# Patient Record
Sex: FEMALE | Race: BLACK OR AFRICAN AMERICAN | NOT HISPANIC OR LATINO | URBAN - METROPOLITAN AREA
[De-identification: names, ages, dates, MRNs, and addresses within clinical notes are randomized per-mention and may not be internally consistent; named-entity substitution may affect disease eponyms.]

---

## 2017-03-07 ENCOUNTER — APPOINTMENT (RX ONLY)
Dept: URBAN - METROPOLITAN AREA CLINIC 349 | Facility: CLINIC | Age: 72
Setting detail: DERMATOLOGY
End: 2017-03-07

## 2017-03-07 DIAGNOSIS — L64.8 OTHER ANDROGENIC ALOPECIA: ICD-10-CM

## 2017-03-07 DIAGNOSIS — L73.2 HIDRADENITIS SUPPURATIVA: ICD-10-CM

## 2017-03-07 PROBLEM — I10 ESSENTIAL (PRIMARY) HYPERTENSION: Status: ACTIVE | Noted: 2017-03-07

## 2017-03-07 PROCEDURE — ? PRESCRIPTION

## 2017-03-07 PROCEDURE — ? TREATMENT REGIMEN

## 2017-03-07 PROCEDURE — ? COUNSELING

## 2017-03-07 PROCEDURE — 99213 OFFICE O/P EST LOW 20 MIN: CPT

## 2017-03-07 RX ORDER — MICONAZOLE NITRATE 20.6 MG/G
POWDER TOPICAL
Qty: 1 | Refills: 3 | Status: ERX | COMMUNITY
Start: 2017-03-07

## 2017-03-07 RX ORDER — CLOBETASOL PROPIONATE 0.5 MG/ML
SOLUTION TOPICAL
Qty: 1 | Refills: 6 | Status: ERX | COMMUNITY
Start: 2017-03-07

## 2017-03-07 RX ORDER — CLINDAMYCIN PHOSPHATE 10 MG/ML
SOLUTION TOPICAL
Qty: 1 | Refills: 4 | Status: ERX

## 2017-03-07 RX ORDER — CIPROFLOXACIN 750 MG/1
TABLET, FILM COATED ORAL
Qty: 60 | Refills: 1 | Status: ERX

## 2017-03-07 RX ADMIN — MICONAZOLE NITRATE: 20.6 POWDER TOPICAL at 18:06

## 2017-03-07 RX ADMIN — CLOBETASOL PROPIONATE: 0.5 SOLUTION TOPICAL at 18:10

## 2017-03-07 ASSESSMENT — LOCATION SIMPLE DESCRIPTION DERM
LOCATION SIMPLE: LEFT SCALP
LOCATION SIMPLE: RIGHT UPPER BACK
LOCATION SIMPLE: UPPER BACK
LOCATION SIMPLE: LEFT SCALP
LOCATION SIMPLE: LEFT UPPER BACK

## 2017-03-07 ASSESSMENT — LOCATION ZONE DERM
LOCATION ZONE: SCALP
LOCATION ZONE: TRUNK
LOCATION ZONE: SCALP
LOCATION ZONE: TRUNK

## 2017-03-07 ASSESSMENT — LOCATION DETAILED DESCRIPTION DERM
LOCATION DETAILED: LEFT INFERIOR LATERAL UPPER BACK
LOCATION DETAILED: INFERIOR THORACIC SPINE
LOCATION DETAILED: LEFT CENTRAL FRONTAL SCALP
LOCATION DETAILED: LEFT MEDIAL FRONTAL SCALP
LOCATION DETAILED: RIGHT INFERIOR LATERAL UPPER BACK

## 2017-03-07 NOTE — HPI: RASH (HIDRADENITIS SUPPURATIVA)
How Severe Is It?: severe
Is This A New Presentation, Or A Follow-Up?: Follow Up Hidradenitis Suppurativa

## 2017-03-27 ENCOUNTER — RX ONLY (OUTPATIENT)
Age: 72
Setting detail: RX ONLY
End: 2017-03-27

## 2017-03-27 RX ORDER — FLUCONAZOLE 150 MG/1
TABLET ORAL
Qty: 5 | Refills: 1 | Status: ERX

## 2018-11-01 PROBLEM — E66.01 SEVERE OBESITY (HCC): Status: ACTIVE | Noted: 2018-11-01

## 2020-07-07 ENCOUNTER — APPOINTMENT (RX ONLY)
Dept: URBAN - METROPOLITAN AREA CLINIC 349 | Facility: CLINIC | Age: 75
Setting detail: DERMATOLOGY
End: 2020-07-07

## 2020-07-07 DIAGNOSIS — L30.9 DERMATITIS, UNSPECIFIED: ICD-10-CM

## 2020-07-07 DIAGNOSIS — L73.2 HIDRADENITIS SUPPURATIVA: ICD-10-CM

## 2020-07-07 DIAGNOSIS — L81.0 POSTINFLAMMATORY HYPERPIGMENTATION: ICD-10-CM

## 2020-07-07 PROBLEM — I10 ESSENTIAL (PRIMARY) HYPERTENSION: Status: ACTIVE | Noted: 2020-07-07

## 2020-07-07 PROCEDURE — 99213 OFFICE O/P EST LOW 20 MIN: CPT

## 2020-07-07 PROCEDURE — ? PRESCRIPTION

## 2020-07-07 PROCEDURE — ? COUNSELING

## 2020-07-07 RX ORDER — CIPROFLOXACIN 750 MG/1
TABLET, FILM COATED ORAL
Qty: 60 | Refills: 2 | Status: ERX | COMMUNITY
Start: 2020-07-07

## 2020-07-07 RX ORDER — CLINDAMYCIN PHOSPHATE 10 MG/ML
SOLUTION TOPICAL
Qty: 1 | Refills: 6 | Status: ERX | COMMUNITY
Start: 2020-07-07

## 2020-07-07 RX ORDER — TRIAMCINOLONE ACETONIDE 1 MG/G
CREAM TOPICAL
Qty: 1 | Refills: 2 | Status: ERX | COMMUNITY
Start: 2020-07-07

## 2020-07-07 RX ADMIN — CIPROFLOXACIN: 750 TABLET, FILM COATED ORAL at 00:00

## 2020-07-07 RX ADMIN — TRIAMCINOLONE ACETONIDE: 1 CREAM TOPICAL at 00:00

## 2020-07-07 RX ADMIN — CLINDAMYCIN PHOSPHATE: 10 SOLUTION TOPICAL at 00:00

## 2020-07-07 ASSESSMENT — LOCATION SIMPLE DESCRIPTION DERM
LOCATION SIMPLE: LEFT UPPER BACK
LOCATION SIMPLE: LEFT LOWER BACK
LOCATION SIMPLE: LEFT AXILLARY VAULT
LOCATION SIMPLE: UPPER BACK
LOCATION SIMPLE: RIGHT UPPER BACK
LOCATION SIMPLE: RIGHT POSTERIOR AXILLA
LOCATION SIMPLE: RIGHT AXILLARY VAULT
LOCATION SIMPLE: RIGHT LOWER BACK

## 2020-07-07 ASSESSMENT — LOCATION DETAILED DESCRIPTION DERM
LOCATION DETAILED: LEFT SUPERIOR LATERAL MIDBACK
LOCATION DETAILED: RIGHT POSTERIOR AXILLA
LOCATION DETAILED: LEFT AXILLARY VAULT
LOCATION DETAILED: RIGHT AXILLARY VAULT
LOCATION DETAILED: INFERIOR THORACIC SPINE
LOCATION DETAILED: RIGHT SUPERIOR LATERAL MIDBACK
LOCATION DETAILED: RIGHT INFERIOR UPPER BACK
LOCATION DETAILED: LEFT INFERIOR UPPER BACK

## 2020-07-07 ASSESSMENT — LOCATION ZONE DERM
LOCATION ZONE: TRUNK
LOCATION ZONE: AXILLAE

## 2020-08-28 ENCOUNTER — APPOINTMENT (RX ONLY)
Dept: URBAN - METROPOLITAN AREA CLINIC 349 | Facility: CLINIC | Age: 75
Setting detail: DERMATOLOGY
End: 2020-08-28

## 2020-08-28 DIAGNOSIS — L81.0 POSTINFLAMMATORY HYPERPIGMENTATION: ICD-10-CM

## 2020-08-28 DIAGNOSIS — L73.2 HIDRADENITIS SUPPURATIVA: ICD-10-CM

## 2020-08-28 PROBLEM — E13.9 OTHER SPECIFIED DIABETES MELLITUS WITHOUT COMPLICATIONS: Status: ACTIVE | Noted: 2020-08-28

## 2020-08-28 PROCEDURE — 99213 OFFICE O/P EST LOW 20 MIN: CPT

## 2020-08-28 PROCEDURE — ? PRESCRIPTION

## 2020-08-28 PROCEDURE — ? COUNSELING

## 2020-08-28 RX ORDER — CIPROFLOXACIN 750 MG/1
TABLET, FILM COATED ORAL
Qty: 60 | Refills: 2 | Status: ERX

## 2020-08-28 RX ORDER — CLINDAMYCIN PHOSPHATE 10 MG/ML
SOLUTION TOPICAL
Qty: 1 | Refills: 6 | Status: ERX

## 2020-08-28 ASSESSMENT — LOCATION SIMPLE DESCRIPTION DERM
LOCATION SIMPLE: LEFT AXILLARY VAULT
LOCATION SIMPLE: RIGHT POSTERIOR AXILLA
LOCATION SIMPLE: UPPER BACK
LOCATION SIMPLE: RIGHT UPPER BACK
LOCATION SIMPLE: LEFT UPPER BACK
LOCATION SIMPLE: RIGHT AXILLARY VAULT

## 2020-08-28 ASSESSMENT — LOCATION DETAILED DESCRIPTION DERM
LOCATION DETAILED: INFERIOR THORACIC SPINE
LOCATION DETAILED: RIGHT AXILLARY VAULT
LOCATION DETAILED: RIGHT POSTERIOR AXILLA
LOCATION DETAILED: LEFT INFERIOR UPPER BACK
LOCATION DETAILED: LEFT AXILLARY VAULT
LOCATION DETAILED: RIGHT INFERIOR UPPER BACK

## 2020-08-28 ASSESSMENT — LOCATION ZONE DERM
LOCATION ZONE: AXILLAE
LOCATION ZONE: TRUNK

## 2021-04-09 PROBLEM — E11.3213 BOTH EYES AFFECTED BY MILD NONPROLIFERATIVE DIABETIC RETINOPATHY WITH MACULAR EDEMA, ASSOCIATED WITH TYPE 2 DIABETES MELLITUS (HCC): Status: ACTIVE | Noted: 2021-04-09

## 2021-05-30 ENCOUNTER — HOSPITAL ENCOUNTER (INPATIENT)
Age: 76
LOS: 1 days | Discharge: HOME | DRG: 065 | End: 2021-06-01
Attending: EMERGENCY MEDICINE | Admitting: INTERNAL MEDICINE
Payer: MEDICARE

## 2021-05-30 ENCOUNTER — APPOINTMENT (OUTPATIENT)
Dept: CT IMAGING | Age: 76
DRG: 065 | End: 2021-05-30
Attending: EMERGENCY MEDICINE
Payer: MEDICARE

## 2021-05-30 DIAGNOSIS — R29.810 FACIAL DROOP: Primary | ICD-10-CM

## 2021-05-30 DIAGNOSIS — I63.9 ACUTE ISCHEMIC STROKE (HCC): ICD-10-CM

## 2021-05-30 DIAGNOSIS — R47.1 DYSARTHRIA: ICD-10-CM

## 2021-05-30 PROBLEM — R09.89 SUSPECTED STROKE PATIENT LAST KNOWN TO BE WELL MORE THAN 2 HOURS AGO: Status: ACTIVE | Noted: 2021-05-30

## 2021-05-30 LAB
ANION GAP SERPL CALC-SCNC: 8 MMOL/L (ref 7–16)
ATRIAL RATE: 62 BPM
BASOPHILS # BLD: 0.1 K/UL (ref 0–0.2)
BASOPHILS NFR BLD: 1 % (ref 0–2)
BUN SERPL-MCNC: 16 MG/DL (ref 8–23)
CALCIUM SERPL-MCNC: 10 MG/DL (ref 8.3–10.4)
CALCULATED P AXIS, ECG09: 37 DEGREES
CALCULATED R AXIS, ECG10: -3 DEGREES
CALCULATED T AXIS, ECG11: 81 DEGREES
CHLORIDE SERPL-SCNC: 110 MMOL/L (ref 98–107)
CO2 SERPL-SCNC: 24 MMOL/L (ref 21–32)
CREAT SERPL-MCNC: 0.71 MG/DL (ref 0.6–1)
DIAGNOSIS, 93000: NORMAL
DIFFERENTIAL METHOD BLD: ABNORMAL
EOSINOPHIL # BLD: 0.3 K/UL (ref 0–0.8)
EOSINOPHIL NFR BLD: 5 % (ref 0.5–7.8)
ERYTHROCYTE [DISTWIDTH] IN BLOOD BY AUTOMATED COUNT: 13.4 % (ref 11.9–14.6)
GLUCOSE BLD STRIP.AUTO-MCNC: 117 MG/DL (ref 65–100)
GLUCOSE BLD STRIP.AUTO-MCNC: 229 MG/DL (ref 65–100)
GLUCOSE BLD STRIP.AUTO-MCNC: 233 MG/DL (ref 65–100)
GLUCOSE SERPL-MCNC: 218 MG/DL (ref 65–100)
HCT VFR BLD AUTO: 37.9 % (ref 35.8–46.3)
HGB BLD-MCNC: 11.9 G/DL (ref 11.7–15.4)
IMM GRANULOCYTES # BLD AUTO: 0 K/UL (ref 0–0.5)
IMM GRANULOCYTES NFR BLD AUTO: 1 % (ref 0–5)
INR PPP: 1
LYMPHOCYTES # BLD: 1.7 K/UL (ref 0.5–4.6)
LYMPHOCYTES NFR BLD: 26 % (ref 13–44)
MCH RBC QN AUTO: 29.5 PG (ref 26.1–32.9)
MCHC RBC AUTO-ENTMCNC: 31.4 G/DL (ref 31.4–35)
MCV RBC AUTO: 94 FL (ref 79.6–97.8)
MONOCYTES # BLD: 0.6 K/UL (ref 0.1–1.3)
MONOCYTES NFR BLD: 9 % (ref 4–12)
NEUTS SEG # BLD: 3.8 K/UL (ref 1.7–8.2)
NEUTS SEG NFR BLD: 59 % (ref 43–78)
NRBC # BLD: 0 K/UL (ref 0–0.2)
P-R INTERVAL, ECG05: 202 MS
PLATELET # BLD AUTO: 197 K/UL (ref 150–450)
PMV BLD AUTO: 9.2 FL (ref 9.4–12.3)
POTASSIUM SERPL-SCNC: 4.1 MMOL/L (ref 3.5–5.1)
PROTHROMBIN TIME: 13.8 SEC (ref 12.5–14.7)
Q-T INTERVAL, ECG07: 424 MS
QRS DURATION, ECG06: 86 MS
QTC CALCULATION (BEZET), ECG08: 430 MS
RBC # BLD AUTO: 4.03 M/UL (ref 4.05–5.2)
SERVICE CMNT-IMP: ABNORMAL
SODIUM SERPL-SCNC: 142 MMOL/L (ref 136–145)
TROPONIN-HIGH SENSITIVITY: 12.6 PG/ML (ref 0–14)
VENTRICULAR RATE, ECG03: 62 BPM
WBC # BLD AUTO: 6.4 K/UL (ref 4.3–11.1)

## 2021-05-30 PROCEDURE — 85025 COMPLETE CBC W/AUTO DIFF WBC: CPT

## 2021-05-30 PROCEDURE — 85610 PROTHROMBIN TIME: CPT

## 2021-05-30 PROCEDURE — 99218 HC RM OBSERVATION: CPT

## 2021-05-30 PROCEDURE — 70450 CT HEAD/BRAIN W/O DYE: CPT

## 2021-05-30 PROCEDURE — 99285 EMERGENCY DEPT VISIT HI MDM: CPT

## 2021-05-30 PROCEDURE — 74011250637 HC RX REV CODE- 250/637: Performed by: INTERNAL MEDICINE

## 2021-05-30 PROCEDURE — 82962 GLUCOSE BLOOD TEST: CPT

## 2021-05-30 PROCEDURE — 93005 ELECTROCARDIOGRAM TRACING: CPT | Performed by: EMERGENCY MEDICINE

## 2021-05-30 PROCEDURE — 74011250636 HC RX REV CODE- 250/636: Performed by: INTERNAL MEDICINE

## 2021-05-30 PROCEDURE — 84484 ASSAY OF TROPONIN QUANT: CPT

## 2021-05-30 PROCEDURE — 2709999900 HC NON-CHARGEABLE SUPPLY

## 2021-05-30 PROCEDURE — 80048 BASIC METABOLIC PNL TOTAL CA: CPT

## 2021-05-30 PROCEDURE — 74011636637 HC RX REV CODE- 636/637: Performed by: INTERNAL MEDICINE

## 2021-05-30 RX ORDER — CLOPIDOGREL BISULFATE 75 MG/1
75 TABLET ORAL DAILY
Status: DISCONTINUED | OUTPATIENT
Start: 2021-05-31 | End: 2021-06-01 | Stop reason: HOSPADM

## 2021-05-30 RX ORDER — SODIUM CHLORIDE 0.9 % (FLUSH) 0.9 %
5-10 SYRINGE (ML) INJECTION AS NEEDED
Status: DISCONTINUED | OUTPATIENT
Start: 2021-05-30 | End: 2021-06-01 | Stop reason: HOSPADM

## 2021-05-30 RX ORDER — SODIUM CHLORIDE 0.9 % (FLUSH) 0.9 %
5-40 SYRINGE (ML) INJECTION AS NEEDED
Status: DISCONTINUED | OUTPATIENT
Start: 2021-05-30 | End: 2021-06-01 | Stop reason: HOSPADM

## 2021-05-30 RX ORDER — INSULIN LISPRO 100 [IU]/ML
INJECTION, SOLUTION INTRAVENOUS; SUBCUTANEOUS
Status: DISCONTINUED | OUTPATIENT
Start: 2021-05-30 | End: 2021-06-01 | Stop reason: HOSPADM

## 2021-05-30 RX ORDER — ONDANSETRON 2 MG/ML
4 INJECTION INTRAMUSCULAR; INTRAVENOUS
Status: DISCONTINUED | OUTPATIENT
Start: 2021-05-30 | End: 2021-06-01 | Stop reason: HOSPADM

## 2021-05-30 RX ORDER — MYCOPHENOLATE MOFETIL 250 MG/1
250 CAPSULE ORAL
Status: DISCONTINUED | OUTPATIENT
Start: 2021-05-30 | End: 2021-06-01 | Stop reason: HOSPADM

## 2021-05-30 RX ORDER — ATORVASTATIN CALCIUM 40 MG/1
80 TABLET, FILM COATED ORAL
Status: DISCONTINUED | OUTPATIENT
Start: 2021-05-30 | End: 2021-06-01 | Stop reason: HOSPADM

## 2021-05-30 RX ORDER — SODIUM CHLORIDE 0.9 % (FLUSH) 0.9 %
5-40 SYRINGE (ML) INJECTION EVERY 8 HOURS
Status: DISCONTINUED | OUTPATIENT
Start: 2021-05-30 | End: 2021-05-30

## 2021-05-30 RX ORDER — GUAIFENESIN 100 MG/5ML
81 LIQUID (ML) ORAL DAILY
Status: DISCONTINUED | OUTPATIENT
Start: 2021-05-31 | End: 2021-06-01 | Stop reason: HOSPADM

## 2021-05-30 RX ORDER — SODIUM CHLORIDE 0.9 % (FLUSH) 0.9 %
5-10 SYRINGE (ML) INJECTION EVERY 8 HOURS
Status: DISCONTINUED | OUTPATIENT
Start: 2021-05-30 | End: 2021-06-01 | Stop reason: HOSPADM

## 2021-05-30 RX ORDER — INSULIN GLARGINE 100 [IU]/ML
10 INJECTION, SOLUTION SUBCUTANEOUS
Status: DISCONTINUED | OUTPATIENT
Start: 2021-05-30 | End: 2021-06-01 | Stop reason: HOSPADM

## 2021-05-30 RX ORDER — ACETAMINOPHEN 325 MG/1
650 TABLET ORAL
Status: DISCONTINUED | OUTPATIENT
Start: 2021-05-30 | End: 2021-06-01 | Stop reason: HOSPADM

## 2021-05-30 RX ADMIN — ATORVASTATIN CALCIUM 80 MG: 40 TABLET, FILM COATED ORAL at 21:36

## 2021-05-30 RX ADMIN — INSULIN LISPRO 4 UNITS: 100 INJECTION, SOLUTION INTRAVENOUS; SUBCUTANEOUS at 16:28

## 2021-05-30 RX ADMIN — MYCOPHENOLATE MOFETIL 250 MG: 250 CAPSULE ORAL at 16:28

## 2021-05-30 RX ADMIN — Medication 10 ML: at 21:29

## 2021-05-30 NOTE — PROGRESS NOTES
SW met with patient who confirmed demographic information, states that she lives with her  and has extensive family support. Patient does not use a walker to ambulate, does not require ADL assistance, and denies any falls. She states that her  does supervise her with bathing just in case she needs extra support. Patient is seen by primary care and is current. Patient states that she currently feels weaker than baseline. PT/OT evaluations ordered. Will continue to follow PT/OT evals for further discharge planning recommendations.      Khadar Fried, COOPER    [facility]    * [email or web address]

## 2021-05-30 NOTE — PROGRESS NOTES
SW reviewed patient's chart and conducted a baseline assessment. Discharge plan at this time is as follows:     Care Management Interventions  PCP Verified by CM: Yes  Mode of Transport at Discharge: Self  Transition of Care Consult (CM Consult): Discharge Planning  Physical Therapy Consult: Yes  Occupational Therapy Consult: Yes  Speech Therapy Consult: Yes  Current Support Network: Own Home, Lives with Spouse  Discharge Location  Discharge Placement: Home with family assistance      *Please note that discharge plans can change throughout an inpatient admission.  Ensure that you are referring to the most recent social work/nurse case management note for current discharge plan*     Nathanael Rowan, [address] [place] [address] Work   [facility]    * [email or web address]

## 2021-05-30 NOTE — ED NOTES
TRANSFER - OUT REPORT:    Verbal report given to GARIMA Nathan(name) on Micha Majano  being transferred to [facility](unit) for routine progression of care       Report consisted of patients Situation, Background, Assessment and   Recommendations(SBAR). Information from the following report(s) ED Summary was reviewed with the receiving nurse. Lines:   Peripheral IV 05/30/21 Right Antecubital (Active)   Site Assessment Clean, dry, & intact 05/30/21 1244   Phlebitis Assessment 0 05/30/21 1244   Infiltration Assessment 0 05/30/21 1244   Dressing Status Clean, dry, & intact 05/30/21 1244   Dressing Type Gauze;Tape;Transparent 05/30/21 1244   Hub Color/Line Status Pink;Flushed 05/30/21 1244   Action Taken Blood drawn 05/30/21 1244        Opportunity for questions and clarification was provided.       Patient transported with:   Registered Nurse

## 2021-05-30 NOTE — H&P
[facility] Hospitalist History and Physical       Name:  Jayne Pcikett  Age:75 y.o. Sex:female   :  1945    MRN:  [id number]   PCP:  Ayleen Early MD      Admit Date:  2021 12:37 PM   Chief Complaint: Difficulty speech    Reason for Admission:   Suspected stroke patient last known to be well more than 2 hours ago [R09.89]    Assessment & Plan:     Suspected stroke:  CT head negative, order MRI and MRA head and neck. No CTA due to renal transplant history. Continue aspirin, add Plavix and statin, check lipids/A1c/get echo/PT/OT/SLP eval.  Permissive hypertension for 24 hours. Prn hydralazine for BP > 220/110. DM2:  Start sliding scale and Lantus 10 units. Adjust according to blood sugars. S/p renal transplant:  Kidney function normal at this time, Resume home meds. Hypertension:  As above. Resume home meds tomorrow. CAD s/p endarterectomy:  Resume aspirin. Disposition/Expected LOS: Less than 48 hours  Diet: DIET DIABETIC WITH OPTIONS  VTE ppx: SCD  Code status: Full Code  Surrogate decision-maker:  Kiki Marshall: [phone]      History of Presenting Illness:     Jayne Pickett is a 70 y.o. female with medical history of renal transplant, DM 2 with neuropathy, hypertension, CAD, gout, hypothyroidism and obesity who presented to ED with CC of difficulty speaking. Patient is accompanied by her  and was providing history. Poor historians but reports that patient woke up with left-sided facial weakness and difficulty in speech.  also reported to ER staff that he probably had similar symptoms yesterday. Now both report that symptoms come and go. In the ER she had elevated blood pressure and Code S was called. Telemetry neuro did not recommend TPA due to NIH stroke scale of 2 and rapidly evolving symptoms. CT of head was unremarkable and neurology recommended further work-up for stroke.   Patient denies any headache, changes in vision, numbness or weakness in hands or feet. Hospitalist consulted for admission. Patient reports history of stroke in her mother many years ago. She was on dialysis before renal transplant. Blood pressure was 209/84 in ER, BG was 229. Review of Systems:  A 14 point review of systems was taken and pertinent positive as per HPI. Past Medical History:   Diagnosis Date    Anemia NEC     Carotid artery disease ([facility] [address]     Chronic kidney disease     dialysis patient mon wed and fri    Diabetes mellitus, type II ([facility] [address]     Dyspepsia and other specified disorders of function of stomach     Gout     Hypercholesterolemia     Hypertension     Hypothyroidism     Menopausal syndrome     Neuropathy     Renal failure     s/p kidney transplant x2    Right shoulder pain     Thyroid nodule        Past Surgical History:   Procedure Laterality Date    HX CATARACT REMOVAL Left     HX CATARACT REMOVAL Right     HX COLONOSCOPY      HX ORTHOPAEDIC      left rotator cuff, bilateral shoulder surg., rt. knee, left index finger    HX ORTHOPAEDIC      right knee surgery after an accident, has pins.  HX PARTIAL HYSTERECTOMY      HX TRANSPLANT      kidney rejected in ,     HX UROLOGICAL      Kidney tx in [facility].      right forearm.     VASCULAR SURGERY PROCEDURE UNLIST      fistula for dialysis, rt. carotid artery       Family History : reviewed  Family History   Problem Relation Age of Onset    Heart Disease Mother          age 46    Hypertension Mother     Stroke Mother     Liver Disease Father     Alcohol abuse Father     Diabetes Sister     Cancer Brother         colon    Lupus Sister     Diabetes Sister         Social History     Tobacco Use    Smoking status: Never Smoker    Smokeless tobacco: Never Used   Substance Use Topics    Alcohol use: No       Allergies   Allergen Reactions    Lisinopril Cough     cough       Immunization History   Administered Date(s) Administered   [facility] Influenza Vaccine 10/16/2014, 10/01/2015, 11/03/2016    Influenza Vaccine (Quad) Mdck Pf (>4 Yrs Flucelvax QUAD 74993) 10/06/2017    Influenza Vaccine (Quad) PF (>6 Mo Flulaval, Fluarix, and >3 Yrs Afluria, Fluzone 11871) 09/27/2018, 12/16/2019    Influenza Vaccine PF 10/16/2015    Pneumococcal Conjugate (PCV-7) 01/01/2012         PTA Medications:  Current Outpatient Medications   Medication Instructions    ascorbic acid (VITAMIN C) 500 mg tablet Oral    aspirin delayed-release 81 mg tablet DAILY    belatacept (NULOJIX) 500 mg, IntraVENous    carvediloL (COREG) 12.5 mg tablet TAKE 1 TABLET BY MOUTH TWICE DAILY    cholecalciferol (VITAMIN D3) 1,000 Units, Oral, DAILY    cyanocobalamin (Vitamin B-12) 250 mcg tablet 1 Tablet, Oral, DAILY    FreeStyle Rhonda 14 Day Sensor kit Use to monitor blood glucose. E11.65    glucagon (GLUCAGEN) 1 mg injection UAD , IM injection for severe hypoglycemia, include syringe and vial    Gvoke HypoPen 2-Pack 1 mg, SubCUTAneous, AS NEEDED    HumaLOG KwikPen Insulin 100 unit/mL kwikpen By correction 1/50>200, max daily dose 20 units    Insulin Needles, Disposable, 32 gauge x 5/32\" ndle Use as directed 3 times a day    insulin NPH (HUMULIN N) 20 Units, SubCUTAneous, DAILY BEFORE BREAKFAST    multivitamin (ONE A DAY) tablet 1 Tablet, Oral, DAILY    mycophenolate mofetil (CELLCEPT) 250 mg capsule TK 3 CS PO BID    RUTH PEN NEEDLE 32 x 5/32 \" ndle No dose, route, or frequency recorded.     predniSONE (DELTASONE) 5 mg, Oral, DAILY    Trulicity 5.14 mg, SubCUTAneous, EVERY 7 DAYS    VITAMIN B COMPLEX PO 1 Tablet, Oral, DAILY       Objective:     Patient Vitals for the past 24 hrs:   Temp Pulse Resp BP SpO2   05/30/21 1412  (!) 58  (!) 209/84 99 %   05/30/21 1334  (!) 58  (!) 189/76 98 %   05/30/21 1249  61   98 %   05/30/21 1248  61  (!) 166/74    05/30/21 1245 97.8 °F (36.6 °C) 60 20 (!) 166/74 99 %       Oxygen Therapy  O2 Sat (%): 99 % (05/30/21 1412)  Pulse via Oximetry: 58 beats per minute (05/30/21 1412)  O2 Device: None (Room air) (05/30/21 1251)    Body mass index is 30.11 kg/m². Physical Exam:    General:  No acute distress, speaking in full sentences  HEENT:  Pupils equal and reactive to light and accommodation, oropharynx is clear   Neck:   Supple, no lymphadenopathy, no JVD, right carotid endarterectomy scar  Lungs:  Clear to auscultation bilaterally   CV:   Regular rate and rhythm with normal S1 and S2   Abdomen:  Soft, nontender, nondistended, normoactive bowel sounds   Extremities:  No cyanosis clubbing or edema, LUE AVF  Neuro:  Nonfocal, A&O x3, Cranial nerves intact, sensations intact, Mild Left facial droop,    dysarthria  Psych:  Normal mood and affect       Data Reviewed: I have reviewed all labs, meds, and studies. Recent Results (from the past 24 hour(s))   CBC WITH AUTOMATED DIFF    Collection Time: 05/30/21 12:47 PM   Result Value Ref Range    WBC 6.4 4.3 - 11.1 K/uL    RBC 4.03 (L) 4.05 - 5.2 M/uL    HGB 11.9 11.7 - 15.4 g/dL    HCT 37.9 35.8 - 46.3 %    MCV 94.0 79.6 - 97.8 FL    MCH 29.5 26.1 - 32.9 PG    MCHC 31.4 31.4 - 35.0 g/dL    RDW 13.4 11.9 - 14.6 %    PLATELET 509 042 - 068 K/uL    MPV 9.2 (L) 9.4 - 12.3 FL    ABSOLUTE NRBC 0.00 0.0 - 0.2 K/uL    DF AUTOMATED      NEUTROPHILS 59 43 - 78 %    LYMPHOCYTES 26 13 - 44 %    MONOCYTES 9 4.0 - 12.0 %    EOSINOPHILS 5 0.5 - 7.8 %    BASOPHILS 1 0.0 - 2.0 %    IMMATURE GRANULOCYTES 1 0.0 - 5.0 %    ABS. NEUTROPHILS 3.8 1.7 - 8.2 K/UL    ABS. LYMPHOCYTES 1.7 0.5 - 4.6 K/UL    ABS. MONOCYTES 0.6 0.1 - 1.3 K/UL    ABS. EOSINOPHILS 0.3 0.0 - 0.8 K/UL    ABS. BASOPHILS 0.1 0.0 - 0.2 K/UL    ABS. IMM.  GRANS. 0.0 0.0 - 0.5 K/UL   METABOLIC PANEL, BASIC    Collection Time: 05/30/21 12:47 PM   Result Value Ref Range    Sodium 142 136 - 145 mmol/L    Potassium 4.1 3.5 - 5.1 mmol/L    Chloride 110 (H) 98 - 107 mmol/L    CO2 24 21 - 32 mmol/L    Anion gap 8 7 - 16 mmol/L    Glucose 218 (H) 65 - 100 mg/dL    BUN 16 8 - 23 MG/DL    Creatinine 0.71 0.6 - 1.0 MG/DL    GFR est AA >60 >60 ml/min/1.73m2    GFR est non-AA >60 >60 ml/min/1.73m2    Calcium 10.0 8.3 - 10.4 MG/DL   PROTHROMBIN TIME + INR    Collection Time: 05/30/21 12:47 PM   Result Value Ref Range    Prothrombin time 13.8 12.5 - 14.7 sec    INR 1.0     TROPONIN-HIGH SENSITIVITY    Collection Time: 05/30/21 12:47 PM   Result Value Ref Range    Troponin-High Sensitivity 12.6 0 - 14 pg/mL   EKG, 12 LEAD, INITIAL    Collection Time: 05/30/21 12:47 PM   Result Value Ref Range    Ventricular Rate 62 BPM    Atrial Rate 62 BPM    P-R Interval 202 ms    QRS Duration 86 ms    Q-T Interval 424 ms    QTC Calculation (Bezet) 430 ms    Calculated P Axis 37 degrees    Calculated R Axis -3 degrees    Calculated T Axis 81 degrees    Diagnosis       !! AGE AND GENDER SPECIFIC ECG ANALYSIS !! Normal sinus rhythm  Normal ECG     GLUCOSE, POC    Collection Time: 05/30/21 12:54 PM   Result Value Ref Range    Glucose (POC) 229 (H) 65 - 100 mg/dL    Performed by Dunia        EKG Results     Procedure 720 Value Units Date/Time    Initial ECG [[id number]] Collected: 05/30/21 1247    Order Status: Completed Updated: 05/30/21 1400     Ventricular Rate 62 BPM      Atrial Rate 62 BPM      P-R Interval 202 ms      QRS Duration 86 ms      Q-T Interval 424 ms      QTC Calculation (Bezet) 430 ms      Calculated P Axis 37 degrees      Calculated R Axis -3 degrees      Calculated T Axis 81 degrees      Diagnosis --     !! AGE AND GENDER SPECIFIC ECG ANALYSIS !! Normal sinus rhythm  Normal ECG            All Micro Results     None          Other Studies:  CT CODE NEURO HEAD WO CONTRAST    Result Date: 5/30/2021  CT HEAD WITHOUT CONTRAST, 5/30/2021 History: Slurred speech beginning at 9:30 Comparison: None Technique:   5 mm axial scans from the skull base to the vertex.  All CT scans performed at this facility use one or all of the following: Automated exposure control, adjustment of the mA and/or kVp according to patient's size, iterative reconstruction. Findings:  No evidence of intracranial hemorrhage is seen. No abnormal extra-axial fluid collections are seen. Moderate age-appropriate chronic cortical volume loss is seen. No evidence for acute hydrocephalus is seen. No evidence of midline shift or herniation is seen. No abnormal edema pattern is seen in a vascular distribution to suggest large artery infarction. Somewhat defined hypodensity is seen in the left basal ganglia on image 16 suggesting a more subacute to chronic lacunar infarction. Evaluation with bone windows shows no acute osseous changes. The visualized sinuses, middle ears, and mastoid air cells are well aerated. 1.  No acute intracranial process evident by noncontrast CT study of the head. This report was made using voice transcription. Despite my best efforts to avoid any, transcription errors may persist. If there is any question about the accuracy of the report or need for clarification, then please call [phone], or text me through [email or web address] for clarification or correction.          Medications:  Medications Administered              Problem List:     Hospital Problems as of 5/30/2021 Date Reviewed: 5/27/2021        Codes Class Noted - Resolved POA    Suspected stroke patient last known to be well more than 2 hours ago ICD-10-CM: R09.89  ICD-9-CM: 785.9  5/30/2021 - Present Yes        Severe obesity ([facility] [address] ICD-10-CM: E66.01  ICD-9-CM: 278.01  11/1/2018 - Present Yes        Renal transplant, status post (Chronic) ICD-10-CM: Z94.0  ICD-9-CM: V42.0  4/19/2016 - Present Yes        Hypertension ICD-10-CM: I10  ICD-9-CM: 401.9  Unknown - Present Yes        Hypercholesterolemia ICD-10-CM: E78.00  ICD-9-CM: 272.0  Unknown - Present Yes        Hypothyroidism ICD-10-CM: E03.9  ICD-9-CM: 244.9  Unknown - Present Yes        Uncontrolled type 2 diabetes mellitus with hyperglycemia ([facility] [address] ICD-10-CM: E11.65  ICD-9-CM: 250.02  Unknown - Present Yes        Carotid artery disease ([facility] [address] ICD-10-CM: I77.9  ICD-9-CM: 447.9  Unknown - Present Yes        Gout ICD-10-CM: M10.9  ICD-9-CM: 274.9  Unknown - Present Yes               Signed By: Alivia Saavedra MD   [facility]ist [facility]    May 30, 2021  4:22 PM

## 2021-05-30 NOTE — PROGRESS NOTES
05/30/21 1606   Dual Skin Pressure Injury Assessment   Dual Skin Pressure Injury Assessment WDL   Second Care Provider (Based on [address] [place] [address]) Anahi Hancock, GARIMA   No wounds or breakdown noted. Fistulas in both arms from previous dialysis.

## 2021-05-30 NOTE — ED TRIAGE NOTES
Pt to ED with new onset slurred speech. Pt states she woke up at 9:30 this morning and noticed the slurred speech shortly after. pts  states she had episodes of slurred speech throughout the day yesterday and he felt that she had left facial droop intermittently yesterday as well. Pt denies any heart history. Pt denies any numbness or tingling. Masked.

## 2021-05-30 NOTE — PROGRESS NOTES
TRANSFER - IN REPORT:    Verbal report received from Jean slater RN(name) on [facility]  being received from ED(unit) for routine progression of care      Report consisted of patients Situation, Background, Assessment and   Recommendations(SBAR). Information from the following report(s) SBAR and ED Summary was reviewed with the receiving nurse. Opportunity for questions and clarification was provided. Assessment completed upon patients arrival to unit and care assumed.

## 2021-05-30 NOTE — ED PROVIDER NOTES
29-year-old black female with history of hypertension, kidney transplant in 2012, CAD, diabetes, and gout presents to the emergency department complaining of a right facial droop with slurred speech starting this morning. According the spouse, she may or may not have had a slight intermittent slurred and facial droop yesterday (very poor historian). Patient denies any headache or visual changes. The history is provided by the patient and the spouse. Dysarthria  This is a new problem. Episode onset: Patient woke this morning with the symptoms. The problem has not changed since onset. There was right facial focality noted. Primary symptoms include slurred speech. Pertinent negatives include no focal weakness, no loss of sensation, no loss of balance, no speech difficulty, no memory loss, no movement disorder, no agitation, no visual change, no auditory change, no mental status change, no unresponsiveness and no disorientation. There has been no fever. Pertinent negatives include no shortness of breath, no chest pain, no vomiting, no altered mental status, no confusion, no headaches, no choking, no nausea, no bowel incontinence and no bladder incontinence. There were no medications administered prior to arrival. Associated medical issues do not include trauma, mood changes, bleeding disorder, seizures, dementia, CVA or clotting disorder.         Past Medical History:   Diagnosis Date    Anemia NEC     Carotid artery disease ([facility] [address]     Chronic kidney disease     dialysis patient mon wed and fri    Diabetes mellitus, type II ([address]     Dyspepsia and other specified disorders of function of stomach     Gout     Hypercholesterolemia     Hypertension     Hypothyroidism     Menopausal syndrome     Neuropathy     Renal failure     s/p kidney transplant x2    Right shoulder pain     Thyroid nodule        Past Surgical History:   Procedure Laterality Date    HX CATARACT REMOVAL Left     HX CATARACT REMOVAL Right     HX COLONOSCOPY      HX ORTHOPAEDIC      left rotator cuff, bilateral shoulder surg., rt. knee, left index finger    HX ORTHOPAEDIC      right knee surgery after an accident, has pins.  HX PARTIAL HYSTERECTOMY      HX TRANSPLANT      kidney rejected in ,     HX UROLOGICAL      [facility]idney tx in [facility] .      right forearm.  VASCULAR SURGERY PROCEDURE UNLIST      fistula for dialysis, rt. carotid artery         Family History:   Problem Relation Age of Onset    Heart Disease Mother          age 46    Hypertension Mother     Stroke Mother     Liver Disease Father     Alcohol abuse Father     Diabetes Sister     Cancer Brother         colon    Lupus Sister     Diabetes Sister        Social History     Socioeconomic History    Marital status:      Spouse name: Not on file    Number of children: Not on file    Years of education: Not on file    Highest education level: Not on file   Occupational History    Not on file   Tobacco Use    Smoking status: Never Smoker    Smokeless tobacco: Never Used   Substance and Sexual Activity    Alcohol use: No    Drug use: No    Sexual activity: Never   Other Topics Concern    Not on file   Social History Narrative    Not on file     Social Determinants of Health     Financial Resource Strain:     Difficulty of Paying Living Expenses:    Food Insecurity:     Worried About Running Out of Food in the Last Year:     Ran Out of Food in the Last Year:    Transportation Needs:     Lack of Transportation (Medical):      Lack of Transportation (Non-Medical):    Physical Activity:     Days of Exercise per Week:     Minutes of Exercise per Session:    Stress:     Feeling of Stress :    Social Connections:     Frequency of Communication with Friends and Family:     Frequency of Social Gatherings with Friends and Family:     Attends Zoroastrian Services:     Active Member of Clubs or Organizations:     Attends [employer] or Organization Meetings:     Marital Status:    Intimate Partner Violence:     Fear of Current or Ex-Partner:     Emotionally Abused:     Physically Abused:     Sexually Abused: ALLERGIES: Lisinopril    Review of Systems   Constitutional: Negative for chills and fever. Respiratory: Negative for choking and shortness of breath. Cardiovascular: Negative for chest pain. Gastrointestinal: Negative for bowel incontinence, nausea and vomiting. Genitourinary: Negative for bladder incontinence. Neurological: Positive for facial asymmetry. Negative for focal weakness, speech difficulty, headaches and loss of balance. Psychiatric/Behavioral: Negative for agitation, confusion and memory loss. All other systems reviewed and are negative. Vitals:    05/30/21 1245   BP: (!) 166/74   Pulse: 60   Resp: 20   Temp: 97.8 °F (36.6 °C)   SpO2: 99%   Weight: 77.1 kg (170 lb)   Height: 5' 3\" (1.6 m)            Physical Exam  Vitals and nursing note reviewed. Constitutional:       General: She is not in acute distress. Appearance: She is well-developed. HENT:      Head: Normocephalic and atraumatic. Right Ear: External ear normal.      Left Ear: External ear normal.   Eyes:      General: No visual field deficit. Extraocular Movements: Extraocular movements intact. Conjunctiva/sclera: Conjunctivae normal.      Pupils: Pupils are equal, round, and reactive to light. Cardiovascular:      Rate and Rhythm: Normal rate and regular rhythm. Heart sounds: Normal heart sounds. No murmur heard. Pulmonary:      Effort: Pulmonary effort is normal.      Breath sounds: Normal breath sounds. Abdominal:      General: Bowel sounds are normal.      Palpations: Abdomen is soft. Tenderness: There is no abdominal tenderness. Musculoskeletal:         General: Normal range of motion. Cervical back: Normal range of motion and neck supple. Skin:     General: Skin is warm and dry. Capillary Refill: Capillary refill takes less than 2 seconds. Neurological:      Mental Status: She is alert and oriented to person, place, and time. Cranial Nerves: Cranial nerve deficit, dysarthria and facial asymmetry present. Sensory: Sensation is intact. Motor: Motor function is intact. Psychiatric:         Mood and Affect: Mood and affect normal.         Behavior: Behavior normal.         Cognition and Memory: Cognition and memory normal.          MDM  Number of Diagnoses or Management Options  Dysarthria: new and requires workup  Facial droop: new and requires workup     Amount and/or Complexity of Data Reviewed  Clinical lab tests: ordered and reviewed  Tests in the radiology section of CPT®: ordered and reviewed  Tests in the medicine section of CPT®: ordered and reviewed  Review and summarize past medical records: yes  Discuss the patient with other providers: yes    Risk of Complications, Morbidity, and/or Mortality  Presenting problems: high  Diagnostic procedures: moderate  Management options: moderate    Patient Progress  Patient progress: improved         Procedures    The patient was observed in the ED. a code stroke was called on presentation due to the recent nature of the onset of the deficits although it is unclear whether this occurred today or yesterday. After the patient was evaluated by neurologist via video conferencing, symptoms were improving greatly. CT of the head revealed no evidence of acute infarct, and the patient did not meet TPA criteria due to the relative minor symptoms that were resolving. It was recommended the patient be placed on Plavix, undergo MRI and MRA as opposed to CTA due to the minimal deficit as well as patient is a kidney transplant and did not want to risk further kidney injury with contrast from the CTA. Case was discussed with the hospitalist who will admit.     Patient is not a TPA candidate for the following reason(s):  Rapid improvement of stroke symptoms  Minor or isolated neurological signs  Symptom onset fall outside the therapeutic window or onset undetermined        Results Reviewed:      Recent Results (from the past 24 hour(s))   CBC WITH AUTOMATED DIFF    Collection Time: 05/30/21 12:47 PM   Result Value Ref Range    WBC 6.4 4.3 - 11.1 K/uL    RBC 4.03 (L) 4.05 - 5.2 M/uL    HGB 11.9 11.7 - 15.4 g/dL    HCT 37.9 35.8 - 46.3 %    MCV 94.0 79.6 - 97.8 FL    MCH 29.5 26.1 - 32.9 PG    MCHC 31.4 31.4 - 35.0 g/dL    RDW 13.4 11.9 - 14.6 %    PLATELET 258 111 - 626 K/uL    MPV 9.2 (L) 9.4 - 12.3 FL    ABSOLUTE NRBC 0.00 0.0 - 0.2 K/uL    DF AUTOMATED      NEUTROPHILS 59 43 - 78 %    LYMPHOCYTES 26 13 - 44 %    MONOCYTES 9 4.0 - 12.0 %    EOSINOPHILS 5 0.5 - 7.8 %    BASOPHILS 1 0.0 - 2.0 %    IMMATURE GRANULOCYTES 1 0.0 - 5.0 %    ABS. NEUTROPHILS 3.8 1.7 - 8.2 K/UL    ABS. LYMPHOCYTES 1.7 0.5 - 4.6 K/UL    ABS. MONOCYTES 0.6 0.1 - 1.3 K/UL    ABS. EOSINOPHILS 0.3 0.0 - 0.8 K/UL    ABS. BASOPHILS 0.1 0.0 - 0.2 K/UL    ABS. IMM.  GRANS. 0.0 0.0 - 0.5 K/UL   METABOLIC PANEL, BASIC    Collection Time: 05/30/21 12:47 PM   Result Value Ref Range    Sodium 142 136 - 145 mmol/L    Potassium 4.1 3.5 - 5.1 mmol/L    Chloride 110 (H) 98 - 107 mmol/L    CO2 24 21 - 32 mmol/L    Anion gap 8 7 - 16 mmol/L    Glucose 218 (H) 65 - 100 mg/dL    BUN 16 8 - 23 MG/DL    Creatinine 0.71 0.6 - 1.0 MG/DL    GFR est AA >60 >60 ml/min/1.73m2    GFR est non-AA >60 >60 ml/min/1.73m2    Calcium 10.0 8.3 - 10.4 MG/DL   PROTHROMBIN TIME + INR    Collection Time: 05/30/21 12:47 PM   Result Value Ref Range    Prothrombin time 13.8 12.5 - 14.7 sec    INR 1.0     TROPONIN-HIGH SENSITIVITY    Collection Time: 05/30/21 12:47 PM   Result Value Ref Range    Troponin-High Sensitivity 12.6 0 - 14 pg/mL   GLUCOSE, POC    Collection Time: 05/30/21 12:54 PM   Result Value Ref Range    Glucose (POC) 229 (H) 65 - 100 mg/dL    Performed by [facility]      CT CODE NEURO HEAD WO CONTRAST Final Result   1. No acute intracranial process evident by noncontrast CT study of the head. This report was made using voice transcription. Despite my best efforts to avoid   any, transcription errors may persist. If there is any question about the   accuracy of the report or need for clarification, then please call [phone], or text me through perfectserv for clarification or correction. CRITICAL CARE Documentation: This patient is critically ill and there is a high probability of of imminent or life threatening deterioration in the patient's condition without immediate management. The nature of the patient's clinical problem is: Right facial droop with dysarthria  I have spent 45 minutes in direct patient care, documentation, review of labs/xrays/old records, discussion with Family, Staff, Colleague, Nursing . The time involved in the performance of separately reportable procedures was not counted toward critical care time.      Larry Davis MD; 5/30/2021 @1:53 PM

## 2021-05-30 NOTE — PROGRESS NOTES
Problem: Falls - Risk of  Goal: *Absence of Falls  Description: Document Ubaldo Walls Fall Risk and appropriate interventions in the flowsheet.   Outcome: Progressing Towards Goal  Note: Fall Risk Interventions:            Medication Interventions: Teach patient to arise slowly

## 2021-05-31 ENCOUNTER — APPOINTMENT (OUTPATIENT)
Dept: MRI IMAGING | Age: 76
DRG: 065 | End: 2021-05-31
Attending: INTERNAL MEDICINE
Payer: MEDICARE

## 2021-05-31 PROBLEM — I63.9 ACUTE ISCHEMIC STROKE (HCC): Status: ACTIVE | Noted: 2021-05-31

## 2021-05-31 LAB
CHOLEST SERPL-MCNC: 262 MG/DL
ERYTHROCYTE [DISTWIDTH] IN BLOOD BY AUTOMATED COUNT: 13.5 % (ref 11.9–14.6)
EST. AVERAGE GLUCOSE BLD GHB EST-MCNC: 169 MG/DL
GLUCOSE BLD STRIP.AUTO-MCNC: 135 MG/DL (ref 65–100)
GLUCOSE BLD STRIP.AUTO-MCNC: 176 MG/DL (ref 65–100)
GLUCOSE BLD STRIP.AUTO-MCNC: 198 MG/DL (ref 65–100)
GLUCOSE BLD STRIP.AUTO-MCNC: 212 MG/DL (ref 65–100)
HBA1C MFR BLD: 7.5 % (ref 4.2–6.3)
HCT VFR BLD AUTO: 35.6 % (ref 35.8–46.3)
HDLC SERPL-MCNC: 89 MG/DL (ref 40–60)
HDLC SERPL: 2.9 {RATIO}
HGB BLD-MCNC: 11.2 G/DL (ref 11.7–15.4)
LDLC SERPL CALC-MCNC: 152.8 MG/DL
MCH RBC QN AUTO: 29.3 PG (ref 26.1–32.9)
MCHC RBC AUTO-ENTMCNC: 31.5 G/DL (ref 31.4–35)
MCV RBC AUTO: 93.2 FL (ref 79.6–97.8)
NRBC # BLD: 0 K/UL (ref 0–0.2)
PLATELET # BLD AUTO: 182 K/UL (ref 150–450)
PMV BLD AUTO: 9.2 FL (ref 9.4–12.3)
RBC # BLD AUTO: 3.82 M/UL (ref 4.05–5.2)
SERVICE CMNT-IMP: ABNORMAL
TRIGL SERPL-MCNC: 101 MG/DL (ref 35–150)
VLDLC SERPL CALC-MCNC: 20.2 MG/DL (ref 6–23)
WBC # BLD AUTO: 5.1 K/UL (ref 4.3–11.1)

## 2021-05-31 PROCEDURE — 82962 GLUCOSE BLOOD TEST: CPT

## 2021-05-31 PROCEDURE — 97165 OT EVAL LOW COMPLEX 30 MIN: CPT

## 2021-05-31 PROCEDURE — 97116 GAIT TRAINING THERAPY: CPT

## 2021-05-31 PROCEDURE — 2709999900 HC NON-CHARGEABLE SUPPLY

## 2021-05-31 PROCEDURE — 70551 MRI BRAIN STEM W/O DYE: CPT

## 2021-05-31 PROCEDURE — 83036 HEMOGLOBIN GLYCOSYLATED A1C: CPT

## 2021-05-31 PROCEDURE — 74011250636 HC RX REV CODE- 250/636: Performed by: INTERNAL MEDICINE

## 2021-05-31 PROCEDURE — 70547 MR ANGIOGRAPHY NECK W/O DYE: CPT

## 2021-05-31 PROCEDURE — 65270000029 HC RM PRIVATE

## 2021-05-31 PROCEDURE — 92610 EVALUATE SWALLOWING FUNCTION: CPT

## 2021-05-31 PROCEDURE — 74011250637 HC RX REV CODE- 250/637: Performed by: INTERNAL MEDICINE

## 2021-05-31 PROCEDURE — 74011636637 HC RX REV CODE- 636/637: Performed by: INTERNAL MEDICINE

## 2021-05-31 PROCEDURE — 36415 COLL VENOUS BLD VENIPUNCTURE: CPT

## 2021-05-31 PROCEDURE — 99218 HC RM OBSERVATION: CPT

## 2021-05-31 PROCEDURE — 70544 MR ANGIOGRAPHY HEAD W/O DYE: CPT

## 2021-05-31 PROCEDURE — 85027 COMPLETE CBC AUTOMATED: CPT

## 2021-05-31 PROCEDURE — 80061 LIPID PANEL: CPT

## 2021-05-31 PROCEDURE — 65660000000 HC RM CCU STEPDOWN

## 2021-05-31 PROCEDURE — 97161 PT EVAL LOW COMPLEX 20 MIN: CPT

## 2021-05-31 RX ORDER — CARVEDILOL 6.25 MG/1
12.5 TABLET ORAL 2 TIMES DAILY WITH MEALS
Status: DISCONTINUED | OUTPATIENT
Start: 2021-05-31 | End: 2021-06-01 | Stop reason: HOSPADM

## 2021-05-31 RX ADMIN — INSULIN LISPRO 2 UNITS: 100 INJECTION, SOLUTION INTRAVENOUS; SUBCUTANEOUS at 11:57

## 2021-05-31 RX ADMIN — MYCOPHENOLATE MOFETIL 250 MG: 250 CAPSULE ORAL at 08:21

## 2021-05-31 RX ADMIN — CARVEDILOL 12.5 MG: 6.25 TABLET, FILM COATED ORAL at 15:18

## 2021-05-31 RX ADMIN — Medication 10 ML: at 06:30

## 2021-05-31 RX ADMIN — MYCOPHENOLATE MOFETIL 250 MG: 250 CAPSULE ORAL at 16:44

## 2021-05-31 RX ADMIN — INSULIN LISPRO 2 UNITS: 100 INJECTION, SOLUTION INTRAVENOUS; SUBCUTANEOUS at 16:44

## 2021-05-31 RX ADMIN — ATORVASTATIN CALCIUM 80 MG: 40 TABLET, FILM COATED ORAL at 22:11

## 2021-05-31 RX ADMIN — ASPIRIN 81 MG 81 MG: 81 TABLET ORAL at 08:21

## 2021-05-31 RX ADMIN — CLOPIDOGREL BISULFATE 75 MG: 75 TABLET ORAL at 08:21

## 2021-05-31 RX ADMIN — Medication 10 ML: at 22:12

## 2021-05-31 NOTE — PROGRESS NOTES
Problem: Dysphagia (Adult)  Goal: *Acute Goals and Plan of Care (Insert Text)  Outcome: Progressing Towards Goal    STG:  Pt will consume a regular diet without signs/sx aspiration 100%. STG:  Pt will participate in a cognitive evaluation x1 to further assess cognitive skills. OBSERVATION SPEECH LANGUAGE PATHOLOGY: DYSPHAGIA- Initial Assessment    NAME/AGE/GENDER: Faraz Tilley is a 70 y.o. female  DATE: 5/31/2021  PRIMARY DIAGNOSIS: Suspected stroke patient last known to be well more than 2 hours ago [R09.89]       ICD-10: Treatment Diagnosis: R13.13 Dysphagia, Pharyngeal Phase    RECOMMENDATIONS   DIET:    Continue with regular diet    MEDICATIONS: With liquid     ASPIRATION PRECAUTIONS  · Upright at 90 degrees during meal     COMPENSATORY STRATEGIES/MODIFICATIONS  · None     RECOMMENDATIONS for CONTINUED SPEECH THERAPY:   YES: Anticipate need for ongoing speech therapy during this hospitalization. ASSESSMENT   Patient seen for a bedside swallowing evaluation. Pt reported no dysphagia at baseline. However, she reported apple juice \"went down the wrong pipe\" once earlier this date. She reported it may have been due to her family making her laugh. Her family was present and reported they thought maybe she drank it too fast.  Her  reported pt's speech was slurred yesterday but it has now returned to baseline. He reported a left facial droop yesterday which has also resolved. Pt given trials thin liquids, pureed, mixed consistencies and solids. Mastication was adequate. No signs/sx aspiration observed. Will follow for diet tolerance given pt reports of coughing x1 with liquids earlier this date. Motor speech skills were intact. Recommend continuing with regular diet. Will follow for diet tolerance and cognitive evaluation given MRI results.         EDUCATION:  · Recommendations discussed with Patient, Family, and RN  CONTINUATION OF SKILLED SERVICES/MEDICAL NECESSITY:   Patient continues to require skilled intervention due to ensure safety of a regular diet; needs cognitive evaluation. REHABILITATION POTENTIAL FOR STATED GOALS: Good    PLAN    FREQUENCY/DURATION: Continue to follow patient 3 times a week for duration of hospital stay to address above goals. - Recommendations for next treatment session: Next treatment session will address po trials and assessment of cognitive-linguistic abilities     SUBJECTIVE   Pt cooperative. Pt's spouse, children and grandchildren present. Oxygen Device: room air  Pain: Pain Scale 1: Numeric (0 - 10)  Pain Intensity 1: 0    History of Present Injury/Illness: Ms. Sheryl Jay  has a past medical history of Anemia NEC, Carotid artery disease ([facility] [address] Chronic kidney disease, Diabetes mellitus, type II ([facility] [address] Dyspepsia and other specified disorders of function of stomach, Gout, Hypercholesterolemia, Hypertension, Hypothyroidism, Menopausal syndrome, Neuropathy, Renal failure, Right shoulder pain, and Thyroid nodule. . She also  has a past surgical history that includes hx vascular access; hx transplant; hx urological; hx partial hysterectomy; vascular surgery procedure unlist; hx colonoscopy; hx orthopaedic; hx cataract removal (Left); hx cataract removal (Right); and hx orthopaedic. PRECAUTIONS/ALLERGIES: Lisinopril     Problem List:  (Impairments causing functional limitations):  1. ? Dysphagia     Previous Dysphagia: NONE REPORTED  Diet Prior to Evaluation: regular     Orientation:  Person  Place  Time  Situation    Cognitive-Linguistic Screening:   Alertness  o Alert   Speech Production:   o Fully intelligible   Expressive Language:  o Fluent speech   Receptive Language:  o Follows commands   Cognition:   o Pt was alert and oriented x4  Prior Level of Function: with spouse  Recommendations: recommend a cognitive evaluation given MRI results.       OBJECTIVE      05/31/21 1500   Mental Status   Neurologic State Alert   Orientation Level Oriented X4 Oral Assessment   Labial No impairment   Dentition Upper & lower dentures   Oral Hygiene adequate   Lingual No impairment   PO Trials   Assessment Method(s) Observation;Palpation   Patient Position upright in chair   Vocal Quality No impairment   Consistency Presented Mixed consistency;Puree; Solid; Thin liquid   How Presented Self-fed/presented;Cup/sip;Spoon;Straw;Successive swallows   Bolus Acceptance No impairment   Bolus Formation/Control No impairment   Propulsion No impairment   Oral Residue None   Initiation of Swallow No impairment   Laryngeal Elevation Functional   Aspiration Signs/Symptoms None       Tool Used: Dysphagia Outcome and Severity Scale (GENE)    Score Comments   Normal Diet  [] 7 With no strategies or extra time needed   Functional Swallow  [] 6 May have mild oral or pharyngeal delay   Mild Dysphagia  [] 5 Which may require one diet consistency restricted    Mild-Moderate Dysphagia  [] 4 With 1-2 diet consistencies restricted   Moderate Dysphagia  [] 3 With 2 or more diet consistencies restricted   Moderate-Severe Dysphagia  [] 2 With partial PO strategies (trials with ST only)   Severe Dysphagia  [] 1 With inability to tolerate any PO safely      Score:  Initial: 6 Most Recent:  (Date  )   Interpretation of Tool: The Dysphagia Outcome and Severity Scale (GENE) is a simple, easy-to-use, 7-point scale developed to systematically rate the functional severity of dysphagia based on objective assessment and make recommendations for diet level, independence level, and type of nutrition. Current Medications:   No current facility-administered medications on file prior to encounter. Current Outpatient Medications on File Prior to Encounter   Medication Sig Dispense Refill    cyanocobalamin (Vitamin B-12) 250 mcg tablet Take 1 Tablet by mouth daily.  VITAMIN B COMPLEX PO Take 1 Tablet by mouth daily.       insulin NPH (HUMULIN N) 100 unit/mL (3 mL) inpn 20 Units by SubCUTAneous route Daily (before breakfast). 6 Pen 3    HumaLOG KwikPen Insulin 100 unit/mL kwikpen By correction 1/50>200, max daily dose 20 units 6 Pen 3    FreeStyle Rhonda 14 Day Sensor kit Use to monitor blood glucose. E11.65 2 Kit 11    carvediloL (COREG) 12.5 mg tablet TAKE 1 TABLET BY MOUTH TWICE DAILY 180 Tab 0    belatacept (NULOJIX) 250 mg injection 500 mg by IntraVENous route.  Insulin Needles, Disposable, 32 gauge x 5/32\" ndle Use as directed 3 times a day      mycophenolate mofetil (CELLCEPT) 250 mg capsule TK 3 CS PO BID  5    multivitamin (ONE A DAY) tablet Take 1 Tab by mouth daily.  ascorbic acid (VITAMIN C) 500 mg tablet Take  by mouth.  Cholecalciferol, Vitamin D3, (VITAMIN D3) 1,000 unit cap Take 1,000 Units by mouth daily.  aspirin delayed-release 81 mg tablet Take  by mouth daily.  RUTH PEN NEEDLE 32 x 5/32 \" ndle       predniSONE (DELTASONE) 5 mg tablet Take 5 mg by mouth daily.  Gvoke HypoPen 2-Pack 1 mg/0.2 mL atIn 1 mg by SubCUTAneous route as needed (severe hypoglycemia). 2 Syringe 1    Trulicity 4.00 AG/9.6 mL sub-q pen 0.5 mL by SubCUTAneous route every seven (7) days.  12 Syringe 3    glucagon (GLUCAGEN) 1 mg injection UAD , IM injection for severe hypoglycemia, include syringe and vial          INTERDISCIPLINARY COLLABORATION: RN    After treatment position/precautions:  · Upright in chair  · Call light within reach  · spouse at bedside  · RN notified    Total Treatment Duration:   Time In: 2155  Time Out: 2 [facility] [address] [facility]

## 2021-05-31 NOTE — PROGRESS NOTES
OUTREACH NURSE PROGRESS REPORT    SUBJECTIVE: In to assess patient secondary to code S earlier in the ER. Melani Valdez was seen and focused assessment complete. MEWS Score: 1 (05/30/21 2321)  Vitals:    05/30/21 1616 05/30/21 1929 05/30/21 2124 05/30/21 2321   BP:  (!) 171/69  (!) 175/62   Pulse:  97  75   Resp:  20  20   Temp:  97.7 °F (36.5 °C)  97.8 °F (36.6 °C)   SpO2:  100%  99%   Weight: 75.6 kg (166 lb 11.2 oz)  75.5 kg (166 lb 7.5 oz)    Height: 5' 3\" (1.6 m)             OBJECTIVE: On arrival to room, I found patient to be semi-dailey's in bed, in company of family member. ASSESSMENT:   Visit Vitals  BP (!) 175/62 (BP 1 Location: Left arm, BP Patient Position: At rest)   Pulse 75   Temp 97.8 °F (36.6 °C)   Resp 20   Ht 5' 3\" (1.6 m)   Wt 75.5 kg (166 lb 7.5 oz)   SpO2 99%   BMI 29.49 kg/m²     General:  Alert, cooperative, no distress. Head:  Normocephalic, without obvious abnormality, atraumatic. Eyes:  Conjunctivae/corneas clear. Pupils equal, round, reactive to light. Lungs:   Clear to auscultation bilaterally. Chest wall:  No tenderness or deformity. Heart:  Regular rate and rhythm, S1, S2 normal,    Abdomen:   Soft, non-tender. Bowel sounds normal.   Extremities: Extremities normal, atraumatic, no cyanosis or edema. = , - arm drop, symmetrical lower extremity strength   Pulses: 2+ and symmetric all extremities. Skin:        Neurologic:  Normal strength, sensation, and reflexes throughout. No facial droop at current. Possible slightly slurred speech. Pain Assessment  Pain Intensity 1: 0 (05/30/21 2321)        Patient Stated Pain Goal: 0      PLAN:  Discussed with primary RN Iona Alexandra, no needs expressed or apparent at current. Will continue to follow per outreach protocol.

## 2021-05-31 NOTE — PROGRESS NOTES
Problem: Self Care Deficits Care Plan (Adult)  Goal: *Acute Goals and Plan of Care (Insert Text)  Outcome: Progressing Towards Goal  Note:   Patient will complete lower body dressing with stand by assist to increase self care independence. Patient will improve static standing at sink for 10 minutes to improve independence with transfers and self cares. Patient will complete toilet transfer with supervision using adaptive equipment as needed. Patient will complete chair transfer with supervision using adaptive equipment as needed. Patient will complete UE exercises with independence to increase overall activity tolerance and strength. Timeframe: 7 visits       OCCUPATIONAL THERAPY: Initial Assessment and AM 5/31/2021  OBSERVATION: OT Visit Days: 1  [place] / Product Type: Medicare /      NAME/AGE/GENDER: Andressa Arzate is a 70 y.o. female   PRIMARY DIAGNOSIS:  Suspected stroke patient last known to be well more than 2 hours ago [R09.89] Suspected stroke patient last known to be well more than 2 hours ago Suspected stroke patient last known to be well more than 2 hours ago        ICD-10: Treatment Diagnosis:    Generalized Muscle Weakness (M62.81)  Other lack of cordination (R27.8)  Difficulty in walking, Not elsewhere classified (R26.2)   Precautions/Allergies:     Lisinopril      ASSESSMENT:     Ms. Inez Sosa presents with suspected CVA. No differences noted in R & L UE assessment. Pt generally weak and has limited ROM in shoulders due to prior shoulder surgeries. Pt required assistance with don/doffing socks. Lives with supportive spouse. Pt reports using walls and furniture to get around in her home. Per patient and spouse, pt functioning below baseline and would benefit from 1-2 additional sessions to address the concerns below.      This section established at most recent assessment   PROBLEM LIST (Impairments causing functional limitations):  Decreased Strength  Decreased ADL/Functional Activities  Decreased Transfer Abilities  Decreased Ambulation Ability/Technique  Decreased Balance   INTERVENTIONS PLANNED: (Benefits and precautions of occupational therapy have been discussed with the patient.)  Activities of daily living training  Adaptive equipment training  Balance training  Clothing management  Cognitive training  Neuromuscular re-eduation  Therapeutic activity  Therapeutic exercise     TREATMENT PLAN: Frequency/Duration: Follow patient 1-2 sessions to address above goals. Rehabilitation Potential For Stated Goals: Good     REHAB RECOMMENDATIONS (at time of discharge pending progress):    Placement: It is my opinion, based on this patient's performance to date, that Ms. Mabel Sher may benefit from [address] after discharge due to the functional deficits listed above that are likely to improve with skilled rehabilitation because patient would benefit from education on safe mobility and functional t/fs in the home . [place]   [facility] [place]              OCCUPATIONAL PROFILE AND HISTORY:   History of Present Injury/Illness (Reason for Referral):  See H&P  Past Medical History/Comorbidities:   Ms. Mabel Sher  has a past medical history of Anemia NEC, Carotid artery disease ([address] Chronic kidney disease, Diabetes mellitus, type II ([address] Dyspepsia and other specified disorders of function of stomach, Gout, Hypercholesterolemia, Hypertension, Hypothyroidism, Menopausal syndrome, Neuropathy, Renal failure, Right shoulder pain, and Thyroid nodule. Ms. Mabel Sher  has a past surgical history that includes hx vascular access; hx transplant; hx urological; hx partial hysterectomy; vascular surgery procedure unlist; hx colonoscopy; hx orthopaedic; hx cataract removal (Left); hx cataract removal (Right); and hx orthopaedic.   Social History/Living Environment:   Home Environment: Private residence  One/Two Story Residence: One story  Living Alone: No  Support Systems: Spouse/Significant Other/Partner, Family member(s)  Patient Expects to be Discharged to[de-identified] Private residence  Current DME Used/Available at Home: None  Tub or Shower Type: Tub/Shower combination  Prior Level of Function/Work/Activity:  Independent ADLs, IADLs with assistance from      Number of Personal Factors/Comorbidities that affect the Plan of Care: Brief history (0):  LOW COMPLEXITY   ASSESSMENT OF OCCUPATIONAL PERFORMANCE[de-identified]   Activities of Daily Living:   Basic ADLs (From Assessment) Complex ADLs (From Assessment)   Feeding: Independent  Oral Facial Hygiene/Grooming: Independent  Bathing: Setup  Upper Body Dressing: Independent  Lower Body Dressing: Minimum assistance  Toileting: Contact guard assistance     Grooming/Bathing/Dressing Activities of Daily Living     Cognitive Retraining  Safety/Judgement: Awareness of environment                 Functional Transfers  Bathroom Mobility: Contact guard assistance  Toilet Transfer : Contact guard assistance  Shower Transfer: Contact guard assistance;Minimum assistance     Bed/Mat Mobility  Supine to Sit: Contact guard assistance  Sit to Supine: Contact guard assistance  Sit to Stand: Minimum assistance;Assist x2  Stand to Sit: Assist x2;Minimum assistance  Bed to Chair: Contact guard assistance  Scooting: Minimum assistance     Most Recent Physical Functioning:   Gross Assessment:                  Posture:  Posture (WDL): Within defined limits  Balance:  Sitting: Intact  Standing: Pull to stand; With support Bed Mobility:  Supine to Sit: Contact guard assistance  Sit to Supine: Contact guard assistance  Scooting: Minimum assistance  Wheelchair Mobility:     Transfers:  Sit to Stand: Minimum assistance;Assist x2  Stand to Sit: Assist x2;Minimum assistance  Bed to Chair: Contact guard assistance            Patient Vitals for the past 6 hrs:   BP BP Patient Position SpO2 Pulse   05/31/21 0735 (!) 150/67 Supine 96 % 64   05/31/21 0800 -- -- -- 64       Mental Status  Neurologic State: Alert  Orientation Level: Oriented X4  Cognition: Appropriate for age attention/concentration, Poor safety awareness  Perception: Appears intact  Perseveration: No perseveration noted  Safety/Judgement: Awareness of environment                          Physical Skills Involved:  Balance  Strength  Activity Tolerance Cognitive Skills Affected (resulting in the inability to perform in a timely and safe manner):  [facility] Psychosocial Skills Affected:  Habits/Routines  Environmental Adaptation   Number of elements that affect the Plan of Care: 1-3:  LOW COMPLEXITY   CLINICAL DECISION MAKIN[address] [place] [address] AM-PAC 6 Clicks   Daily Activity Inpatient Short Form  How much help from another person does the patient currently need. .. Total A Lot A Little None   1. Putting on and taking off regular lower body clothing? [] 1   [] 2   [x] 3   [] 4   2. Bathing (including washing, rinsing, drying)? [] 1   [] 2   [x] 3   [] 4   3. Toileting, which includes using toilet, bedpan or urinal?   [] 1   [] 2   [x] 3   [] 4   4. Putting on and taking off regular upper body clothing? [] 1   [] 2   [] 3   [x] 4   5. Taking care of personal grooming such as brushing teeth? [] 1   [] 2   [] 3   [x] 4   6. Eating meals? [] 1   [] 2   [] 3   [x] 4   © , Trustees of [address] [place] [address], under license to [employer]. All rights reserved      Score:  Initial: 21 Most Recent: X (Date: -- )    Interpretation of Tool:  Represents activities that are increasingly more difficult (i.e. Bed mobility, Transfers, Gait). Medical Necessity:     Skilled intervention continues to be required due to the above deficits. Reason for Services/Other Comments:  See above deficits.    Use of outcome tool(s) and clinical judgement create a POC that gives a: LOW COMPLEXITY         TREATMENT:   (In addition to Assessment/Re-Assessment sessions the following treatments were rendered)     Pre-treatment Symptoms/Complaints:    Pain: Initial:   Pain Intensity 1: 0  Post Session:  0     Assessment/Reassessment only, no treatment provided today    Braces/Orthotics/Lines/Etc:   O2 Device: None (Room air)  Treatment/Session Assessment:    Response to Treatment:  Good, up in chair  Interdisciplinary Collaboration:   Physical Therapist  Occupational Therapist  Registered Nurse  After treatment position/precautions:   Up in chair  Bed/Chair-wheels locked  Caregiver at bedside  Call light within reach  RN notified   Compliance with Program/Exercises: Will assess as treatment progresses. Recommendations/Intent for next treatment session: \"Next visit will focus on reduction in assistance provided\".   Total Treatment Duration:  OT Patient Time In/Time Out  Time In: 0915  Time Out: Yin Maloney

## 2021-05-31 NOTE — PROGRESS NOTES
Outreach Follow Up Note          MEWS Score: 1 (05/30/21 2321)  Vitals:    05/30/21 1616 05/30/21 1929 05/30/21 2124 05/30/21 2321   BP:  (!) 171/69  (!) 175/62   Pulse:  97  75   Resp:  20  20   Temp:  97.7 °F (36.5 °C)  97.8 °F (36.6 °C)   SpO2:  100%  99%   Weight: 75.6 kg (166 lb 11.2 oz)  75.5 kg (166 lb 7.5 oz)    Height: 5' 3\" (1.6 m)            Pain Assessment  Pain Intensity 1: 0 (05/30/21 2321)        Patient Stated Pain Goal: 0      Patient reviewed and discussed with primary nurse. There have been no significant clinical changes since the completion of the last dated Outreach assessment. Patient restful at current with eyes closed and even non-labored respirations- allowed to remain at rest. Primary RN to advise if any changes or if patient with any further needs. Will continue to follow up per outreach protocol.     Signed By:   Gelacio Oliver RN    May 31, 2021 3:02 AM

## 2021-05-31 NOTE — PROGRESS NOTES
END OF SHIFT NOTE: 7p ~ 7a    Intake/Output  Taking PO without difficulty  Voiding: YES      Stool:  0 occurrences. Emesis:  0 occurrences. VITAL SIGNS  Patient Vitals for the past 12 hrs:   Temp Pulse Resp BP SpO2   05/31/21 0735 98.2 °F (36.8 °C) 64 20 (!) 150/67 96 %   05/31/21 0310 97.7 °F (36.5 °C) 62 20 (!) 181/72 99 %   05/30/21 2321 97.8 °F (36.6 °C) 75 20 (!) 175/62 99 %       Pain Assessment  Pain 1  Pain Scale 1: Numeric (0 - 10) (05/31/21 0310)  Pain Intensity 1: 0 (05/31/21 0310)  Patient Stated Pain Goal: 0 (05/31/21 0310)    Ambulating  Yes    Additional Information: Neuro checks are stable. Voices no c/o. Continuing with current POC. Shift report given to oncoming nurse at the bedside.     Phyllis Boswell RN

## 2021-05-31 NOTE — PROGRESS NOTES
Outreach Follow Up Note    Melani Valdez was seen and assessed. MEWS Score: 1 (05/31/21 0310)  Vitals:    05/30/21 1929 05/30/21 2124 05/30/21 2321 05/31/21 0310   BP: (!) 171/69  (!) 175/62 (!) 181/72   Pulse: 97  75 62   Resp: 20  20 20   Temp: 97.7 °F (36.5 °C)  97.8 °F (36.6 °C) 97.7 °F (36.5 °C)   SpO2: 100%  99% 99%   Weight:  75.5 kg (166 lb 7.5 oz)     Height:             Pain Assessment  Pain Intensity 1: 0 (05/31/21 0310)        Patient Stated Pain Goal: 0      Patient reviewed and discussed with primary nurse. There have been no significant clinical changes since the completion of the last dated Outreach assessment. Will continue to follow up per outreach protocol.     Signed By:   Lolly Mcgill RN    May 31, 2021 8:17 AM

## 2021-05-31 NOTE — CONSULTS
IRC/Physiatrist Consult Acknowledged    EHR reviewed. Pt admitted under Code S Protocol, thus Physiatry consulted. OBSERVATION status. Will f/u MRI results and PT/OT pending. Thank you for this consultation. Recommendations to follow. Denise Malave MD, Medical Director  [address] [place] [address]

## 2021-05-31 NOTE — PROGRESS NOTES
Problem: Mobility Impaired (Adult and Pediatric)  Goal: *Acute Goals and Plan of Care (Insert Text)  Outcome: Progressing Towards Goal  Note: STG:  (1.)Ms. Thaddeus Garcia will move from supine to sit and sit to supine  with CONTACT GUARD ASSIST within 1-2 treatment day(s). (2.)Ms. Thaddeus Garcia will transfer from bed to chair and chair to bed with CONTACT GUARD ASSIST using the least restrictive device within 1-2 treatment day(s). (3.)Ms. Thaddeus Garcia will ambulate with CONTACT GUARD ASSIST for 45-65 feet with the least restrictive device within 1-2 treatment day(s). LTG:  (1.)Ms. Thaddeus Garcia will move from supine to sit and sit to supine  in bed with STAND BY ASSIST-INDEPENDENT within three treatment day(s). (2.)Ms. Thaddeus Garcia will transfer from bed to chair and chair to bed with STAND BY ASSIST-INDEPENDENT using the least restrictive device within 3-5 treatment day(s). (3.)Ms. Castaneda will ambulate with STAND BY ASSIST-INDEPENDENT for 65-85 feet with the least restrictive device within 3-7 treatment day(s). ________________________________________________________________________________________________       PHYSICAL THERAPY: Initial Assessment 5/31/2021  OBSERVATION:    [place] / Product Type: Medicare /       NAME/AGE/GENDER: Myron Moreno is a 70 y.o. female   PRIMARY DIAGNOSIS: Suspected stroke patient last known to be well more than 2 hours ago [R09.89] Suspected stroke patient last known to be well more than 2 hours ago Suspected stroke patient last known to be well more than 2 hours ago        ICD-10: Treatment Diagnosis:    · Difficulty in walking, Not elsewhere classified (R26.2)  · Other abnormalities of gait and mobility (R26.89)   Precaution/Allergies:  Lisinopril      ASSESSMENT:     Ms. Thaddeus Garcia presents with decreased independence with functional mobility. Pt performed supine to sit to stand. Pt ambulated in room. Pt in bedside chair with needs in reach.  Pt will benefit from PT services to maximize independence with functional mobility. This section established at most recent assessment   PROBLEM LIST (Impairments causing functional limitations):  1. Decreased Strength  2. Decreased Transfer Abilities  3. Decreased Ambulation Ability/Technique  4. Decreased Balance  5. Increased Pain  6. Decreased Activity Tolerance  7. Decreased Flexibility/Joint Mobility   INTERVENTIONS PLANNED: (Benefits and precautions of physical therapy have been discussed with the patient.)  1. Bed Mobility  2. Gait Training  3. Therapeutic Activites  4. Therapeutic Exercise/Strengthening  5. Transfer Training     TREATMENT PLAN: Frequency/Duration: daily for duration of hospital stay  Rehabilitation Potential For Stated Goals: Good     REHAB RECOMMENDATIONS (at time of discharge pending progress):    Placement: It is my opinion, based on this patient's performance to date, that Ms. Madalynn Boxer may benefit from participating in 1-2 additional therapy sessions in order to continue to assess for rehab potential and then make recommendation for disposition at discharge. Equipment:    None at this time              HISTORY:   History of Present Injury/Illness (Reason for Referral):  Pt is admitted with above diagnosis. Past Medical History/Comorbidities:   Ms. Madalynn Boxer  has a past medical history of Anemia NEC, Carotid artery disease ([address] Chronic kidney disease, Diabetes mellitus, type II ([address] Dyspepsia and other specified disorders of function of stomach, Gout, Hypercholesterolemia, Hypertension, Hypothyroidism, Menopausal syndrome, Neuropathy, Renal failure, Right shoulder pain, and Thyroid nodule. Ms. Madalynn Boxer  has a past surgical history that includes hx vascular access; hx transplant; hx urological; hx partial hysterectomy; vascular surgery procedure unlist; hx colonoscopy; hx orthopaedic; hx cataract removal (Left); hx cataract removal (Right); and hx orthopaedic.   Social History/Living Environment:   Home Environment: Private residence  One/Two Story Residence: One story  Living Alone: No  Support Systems: Spouse/Significant Other/Partner, Family member(s)  Patient Expects to be Discharged to[de-identified] Private residence  Current DME Used/Available at Home: None  Tub or Shower Type: Tub/Shower combination  Prior Level of Function/Work/Activity:  Pt is independent with ambulation. Number of Personal Factors/Comorbidities that affect the Plan of Care: 0: LOW COMPLEXITY   EXAMINATION:   Most Recent Physical Functioning:   Gross Assessment:  AROM: Generally decreased, functional  Strength: Generally decreased, functional  Sensation: Intact               Posture:  Posture (WDL): Within defined limits  Balance:  Sitting: Intact  Standing: Pull to stand; With support Bed Mobility:  Supine to Sit: Contact guard assistance  Sit to Supine: Contact guard assistance  Wheelchair Mobility:     Transfers:  Sit to Stand: Minimum assistance;Assist x2  Stand to Sit: Assist x2;Minimum assistance  Gait:     Gait Abnormalities: Other (slightly unsteady gait)  Distance (ft): 45 Feet (ft)  Ambulation - Level of Assistance: Minimal assistance;Assist x2      Body Structures Involved:  1. Bones  2. Joints  3. Muscles  4. Ligaments Body Functions Affected:  1. Neuromusculoskeletal  2. Movement Related Activities and Participation Affected:  1. Mobility   Number of elements that affect the Plan of Care: 4+: HIGH COMPLEXITY   CLINICAL PRESENTATION:   Presentation: Stable and uncomplicated: LOW COMPLEXITY   CLINICAL DECISION MAKIN [facility] [address] [id number] AM-PAC 6 Clicks   Basic Mobility Inpatient Short Form  How much difficulty does the patient currently have. .. Unable A Lot A Little None   1. Turning over in bed (including adjusting bedclothes, sheets and blankets)? [] 1   [] 2   [x] 3   [] 4   2. Sitting down on and standing up from a chair with arms ( e.g., wheelchair, bedside commode, etc.)   [] 1   [] 2   [x] 3   [] 4   3.   Moving from lying on back to sitting on the side of the bed? [] 1   [] 2   [x] 3   [] 4   How much help from another person does the patient currently need. .. Total A Lot A Little None   4. Moving to and from a bed to a chair (including a wheelchair)? [] 1   [] 2   [x] 3   [] 4   5. Need to walk in hospital room? [] 1   [] 2   [x] 3   [] 4   6. Climbing 3-5 steps with a railing? [] 1   [] 2   [x] 3   [] 4   © 2007, Trustees of [facility], under license to [employer]. All rights reserved      Score:  Initial: 18 Most Recent: X (Date: -- )    Interpretation of Tool:  Represents activities that are increasingly more difficult (i.e. Bed mobility, Transfers, Gait). Medical Necessity:     · Skilled intervention continues to be required due to decreased independence with functional mobility. Reason for Services/Other Comments:  · Patient continues to require skilled intervention due to   · Decreased independence with functional mobility. · .   Use of outcome tool(s) and clinical judgement create a POC that gives a: Clear prediction of patient's progress: LOW COMPLEXITY            TREATMENT:   (In addition to Assessment/Re-Assessment sessions the following treatments were rendered)   Pre-treatment Symptoms/Complaints:    Pain: Initial:      Post Session:  no complaints     Gait Training ( 15 minutes):  Gait training to improve and/or restore physical functioning as related to mobility. Ambulated 45 Feet (ft) with Minimal assistance;Assist x2     Assessment    Braces/Orthotics/Lines/Etc:   · O2 Device: None (Room air)  Treatment/Session Assessment:    · Response to Treatment:  Pt agreeable to exercise and ambulate with therapy.   · Interdisciplinary Collaboration:   o Physical Therapist  o Occupational Therapist  o Registered Nurse  · After treatment position/precautions:   o Up in chair  o Bed/Chair-wheels locked  o Bed in low position  o Caregiver at bedside  o Call light within reach  o RN notified   · Compliance with Program/Exercises: Compliant most of the time, Will assess as treatment progresses  · Recommendations/Intent for next treatment session: \"Next visit will focus on advancements to more challenging activities and reduction in assistance provided\".   Total Treatment Duration:  PT Patient Time In/Time Out  Time In: 1000  Time Out: 16 Kidspe[address]a Shah, PT

## 2021-05-31 NOTE — DISCHARGE INSTRUCTIONS
Stroke: After Your Visit     Your Care Instructions     Risk factors for stroke include being overweight, smoking, and sedentary lifestyle. This means that the blood flow to a part of your brain was blocked for some time, which damages the nerve cells in that part of the brain. The part of your body controlled by that part of your brain may not function properly now. The brain is an amazing organ that can heal itself to some degree. The stroke you had damaged part of your brain, but other parts of your brain may take over in some way for the damaged areas. You have already started this process. Going home may be hard for you and your family. The more you can try to do for yourself, the better. Remember to take each day one at a time. Follow-up care is a key part of your treatment and safety. Be sure to make and go to all appointments, and call your doctor if you are having problems. Its also a good idea to know your test results and keep a list of the medicines you take. How can you care for yourself at home? Enter a stroke rehabilitation (rehab) program, if your doctor recommends it. Physical, speech, and occupational therapies can help you manage bathing, dressing, eating, and other basics of daily living. Eat a heart-healthy diet that is low in cholesterol, saturated fat, and salt. Eat lots of fresh fruits and vegetables and foods high in fiber. Increase your activities slowly. Take short rest breaks when you get tired. Gradually increase the amount you walk. Start out by walking a little more than you did the day before. Do not drive until your doctor says it is okay. It is normal to feel sad or depressed after a stroke. If the blues last, talk to your doctor. If you are having problems with urine leakage, go to the bathroom at regular times, including when you first wake up and at bedtime. Also, limit fluids after dinner.   If you are constipated, drink plenty of fluids, enough so that your urine is light yellow or clear like water. If you have kidney, heart, or liver disease and have to limit fluids, talk with your doctor before you increase the amount of fluids you drink. Set up a regular time for using the toilet. If you continue to have constipation, your doctor may suggest using a bulking agent, such as Metamucil, or a stool softener, laxative, or enema. Medicines  Take your medicines exactly as prescribed. Call your doctor if you think you are having a problem with your medicine. You may be taking several medicines. ACE (angiotensin-converting enzyme) inhibitors, angiotensin II receptor blockers (ARBs), beta-blockers, diuretics (water pills), and calcium channel blockers control your blood pressure. Statins help lower cholesterol. Your doctor may also prescribe medicines for depression, pain, sleep problems, anxiety, or agitation. If your doctor has given you medicine that prevents blood clots, such as warfarin (Coumadin), aspirin combined with extended-release dipyridamole (Aggrenox), clopidogrel (Plavix), or aspirin to prevent another stroke, you should:  Tell your dentist, pharmacist, and other health professionals that you take these medicines. Watch for unusual bruising or bleeding, such as blood in your urine, red or black stools, or bleeding from your nose or gums. Get regular blood tests to check your clotting time if you are taking Coumadin. Wear medical alert jewelry that says you take blood thinners. You can buy this at most drugstores. Do not take any over-the-counter medicines or herbal products without talking to your doctor first.  If you take birth control pills or hormone replacement therapy, talk to your doctor about whether they are right for you. For family members and caregivers  Make the home safe. Set up a room so that your loved one does not have to climb stairs. Be sure the bathroom is on the same floor.  Move throw rugs and furniture that could cause falls, and make sure that the lighting is good. Put grab bars and seats in tubs and showers. Find out what your loved one can do and what he or she needs help with. Try not to do things for your loved one that your loved one can do on his or her own. Help him or her learn and practice new skills. Visit and talk with your loved one often. Try doing activities together that you both enjoy, such as playing cards or board games. Keep in touch with your loved one's friends as much as you can, and encourage them to visit. Take care of yourself. Do not try to do everything yourself. Ask other family members to help. Eat well, get enough rest, and take time to do things that you enjoy. Keep up with your own doctor visits, and make sure to take your medicines regularly. Get out of the house as much as you can. Join a local support group. Find out if you qualify for home health care visits to help with rehab or for adult day care. When should you call for help? Call 911 anytime you think you may need emergency care. For example, call if:  You have signs of another stroke. These may include:  Sudden numbness, paralysis, or weakness in your face, arm, or leg, especially on only one side of your body. New problems with walking or balance. Sudden vision changes. Drooling or slurred speech. New problems speaking or understanding simple statements, or you feel confused. A sudden, severe headache that is different from past headaches. Call 911 even if these symptoms go away in a few minutes. You cough up blood. You vomit blood or what looks like coffee grounds. You pass maroon or very bloody stools. Call your doctor now or seek immediate medical care if:  You have new bruises or blood spots under your skin. You have a nosebleed. Your gums bleed when you brush your teeth. You have blood in your urine. Your stools are black and tarlike or have streaks of blood.   You have vaginal bleeding when you are not having your period, or heavy period bleeding. You have new symptoms that may be related to your stroke, such as falls or trouble swallowing. Watch closely for changes in your health, and be sure to contact your doctor if you have any problems. Where can you learn more? Go to [employer]    Enter C294  in the search box to learn more about \"Stroke: After Your Visit\". © 7787-2110 [employer], [employer]. Care instructions adapted under license by [employer] (which disclaims liability or warranty for this information). This care instruction is for use with your licensed healthcare professional. If you have questions about a medical condition or this instruction, always ask your healthcare professional. Lacey Speck any warranty or liability for your use of this information.

## 2021-05-31 NOTE — PROGRESS NOTES
[phone] [id number]- notified provider of pts increased bp, ordered to resume home bp meds. Coreg 12.5mg BID ordered.

## 2021-05-31 NOTE — PROGRESS NOTES
Physiatry Follow up      EHR reviewed, MRI confirms ; an Acute to early subacute small infarct in the left corona radiata    PT/OT have seen the pt and she is functioning below her baseline independent status. She is CGA with bed mobility, STS min assist x 2, gait 45ft min assist x 2. Generalized weakness documented, no focal deficits. She is independent with feeding, grooming and Upper body dressing. Min assist with LE dressing. (did require assist with socks PTA).  does assist at times with  IADLs. Rec; pt would benefit from further post acute rehab. [facility] to maximize functional mobility and self care activities. Could also consider a short inpt rehab stay to continue maximizing BP and Blood Sugar levels. -will d/w team and see what pt would prefer    Denise Rodriguez MD, Medical Director  [address] [place] [address]

## 2021-05-31 NOTE — PROGRESS NOTES
1800-END OF SHIFT NOTE:    -pt rested well throughout the shift  -possible d/c tomorrow   -vss, no needs voiced at this time     Intake/Output  05/31 0701 - 05/31 1900  In: 600 [P.O.:600]  Out: 50 [Urine:50] (pt not voiding in hat)   Voiding: YES  Catheter: NO  Drain:        Stool:  0 occurrences. Emesis:  0 occurrences. VITAL SIGNS  Patient Vitals for the past 12 hrs:   Temp Pulse Resp BP SpO2   05/31/21 1506 98.2 °F (36.8 °C) 63 20 (!) 174/71 99 %   05/31/21 1200  63      05/31/21 1110 97.8 °F (36.6 °C) 65 20 (!) 166/65 95 %   05/31/21 0800  64      05/31/21 0735 98.2 °F (36.8 °C) 64 20 (!) 150/67 96 %       Pain Assessment  Pain 1  Pain Scale 1: Numeric (0 - 10) (05/31/21 1500)  Pain Intensity 1: 0 (05/31/21 1500)  Patient Stated Pain Goal: 0 (05/31/21 0830)    Ambulating  Yes    Additional Information:     Shift report given to oncoming nurse at the bedside.     Salo Willis RN

## 2021-06-01 ENCOUNTER — HOME HEALTH ADMISSION (OUTPATIENT)
Dept: HOME HEALTH SERVICES | Facility: HOME HEALTH | Age: 76
End: 2021-06-01

## 2021-06-01 VITALS
DIASTOLIC BLOOD PRESSURE: 71 MMHG | TEMPERATURE: 97.9 F | SYSTOLIC BLOOD PRESSURE: 140 MMHG | BODY MASS INDEX: 29.5 KG/M2 | HEART RATE: 65 BPM | WEIGHT: 166.47 LBS | RESPIRATION RATE: 12 BRPM | OXYGEN SATURATION: 99 % | HEIGHT: 63 IN

## 2021-06-01 LAB
GLUCOSE BLD STRIP.AUTO-MCNC: 166 MG/DL (ref 65–100)
GLUCOSE BLD STRIP.AUTO-MCNC: 180 MG/DL (ref 65–100)
GLUCOSE BLD STRIP.AUTO-MCNC: 216 MG/DL (ref 65–100)
SERVICE CMNT-IMP: ABNORMAL

## 2021-06-01 PROCEDURE — 99222 1ST HOSP IP/OBS MODERATE 55: CPT | Performed by: PSYCHIATRY & NEUROLOGY

## 2021-06-01 PROCEDURE — 97530 THERAPEUTIC ACTIVITIES: CPT

## 2021-06-01 PROCEDURE — 74011000250 HC RX REV CODE- 250: Performed by: INTERNAL MEDICINE

## 2021-06-01 PROCEDURE — 2709999900 HC NON-CHARGEABLE SUPPLY

## 2021-06-01 PROCEDURE — C8929 TTE W OR WO FOL WCON,DOPPLER: HCPCS

## 2021-06-01 PROCEDURE — 97116 GAIT TRAINING THERAPY: CPT

## 2021-06-01 PROCEDURE — 97535 SELF CARE MNGMENT TRAINING: CPT

## 2021-06-01 PROCEDURE — 92526 ORAL FUNCTION THERAPY: CPT

## 2021-06-01 PROCEDURE — 74011250637 HC RX REV CODE- 250/637: Performed by: INTERNAL MEDICINE

## 2021-06-01 PROCEDURE — 74011636637 HC RX REV CODE- 636/637: Performed by: INTERNAL MEDICINE

## 2021-06-01 PROCEDURE — 74011250636 HC RX REV CODE- 250/636: Performed by: INTERNAL MEDICINE

## 2021-06-01 PROCEDURE — 82962 GLUCOSE BLOOD TEST: CPT

## 2021-06-01 RX ORDER — LOSARTAN POTASSIUM 50 MG/1
25 TABLET ORAL DAILY
Status: DISCONTINUED | OUTPATIENT
Start: 2021-06-01 | End: 2021-06-01 | Stop reason: HOSPADM

## 2021-06-01 RX ORDER — CLOPIDOGREL BISULFATE 75 MG/1
75 TABLET ORAL DAILY
Qty: 19 TABLET | Refills: 0 | Status: SHIPPED | OUTPATIENT
Start: 2021-06-02 | End: 2021-07-20 | Stop reason: ALTCHOICE

## 2021-06-01 RX ORDER — LOSARTAN POTASSIUM 25 MG/1
25 TABLET ORAL DAILY
Qty: 30 TABLET | Refills: 3 | Status: SHIPPED | OUTPATIENT
Start: 2021-06-02 | End: 2022-02-02

## 2021-06-01 RX ORDER — AMLODIPINE BESYLATE 5 MG/1
5 TABLET ORAL DAILY
Status: DISCONTINUED | OUTPATIENT
Start: 2021-06-01 | End: 2021-06-01

## 2021-06-01 RX ORDER — ATORVASTATIN CALCIUM 80 MG/1
80 TABLET, FILM COATED ORAL
Qty: 30 TABLET | Refills: 3 | Status: SHIPPED | OUTPATIENT
Start: 2021-06-01 | End: 2021-06-18

## 2021-06-01 RX ORDER — LOSARTAN POTASSIUM 25 MG/1
25 TABLET ORAL DAILY
Qty: 30 TABLET | Refills: 0 | Status: SHIPPED | OUTPATIENT
Start: 2021-06-02 | End: 2021-06-01

## 2021-06-01 RX ADMIN — PERFLUTREN 1 ML: 6.52 INJECTION, SUSPENSION INTRAVENOUS at 10:20

## 2021-06-01 RX ADMIN — CLOPIDOGREL BISULFATE 75 MG: 75 TABLET ORAL at 08:03

## 2021-06-01 RX ADMIN — Medication 10 ML: at 13:48

## 2021-06-01 RX ADMIN — INSULIN LISPRO 2 UNITS: 100 INJECTION, SOLUTION INTRAVENOUS; SUBCUTANEOUS at 16:17

## 2021-06-01 RX ADMIN — MYCOPHENOLATE MOFETIL 250 MG: 250 CAPSULE ORAL at 16:14

## 2021-06-01 RX ADMIN — Medication 10 ML: at 05:34

## 2021-06-01 RX ADMIN — INSULIN LISPRO 2 UNITS: 100 INJECTION, SOLUTION INTRAVENOUS; SUBCUTANEOUS at 12:05

## 2021-06-01 RX ADMIN — LOSARTAN POTASSIUM 25 MG: 50 TABLET, FILM COATED ORAL at 12:23

## 2021-06-01 RX ADMIN — MYCOPHENOLATE MOFETIL 250 MG: 250 CAPSULE ORAL at 08:03

## 2021-06-01 RX ADMIN — INSULIN LISPRO 4 UNITS: 100 INJECTION, SOLUTION INTRAVENOUS; SUBCUTANEOUS at 08:04

## 2021-06-01 RX ADMIN — ASPIRIN 81 MG 81 MG: 81 TABLET ORAL at 08:03

## 2021-06-01 RX ADMIN — CARVEDILOL 12.5 MG: 6.25 TABLET, FILM COATED ORAL at 16:14

## 2021-06-01 RX ADMIN — CARVEDILOL 12.5 MG: 6.25 TABLET, FILM COATED ORAL at 08:03

## 2021-06-01 NOTE — PROGRESS NOTES
STG:  Pt will consume a regular diet without signs/sx aspiration 100%. MET 6/1  STG:  Pt will participate in a cognitive evaluation x1 to further assess cognitive skills. SPEECH LANGUAGE PATHOLOGY: DYSPHAGIA- Daily Note 1    NAME/AGE/GENDER: Andressa Arzate is a 70 y.o. female  DATE: 6/1/2021  PRIMARY DIAGNOSIS: Suspected stroke patient last known to be well more than 2 hours ago [R09.89]  Acute ischemic stroke ([facility] [address] [I63.9]      ICD-10: Treatment Diagnosis: R13.13 Dysphagia, Pharyngeal Phase    RECOMMENDATIONS   DIET:    Regular Consistency   Thin Liquids    MEDICATIONS: With liquid     ASPIRATION PRECAUTIONS  · Upright at 90 degrees during meal     COMPENSATORY STRATEGIES/MODIFICATIONS  · None     RECOMMENDATIONS for CONTINUED SPEECH THERAPY:   YES: Anticipate need for ongoing speech therapy during this hospitalization and at next level of care. ASSESSMENT   Patient consuming regular consistency lunch meal and thin liquids without overt s/sx airway compromise. Functional oral prep and clearance with all textures. Recommend continue regular diet/thin liquids. Plan for cognitive linguistic assessment next session or at next level care s/p acute CVA. EDUCATION:  · Recommendations discussed with Patient and Family  CONTINUATION OF SKILLED SERVICES/MEDICAL NECESSITY:   Patient continues to require skilled intervention due to need for cognitive linguistic assessment. REHABILITATION POTENTIAL FOR STATED GOALS: Good    PLAN    FREQUENCY/DURATION: Assessment of cognitive-linguistic abilities to be completed at next session.  Frequency and duration to be determined by findings/recommendations.     - Recommendations for next treatment session: Next treatment session will address assessment of cognitive-linguistic abilities     SUBJECTIVE   Patient alert upright in bedside chair upon arrival. Just received lunch tray and family at bedside assisting patient with set up for meal.     Oxygen Device: room air  Pain: Pain Scale 1: Numeric (0 - 10)  Pain Intensity 1: 0    History of Present Injury/Illness: Ms. Ovi Holcomb  has a past medical history of Anemia NEC, Carotid artery disease ([facility] [address] Chronic kidney disease, Diabetes mellitus, type II ([address] Dyspepsia and other specified disorders of function of stomach, Gout, Hypercholesterolemia, Hypertension, Hypothyroidism, Menopausal syndrome, Neuropathy, Renal failure, Right shoulder pain, and Thyroid nodule. . She also  has a past surgical history that includes hx vascular access; hx transplant; hx urological; hx partial hysterectomy; vascular surgery procedure unlist; hx colonoscopy; hx orthopaedic; hx cataract removal (Left); hx cataract removal (Right); and hx orthopaedic. PRECAUTIONS/ALLERGIES: Lisinopril     Problem List:  (Impairments causing functional limitations):  1. Oropharyngeal dysphagia- No symptoms identified    Orientation:  Person  Place  Time       OBJECTIVE   Swallowing Treatment:  Patient seen for diet tolerance. Consumed ~ 3 oz thin by cup/straw and regular consistency lunch meal items without overt s/sx airway compromise. Functional oral prep and clearance with all consistencies.        Tool Used: Dysphagia Outcome and Severity Scale (GENE)    Score Comments   Normal Diet  [] 7 With no strategies or extra time needed   Functional Swallow  [] 6 May have mild oral or pharyngeal delay   Mild Dysphagia  [] 5 Which may require one diet consistency restricted    Mild-Moderate Dysphagia  [] 4 With 1-2 diet consistencies restricted   Moderate Dysphagia  [] 3 With 2 or more diet consistencies restricted   Moderate-Severe Dysphagia  [] 2 With partial PO strategies (trials with ST only)   Severe Dysphagia  [] 1 With inability to tolerate any PO safely      Score:  Initial: 6 Most Recent: 7 (Date 6/1/21  )   Interpretation of Tool: The Dysphagia Outcome and Severity Scale (GENE) is a simple, easy-to-use, 7-point scale developed to systematically rate the functional severity of dysphagia based on objective assessment and make recommendations for diet level, independence level, and type of nutrition. Current Medications:   No current facility-administered medications on file prior to encounter. Current Outpatient Medications on File Prior to Encounter   Medication Sig Dispense Refill    cyanocobalamin (Vitamin B-12) 250 mcg tablet Take 1 Tablet by mouth daily.  VITAMIN B COMPLEX PO Take 1 Tablet by mouth daily.  insulin NPH (HUMULIN N) 100 unit/mL (3 mL) inpn 20 Units by SubCUTAneous route Daily (before breakfast). 6 Pen 3    HumaLOG KwikPen Insulin 100 unit/mL kwikpen By correction 1/50>200, max daily dose 20 units 6 Pen 3    FreeStyle Rhonda 14 Day Sensor kit Use to monitor blood glucose. E11.65 2 Kit 11    carvediloL (COREG) 12.5 mg tablet TAKE 1 TABLET BY MOUTH TWICE DAILY 180 Tab 0    belatacept (NULOJIX) 250 mg injection 500 mg by IntraVENous route.  Insulin Needles, Disposable, 32 gauge x 5/32\" ndle Use as directed 3 times a day      mycophenolate mofetil (CELLCEPT) 250 mg capsule TK 3 CS PO BID  5    multivitamin (ONE A DAY) tablet Take 1 Tab by mouth daily.  ascorbic acid (VITAMIN C) 500 mg tablet Take  by mouth.  Cholecalciferol, Vitamin D3, (VITAMIN D3) 1,000 unit cap Take 1,000 Units by mouth daily.  aspirin delayed-release 81 mg tablet Take  by mouth daily.  RUTH PEN NEEDLE 32 x 5/32 \" ndle       predniSONE (DELTASONE) 5 mg tablet Take 5 mg by mouth daily.  Gvoke HypoPen 2-Pack 1 mg/0.2 mL atIn 1 mg by SubCUTAneous route as needed (severe hypoglycemia). 2 Syringe 1    Trulicity 7.55 RC/5.6 mL sub-q pen 0.5 mL by SubCUTAneous route every seven (7) days.  12 Syringe 3    glucagon (GLUCAGEN) 1 mg injection UAD , IM injection for severe hypoglycemia, include syringe and vial          INTERDISCIPLINARY COLLABORATION: RN    After treatment position/precautions:  · Upright in chair  · Family at bedside    Total Treatment Duration:   Time In: 1244  Time Out: 1016 [facility], [id number] [facility]

## 2021-06-01 NOTE — DISCHARGE SUMMARY
Hospitalist Discharge Summary     Patient ID:  Camron Rivera  [id number]  70 y.o.  1945  Admit date: 5/30/2021 12:37 PM  Discharge date and time: 6/1/2021  Attending: Aristeo Milton MD  PCP:  Wayne Anders MD  Treatment Team: Attending Provider: Aristeo Milton MD; : Don Rivera; Utilization Review: Suresh Ely; Physical Therapist: Jayne Delaney PT; Staff Nurse: Varsha Dalton RN; Consulting Provider: Aravind Mcgovern DO; Care Manager: AMEYA Hunt    Principal Diagnosis Suspected stroke patient last known to be well more than 2 hours ago   Principal Problem:    Suspected stroke patient last known to be well more than 2 hours ago (5/30/2021)    Active Problems:    Hypertension ()      Hypercholesterolemia ()      Hypothyroidism ()      Uncontrolled type 2 diabetes mellitus with hyperglycemia ([facility] ()      Carotid artery disease ([facility] ()      Gout ()      Renal transplant, status post (4/19/2016)      Severe obesity ([facility] [facility] (11/1/2018)      Acute ischemic stroke ([facility] [address] (5/31/2021)         Hospital Course:    Laci Cheney a 70 y. o. female with medical history of renal transplant, DM 2 with neuropathy, hypertension, CAD, gout, hypothyroidism and obesity who presented to ED [facility] of difficulty speaking. Lorene Snyder is accompanied by her  and was providing history.  Poor historians but reports that patient woke up with left-sided facial weakness and difficulty in speech.   also reported to ER staff that he probably had similar symptoms yesterday.  Now both report that symptoms come and go.  In the ER she had elevated blood pressure and Code S was called.  Telemetry neuro did not recommend TPA due to NIH stroke scale of 2 and rapidly evolving symptoms.  CT of head was unremarkable and neurology recommended further work-up for stroke. MRI showed  Acute to early subacute small infarct in the left corona radiata.   Patient was seen by Dr. Carlos Thomas over telemetry consult continue aspirin and adding Plavix for 21 days with high intensity statin and outpatient follow-up. She was seen by PT OT and ST and was cleared for discharge with home health therapy. Blood pressure was elevated and losartan was added which improved her blood pressure to goal of < 140/90. Patient feels better and is medically ready for discharge to home with home health therapy and outpatient follow-up with her PCP and with neurology. Plan discussed with pt who is in agreement with the plan. All questions answered. Pt was stable at time of discharge. Follow up as per below. Significant Diagnostic Imaging:     Labs: Results:       Chemistry Recent Labs     05/30/21  1247   *      K 4.1   *   CO2 24   BUN 16   CREA 0.71   CA 10.0   AGAP 8      CBC w/Diff Recent Labs     05/31/21  0513 05/30/21  1247   WBC 5.1 6.4   RBC 3.82* 4.03*   HGB 11.2* 11.9   HCT 35.6* 37.9    197   GRANS  --  59   LYMPH  --  26   EOS  --  5      Cardiac Enzymes No results for input(s): CPK, CKND1, SABRINA in the last 72 hours. No lab exists for component: CKRMB, TROIP   Coagulation Recent Labs     05/30/21  1247   PTP 13.8   INR 1.0       Last A1c Lab Results   Component Value Date/Time    Hemoglobin A1c 7.5 (H) 05/31/2021 05:13 AM    Hemoglobin A1c (POC) 7.7 05/25/2021 03:10 PM    Hemoglobin A1c, External 11.6 06/25/2015 12:00 AM      Lipid Panel Lab Results   Component Value Date/Time    Cholesterol, total 262 05/31/2021 05:13 AM    HDL Cholesterol 89 (H) 05/31/2021 05:13 AM    LDL, calculated 152.8 (H) 05/31/2021 05:13 AM    VLDL, calculated 20.2 05/31/2021 05:13 AM    Triglyceride 101 05/31/2021 05:13 AM    CHOL/HDL Ratio 2.9 05/31/2021 05:13 AM      BNP No results for input(s): BNPP in the last 72 hours. Liver Enzymes No results for input(s): TP, ALB, TBIL, AP in the last 72 hours.     No lab exists for component: SGOT, GPT, DBIL   Thyroid Studies Lab Results   Component Value Date/Time TSH 0.760 02/17/2021 03:46 PM          MRA BRAIN WO CONT    Result Date: 5/31/2021  INDICATION:   stroke. History of kidney transplant COMPARISON:  None TECHNIQUE:  3-D time-of-flight MRA of the brain was performed. Multiplanar reconstructions were obtained. FINDINGS: The right vertebral artery is dominant. The left vertebral artery demonstrates diminished flow. It is uncertain if this is a chronic finding. . The basilar artery is small but patent. Visualized branches are patent. . The internal carotid, anterior cerebral, and middle cerebral arteries are patent. There is no flow-limiting intracranial stenosis. There is no aneurysm. Bilateral fetal origin of the PTAs. .     1. Diminished flow of the distal left vertebral artery. This may be a chronic finding. 2. Otherwise, no obvious acute intracranial arterial occlusion or stenosis. Minh Forrest MRA NECK WO CONT    Result Date: 5/31/2021  EXAM:  MRA NECK WO CONT INDICATION:    Slurred speech. Left-sided facial droop COMPARISON:  MRI of the brain of the same date TECHNIQUE: Noncontrasted images and 2D time-of-flight MRA of the neck were performed. Multiplanar MIP and 3D shaded surface display reconstructions were obtained. FINDINGS: There is conventional three vessel arch anatomy. The right subclavian, common carotid, and internal carotid arteries are patent with no flow-limiting stenosis. Mild smooth narrowing of the proximal left ICA in the bulb. Remainder of the ICA is patent. The left common carotid and left subclavian arteries appear patent. % of right carotid artery stenosis: 0 % of left carotid artery stenosis: Less than 50 NASCET method was utilized for calculating stenosis. Diminished flow is present in the left vertebral artery. This may be a chronic finding. The right vertebral artery is dominant. .     No significant stenosis is identified. MRI BRAIN WO CONT    Result Date: 5/31/2021  MRI BRAIN WITHOUT CONTRAST 5/31/2021 HISTORY: Stroke evaluation.  Recent slurred speech and intermittent left facial droop. TECHNIQUE: Sagittal and axial T1-weighted, axial T2-weighted, axial and coronal FLAIR, axial T2-weighted gradient-echo, axial diffusion weighted images with ADC maps of the brain. COMPARISON: Head CT from the previous day FINDINGS: There is a small area of restricted diffusion that is hyperintense on the FLAIR sequence located in the left corona radiata. There is no associated hemorrhage or mass effect. Moderate cerebral volume loss is present without disproportionate hippocampal atrophy. On the T2-weighted and FLAIR sequences, there are multiple white matter hyperintensities scattered throughout the brain. This pattern may be present with chronic small vessel ischemic disease. There is no intra-axial mass, hydrocephalus, or extra-axial hematoma. 1. Acute to early subacute small infarct in the left corona radiata. 2. White matter findings compatible with chronic small vessel ischemic disease. 3. Moderate cerebral volume loss. CT CODE NEURO HEAD WO CONTRAST    Result Date: 5/30/2021  CT HEAD WITHOUT CONTRAST, 5/30/2021 History: Slurred speech beginning at 9:30 Comparison: None Technique:   5 mm axial scans from the skull base to the vertex. All CT scans performed at this facility use one or all of the following: Automated exposure control, adjustment of the mA and/or kVp according to patient's size, iterative reconstruction. Findings:  No evidence of intracranial hemorrhage is seen. No abnormal extra-axial fluid collections are seen. Moderate age-appropriate chronic cortical volume loss is seen. No evidence for acute hydrocephalus is seen. No evidence of midline shift or herniation is seen. No abnormal edema pattern is seen in a vascular distribution to suggest large artery infarction. Somewhat defined hypodensity is seen in the left basal ganglia on image 16 suggesting a more subacute to chronic lacunar infarction.  Evaluation with bone windows shows no acute osseous changes. The visualized sinuses, middle ears, and mastoid air cells are well aerated. 1.  No acute intracranial process evident by noncontrast CT study of the head. This report was made using voice transcription. Despite my best efforts to avoid any, transcription errors may persist. If there is any question about the accuracy of the report or need for clarification, then please call [phone], or text me through perfectserv for clarification or correction. Discharge Exam:  Patient Vitals for the past 24 hrs:   Temp Pulse Resp BP SpO2   06/01/21 1447 97.9 °F (36.6 °C) 65 12 (!) 140/71 99 %   06/01/21 1210  66      06/01/21 1200 97.6 °F (36.4 °C) 62 12 (!) 161/97 98 %   06/01/21 0752  82      06/01/21 0738 98 °F (36.7 °C) 79 12 (!) 165/69 100 %   06/01/21 0210 98 °F (36.7 °C) 69 20 (!) 175/73 99 %   05/31/21 2239 98.3 °F (36.8 °C) 64 20 (!) 178/73 99 %   05/31/21 1931 98.3 °F (36.8 °C) 72 20 (!) 153/75 99 %     Visit Vitals  BP (!) 140/71 (BP 1 Location: Right upper arm, BP Patient Position: Sitting)   Pulse 65   Temp 97.9 °F (36.6 °C)   Resp 12   Ht 5' 3\" (1.6 m)   Wt 75.5 kg (166 lb 7.5 oz)   SpO2 99%   BMI 29.49 kg/m²     General appearance: alert, cooperative, no distress  Lungs: CTABL  Heart: RRR, no m/r/g  Abdomen: soft, non-tender. Bowel sounds normal.   Extremities: no cyanosis. Neurologic: Grossly normal    Disposition: Home with home health  Discharge Condition: stable  Patient Instructions: Activity as tolerated, diet as recommended. Current Discharge Medication List      START taking these medications    Details   atorvastatin (LIPITOR) 80 mg tablet Take 1 Tablet by mouth nightly. Qty: 30 Tablet, Refills: 3  Start date: 6/1/2021      clopidogreL (PLAVIX) 75 mg tab Take 1 Tablet by mouth daily. Qty: 19 Tablet, Refills: 0  Start date: 6/2/2021      losartan (COZAAR) 25 mg tablet Take 1 Tablet by mouth daily.   Qty: 30 Tablet, Refills: 3  Start date: 6/2/2021 CONTINUE these medications which have NOT CHANGED    Details   cyanocobalamin (Vitamin B-12) 250 mcg tablet Take 1 Tablet by mouth daily. VITAMIN B COMPLEX PO Take 1 Tablet by mouth daily. insulin NPH (HUMULIN N) 100 unit/mL (3 mL) inpn 20 Units by SubCUTAneous route Daily (before breakfast). Qty: 6 Pen, Refills: 3    Comments: STOP 75/25 MIXED INSULIN  Associated Diagnoses: Uncontrolled type 2 diabetes mellitus with hyperglycemia (HCC)      HumaLOG KwikPen Insulin 100 unit/mL kwikpen By correction 1/50>200, max daily dose 20 units  Qty: 6 Pen, Refills: 3    Associated Diagnoses: Uncontrolled type 2 diabetes mellitus with hyperglycemia (HCC)      FreeStyle Rhonda 14 Day Sensor kit Use to monitor blood glucose. E11.65  Qty: 2 Kit, Refills: 11      carvediloL (COREG) 12.5 mg tablet TAKE 1 TABLET BY MOUTH TWICE DAILY  Qty: 180 Tab, Refills: 0      belatacept (NULOJIX) 250 mg injection 500 mg by IntraVENous route.      !! Insulin Needles, Disposable, 32 gauge x 5/32\" ndle Use as directed 3 times a day      mycophenolate mofetil (CELLCEPT) 250 mg capsule TK 3 CS PO BID  Refills: 5      multivitamin (ONE A DAY) tablet Take 1 Tab by mouth daily. ascorbic acid (VITAMIN C) 500 mg tablet Take  by mouth. Cholecalciferol, Vitamin D3, (VITAMIN D3) 1,000 unit cap Take 1,000 Units by mouth daily. aspirin delayed-release 81 mg tablet Take  by mouth daily. !! RUTH PEN NEEDLE 32 x 5/32 \" ndle       predniSONE (DELTASONE) 5 mg tablet Take 5 mg by mouth daily. Gvoke HypoPen 2-Pack 1 mg/0.2 mL atIn 1 mg by SubCUTAneous route as needed (severe hypoglycemia). Qty: 2 Syringe, Refills: 1    Associated Diagnoses: Uncontrolled type 2 diabetes mellitus with hyperglycemia (HCC)      Trulicity 5.89 LR/6.3 mL sub-q pen 0.5 mL by SubCUTAneous route every seven (7) days.   Qty: 12 Syringe, Refills: 3    Associated Diagnoses: Uncontrolled type 2 diabetes mellitus with hyperglycemia ([address]      glucagon (GLUCAGEN) 1 mg injection UAD , IM injection for severe hypoglycemia, include syringe and vial       !! - Potential duplicate medications found. Please discuss with provider. Vaccinations:   Pt screened by nursing for pneumococcal and influenza vaccination needs at discharge, will be ordered if needed and pt consented. Immunization History   Administered Date(s) Administered    Influenza Vaccine 10/16/2014, 10/01/2015, 11/03/2016    Influenza Vaccine (Quad) Mdck Pf (>4 Yrs Flucelvax QUAD [id number]) 10/06/2017    Influenza Vaccine [employer]) PF (>6 Mo Flulaval, Fluarix, and >3 Yrs Afluria, Fluzone [id number]) 09/27/2018, 12/16/2019    Influenza Vaccine PF 10/16/2015    Pneumococcal Conjugate (PCV-7) 01/01/2012       Follow-up Information     Follow up With Specialties Details Why Contact Info    Ayleen Early MD Family Medicine   [address] [facility]  [phone]      Dahlia DO [facility]   [address]  [phone]            Time spent in the discharge process was 35 minutes.       Signed By: Elida Rangel MD     June 1, 2021

## 2021-06-01 NOTE — PROGRESS NOTES
OUTREACH NURSE PROGRESS REPORT    SUBJECTIVE: In to assess patient secondary to Code S 05/30. Daija Ferreira was seen and focused assessment complete. MEWS Score: 1 (05/31/21 1931)  Vitals:    05/31/21 1110 05/31/21 1200 05/31/21 1506 05/31/21 1931   BP: (!) 166/65  (!) 174/71 (!) 153/75   Pulse: 65 63 63 72   Resp: 20  20 20   Temp: 97.8 °F (36.6 °C)  98.2 °F (36.8 °C) 98.3 °F (36.8 °C)   SpO2: 95%  99% 99%   Weight:       Height:              OBJECTIVE: On arrival to room, I found patient to be seated in recliner, alert/oriented. ASSESSMENT:   Visit Vitals  BP (!) 153/75 (BP 1 Location: Right arm, BP Patient Position: Sitting)   Pulse 72   Temp 98.3 °F (36.8 °C)   Resp 20   Ht 5' 3\" (1.6 m)   Wt 75.5 kg (166 lb 7.5 oz)   SpO2 99%   BMI 29.49 kg/m²     General:  Alert, cooperative, no distress. Head:  Normocephalic, without obvious abnormality, atraumatic. Eyes:  Conjunctivae/corneas clear. Pupils equal, round, reactive to light. Extraocular movements intact. Lungs:   Clear to auscultation bilaterally. Heart:  Regular rate and rhythm, S1, S2 normal   Abdomen:   Soft, non-tender. Bowel sounds normal. No masses. No organomegaly. Extremities: Extremities normal, atraumatic, no cyanosis or edema. Pulses: 2+ and symmetric all extremities. Skin: Skin color, texture, turgor normal. No rashes or lesions. Neurologic: Normal strength, sensation, and reflexes throughout. Improvement in speech from yesterday- now speaking in clear, complete sentences. Slight facial droop from yesterday now resolved. Patient with some global weakness, no unilateral deficits. Pain Assessment  Pain Intensity 1: 0 (05/31/21 1500)        Patient Stated Pain Goal: 0      PLAN:  Discussed with primary RN Rashaun Jonas, no needs expressed or apparent. Patient states she's 10-4, denies any needs. Echo planned for tomorrow and to continue OT. Will continue to follow per outreach protocol.

## 2021-06-01 NOTE — PROGRESS NOTES
Problem: Self Care Deficits Care Plan (Adult)  Goal: *Acute Goals and Plan of Care (Insert Text)  Outcome: Progressing Towards Goal  Note:   1. Patient will complete lower body dressing with stand by assist to increase self care independence. 2. Patient will improve static standing at sink for 10 minutes to improve independence with transfers and self cares. 3. Patient will complete toilet transfer with supervision using adaptive equipment as needed. 4. Patient will complete chair transfer with supervision using adaptive equipment as needed. 5. Patient will complete UE exercises with independence to increase overall activity tolerance and strength. Timeframe: 7 visits       OCCUPATIONAL THERAPY: Initial Assessment and AM 6/1/2021  INPATIENT: OT Visit Days: 2  [place] / [place] / Product Type: Medicare /      NAME/AGE/GENDER: Rahel De Los Santos is a 70 y.o. female   PRIMARY DIAGNOSIS:  Suspected stroke patient last known to be well more than 2 hours ago [R09.89]  Acute ischemic stroke ([facility] [address] [I63.9] Suspected stroke patient last known to be well more than 2 hours ago Suspected stroke patient last known to be well more than 2 hours ago       ICD-10: Treatment Diagnosis:    · Generalized Muscle Weakness (M62.81)  · Other lack of cordination (R27.8)  · Difficulty in walking, Not elsewhere classified (R26.2)   Precautions/Allergies:     Lisinopril      ASSESSMENT:     Ms. Bety Freeman presents with suspected CVA. No differences noted in R & L UE assessment. Pt generally weak and has limited ROM in shoulders due to prior shoulder surgeries. Pt required assistance with don/doffing socks. Lives with supportive spouse. Pt reports using walls and furniture to get around in her home. Per patient and spouse, pt functioning below baseline and would benefit from 1-2 additional sessions to address the concerns below. 6/1/2021 Spouse notes improvement from yesterday.  Patient improving mobility and exercises up in chair. Used RW well and could use one at d/c. This section established at most recent assessment   PROBLEM LIST (Impairments causing functional limitations):  1. Decreased Strength  2. Decreased ADL/Functional Activities  3. Decreased Transfer Abilities  4. Decreased Ambulation Ability/Technique  5. Decreased Balance   INTERVENTIONS PLANNED: (Benefits and precautions of occupational therapy have been discussed with the patient.)  1. Activities of daily living training  2. Adaptive equipment training  3. Balance training  4. Clothing management  5. Cognitive training  6. Neuromuscular re-eduation  7. Therapeutic activity  8. Therapeutic exercise     TREATMENT PLAN: Frequency/Duration: Follow patient 1-2 sessions to address above goals. Rehabilitation Potential For Stated Goals: Good     REHAB RECOMMENDATIONS (at time of discharge pending progress):    Placement: It is my opinion, based on this patient's performance to date, that Ms. Herberth Francois may benefit from [address] after discharge due to the functional deficits listed above that are likely to improve with skilled rehabilitation because patient would benefit from education on safe mobility and functional t/fs in the home . Equipment:    [facility]              OCCUPATIONAL PROFILE AND HISTORY:   History of Present Injury/Illness (Reason for Referral):  See H&P  Past Medical History/Comorbidities:   Ms. Herberth Francois  has a past medical history of Anemia NEC, Carotid artery disease ([address] Chronic kidney disease, Diabetes mellitus, type II ([address] Dyspepsia and other specified disorders of function of stomach, Gout, Hypercholesterolemia, Hypertension, Hypothyroidism, Menopausal syndrome, Neuropathy, Renal failure, Right shoulder pain, and Thyroid nodule.   Ms. Herberth Francois  has a past surgical history that includes hx vascular access; hx transplant; hx urological; hx partial hysterectomy; vascular surgery procedure unlist; hx colonoscopy; hx orthopaedic; hx cataract removal (Left); hx cataract removal (Right); and hx orthopaedic.   Social History/Living Environment:   Home Environment: Private residence  One/Two Story Residence: One story  Living Alone: No  Support Systems: Spouse/Significant Other/Partner  Patient Expects to be Discharged to[de-identified] Private residence  Current DME Used/Available at Home: None  Tub or Shower Type: Tub/Shower combination  Prior Level of Function/Work/Activity:  Independent ADLs, IADLs with assistance from      Number of Personal Factors/Comorbidities that affect the Plan of Care: Brief history (0):  LOW COMPLEXITY   ASSESSMENT OF OCCUPATIONAL PERFORMANCE[de-identified]   Activities of Daily Living:   Basic ADLs (From Assessment) Complex ADLs (From Assessment)   Feeding: Independent  Oral Facial Hygiene/Grooming: Independent  Bathing: Setup  Upper Body Dressing: Independent  Lower Body Dressing: Minimum assistance  Toileting: Contact guard assistance     Grooming/Bathing/Dressing Activities of Daily Living                       Functional Transfers  Bathroom Mobility: Contact guard assistance  Toilet Transfer : Contact guard assistance     Bed/Mat Mobility  Sit to Stand: Contact guard assistance  Stand to Sit: Contact guard assistance  Bed to Chair: Contact guard assistance     Most Recent Physical Functioning:   Gross Assessment:                  Posture:  Posture (WDL): Within defined limits  Balance:  Sitting: Intact  Standing: With support Bed Mobility:     Wheelchair Mobility:     Transfers:  Sit to Stand: Contact guard assistance  Stand to Sit: Contact guard assistance  Bed to Chair: Contact guard assistance            Patient Vitals for the past 6 hrs:   BP BP Patient Position SpO2 Pulse   06/01/21 0738 (!) 165/69 At rest 100 % 79   06/01/21 0752    82       Mental Status  Neurologic State: Alert  Orientation Level: Oriented X4  Cognition: Appropriate decision making  Perception: Appears intact  Perseveration: No perseveration noted  Safety/Judgement: Awareness of environment                          Physical Skills Involved:  1. Balance  2. Strength  3. Activity Tolerance Cognitive Skills Affected (resulting in the inability to perform in a timely and safe manner):  1. [facility] Psychosocial Skills Affected:  1. Habits/Routines  2. Environmental Adaptation   Number of elements that affect the Plan of Care: 1-3:  LOW COMPLEXITY   CLINICAL DECISION MAKING:   [facility] AM-PAC 6 Clicks   Daily Activity Inpatient Short Form  How much help from another person does the patient currently need. .. Total A Lot A Little None   1. Putting on and taking off regular lower body clothing? [] 1   [] 2   [x] 3   [] 4   2. Bathing (including washing, rinsing, drying)? [] 1   [] 2   [x] 3   [] 4   3. Toileting, which includes using toilet, bedpan or urinal?   [] 1   [] 2   [x] 3   [] 4   4. Putting on and taking off regular upper body clothing? [] 1   [] 2   [] 3   [x] 4   5. Taking care of personal grooming such as brushing teeth? [] 1   [] 2   [] 3   [x] 4   6. Eating meals? [] 1   [] 2   [] 3   [x] 4   © 2007, Trustees of [facility], under license to [employer]. All rights reserved      Score:  Initial: 21 Most Recent: X (Date: -- )    Interpretation of Tool:  Represents activities that are increasingly more difficult (i.e. Bed mobility, Transfers, Gait). Medical Necessity:     · Skilled intervention continues to be required due to the above deficits. Reason for Services/Other Comments:  · See above deficits.    Use of outcome tool(s) and clinical judgement create a POC that gives a: LOW COMPLEXITY         TREATMENT:   (In addition to Assessment/Re-Assessment sessions the following treatments were rendered)     Pre-treatment Symptoms/Complaints:    Pain: Initial:   Pain Intensity 1: 0  Post Session:  0     Self Care: (10): Procedure(s) (per grid) utilized to improve and/or restore self-care/home management as related to grooming and transfers. Required minimal verbal and tactile cueing to facilitate activities of daily living skills. Therapeutic Activity: (    15): Therapeutic activities including Bed transfers and Chair transfers to improve mobility and strength. Required minimal   to promote motor control of bilateral, upper extremity(s), lower extremity(s). Braces/Orthotics/Lines/Etc:   · O2 Device: None (Room air)  Treatment/Session Assessment:    · Response to Treatment:  Good, up in chair  · Interdisciplinary Collaboration:   o Physical Therapist  o Occupational Therapist  o Registered Nurse  · After treatment position/precautions:   o Up in chair  o Bed/Chair-wheels locked  o Caregiver at bedside  o Call light within reach  o RN notified   · Compliance with Program/Exercises: Will assess as treatment progresses. · Recommendations/Intent for next treatment session: \"Next visit will focus on reduction in assistance provided\".   Total Treatment Duration:  OT Patient Time In/Time Out  Time In: 2902  Time Out: [place], [place]

## 2021-06-01 NOTE — PROGRESS NOTES
Outreach Follow Up Note    Patient resting in bed with eyes closed. No distress noted. Per Stan Martinez (primary) patient may be discharged to home later today. No needs or concerns voiced at this time. Will continue to monitor. MEWS Score: 0 (06/01/21 0738)  Vitals:    05/31/21 2239 06/01/21 0210 06/01/21 0738 06/01/21 0752   BP: (!) 178/73 (!) 175/73 (!) 165/69    Pulse: 64 69 79 82   Resp: 20 20 12    Temp: 98.3 °F (36.8 °C) 98 °F (36.7 °C) 98 °F (36.7 °C)    SpO2: 99% 99% 100%    Weight:       Height:             Pain Assessment  Pain Intensity 1: 0 (06/01/21 0800)        Patient Stated Pain Goal: 0      Patient reviewed and discussed with primary nurse. There have been no significant clinical changes since the completion of the last dated Outreach assessment. Will continue to follow up per outreach protocol.     Signed By:   Colton Sandy RN    June 1, 2021 9:12 AM

## 2021-06-01 NOTE — PROGRESS NOTES
SW received a call from Mike from [facility] stating they received a referral and could accept pt for a short stay since pt was requiring min assist x 2.   SW met with pt and her spouse, pt was walking out of bathroom with no DME when SW entered the room. Pt and spouse state she worked with PT today and walked down the griffith. They state pt is pretty much at baseline. 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 Pt will return home, SW following to determine if pt has [facility] needs. AMEYA Huston    Met with pt, spouse and 2 dtrs and they would like HHPT. Pt chose [facility]. Order and referral completed for [facility]. AMEYA Huston   OT recommended HHOT-orders added for [facility].

## 2021-06-01 NOTE — PROGRESS NOTES
Problem: Mobility Impaired (Adult and Pediatric)  Goal: *Acute Goals and Plan of Care (Insert Text)  Outcome: Progressing Towards Goal  Note: STG:  (1.)Ms. Madalynn Boxer will move from supine to sit and sit to supine  with CONTACT GUARD ASSIST within 1-2 treatment day(s). (2.)Ms. Madalynn Boxer will transfer from bed to chair and chair to bed with CONTACT GUARD ASSIST using the least restrictive device within 1-2 treatment day(s). (3.)Ms. Madalynn Boxer will ambulate with CONTACT GUARD ASSIST for 45-65 feet with the least restrictive device within 1-2 treatment day(s). LTG:  (1.)Ms. Madalynn Boxer will move from supine to sit and sit to supine  in bed with STAND BY ASSIST-INDEPENDENT within three treatment day(s). (2.)Ms. Madalynn Boxer will transfer from bed to chair and chair to bed with STAND BY ASSIST-INDEPENDENT using the least restrictive device within 3-5 treatment day(s). (3.)Ms. Castaneda will ambulate with STAND BY ASSIST-INDEPENDENT for 65-85 feet with the least restrictive device within 3-7 treatment day(s). ________________________________________________________________________________________________       PHYSICAL THERAPY: Daily Note and AM 6/1/2021  INPATIENT: PT Visit Days : 2  [place] / [place] / Product Type: Medicare /       NAME/AGE/GENDER: Demond Murillo is a 70 y.o. female   PRIMARY DIAGNOSIS: Suspected stroke patient last known to be well more than 2 hours ago [R09.89]  Acute ischemic stroke ([facility] [address] [I63.9] Suspected stroke patient last known to be well more than 2 hours ago Suspected stroke patient last known to be well more than 2 hours ago       ICD-10: Treatment Diagnosis:    · Difficulty in walking, Not elsewhere classified (R26.2)  · Other abnormalities of gait and mobility (R26.89)   Precaution/Allergies:  Lisinopril      ASSESSMENT:     Ms. Madalynn Boxer presents with decreased independence with functional mobility. Pt performed supine to sit to stand. Pt ambulated in room.  Pt in bedside chair with needs in reach. Pt will benefit from PT services to maximize independence with functional mobility. 6/1--Pt in bedside chair. Discussed walker usage with pt. Pt and  hoped PT would see pt walk then decide whether pt needed to use walker. Pt performed sit to stand. Pt walked a distance in the hallway, more steady than before, yet still slightly unsteady. Pt requires a little extra time for transfers and gait due to unsteadiness. This section established at most recent assessment   PROBLEM LIST (Impairments causing functional limitations):  1. Decreased Strength  2. Decreased Transfer Abilities  3. Decreased Ambulation Ability/Technique  4. Decreased Balance  5. Increased Pain  6. Decreased Activity Tolerance  7. Decreased Flexibility/Joint Mobility   INTERVENTIONS PLANNED: (Benefits and precautions of physical therapy have been discussed with the patient.)  1. Bed Mobility  2. Gait Training  3. Therapeutic Activites  4. Therapeutic Exercise/Strengthening  5. Transfer Training     TREATMENT PLAN: Frequency/Duration: daily for duration of hospital stay  Rehabilitation Potential For Stated Goals: Good     REHAB RECOMMENDATIONS (at time of discharge pending progress):    Placement: It is my opinion, based on this patient's performance to date, that Ms. Manjit Gonzalez may benefit from participating in 1-2 additional therapy sessions in order to continue to assess for rehab potential and then make recommendation for disposition at discharge. Equipment:    Walkers, Type: fixed wheel              HISTORY:   History of Present Injury/Illness (Reason for Referral):  Pt is admitted with above diagnosis.   Past Medical History/Comorbidities:   Ms. Manjit Gonzalez  has a past medical history of Anemia NEC, Carotid artery disease ([address] Chronic kidney disease, Diabetes mellitus, type II ([address] Dyspepsia and other specified disorders of function of stomach, Gout, Hypercholesterolemia, Hypertension, Hypothyroidism, Menopausal syndrome, Neuropathy, Renal failure, Right shoulder pain, and Thyroid nodule. Ms. Meri Mack  has a past surgical history that includes hx vascular access; hx transplant; hx urological; hx partial hysterectomy; vascular surgery procedure unlist; hx colonoscopy; hx orthopaedic; hx cataract removal (Left); hx cataract removal (Right); and hx orthopaedic. Social History/Living Environment:   Home Environment: Private residence  One/Two Story Residence: One story  Living Alone: No  Support Systems: Spouse/Significant Other/Partner, Family member(s)  Patient Expects to be Discharged to[de-identified] Private residence  Current DME Used/Available at Home: None  Tub or Shower Type: Tub/Shower combination  Prior Level of Function/Work/Activity:  Pt is independent with ambulation. Number of Personal Factors/Comorbidities that affect the Plan of Care: 0: LOW COMPLEXITY   EXAMINATION:   Most Recent Physical Functioning:   Gross Assessment:  AROM: Generally decreased, functional  Strength: Generally decreased, functional  Sensation: Intact               Posture:  Posture (WDL): Within defined limits  Balance:  Sitting: Intact  Standing: Pull to stand; With support Bed Mobility:     Wheelchair Mobility:     Transfers:  Sit to Stand: Contact guard assistance  Stand to Sit: Contact guard assistance  Gait:     Distance (ft): 125 Feet (ft)  Ambulation - Level of Assistance: Contact guard assistance      Body Structures Involved:  1. Bones  2. Joints  3. Muscles  4. Ligaments Body Functions Affected:  1. Neuromusculoskeletal  2. Movement Related Activities and Participation Affected:  1. Mobility   Number of elements that affect the Plan of Care: 4+: HIGH COMPLEXITY   CLINICAL PRESENTATION:   Presentation: Stable and uncomplicated: LOW COMPLEXITY   CLINICAL DECISION MAKIN [facility] [address] [id number] AM-PAC 6 Clicks   Basic Mobility Inpatient Short Form  How much difficulty does the patient currently have. .. Unable A Lot A Little None   1.   Turning over in bed (including adjusting bedclothes, sheets and blankets)? [] 1   [] 2   [x] 3   [] 4   2. Sitting down on and standing up from a chair with arms ( e.g., wheelchair, bedside commode, etc.)   [] 1   [] 2   [x] 3   [] 4   3. Moving from lying on back to sitting on the side of the bed? [] 1   [] 2   [x] 3   [] 4   How much help from another person does the patient currently need. .. Total A Lot A Little None   4. Moving to and from a bed to a chair (including a wheelchair)? [] 1   [] 2   [x] 3   [] 4   5. Need to walk in hospital room? [] 1   [] 2   [x] 3   [] 4   6. Climbing 3-5 steps with a railing? [] 1   [] 2   [x] 3   [] 4   © 2007, Trustees of [address] [place] [address], under license to [employer]. All rights reserved      Score:  Initial: 18 Most Recent: X (Date: -- )    Interpretation of Tool:  Represents activities that are increasingly more difficult (i.e. Bed mobility, Transfers, Gait). Medical Necessity:     · Skilled intervention continues to be required due to decreased independence with functional mobility. Reason for Services/Other Comments:  · Patient continues to require skilled intervention due to   · Decreased independence with functional mobility. · .   Use of outcome tool(s) and clinical judgement create a POC that gives a: Clear prediction of patient's progress: LOW COMPLEXITY            TREATMENT:   (In addition to Assessment/Re-Assessment sessions the following treatments were rendered)   Pre-treatment Symptoms/Complaints:    Pain: Initial:      Post Session:  no complaints     Gait Training ( 12 minutes):  Gait training to improve and/or restore physical functioning as related to mobility. Ambulated 125 Feet (ft) with Contact guard assistance   Therapeutic Activity (   11): Therapeutic activities per grid below to improve mobility.      Braces/Orthotics/Lines/Etc:   · O2 Device: None (Room air)  Treatment/Session Assessment:    · Response to Treatment:  Pt agreeable to ambulate with therapy. · Interdisciplinary Collaboration:   o Physical Therapist  o Occupational Therapist  o Registered Nurse  · After treatment position/precautions:   o Up in chair  o Bed/Chair-wheels locked  o Bed in low position  o Caregiver at bedside  o Call light within reach  o RN notified   · Compliance with Program/Exercises: Compliant most of the time, Will assess as treatment progresses  · Recommendations/Intent for next treatment session: \"Next visit will focus on advancements to more challenging activities and reduction in assistance provided\".   Total Treatment Duration:  PT Patient Time In/Time Out  Time In: 0910  Time Out: Joe [address], PT

## 2021-06-01 NOTE — PROGRESS NOTES
Outreach Follow Up Note          MEWS Score: 1 (06/01/21 0210)  Vitals:    05/31/21 1506 05/31/21 1931 05/31/21 2239 06/01/21 0210   BP: (!) 174/71 (!) 153/75 (!) 178/73 (!) 175/73   Pulse: 63 72 64 69   Resp: 20 20 20 20   Temp: 98.2 °F (36.8 °C) 98.3 °F (36.8 °C) 98.3 °F (36.8 °C) 98 °F (36.7 °C)   SpO2: 99% 99% 99% 99%   Weight:       Height:             Pain Assessment  Pain Intensity 1: 0 (05/31/21 1500)        Patient Stated Pain Goal: 0      Patient reviewed and discussed with primary nurse. There have been no significant clinical changes since the completion of the last dated Outreach assessment. Patient restful at current with eyes closed, even non-labored respirations. Patient allowed to remain at rest. No further needs expressed or apparent from primary [employer]. Will continue to follow up per outreach protocol.     Signed By:   Charlette Kaplan RN    June 1, 2021 2:20 AM

## 2021-06-01 NOTE — CONSULTS
Consult    Patient: Daija Ferreira MRN: [id number]     YOB: 1945  Age: 70 y.o. Sex: female      Subjective:      Dajia Ferreira is a 70 y.o. female who is being seen for stroke    The patient had sudden onset facial droop and slurred speech. She cannot recall if her facial droop is on the right or left. Duration was the least several hours. Her symptoms have now resolved and she feels better. MRI shows a small infarct in the left subcortex. Past Medical History:   Diagnosis Date    Anemia NEC     Carotid artery disease ([facility] [address]     Chronic kidney disease     dialysis patient  and fri    Diabetes mellitus, type II ([facility] [address]     Dyspepsia and other specified disorders of function of stomach     Gout     Hypercholesterolemia     Hypertension     Hypothyroidism     Menopausal syndrome     Neuropathy     Renal failure     s/p kidney transplant x2    Right shoulder pain     Thyroid nodule      Past Surgical History:   Procedure Laterality Date    HX CATARACT REMOVAL Left     HX CATARACT REMOVAL Right     HX COLONOSCOPY      HX ORTHOPAEDIC      left rotator cuff, bilateral shoulder surg., rt. knee, left index finger    HX ORTHOPAEDIC      right knee surgery after an accident, has pins.  HX PARTIAL HYSTERECTOMY      HX TRANSPLANT      kidney rejected in 1994    HX UROLOGICAL      Kidney tx in [facility].      right forearm.     VASCULAR SURGERY PROCEDURE UNLIST      fistula for dialysis, rt. carotid artery      Family History   Problem Relation Age of Onset    Heart Disease Mother          age 46    Hypertension Mother     Stroke Mother     Liver Disease Father     Alcohol abuse Father     Diabetes Sister     Cancer Brother         colon    Lupus Sister     Diabetes Sister      Social History     Tobacco Use    Smoking status: Never Smoker    Smokeless tobacco: Never Used   Substance Use Topics    Alcohol use: No      Current Facility-Administered Medications   Medication Dose Route Frequency Provider Last Rate Last Admin    losartan (COZAAR) tablet 25 mg  25 mg Oral DAILY Ena Durant MD   25 mg at 06/01/21 1223    carvediloL (COREG) tablet 12.5 mg  12.5 mg Oral BID WITH MEALS Ena Durant MD   12.5 mg at 06/01/21 0803    sodium chloride (NS) flush 5-10 mL  5-10 mL IntraVENous Q8H Leo Goldman MD   10 mL at 06/01/21 1348    sodium chloride (NS) flush 5-10 mL  5-10 mL IntraVENous PRN Sridevi Goldman MD        sodium chloride (NS) flush 5-40 mL  5-40 mL IntraVENous PRN Mayur Peterson MD        ondansetron (ZOFRAN) injection 4 mg  4 mg IntraVENous Q6H PRN Mayur Peterson MD        aspirin chewable tablet 81 mg  81 mg Oral DAILY Ena Durant MD   81 mg at 06/01/21 0803    clopidogreL (PLAVIX) tablet 75 mg  75 mg Oral DAILY Ena Durant MD   75 mg at 06/01/21 0803    atorvastatin (LIPITOR) tablet 80 mg  80 mg Oral QHS Yung Durant MD   80 mg at 05/31/21 2211    acetaminophen (TYLENOL) tablet 650 mg  650 mg Oral Q4H PRN Yung Durant MD        mycophenolate mofetil (CELLCEPT) capsule 250 mg  250 mg Oral ACB&D Mayur Peterson MD   250 mg at 06/01/21 0803    insulin lispro (HUMALOG) injection   SubCUTAneous AC&HS Mayur Peterson MD   2 Units at 06/01/21 1205    insulin glargine (LANTUS) injection 10 Units  10 Units SubCUTAneous QHS Ena Durant MD            Allergies   Allergen Reactions    Lisinopril Cough     cough       Review of Systems:  CONSTITUTIONAL: No weight loss, fever, chills, weakness or fatigue. HEENT: Eyes: No visual loss, blurred vision, double vision or yellow sclerae. Ears, Nose, Throat: No hearing loss, sneezing, congestion, runny nose or sore throat. SKIN: No rash or itching. CARDIOVASCULAR: No chest pain, chest pressure or chest discomfort. No palpitations or edema. RESPIRATORY: No shortness of breath, cough or sputum. GASTROINTESTINAL: No anorexia, nausea, vomiting or diarrhea.  No abdominal pain or blood. GENITOURINARY: no burning with urination. NEUROLOGICAL: No headache, dizziness, syncope, paralysis, ataxia, numbness or tingling in the extremities. No change in bowel or bladder control. MUSCULOSKELETAL: No muscle, back pain, joint pain or stiffness. HEMATOLOGIC: No anemia, bleeding or bruising. LYMPHATICS: No enlarged nodes. No history of splenectomy. PSYCHIATRIC: No history of depression or anxiety. ENDOCRINOLOGIC: No reports of sweating, cold or heat intolerance. No polyuria or polydipsia. ALLERGIES: No history of asthma, hives, eczema or rhinitis. Objective:     Vitals:    06/01/21 0738 06/01/21 0752 06/01/21 1200 06/01/21 1210   BP: (!) 165/69  (!) 161/97    Pulse: 79 82 62 66   Resp: 12  12    Temp: 98 °F (36.7 °C)  97.6 °F (36.4 °C)    SpO2: 100%  98%    Weight:       Height:            Physical Exam:  General - Well developed, well nourished, in no apparent distress. Pleasant and conversant. HEENT - Normocephalic, atraumatic. Neck -No masses   Lungs - Normal work of breathing   Abdomen - No masses   Extremities - No edema and no rashes. Psychiatric - Mood and affect are normal    Neurological examination - Comprehension, attention , memory and reasoning are intact. Language and speech are normal. On cranial nerve examination pupils are equal round and reactive to light (cranial nerve II and III). Visual acuity is adequate (cranial nerve II). Visual fields are full to finger confrontation (CN II). Extraocular motility is normal (CN III, IV, VI). Face is symmetric (CN VII). Hearing is grossly intact to verbal communication (CN VIII). Motor examination - There is normal bulk. Power is full throughout (with spontaneous movement against environmental objects). Cerebellar examination is normal with finger-no-nose testing. Neeta Andres      NIHSS   NIHSS Score: 0  1a-Level of Consciousness 0  1b-What is Month/Age 0  1c-Open/Close Eyes&Hand 0  2 -Best Gaze 0  3 -Visual Fields 0  4 -Facial Palsy 0  5a-Motor-Left Arm 0  5b-Motor-Right Arm 0  6a-Motor-Left Leg 0  6b-Motor-Right Leg 0  7 -Limb Ataxia 0  8 -Sensory 0  9 -Best Language 0  10-Dysarthria 0  11-Extinction/Inattention 0    Lab Results   Component Value Date/Time    Cholesterol, total 262 05/31/2021 05:13 AM    HDL Cholesterol 89 (H) 05/31/2021 05:13 AM    LDL, calculated 152.8 (H) 05/31/2021 05:13 AM    VLDL, calculated 20.2 05/31/2021 05:13 AM    Triglyceride 101 05/31/2021 05:13 AM    CHOL/HDL Ratio 2.9 05/31/2021 05:13 AM        Lab Results   Component Value Date/Time    Hemoglobin A1c 7.5 (H) 05/31/2021 05:13 AM    Hemoglobin A1c (POC) 7.7 05/25/2021 03:10 PM    Hemoglobin A1c, External 11.6 06/25/2015 12:00 AM        CT Results (most recent): Personally Reviewed   Results from [place] encounter on 05/30/21    CT CODE NEURO HEAD WO CONTRAST    Narrative  CT HEAD WITHOUT CONTRAST, 5/30/2021    History: Slurred speech beginning at 9:30    Comparison: None    Technique:   5 mm axial scans from the skull base to the vertex. All CT scans  performed at this facility use one or all of the following: Automated exposure  control, adjustment of the mA and/or kVp according to patient's size, iterative  reconstruction. Findings:  No evidence of intracranial hemorrhage is seen. No abnormal  extra-axial fluid collections are seen. Moderate age-appropriate chronic  cortical volume loss is seen. No evidence for acute hydrocephalus is seen. No  evidence of midline shift or herniation is seen. No abnormal edema pattern is  seen in a vascular distribution to suggest large artery infarction. Somewhat  defined hypodensity is seen in the left basal ganglia on image 16 suggesting a  more subacute to chronic lacunar infarction. Evaluation with bone windows shows no acute osseous changes. The visualized  sinuses, middle ears, and mastoid air cells are well aerated. Impression  1.   No acute intracranial process evident by noncontrast CT study of the head. This report was made using voice transcription. Despite my best efforts to avoid  any, transcription errors may persist. If there is any question about the  accuracy of the report or need for clarification, then please call [phone], or text me through [employer]serv for clarification or correction. Results for orders placed or performed during the hospital encounter of 05/30/21   EKG, 12 LEAD, INITIAL   Result Value Ref Range    Ventricular Rate 62 BPM    Atrial Rate 62 BPM    P-R Interval 202 ms    QRS Duration 86 ms    Q-T Interval 424 ms    QTC Calculation (Bezet) 430 ms    Calculated P Axis 37 degrees    Calculated R Axis -3 degrees    Calculated T Axis 81 degrees    Diagnosis       !! AGE AND GENDER SPECIFIC ECG ANALYSIS !! Normal sinus rhythm  Normal ECG    Confirmed by Bernardino Chaudhari MD (), DASIA ARIAS ([id number]) on 5/30/2021 6:50:30 PM          MRI Results (most recent): Personally Reviewed  Results from [place] encounter on 05/30/21    MRA NECK WO CONT    Narrative  EXAM:  MRA NECK WO CONT    INDICATION:    Slurred speech. Left-sided facial droop    COMPARISON:  MRI of the brain of the same date    TECHNIQUE: Noncontrasted images and 2D time-of-flight MRA of the neck were  performed. Multiplanar MIP and 3D shaded surface display reconstructions were  obtained. FINDINGS:  There is conventional three vessel arch anatomy. The right subclavian, common  carotid, and internal carotid arteries are patent with no flow-limiting  stenosis. Mild smooth narrowing of the proximal left ICA in the bulb. Remainder  of the ICA is patent. The left common carotid and left subclavian arteries  appear patent. % of right carotid artery stenosis: 0  % of left carotid artery stenosis: Less than 50    NASCET method was utilized for calculating stenosis. Diminished flow is present in the left vertebral artery. This may be a chronic  finding. The right vertebral artery is dominant. .    Impression  No significant stenosis is identified.                Assessment:     Acute ischemic stroke secondary to small vessel ischemic disease    Plan:     Aspirin 81 mg daily and clopidogrel 75 mg daily for total of 21 days and then continue aspirin monotherapy    High intensity statin    Long-term blood pressure goal less than 140/90 mmHg    Discussed diet and exercise    Follow-up in neurology clinic    Signed By: Alivia Stevenson DO     June 1, 2021

## 2021-06-01 NOTE — PROGRESS NOTES
Pt and pts  given discharge instructions, future medication changes/prescriptions, and follow up appts and aware of appt that needs to be made. Pt and pts  verbalized understanding. All lines removed. Pt taken downstairs via wheelchair by pct. Pt picked up by pts .

## 2021-06-03 ENCOUNTER — HOME CARE VISIT (OUTPATIENT)
Dept: SCHEDULING | Facility: HOME HEALTH | Age: 76
End: 2021-06-03

## 2021-09-20 ENCOUNTER — TRANSCRIBE ORDER (OUTPATIENT)
Dept: SCHEDULING | Age: 76
End: 2021-09-20

## 2021-09-20 DIAGNOSIS — R10.9 RIGHT FLANK PAIN: Primary | ICD-10-CM

## 2021-09-24 ENCOUNTER — HOSPITAL ENCOUNTER (OUTPATIENT)
Dept: ULTRASOUND IMAGING | Age: 76
Discharge: HOME | End: 2021-09-24
Attending: NURSE PRACTITIONER
Payer: MEDICARE

## 2021-09-24 DIAGNOSIS — R10.9 RIGHT FLANK PAIN: ICD-10-CM

## 2021-09-24 PROCEDURE — 76776 US EXAM K TRANSPL W/DOPPLER: CPT

## 2022-02-02 ENCOUNTER — HOSPITAL ENCOUNTER (INPATIENT)
Age: 77
LOS: 2 days | Discharge: HOME | DRG: 378 | End: 2022-02-06
Attending: EMERGENCY MEDICINE | Admitting: FAMILY MEDICINE
Payer: MEDICARE

## 2022-02-02 DIAGNOSIS — K62.5 BRIGHT RED BLOOD PER RECTUM: ICD-10-CM

## 2022-02-02 DIAGNOSIS — K92.2 LOWER GI BLEED: Primary | ICD-10-CM

## 2022-02-02 LAB
ALBUMIN SERPL-MCNC: 3.2 G/DL (ref 3.2–4.6)
ALBUMIN/GLOB SERPL: 0.8 {RATIO} (ref 1.2–3.5)
ALP SERPL-CCNC: 64 U/L (ref 50–136)
ALT SERPL-CCNC: 17 U/L (ref 12–65)
ANION GAP SERPL CALC-SCNC: 6 MMOL/L (ref 7–16)
AST SERPL-CCNC: 14 U/L (ref 15–37)
BASOPHILS # BLD: 0 K/UL (ref 0–0.2)
BASOPHILS NFR BLD: 1 % (ref 0–2)
BILIRUB SERPL-MCNC: 0.2 MG/DL (ref 0.2–1.1)
BUN SERPL-MCNC: 17 MG/DL (ref 8–23)
CALCIUM SERPL-MCNC: 10 MG/DL (ref 8.3–10.4)
CHLORIDE SERPL-SCNC: 109 MMOL/L (ref 98–107)
CO2 SERPL-SCNC: 26 MMOL/L (ref 21–32)
CREAT SERPL-MCNC: 0.96 MG/DL (ref 0.6–1)
DIFFERENTIAL METHOD BLD: ABNORMAL
EOSINOPHIL # BLD: 0.2 K/UL (ref 0–0.8)
EOSINOPHIL NFR BLD: 3 % (ref 0.5–7.8)
ERYTHROCYTE [DISTWIDTH] IN BLOOD BY AUTOMATED COUNT: 12.7 % (ref 11.9–14.6)
GLOBULIN SER CALC-MCNC: 3.9 G/DL (ref 2.3–3.5)
GLUCOSE SERPL-MCNC: 225 MG/DL (ref 65–100)
HCT VFR BLD AUTO: 31.3 % (ref 35.8–46.3)
HCT VFR BLD AUTO: 33.7 % (ref 35.8–46.3)
HGB BLD-MCNC: 10.4 G/DL (ref 11.7–15.4)
HGB BLD-MCNC: 9.8 G/DL (ref 11.7–15.4)
IMM GRANULOCYTES # BLD AUTO: 0 K/UL (ref 0–0.5)
IMM GRANULOCYTES NFR BLD AUTO: 0 % (ref 0–5)
INR PPP: 1
LYMPHOCYTES # BLD: 1.7 K/UL (ref 0.5–4.6)
LYMPHOCYTES NFR BLD: 31 % (ref 13–44)
MCH RBC QN AUTO: 29.2 PG (ref 26.1–32.9)
MCHC RBC AUTO-ENTMCNC: 30.9 G/DL (ref 31.4–35)
MCV RBC AUTO: 94.7 FL (ref 79.6–97.8)
MONOCYTES # BLD: 0.4 K/UL (ref 0.1–1.3)
MONOCYTES NFR BLD: 6 % (ref 4–12)
NEUTS SEG # BLD: 3.2 K/UL (ref 1.7–8.2)
NEUTS SEG NFR BLD: 59 % (ref 43–78)
NRBC # BLD: 0 K/UL (ref 0–0.2)
PLATELET # BLD AUTO: 161 K/UL (ref 150–450)
PMV BLD AUTO: 9.4 FL (ref 9.4–12.3)
POTASSIUM SERPL-SCNC: 3.8 MMOL/L (ref 3.5–5.1)
PROT SERPL-MCNC: 7.1 G/DL (ref 6.3–8.2)
PROTHROMBIN TIME: 13.6 SEC (ref 12.6–14.5)
RBC # BLD AUTO: 3.56 M/UL (ref 4.05–5.2)
SODIUM SERPL-SCNC: 141 MMOL/L (ref 136–145)
WBC # BLD AUTO: 5.5 K/UL (ref 4.3–11.1)

## 2022-02-02 PROCEDURE — 74011250637 HC RX REV CODE- 250/637: Performed by: NURSE PRACTITIONER

## 2022-02-02 PROCEDURE — G0378 HOSPITAL OBSERVATION PER HR: HCPCS

## 2022-02-02 PROCEDURE — 80053 COMPREHEN METABOLIC PANEL: CPT

## 2022-02-02 PROCEDURE — 85018 HEMOGLOBIN: CPT

## 2022-02-02 PROCEDURE — 74011000250 HC RX REV CODE- 250: Performed by: NURSE PRACTITIONER

## 2022-02-02 PROCEDURE — 85610 PROTHROMBIN TIME: CPT

## 2022-02-02 PROCEDURE — 74011250636 HC RX REV CODE- 250/636: Performed by: NURSE PRACTITIONER

## 2022-02-02 PROCEDURE — 86923 COMPATIBILITY TEST ELECTRIC: CPT

## 2022-02-02 PROCEDURE — 99285 EMERGENCY DEPT VISIT HI MDM: CPT

## 2022-02-02 PROCEDURE — 36415 COLL VENOUS BLD VENIPUNCTURE: CPT

## 2022-02-02 PROCEDURE — 85025 COMPLETE CBC W/AUTO DIFF WBC: CPT

## 2022-02-02 PROCEDURE — 86900 BLOOD TYPING SEROLOGIC ABO: CPT

## 2022-02-02 RX ORDER — ATORVASTATIN CALCIUM 20 MG/1
20 TABLET, FILM COATED ORAL DAILY
Status: ON HOLD | COMMUNITY
End: 2022-02-02

## 2022-02-02 RX ORDER — SODIUM CHLORIDE 9 MG/ML
125 INJECTION, SOLUTION INTRAVENOUS CONTINUOUS
Status: DISCONTINUED | OUTPATIENT
Start: 2022-02-02 | End: 2022-02-06

## 2022-02-02 RX ORDER — SODIUM CHLORIDE 0.9 % (FLUSH) 0.9 %
5-40 SYRINGE (ML) INJECTION EVERY 8 HOURS
Status: DISCONTINUED | OUTPATIENT
Start: 2022-02-02 | End: 2022-02-06 | Stop reason: HOSPADM

## 2022-02-02 RX ORDER — SODIUM CHLORIDE 0.9 % (FLUSH) 0.9 %
5-40 SYRINGE (ML) INJECTION AS NEEDED
Status: DISCONTINUED | OUTPATIENT
Start: 2022-02-02 | End: 2022-02-06 | Stop reason: HOSPADM

## 2022-02-02 RX ORDER — ROSUVASTATIN CALCIUM 20 MG/1
20 TABLET, COATED ORAL
Status: DISCONTINUED | OUTPATIENT
Start: 2022-02-02 | End: 2022-02-06 | Stop reason: HOSPADM

## 2022-02-02 RX ORDER — ROSUVASTATIN CALCIUM 20 MG/1
20 TABLET, COATED ORAL DAILY
COMMUNITY

## 2022-02-02 RX ORDER — CARVEDILOL 6.25 MG/1
12.5 TABLET ORAL 2 TIMES DAILY WITH MEALS
Status: DISCONTINUED | OUTPATIENT
Start: 2022-02-02 | End: 2022-02-06 | Stop reason: HOSPADM

## 2022-02-02 RX ORDER — MYCOPHENOLATE MOFETIL 250 MG/1
750 CAPSULE ORAL
Status: DISCONTINUED | OUTPATIENT
Start: 2022-02-02 | End: 2022-02-06 | Stop reason: HOSPADM

## 2022-02-02 RX ORDER — LANOLIN ALCOHOL/MO/W.PET/CERES
500 CREAM (GRAM) TOPICAL DAILY
Status: DISCONTINUED | OUTPATIENT
Start: 2022-02-03 | End: 2022-02-06 | Stop reason: HOSPADM

## 2022-02-02 RX ORDER — PREDNISONE 5 MG/1
5 TABLET ORAL DAILY
Status: DISCONTINUED | OUTPATIENT
Start: 2022-02-03 | End: 2022-02-06 | Stop reason: HOSPADM

## 2022-02-02 RX ORDER — ONDANSETRON 2 MG/ML
4 INJECTION INTRAMUSCULAR; INTRAVENOUS
Status: DISCONTINUED | OUTPATIENT
Start: 2022-02-02 | End: 2022-02-06 | Stop reason: HOSPADM

## 2022-02-02 RX ORDER — INSULIN LISPRO 100 [IU]/ML
INJECTION, SOLUTION INTRAVENOUS; SUBCUTANEOUS
Status: DISCONTINUED | OUTPATIENT
Start: 2022-02-02 | End: 2022-02-06 | Stop reason: HOSPADM

## 2022-02-02 RX ADMIN — SODIUM CHLORIDE, PRESERVATIVE FREE 10 ML: 5 INJECTION INTRAVENOUS at 22:00

## 2022-02-02 RX ADMIN — SODIUM CHLORIDE, PRESERVATIVE FREE 10 ML: 5 INJECTION INTRAVENOUS at 14:00

## 2022-02-02 RX ADMIN — MYCOPHENOLATE MOFETIL 750 MG: 250 CAPSULE ORAL at 18:19

## 2022-02-02 RX ADMIN — CARVEDILOL 12.5 MG: 6.25 TABLET, FILM COATED ORAL at 18:19

## 2022-02-02 RX ADMIN — SODIUM CHLORIDE 75 ML/HR: 900 INJECTION, SOLUTION INTRAVENOUS at 12:11

## 2022-02-02 RX ADMIN — ROSUVASTATIN CALCIUM 20 MG: 20 TABLET, COATED ORAL at 23:11

## 2022-02-02 NOTE — ED PROVIDER NOTES
Mask was worn during the entire patient examination. Nasra Olivares is a 78 y.o. female who presents to the ED with a chief complaint of rectal bleeding. Patient has no history of rectal bleeding in the past.  Has had 2 episodes at home that were bright red blood that filled the toilet bowl. They show me a picture of this. She is wearing a pad now and states she feels a little bit fatigued and weak but has no other real complaints. She occasionally gets some right-sided abdominal pain but does not have any today. She denies being on any blood thinners. No syncope. The history is provided by the patient. Rectal Bleeding   Pertinent negatives include no abdominal pain, no abdominal distention, no chills, no fever, no nausea, no vomiting and no diarrhea. Past Medical History:   Diagnosis Date    Anemia NEC     Carotid artery disease [address] [address]     Chronic kidney disease     dialysis patient mon wed and fri    Diabetes mellitus, type II ([facility] [address]     Dyspepsia and other specified disorders of function of stomach     Gout     Hypercholesterolemia     Hypertension     Hypothyroidism     Menopausal syndrome     Neuropathy     Renal failure     s/p kidney transplant x2    Right shoulder pain     Thyroid nodule        Past Surgical History:   Procedure Laterality Date    HX CATARACT REMOVAL Left     HX CATARACT REMOVAL Right     HX COLONOSCOPY      HX ORTHOPAEDIC      left rotator cuff, bilateral shoulder surg., rt. knee, left index finger    HX ORTHOPAEDIC      right knee surgery after an accident, has pins.  HX PARTIAL HYSTERECTOMY      HX TRANSPLANT      kidney rejected in 1994    HX UROLOGICAL      Kidney tx in [facility] .      right forearm.     VASCULAR SURGERY PROCEDURE UNLIST      fistula for dialysis, rt. carotid artery         Family History:   Problem Relation Age of Onset    Heart Disease Mother          age 52    Hypertension Mother     Stroke Mother     Liver Disease Father     Alcohol abuse Father     Diabetes Sister     Cancer Brother         colon    Lupus Sister     Diabetes Sister        Social History     Socioeconomic History    Marital status:      Spouse name: Not on file    Number of children: Not on file    Years of education: Not on file    Highest education level: Not on file   Occupational History    Not on file   Tobacco Use    Smoking status: Never Smoker    Smokeless tobacco: Never Used   Substance and Sexual Activity    Alcohol use: No    Drug use: No    Sexual activity: Never   Other Topics Concern    Not on file   Social History Narrative    Not on file     Social Determinants of Health     Financial Resource Strain:     Difficulty of Paying Living Expenses: Not on file   Food Insecurity:     Worried About Running Out of Food in the Last Year: Not on file    Emily of Food in the Last Year: Not on file   Transportation Needs:     Lack of Transportation (Medical): Not on file    Lack of Transportation (Non-Medical):  Not on file   Physical Activity:     Days of Exercise per Week: Not on file    Minutes of Exercise per Session: Not on file   Stress:     Feeling of Stress : Not on file   Social Connections:     Frequency of Communication with Friends and Family: Not on file    Frequency of Social Gatherings with Friends and Family: Not on file    Attends [employer] Services: Not on file    Active Member of [address] [place] [address] or Organizations: Not on file    Attends Club or Organization Meetings: Not on file    Marital Status: Not on file   Intimate Partner Violence:     Fear of Current or Ex-Partner: Not on file    Emotionally Abused: Not on file    Physically Abused: Not on file    Sexually Abused: Not on file   Housing Stability:     Unable to Pay for Housing in the Last Year: Not on file    Number of [place] in the Last Year: Not on file    Unstable Housing in the Last Year: Not on file ALLERGIES: Lisinopril    Review of Systems   Constitutional: Positive for fatigue. Negative for activity change, appetite change, chills and fever. Respiratory: Negative for apnea, cough, chest tightness, shortness of breath, wheezing and stridor. Cardiovascular: Negative for chest pain and palpitations. Gastrointestinal: Positive for anal bleeding and blood in stool. Negative for abdominal distention, abdominal pain, diarrhea, nausea and vomiting. Skin: Negative for pallor and rash. All other systems reviewed and are negative. Vitals:    02/02/22 0701   BP: (!) 146/64   Pulse: 87   Resp: 18   Temp: 97.5 °F (36.4 °C)   SpO2: 100%   Weight: 73.5 kg (162 lb)   Height: 5' 3\" (1.6 m)            Physical Exam  Vitals and nursing note reviewed. Constitutional:       General: She is not in acute distress. Appearance: Normal appearance. She is well-developed. She is not ill-appearing, toxic-appearing or diaphoretic. HENT:      Head: Normocephalic and atraumatic. Eyes:      General: No scleral icterus. Conjunctiva/sclera: Conjunctivae normal.   Pulmonary:      Effort: Pulmonary effort is normal. No respiratory distress. Breath sounds: Normal breath sounds. No stridor. No wheezing, rhonchi or rales. Chest:      Chest wall: No tenderness. Abdominal:      General: Abdomen is flat. There is no distension. Palpations: Abdomen is soft. There is no mass. Tenderness: There is no abdominal tenderness. There is no guarding or rebound. Hernia: No hernia is present. Genitourinary:     Vagina: No vaginal discharge. Rectum: Guaiac result positive (some blood from the rectum no bleeding hemorrhoids or active problems. ). Musculoskeletal:      Cervical back: No rigidity or tenderness. Skin:     Capillary Refill: Capillary refill takes less than 2 seconds. Coloration: Skin is not jaundiced or pale. Neurological:      Mental Status: She is alert.  Mental status is at baseline. Psychiatric:         Mood and Affect: Mood normal.         Behavior: Behavior normal.          MDM  Number of Diagnoses or Management Options  Diagnosis management comments: Patient's hemoglobin is dropped one-point but given the bleeding has been several episodes today and fairly large amount of bright red blood I am going to have her observed in the hospital and have consulted with gastroenterology who felt the patient could stay [facility] at [facility] I have also consult with hospitalist for admission. Lucero Strong MD; 2/2/2022 @9:38 AM Voice dictation software was used during the making of this note. This software is not perfect and grammatical and other typographical errors may be present.   This note has not been proofread for errors.  ====================================        Amount and/or Complexity of Data Reviewed  Clinical lab tests: ordered and reviewed (Results for orders placed or performed during the hospital encounter of 02/02/22  -CBC WITH AUTOMATED DIFF:        Result                      Value             Ref Range           WBC                         5.5               4.3 - 11.1 K*       RBC                         3.56 (L)          4.05 - 5.2 M*       HGB                         10.4 (L)          11.7 - 15.4 *       HCT                         33.7 (L)          35.8 - 46.3 %       MCV                         94.7              79.6 - 97.8 *       MCH                         29.2              26.1 - 32.9 *       MCHC                        30.9 (L)          31.4 - 35.0 *       RDW                         12.7              11.9 - 14.6 %       PLATELET                    161               150 - 450 K/*       MPV                         9.4               9.4 - 12.3 FL       ABSOLUTE NRBC               0.00              0.0 - 0.2 K/*       DF                          AUTOMATED                             NEUTROPHILS                 59                43 - 78 % LYMPHOCYTES                 31                13 - 44 %           MONOCYTES                   6                 4.0 - 12.0 %        EOSINOPHILS                 3                 0.5 - 7.8 %         BASOPHILS                   1                 0.0 - 2.0 %         IMMATURE GRANULOCYTES       0                 0.0 - 5.0 %         ABS. NEUTROPHILS            3.2               1.7 - 8.2 K/*       ABS. LYMPHOCYTES            1.7               0.5 - 4.6 K/*       ABS. MONOCYTES              0.4               0.1 - 1.3 K/*       ABS. EOSINOPHILS            0.2               0.0 - 0.8 K/*       ABS. BASOPHILS              0.0               0.0 - 0.2 K/*       ABS. IMM. GRANS.            0.0               0.0 - 0.5 K/*  -METABOLIC PANEL, COMPREHENSIVE:        Result                      Value             Ref Range           Sodium                      141               136 - 145 mm*       Potassium                   3.8               3.5 - 5.1 mm*       Chloride                    109 (H)           98 - 107 mmo*       CO2                         26                21 - 32 mmol*       Anion gap                   6 (L)             7 - 16 mmol/L       Glucose                     225 (H)           65 - 100 mg/*       BUN                         17                8 - 23 MG/DL        Creatinine                  0.96              0.6 - 1.0 MG*       GFR est AA                  >60               >60 ml/min/1*       GFR est non-AA              >60               >60 ml/min/1*       Calcium                     10.0              8.3 - 10.4 M*       Bilirubin, total            0.2               0.2 - 1.1 MG*       ALT (SGPT)                  17                12 - 65 U/L         AST (SGOT)                  14 (L)            15 - 37 U/L         Alk.  phosphatase            64                50 - 136 U/L        Protein, total              7.1               6.3 - 8.2 g/*       Albumin                     3.2               3.2 - 4.6 g/* Globulin                    3.9 (H)           2.3 - 3.5 g/*       A-G Ratio                   0.8 (L)           1.2 - 3.5      -PROTHROMBIN TIME + INR:        Result                      Value             Ref Range           Prothrombin time            13.6              12.6 - 14.5 *       INR                         1.0                              -TYPE & SCREEN:        Result                      Value             Ref Range           Crossmatch Expiration                                         02/05/2022,2359       ABO/Rh(D)                   O POSITIVE                            Antibody screen             NEG                             )           Procedures

## 2022-02-02 NOTE — PROGRESS NOTES
Care Management Interventions  PCP Verified by CM: Yes Eugene Feng)  Mode of Transport at Discharge: Other (see comment) (Family)  Transition of Care Consult (CM Consult): Discharge Planning  MyChart Signup: No  Discharge Durable Medical Equipment: No  Physical Therapy Consult: No  Occupational Therapy Consult: No  Speech Therapy Consult: No  Support Systems: Spouse/Significant Other  Confirm Follow Up Transport: Family  The Plan for Transition of Care is Related to the Following Treatment Goals : Work with patient until back to baseline  The Patient and/or Patient Representative was Provided with a Choice of Provider and Agrees with the Discharge Plan?: Yes  Freedom of Choice List was Provided with Basic Dialogue that Supports the Patient's Individualized Plan of Care/Goals, Treatment Preferences and Shares the Quality Data Associated with the Providers?: Yes  Discharge Location  Patient Expects to be Discharged to[de-identified] Home    SW met with patient/ while social distancing w/appropriate PPE. Patient lives with her  Silvia Cavazos) who will be available for support upon discharge. Prior to hospitalization, patient was independent w/ADLs and required no DME. No current home health services. No d/c needs identified at this time. SW will continue to follow.     JUAQUIN Cardozo, [address] [facility]   [phone]

## 2022-02-02 NOTE — H&P (VIEW-ONLY)
[facility] Consult Note       Primary GI Physician: Dr. Rj Sanchez    Referring Provider:  Dr. Baron Silence Date:  2/2/2022    Admit Date:  2/2/2022    Chief Complaint:  GI bleeding    Subjective:     History of Present Illness:  Patient is a 78 y.o. female with PMH including but not limited to CAD, CKD, s/p two renal transplants at [facility] on immunosuppressive therapy, DM II, HlD, who is seen in consultation at the request of Dr. Baldomero Mckeon for GI bleeding. Patient presented to the ED today with complaints of bright  bloody stool x 2 starting at 3 am today. Has had intermittent right sided abdominal pain over months. HGB is 10.4 on presentation with MCV 94. Baseline HGB appears to be 11. BUN 17, creatinine 0.96. No upper sx. States that she had a routine colonoscopy several year ago that was normal.  She takes prednisone, but denies NSAIDs. No reported anti coagulants. We have never seen Mrs. Deidre Benavides in our office. PMH:  Past Medical History:   Diagnosis Date    Anemia NEC     [facility]otid artery disease ([facility] [address]     Chronic kidney disease     dialysis patient mon wed and fri    Diabetes mellitus, type II ([facility] [address]     Dyspepsia and other specified disorders of function of stomach     Gout     Hypercholesterolemia     Hypertension     Hypothyroidism     Menopausal syndrome     Neuropathy     Renal failure     s/p kidney transplant x2    Right shoulder pain     Thyroid nodule        PSH:  Past Surgical History:   Procedure Laterality Date    HX CATARACT REMOVAL Left     HX CATARACT REMOVAL Right     HX COLONOSCOPY      HX ORTHOPAEDIC      left rotator cuff, bilateral shoulder surg., rt. knee, left index finger    HX ORTHOPAEDIC      right knee surgery after an accident, has pins.  HX PARTIAL HYSTERECTOMY      HX TRANSPLANT      kidney rejected in 2005, 1994    HX UROLOGICAL      Kidney tx in [facility]      right forearm.     VASCULAR SURGERY PROCEDURE UNLIST fistula for dialysis, rt. carotid artery       Allergies: Allergies   Allergen Reactions    Lisinopril Cough     cough       Home Medications:  Prior to Admission medications    Medication Sig Start Date End Date Taking? Authorizing Provider   insulin NPH (HUMULIN N) 100 unit/mL (3 mL) inpn 25 Units by SubCUTAneous route Daily (before breakfast). 1/4/22   Byron Morrissey PA-C   HumaLOG KwikPen Insulin 100 unit/mL kwikpen By correction 1/50>200, max daily dose 20 units 1/4/22   Delonte Ledezma PA-C   Trulicity 1.5 CF/0.9 mL sub-q pen 0.5 mL by SubCUTAneous route every seven (7) days. 1/4/22   Yulissa RICKETTS PA-C   carvediloL (COREG) 12.5 mg tablet TAKE 1 TABLET BY MOUTH TWICE DAILY 12/20/21   Wynonia Kawasaki P, DO   rosuvastatin (CRESTOR) 20 mg tablet Take 1 Tablet by mouth nightly. 11/24/21   Wendy Manuel MD   losartan (COZAAR) 25 mg tablet Take 1 Tablet by mouth daily. 6/2/21   Maurice Cameron MD   cyanocobalamin (Vitamin B-12) 250 mcg tablet Take 1 Tablet by mouth daily. Provider, Historical   VITAMIN B COMPLEX PO Take 1 Tablet by mouth daily. Provider, Historical   Gvoke HypoPen 2-Pack 1 mg/0.2 mL atIn 1 mg by SubCUTAneous route as needed (severe hypoglycemia). 5/25/21   Byron Morrissey PA-C   FreeStyle Rhonda 14 Day Sensor kit Use to monitor blood glucose. E11.65 5/10/21   Byron Morrissey PA-C   belatacept (NULOJIX) 250 mg injection 500 mg by IntraVENous route. 11/27/14   Provider, Historical   glucagon (GLUCAGEN) 1 mg injection UAD , IM injection for severe hypoglycemia, include syringe and vial 6/19/17   Provider, Historical   Insulin Needles, Disposable, 32 gauge x 5/32\" ndle Use as directed 3 times a day 1/9/17   Provider, Historical   mycophenolate mofetil (CELLCEPT) 250 mg capsule TK 3 CS PO BID 4/1/17   Provider, Historical   multivitamin (ONE A DAY) tablet Take 1 Tab by mouth daily. Provider, Historical   ascorbic acid (VITAMIN C) 500 mg tablet Take  by mouth.     Provider, Historical   Cholecalciferol, Vitamin D3, (VITAMIN D3) 1,000 unit cap Take 1,000 Units by mouth daily. Provider, Historical   aspirin delayed-release 81 mg tablet Take  by mouth daily. Provider, Historical   RUTH PEN NEEDLE 32 x 5/32 \" ndle  2/15/14   Provider, Historical   predniSONE (DELTASONE) 5 mg tablet Take 5 mg by mouth daily. Provider, Historical       Hospital Medications:  No current facility-administered medications for this encounter. Current Outpatient Medications   Medication Sig    insulin NPH (HUMULIN N) 100 unit/mL (3 mL) inpn 25 Units by SubCUTAneous route Daily (before breakfast).  HumaLOG KwikPen Insulin 100 unit/mL kwikpen By correction 1/50>200, max daily dose 20 units    Trulicity 1.5 UG/9.7 mL sub-q pen 0.5 mL by SubCUTAneous route every seven (7) days.  carvediloL (COREG) 12.5 mg tablet TAKE 1 TABLET BY MOUTH TWICE DAILY    rosuvastatin (CRESTOR) 20 mg tablet Take 1 Tablet by mouth nightly.  losartan (COZAAR) 25 mg tablet Take 1 Tablet by mouth daily.  cyanocobalamin (Vitamin B-12) 250 mcg tablet Take 1 Tablet by mouth daily.  VITAMIN B COMPLEX PO Take 1 Tablet by mouth daily.  Gvoke HypoPen 2-Pack 1 mg/0.2 mL atIn 1 mg by SubCUTAneous route as needed (severe hypoglycemia).  FreeStyle Rhonda 14 Day Sensor kit Use to monitor blood glucose. E11.65    belatacept (NULOJIX) 250 mg injection 500 mg by IntraVENous route.  glucagon (GLUCAGEN) 1 mg injection UAD , IM injection for severe hypoglycemia, include syringe and vial    Insulin Needles, Disposable, 32 gauge x 5/32\" ndle Use as directed 3 times a day    mycophenolate mofetil (CELLCEPT) 250 mg capsule TK 3 CS PO BID    multivitamin (ONE A DAY) tablet Take 1 Tab by mouth daily.  ascorbic acid (VITAMIN C) 500 mg tablet Take  by mouth.  Cholecalciferol, Vitamin D3, (VITAMIN D3) 1,000 unit cap Take 1,000 Units by mouth daily.  aspirin delayed-release 81 mg tablet Take  by mouth daily.     RUTH PEN NEEDLE 32 x 5/32 \" ndle     predniSONE (DELTASONE) 5 mg tablet Take 5 mg by mouth daily. Social History:  Social History     Tobacco Use    Smoking status: Never Smoker    Smokeless tobacco: Never Used   Substance Use Topics    Alcohol use: No       Pt denies any history of drug use, blood transfusions, or tattoos. Family History:  Family History   Problem Relation Age of Onset    Heart Disease Mother          age 46    Hypertension Mother     Stroke Mother     Liver Disease Father     Alcohol abuse Father     Diabetes Sister     Cancer Brother         colon    Lupus Sister     Diabetes Sister        Review of Systems:  A detailed 10 system ROS is obtained, with pertinent positives as listed above. All others are negative. Diet:      Objective:     Physical Exam:  Vitals:  Visit Vitals  BP (!) 146/64   Pulse 87   Temp 97.5 °F (36.4 °C)   Resp 18   Ht 5' 3\" (1.6 m)   Wt 73.5 kg (162 lb)   SpO2 100%   BMI 28.70 kg/m²     Gen:  Pt is alert, cooperative, no acute distress  Skin:  Extremities and face reveal no rashes. HEENT: Sclerae anicteric. Extra-occular muscles are intact. No oral ulcers. No abnormal pigmentation of the lips. The neck is supple. Cardiovascular: Regular rate and rhythm. No murmurs, gallops, or rubs. Respiratory:  Comfortable breathing with no accessory muscle use. Clear breath sounds anteriorly with no wheezes, rales, or rhonchi. GI:  Abdomen distended, soft, and nontender. Normal active bowel sounds. No enlargement of the liver or spleen. No masses palpable. Rectal:  Deferred  Musculoskeletal:  No pitting edema of the lower legs. Neurological:  Gross memory appears intact. Patient is alert and oriented.       Laboratory:    Recent Labs     22  0812   WBC 5.5   HGB 10.4*   HCT 33.7*      MCV 94.7      K 3.8   *   CO2 26   BUN 17   CREA 0.96   CA 10.0   *   AP 64   AST 14*   ALT 17   TBILI 0.2   ALB 3.2   TP 7.1   PTP 13.6 INR 1.0          Assessment:     Patient is a 78 y.o. female with PMH including but not limited to CAD, CKD, s/p Renal Transplant on immunosuppressive therapy, DM II, HlD, who is seen in consultation at the request of Dr. Geo Robins for GI bleeding. We have never seen her in our office. She presented on 2/2/22 for complaints of 2 episodes of dark bloody BM. HGB 10.4 on presentation with baseline HGB around 11. She is not on any anticoagulants. Plan:   Follow HGB and transfuse as needed  Would start with a CT A/P first.  With contrast if no contraindications. We may want to discuss with her Nephrologist tomorrow. Clear liquid diet  May need flex or colonoscopy on Friday.     Rashida Shin MD

## 2022-02-02 NOTE — ED TRIAGE NOTES
Patient states that she woke up this morning and has had two bloody stool. Patient states dark stool. States no previous hx of same. Denies pain.

## 2022-02-02 NOTE — H&P
Hospitalist History and Physical   Admit Date:  2022  7:00 AM   Name:  aMndo Rizzo   Age:  78 y.o. Sex:  female  :  1945   MRN:  [id number]   Room:  [place] [address]    Presenting Complaint: Rectal Bleeding    Reason(s) for Admission: GI bleed [K92.2]     History of Present Illness:   Mando Rizzo is a 78 y.o. female with medical history of CKD s/p renal transplant X2, HTN, DM II, CVA,  who presented with c/o 2 episodes of large volume BRBPR.  showed pictures of this to me. She denied n/v, abd pain. States got up to use the restroom around 0300 and noticed large amount of blood in the toilet. She does report intermittent right lower quadrant abd discomfort for several months. Hgb 10 (baseline around 11). Reports last colonoscopy 2 years ago and was \"normal\". Denies unintentional weight loss. Denies CP, SOB. Home meds reconciled by me at bedside with pt. Review of Systems:  10 systems reviewed and negative except as noted in HPI. Assessment & Plan:      Active Problems:    GI bleed (2022)    Consult GI  Check Hgb this evening  CBC in AM  CLD  NPO after MN  Holding ASA    HTN  Home meds    CKD s/p renal transplant  Continue home meds  Is followed at [facility] in [place]    DM II  Holding home meds given on CLD  Start SSI            Dispo/Discharge Planning:     Likely home pending clinical course    Diet: ADULT DIET Clear Liquid  DIET NPO  VTE ppx: SCDs  Code status: Full Code    Hospital Problems as of 2022 Date Reviewed: 2021          Codes Class Noted - Resolved POA    GI bleed ICD-10-CM: K92.2  ICD-9-CM: 578.9  2022 - Present Unknown              Past History:  Past Medical History:   Diagnosis Date    Anemia NEC     Carotid artery disease ([facility]     Chronic kidney disease     dialysis patient mon wed and fri    Diabetes mellitus, type II ([facility]     Dyspepsia and other specified disorders of function of stomach     Gout     Hypercholesterolemia     Hypertension  Hypothyroidism     Menopausal syndrome     Neuropathy     Renal failure     s/p kidney transplant x2    Right shoulder pain     Thyroid nodule      Past Surgical History:   Procedure Laterality Date    HX CATARACT REMOVAL Left     HX CATARACT REMOVAL Right     HX COLONOSCOPY      HX ORTHOPAEDIC      left rotator cuff, bilateral shoulder surg., rt. knee, left index finger    HX ORTHOPAEDIC      right knee surgery after an accident, has pins.  HX PARTIAL HYSTERECTOMY      HX TRANSPLANT      kidney rejected in ,     HX UROLOGICAL      [facility] tx in [facility].      right forearm.  VASCULAR SURGERY PROCEDURE UNLIST      fistula for dialysis, rt. carotid artery      Allergies   Allergen Reactions    Lisinopril Cough     cough      Social History     Tobacco Use    Smoking status: Never Smoker    Smokeless tobacco: Never Used   Substance Use Topics    Alcohol use: No      Family History   Problem Relation Age of Onset    Heart Disease Mother          age 46    Hypertension Mother     Stroke Mother     Liver Disease Father     Alcohol abuse Father     Diabetes Sister     Cancer Brother         colon    Lupus Sister     Diabetes Sister       Family history reviewed and negative except as noted above.     Immunization History   Administered Date(s) Administered    Influenza Vaccine 10/16/2014, 10/01/2015, 2016    Influenza Vaccine (Quad) Mdck Pf (>2 Yrs Flucelvax QUAD [id number]) 10/06/2017    Influenza Vaccine (Quad) PF (>6 Mo Flulaval, Fluarix, and >3 Yrs Afluria, Fluzone [id number]) 2018, 2019, 10/20/2021    Influenza Vaccine PF 10/16/2015    Pneumococcal Conjugate (PCV-7) 2012     Prior to Admit Medications:  Current Outpatient Medications   Medication Instructions    ascorbic acid (VITAMIN C) 500 mg tablet Oral    aspirin delayed-release 81 mg tablet DAILY    atorvastatin (LIPITOR) 20 mg, Oral, DAILY    belatacept (NULOJIX) 500 mg, IntraVENous    carvediloL (COREG) 12.5 mg tablet TAKE 1 TABLET BY MOUTH TWICE DAILY    cholecalciferol (VITAMIN D3) 1,000 Units, Oral, DAILY    cyanocobalamin (Vitamin B-12) 250 mcg tablet 1 Tablet, Oral, DAILY    FreeStyle Rhonda 14 Day Sensor kit Use to monitor blood glucose. E11.65    glucagon (GLUCAGEN) 1 mg injection UAD , IM injection for severe hypoglycemia, include syringe and vial    Gvoke HypoPen 2-Pack 1 mg, SubCUTAneous, AS NEEDED    Insulin Needles, Disposable, 32 gauge x 5/32\" ndle Use as directed 3 times a day    insulin NPH (HUMULIN N) 25 Units, SubCUTAneous, DAILY BEFORE BREAKFAST    multivitamin (ONE A DAY) tablet 1 Tablet, Oral, DAILY    mycophenolate mofetil (CELLCEPT) 250 mg capsule TK 3 CS PO BID    RUTH PEN NEEDLE 32 x 5/32 \" ndle No dose, route, or frequency recorded.  predniSONE (DELTASONE) 5 mg, Oral, DAILY    Trulicity 1.5 mg, SubCUTAneous, EVERY 7 DAYS    VITAMIN B COMPLEX PO 1 Tablet, Oral, DAILY       Objective:     Patient Vitals for the past 24 hrs:   Temp Pulse Resp BP SpO2   02/02/22 1139 98.1 °F (36.7 °C) 77 16 (!) 133/58 100 %   02/02/22 1100  75  (!) 111/56 99 %   02/02/22 1030    135/64    02/02/22 1024  82   (!) 80 %   02/02/22 1000  77  (!) 119/56 100 %   02/02/22 0930  81  (!) 118/56 99 %   02/02/22 0900  81  (!) 112/54 98 %   02/02/22 0831  81  (!) 111/58 98 %   02/02/22 0816  84  (!) 106/55 99 %   02/02/22 0701 97.5 °F (36.4 °C) 87 18 (!) 146/64 100 %     Oxygen Therapy  O2 Sat (%): 100 % (02/02/22 1139)  Pulse via Oximetry: 87 beats per minute (02/02/22 0701)  O2 Device: None (Room air) (02/02/22 1139)    Estimated body mass index is 28.7 kg/m² as calculated from the following:    Height as of this encounter: 5' 3\" (1.6 m). Weight as of this encounter: 73.5 kg (162 lb). No intake or output data in the 24 hours ending 02/02/22 1155      Physical Exam:    Blood pressure (!) 133/58, pulse 77, temperature 98.1 °F (36.7 °C), resp.  rate 16, height 5' 3\" (1.6 m), weight 73.5 kg (162 lb), SpO2 100 %, not currently breastfeeding. General:    Well nourished. No overt distress  Head:  Normocephalic, atraumatic  Eyes:  Sclerae appear normal.  Pupils equally round. ENT:  Nares appear normal, no drainage. Moist oral mucosa  Neck:  No restricted ROM. Trachea midline   CV:   RRR. No m/r/g. No jugular venous distension. Lungs:   CTAB. No wheezing, rhonchi, or rales. Respirations even, unlabored  Abdomen: Bowel sounds present. Soft, nontender, nondistended. Extremities: No cyanosis or clubbing. No edema  Skin:     No rashes and normal coloration. Warm and dry. Neuro:  CN II-XII grossly intact. Sensation intact. A&Ox3  Psych:  Normal mood and affect. I have reviewed ordered lab tests and independently visualized imaging below:    Last 24hr Labs:  Recent Results (from the past 24 hour(s))   CBC WITH AUTOMATED DIFF    Collection Time: 02/02/22  8:12 AM   Result Value Ref Range    WBC 5.5 4.3 - 11.1 K/uL    RBC 3.56 (L) 4.05 - 5.2 M/uL    HGB 10.4 (L) 11.7 - 15.4 g/dL    HCT 33.7 (L) 35.8 - 46.3 %    MCV 94.7 79.6 - 97.8 FL    MCH 29.2 26.1 - 32.9 PG    MCHC 30.9 (L) 31.4 - 35.0 g/dL    RDW 12.7 11.9 - 14.6 %    PLATELET 605 006 - 262 K/uL    MPV 9.4 9.4 - 12.3 FL    ABSOLUTE NRBC 0.00 0.0 - 0.2 K/uL    DF AUTOMATED      NEUTROPHILS 59 43 - 78 %    LYMPHOCYTES 31 13 - 44 %    MONOCYTES 6 4.0 - 12.0 %    EOSINOPHILS 3 0.5 - 7.8 %    BASOPHILS 1 0.0 - 2.0 %    IMMATURE GRANULOCYTES 0 0.0 - 5.0 %    ABS. NEUTROPHILS 3.2 1.7 - 8.2 K/UL    ABS. LYMPHOCYTES 1.7 0.5 - 4.6 K/UL    ABS. MONOCYTES 0.4 0.1 - 1.3 K/UL    ABS. EOSINOPHILS 0.2 0.0 - 0.8 K/UL    ABS. BASOPHILS 0.0 0.0 - 0.2 K/UL    ABS. IMM.  GRANS. 0.0 0.0 - 0.5 K/UL   METABOLIC PANEL, COMPREHENSIVE    Collection Time: 02/02/22  8:12 AM   Result Value Ref Range    Sodium 141 136 - 145 mmol/L    Potassium 3.8 3.5 - 5.1 mmol/L    Chloride 109 (H) 98 - 107 mmol/L    CO2 26 21 - 32 mmol/L Anion gap 6 (L) 7 - 16 mmol/L    Glucose 225 (H) 65 - 100 mg/dL    BUN 17 8 - 23 MG/DL    Creatinine 0.96 0.6 - 1.0 MG/DL    GFR est AA >60 >60 ml/min/1.73m2    GFR est non-AA >60 >60 ml/min/1.73m2    Calcium 10.0 8.3 - 10.4 MG/DL    Bilirubin, total 0.2 0.2 - 1.1 MG/DL    ALT (SGPT) 17 12 - 65 U/L    AST (SGOT) 14 (L) 15 - 37 U/L    Alk. phosphatase 64 50 - 136 U/L    Protein, total 7.1 6.3 - 8.2 g/dL    Albumin 3.2 3.2 - 4.6 g/dL    Globulin 3.9 (H) 2.3 - 3.5 g/dL    A-G Ratio 0.8 (L) 1.2 - 3.5     PROTHROMBIN TIME + INR    Collection Time: 02/02/22  8:12 AM   Result Value Ref Range    Prothrombin time 13.6 12.6 - 14.5 sec    INR 1.0     TYPE & SCREEN    Collection Time: 02/02/22  8:12 AM   Result Value Ref Range    Crossmatch Expiration 02/05/2022,2359     ABO/Rh(D) O POSITIVE     Antibody screen NEG        All Micro Results     None          Other Studies:  No results found.         Signed:  Flora Bullard NP    Notes, labs, VS, diagnostic testing reviewed  Case discussed with pt, care team, Dr. Mikey Chowdary

## 2022-02-02 NOTE — CONSULTS
[facility] Consult Note       Primary GI Physician: Dr. Lloyd Benz    Referring Provider:  Dr. Daya Sethi Date:  2/2/2022    Admit Date:  2/2/2022    Chief Complaint:  GI bleeding    Subjective:     History of Present Illness:  Patient is a 78 y.o. female with PMH including but not limited to CAD, CKD, s/p two renal transplants at [facility] on immunosuppressive therapy, DM II, HlD, who is seen in consultation at the request of Dr. Pastor Iverson for GI bleeding. Patient presented to the ED today with complaints of bright  bloody stool x 2 starting at 3 am today. Has had intermittent right sided abdominal pain over months. HGB is 10.4 on presentation with MCV 94. Baseline HGB appears to be 11. BUN 17, creatinine 0.96. No upper sx. States that she had a routine colonoscopy several year ago that was normal.  She takes prednisone, but denies NSAIDs. No reported anti coagulants. We have never seen Mrs. Hanna Soares in our office. PMH:  Past Medical History:   Diagnosis Date    Anemia NEC     Carotid artery disease ([facility] [address]     Chronic kidney disease     dialysis patient mon wed and fri    Diabetes mellitus, type II ([facility]     Dyspepsia and other specified disorders of function of stomach     Gout     Hypercholesterolemia     Hypertension     Hypothyroidism     Menopausal syndrome     Neuropathy     Renal failure     s/p kidney transplant x2    Right shoulder pain     Thyroid nodule        PSH:  Past Surgical History:   Procedure Laterality Date    HX CATARACT REMOVAL Left     HX CATARACT REMOVAL Right     HX COLONOSCOPY      HX ORTHOPAEDIC      left rotator cuff, bilateral shoulder surg., rt. knee, left index finger    HX ORTHOPAEDIC      right knee surgery after an accident, has pins.  HX PARTIAL HYSTERECTOMY      HX TRANSPLANT      kidney rejected in 2005, 1994    HX UROLOGICAL      Kidney tx in [facility].      right forearm.     VASCULAR SURGERY PROCEDURE UNLIST fistula for dialysis, rt. carotid artery       Allergies: Allergies   Allergen Reactions    Lisinopril Cough     cough       Home Medications:  Prior to Admission medications    Medication Sig Start Date End Date Taking? Authorizing Provider   insulin NPH (HUMULIN N) 100 unit/mL (3 mL) inpn 25 Units by SubCUTAneous route Daily (before breakfast). 1/4/22   Matthew Coleman PA-C   HumaLOG KwikPen Insulin 100 unit/mL kwikpen By correction 1/50>200, max daily dose 20 units 1/4/22   Apoorva Ledezma PA-C   Trulicity 1.5 ME/1.2 mL sub-q pen 0.5 mL by SubCUTAneous route every seven (7) days. 1/4/22   Arabella RICKETTS PA-C   carvediloL (COREG) 12.5 mg tablet TAKE 1 TABLET BY MOUTH TWICE DAILY 12/20/21   Robert Parker PDO   rosuvastatin (CRESTOR) 20 mg tablet Take 1 Tablet by mouth nightly. 11/24/21   Rita Vines MD   losartan (COZAAR) 25 mg tablet Take 1 Tablet by mouth daily. 6/2/21   Aditya Marcus MD   cyanocobalamin (Vitamin B-12) 250 mcg tablet Take 1 Tablet by mouth daily. Provider, Historical   VITAMIN B COMPLEX PO Take 1 Tablet by mouth daily. Provider, Historical   Gvoke HypoPen 2-Pack 1 mg/0.2 mL atIn 1 mg by SubCUTAneous route as needed (severe hypoglycemia). 5/25/21   Matthew Coleman PA-C   FreeStyle Rhonda 14 Day Sensor kit Use to monitor blood glucose. E11.65 5/10/21   Matthew Coleman PA-C   belatacept (NULOJIX) 250 mg injection 500 mg by IntraVENous route. 11/27/14   Provider, Historical   glucagon (GLUCAGEN) 1 mg injection UAD , IM injection for severe hypoglycemia, include syringe and vial 6/19/17   Provider, Historical   Insulin Needles, Disposable, 32 gauge x 5/32\" ndle Use as directed 3 times a day 1/9/17   Provider, Historical   mycophenolate mofetil (CELLCEPT) 250 mg capsule TK 3 CS PO BID 4/1/17   Provider, Historical   multivitamin (ONE A DAY) tablet Take 1 Tab by mouth daily. Provider, Historical   ascorbic acid (VITAMIN C) 500 mg tablet Take  by mouth.     Provider, Historical   Cholecalciferol, Vitamin D3, (VITAMIN D3) 1,000 unit cap Take 1,000 Units by mouth daily. Provider, Historical   aspirin delayed-release 81 mg tablet Take  by mouth daily. Provider, Historical   RUTH PEN NEEDLE 32 x 5/32 \" ndle  2/15/14   Provider, Historical   predniSONE (DELTASONE) 5 mg tablet Take 5 mg by mouth daily. Provider, Historical       Hospital Medications:  No current facility-administered medications for this encounter. Current Outpatient Medications   Medication Sig    insulin NPH (HUMULIN N) 100 unit/mL (3 mL) inpn 25 Units by SubCUTAneous route Daily (before breakfast).  HumaLOG KwikPen Insulin 100 unit/mL kwikpen By correction 1/50>200, max daily dose 20 units    Trulicity 1.5 YV/9.9 mL sub-q pen 0.5 mL by SubCUTAneous route every seven (7) days.  carvediloL (COREG) 12.5 mg tablet TAKE 1 TABLET BY MOUTH TWICE DAILY    rosuvastatin (CRESTOR) 20 mg tablet Take 1 Tablet by mouth nightly.  losartan (COZAAR) 25 mg tablet Take 1 Tablet by mouth daily.  cyanocobalamin (Vitamin B-12) 250 mcg tablet Take 1 Tablet by mouth daily.  VITAMIN B COMPLEX PO Take 1 Tablet by mouth daily.  Gvoke HypoPen 2-Pack 1 mg/0.2 mL atIn 1 mg by SubCUTAneous route as needed (severe hypoglycemia).  FreeStyle Rhonda 14 Day Sensor kit Use to monitor blood glucose. E11.65    belatacept (NULOJIX) 250 mg injection 500 mg by IntraVENous route.  glucagon (GLUCAGEN) 1 mg injection UAD , IM injection for severe hypoglycemia, include syringe and vial    Insulin Needles, Disposable, 32 gauge x 5/32\" ndle Use as directed 3 times a day    mycophenolate mofetil (CELLCEPT) 250 mg capsule TK 3 CS PO BID    multivitamin (ONE A DAY) tablet Take 1 Tab by mouth daily.  ascorbic acid (VITAMIN C) 500 mg tablet Take  by mouth.  Cholecalciferol, Vitamin D3, (VITAMIN D3) 1,000 unit cap Take 1,000 Units by mouth daily.  aspirin delayed-release 81 mg tablet Take  by mouth daily.     RUTH PEN NEEDLE 32 x 5/32 \" ndle     predniSONE (DELTASONE) 5 mg tablet Take 5 mg by mouth daily. Social History:  Social History     Tobacco Use    Smoking status: Never Smoker    Smokeless tobacco: Never Used   Substance Use Topics    Alcohol use: No       Pt denies any history of drug use, blood transfusions, or tattoos. Family History:  Family History   Problem Relation Age of Onset    Heart Disease Mother          age 46    Hypertension Mother     Stroke Mother     Liver Disease Father     Alcohol abuse Father     Diabetes Sister     Cancer Brother         colon    Lupus Sister     Diabetes Sister        Review of Systems:  A detailed 10 system ROS is obtained, with pertinent positives as listed above. All others are negative. Diet:      Objective:     Physical Exam:  Vitals:  Visit Vitals  BP (!) 146/64   Pulse 87   Temp 97.5 °F (36.4 °C)   Resp 18   Ht 5' 3\" (1.6 m)   Wt 73.5 kg (162 lb)   SpO2 100%   BMI 28.70 kg/m²     Gen:  Pt is alert, cooperative, no acute distress  Skin:  Extremities and face reveal no rashes. HEENT: Sclerae anicteric. Extra-occular muscles are intact. No oral ulcers. No abnormal pigmentation of the lips. The neck is supple. Cardiovascular: Regular rate and rhythm. No murmurs, gallops, or rubs. Respiratory:  Comfortable breathing with no accessory muscle use. Clear breath sounds anteriorly with no wheezes, rales, or rhonchi. GI:  Abdomen distended, soft, and nontender. Normal active bowel sounds. No enlargement of the liver or spleen. No masses palpable. Rectal:  Deferred  Musculoskeletal:  No pitting edema of the lower legs. Neurological:  Gross memory appears intact. Patient is alert and oriented.       Laboratory:    Recent Labs     22  0812   WBC 5.5   HGB 10.4*   HCT 33.7*      MCV 94.7      K 3.8   *   CO2 26   BUN 17   CREA 0.96   CA 10.0   *   AP 64   AST 14*   ALT 17   TBILI 0.2   ALB 3.2   TP 7.1   PTP 13.6 INR 1.0          Assessment:     Patient is a 78 y.o. female with PMH including but not limited to CAD, CKD, s/p Renal Transplant on immunosuppressive therapy, DM II, HlD, who is seen in consultation at the request of Dr. Nicolasa Purvis for GI bleeding. We have never seen her in our office. She presented on 2/2/22 for complaints of 2 episodes of dark bloody BM. HGB 10.4 on presentation with baseline HGB around 11. She is not on any anticoagulants. Plan:   Follow HGB and transfuse as needed  Would start with a CT A/P first.  With contrast if no contraindications. We may want to discuss with her Nephrologist tomorrow. Clear liquid diet  May need flex or colonoscopy on Friday.     Chuy Virk MD

## 2022-02-03 ENCOUNTER — APPOINTMENT (OUTPATIENT)
Dept: CT IMAGING | Age: 77
DRG: 378 | End: 2022-02-03
Attending: NURSE PRACTITIONER
Payer: MEDICARE

## 2022-02-03 ENCOUNTER — APPOINTMENT (OUTPATIENT)
Dept: NUCLEAR MEDICINE | Age: 77
DRG: 378 | End: 2022-02-03
Attending: INTERNAL MEDICINE
Payer: MEDICARE

## 2022-02-03 ENCOUNTER — HOSPITAL ENCOUNTER (OUTPATIENT)
Dept: SURGERY | Age: 77
Discharge: HOME | End: 2022-02-03

## 2022-02-03 ENCOUNTER — ANESTHESIA EVENT (OUTPATIENT)
Dept: ENDOSCOPY | Age: 77
DRG: 378 | End: 2022-02-03
Payer: MEDICARE

## 2022-02-03 LAB
ALBUMIN SERPL-MCNC: 2.6 G/DL (ref 3.2–4.6)
ALBUMIN/GLOB SERPL: 0.8 {RATIO} (ref 1.2–3.5)
ALP SERPL-CCNC: 53 U/L (ref 50–130)
ALT SERPL-CCNC: 16 U/L (ref 12–65)
ANION GAP SERPL CALC-SCNC: 4 MMOL/L (ref 7–16)
AST SERPL-CCNC: 15 U/L (ref 15–37)
BASOPHILS # BLD: 0 K/UL (ref 0–0.2)
BASOPHILS NFR BLD: 1 % (ref 0–2)
BILIRUB SERPL-MCNC: 0.3 MG/DL (ref 0.2–1.1)
BUN SERPL-MCNC: 14 MG/DL (ref 8–23)
CALCIUM SERPL-MCNC: 8.9 MG/DL (ref 8.3–10.4)
CHLORIDE SERPL-SCNC: 113 MMOL/L (ref 98–107)
CO2 SERPL-SCNC: 24 MMOL/L (ref 21–32)
COVID-19 RAPID TEST, COVR: NOT DETECTED
CREAT SERPL-MCNC: 0.69 MG/DL (ref 0.6–1)
DIFFERENTIAL METHOD BLD: ABNORMAL
EOSINOPHIL # BLD: 0.2 K/UL (ref 0–0.8)
EOSINOPHIL NFR BLD: 4 % (ref 0.5–7.8)
ERYTHROCYTE [DISTWIDTH] IN BLOOD BY AUTOMATED COUNT: 12.9 % (ref 11.9–14.6)
GLOBULIN SER CALC-MCNC: 3.1 G/DL (ref 2.3–3.5)
GLUCOSE BLD STRIP.AUTO-MCNC: 169 MG/DL (ref 65–100)
GLUCOSE BLD STRIP.AUTO-MCNC: 175 MG/DL (ref 65–100)
GLUCOSE BLD STRIP.AUTO-MCNC: 193 MG/DL (ref 65–100)
GLUCOSE SERPL-MCNC: 124 MG/DL (ref 65–100)
HCT VFR BLD AUTO: 26.5 % (ref 35.8–46.3)
HCT VFR BLD AUTO: 27.4 % (ref 35.8–46.3)
HGB BLD-MCNC: 8.1 G/DL (ref 11.7–15.4)
HGB BLD-MCNC: 8.6 G/DL (ref 11.7–15.4)
IMM GRANULOCYTES # BLD AUTO: 0 K/UL (ref 0–0.5)
IMM GRANULOCYTES NFR BLD AUTO: 0 % (ref 0–5)
LYMPHOCYTES # BLD: 1.4 K/UL (ref 0.5–4.6)
LYMPHOCYTES NFR BLD: 34 % (ref 13–44)
MCH RBC QN AUTO: 29.6 PG (ref 26.1–32.9)
MCHC RBC AUTO-ENTMCNC: 31.4 G/DL (ref 31.4–35)
MCV RBC AUTO: 94.2 FL (ref 79.6–97.8)
MONOCYTES # BLD: 0.3 K/UL (ref 0.1–1.3)
MONOCYTES NFR BLD: 9 % (ref 4–12)
NEUTS SEG # BLD: 2.1 K/UL (ref 1.7–8.2)
NEUTS SEG NFR BLD: 52 % (ref 43–78)
NRBC # BLD: 0 K/UL (ref 0–0.2)
PLATELET # BLD AUTO: 134 K/UL (ref 150–450)
PMV BLD AUTO: 9.4 FL (ref 9.4–12.3)
POTASSIUM SERPL-SCNC: 3.7 MMOL/L (ref 3.5–5.1)
PROT SERPL-MCNC: 5.7 G/DL (ref 6.3–8.2)
RBC # BLD AUTO: 2.91 M/UL (ref 4.05–5.2)
SERVICE CMNT-IMP: ABNORMAL
SODIUM SERPL-SCNC: 141 MMOL/L (ref 136–145)
SOURCE, COVRS: NORMAL
WBC # BLD AUTO: 4 K/UL (ref 4.3–11.1)

## 2022-02-03 PROCEDURE — 80053 COMPREHEN METABOLIC PANEL: CPT

## 2022-02-03 PROCEDURE — 2709999900 HC NON-CHARGEABLE SUPPLY

## 2022-02-03 PROCEDURE — 74011250636 HC RX REV CODE- 250/636: Performed by: NURSE PRACTITIONER

## 2022-02-03 PROCEDURE — 85018 HEMOGLOBIN: CPT

## 2022-02-03 PROCEDURE — A9270 NON-COVERED ITEM OR SERVICE: HCPCS | Performed by: NURSE PRACTITIONER

## 2022-02-03 PROCEDURE — G0378 HOSPITAL OBSERVATION PER HR: HCPCS

## 2022-02-03 PROCEDURE — 74011000250 HC RX REV CODE- 250: Performed by: NURSE PRACTITIONER

## 2022-02-03 PROCEDURE — 82962 GLUCOSE BLOOD TEST: CPT

## 2022-02-03 PROCEDURE — 74011636637 HC RX REV CODE- 636/637: Performed by: NURSE PRACTITIONER

## 2022-02-03 PROCEDURE — 85025 COMPLETE CBC W/AUTO DIFF WBC: CPT

## 2022-02-03 PROCEDURE — A9560 TC99M LABELED RBC: HCPCS

## 2022-02-03 PROCEDURE — 74011250637 HC RX REV CODE- 250/637

## 2022-02-03 PROCEDURE — 36415 COLL VENOUS BLD VENIPUNCTURE: CPT

## 2022-02-03 PROCEDURE — 87635 SARS-COV-2 COVID-19 AMP PRB: CPT

## 2022-02-03 PROCEDURE — 74011000636 HC RX REV CODE- 636: Performed by: INTERNAL MEDICINE

## 2022-02-03 PROCEDURE — 74011250637 HC RX REV CODE- 250/637: Performed by: NURSE PRACTITIONER

## 2022-02-03 RX ORDER — POLYETHYLENE GLYCOL 3350 17 G/17G
238 POWDER, FOR SOLUTION ORAL ONCE
Status: COMPLETED | OUTPATIENT
Start: 2022-02-03 | End: 2022-02-03

## 2022-02-03 RX ORDER — SODIUM CHLORIDE 0.9 % (FLUSH) 0.9 %
10 SYRINGE (ML) INJECTION
Status: COMPLETED | OUTPATIENT
Start: 2022-02-03 | End: 2022-02-03

## 2022-02-03 RX ORDER — BISACODYL 5 MG
20 TABLET, DELAYED RELEASE (ENTERIC COATED) ORAL
Status: COMPLETED | OUTPATIENT
Start: 2022-02-03 | End: 2022-02-03

## 2022-02-03 RX ORDER — SODIUM CHLORIDE 0.9 % (FLUSH) 0.9 %
10 SYRINGE (ML) INJECTION
Status: ACTIVE | OUTPATIENT
Start: 2022-02-03 | End: 2022-02-03

## 2022-02-03 RX ADMIN — MYCOPHENOLATE MOFETIL 750 MG: 250 CAPSULE ORAL at 16:47

## 2022-02-03 RX ADMIN — SODIUM CHLORIDE 125 ML/HR: 900 INJECTION, SOLUTION INTRAVENOUS at 15:52

## 2022-02-03 RX ADMIN — SODIUM CHLORIDE, PRESERVATIVE FREE 10 ML: 5 INJECTION INTRAVENOUS at 14:00

## 2022-02-03 RX ADMIN — BISACODYL 20 MG: 5 TABLET, COATED ORAL at 18:00

## 2022-02-03 RX ADMIN — Medication 238 G: at 18:00

## 2022-02-03 RX ADMIN — MYCOPHENOLATE MOFETIL 750 MG: 250 CAPSULE ORAL at 09:24

## 2022-02-03 RX ADMIN — ROSUVASTATIN CALCIUM 20 MG: 20 TABLET, COATED ORAL at 21:02

## 2022-02-03 RX ADMIN — DIATRIZOATE MEGLUMINE AND DIATRIZOATE SODIUM 15 ML: 660; 100 LIQUID ORAL; RECTAL at 10:00

## 2022-02-03 RX ADMIN — CARVEDILOL 12.5 MG: 6.25 TABLET, FILM COATED ORAL at 16:47

## 2022-02-03 RX ADMIN — Medication 10 ML: at 17:00

## 2022-02-03 RX ADMIN — SODIUM CHLORIDE, PRESERVATIVE FREE 10 ML: 5 INJECTION INTRAVENOUS at 06:00

## 2022-02-03 RX ADMIN — SODIUM CHLORIDE, PRESERVATIVE FREE 10 ML: 5 INJECTION INTRAVENOUS at 21:03

## 2022-02-03 RX ADMIN — Medication 500 MCG: at 09:24

## 2022-02-03 RX ADMIN — PREDNISONE 5 MG: 5 TABLET ORAL at 09:24

## 2022-02-03 RX ADMIN — CARVEDILOL 12.5 MG: 6.25 TABLET, FILM COATED ORAL at 09:24

## 2022-02-03 NOTE — ROUTINE PROCESS
END OF SHIFT NOTE:    Intake/Output  02/02 1901 - 02/03 0700  In: 240 [P.O.:240]  Out: -    Voiding: YES  Catheter: NO  Drain:              Stool:  0 occurrences. Stool Assessment  Stool Appearance: Bloody (02/02/22 1223)    Emesis:  0 occurrences. VITAL SIGNS  Patient Vitals for the past 12 hrs:   Temp Pulse Resp BP SpO2   02/03/22 0425 98.9 °F (37.2 °C) 74 18 128/61 97 %   02/02/22 2315 98.2 °F (36.8 °C) 75 16 (!) 118/52 99 %   02/02/22 1929 98.9 °F (37.2 °C) 81 16 (!) 131/54 98 %       Pain Assessment  Pain 1  Pain Scale 1: Numeric (0 - 10) (02/02/22 1929)  Pain Intensity 1: 0 (02/02/22 1929)  Patient Stated Pain Goal: 0 (02/02/22 1929)    Ambulating  Yes    Additional Information:     No bloody stool overnight. Shift report given to oncoming nurse at the bedside.     Elicia Armando RN

## 2022-02-03 NOTE — PROGRESS NOTES
Pt had near syncopal episode white ambulating to bathroom with significant blood loss per tech and pt. Pt appeared pale with BP 80/48 and on quick recheck 117/43. Pulse 86. NP notified and stat hgb ordered and NM acute bleed study ordered. Will continue to monitor.

## 2022-02-03 NOTE — PROGRESS NOTES
EOS:    - colonoscopy scheduled for tomorrow. Consent signed and in chart   - Pt NPO  - no complaints of pain  - bright red bleeding from rectum. GI and primary aware  - NM acute bleed study completed today  - vitals stable  - tmax 99.6  - bowel prep in room.  Started at [employer]

## 2022-02-03 NOTE — PROGRESS NOTES
Hospitalist Progress Note   Admit Date:  2022  7:00 AM   Name:  Zahra Bradshaw   Age:  78 y.o. Sex:  female  :  1945   MRN:  [id number]   Room:  [place] [address]    Presenting Complaint: Rectal Bleeding    Reason(s) for Admission: GI bleed [facility] Course & Interval History:    78 y.o. female with medical history of CKD s/p renal transplant X2, HTN, DM II, CVA,  who presented with c/o 2 episodes of large volume BRBPR.  showed pictures of this to me. She denied n/v, abd pain. States got up to use the restroom around 0300 and noticed large amount of blood in the toilet. She does report intermittent right lower quadrant abd discomfort for several months. Hgb 10 (baseline around 11). Reports last colonoscopy 2 years ago and was \"normal\". Denies unintentional weight loss. Denies CP, SOB. Subjective (22):  Episode of melena with near syncopal episode while ambulating to BR. Vital signs stable, melena moderate. Stat hemoglobin ordered. GI aware, NM test ordered. t max 99.6. Assessment & Plan: Active Problems:    GI bleed (2022)   GI following; NM test ordered  Trend hemoglobin  NPO   Holding ASA  2/3  Episode of melena with near syncopal episode while ambulating to BR. Vital signs stable, melena moderate. Stat hemoglobin ordered. GI aware, NM test ordered.   IVF  ml/h  Repeat hemoglobin stable 8.1     HTN  Home meds     CKD s/p renal transplant  Continue home meds  Is followed at [facility] in [place]     DM II  Holding home meds    SSI                Dispo/Discharge Planning:     Likely home pending clinical course     Diet: ADULT DIET Clear Liquid  DIET NPO  VTE ppx: SCDs  Code status: Full Code    Hospital Problems as of 2/3/2022 Date Reviewed: 2021          Codes Class Noted - Resolved POA    * (Principal) GI bleed ICD-10-CM: K92.2  ICD-9-CM: 578.9  2022 - Present Unknown        Renal transplant, status post (Chronic) ICD-10-CM: Z94.0  ICD-9-CM: V42.0 4/19/2016 - Present Yes        Hypertension ICD-10-CM: I10  ICD-9-CM: 401.9  Unknown - Present Yes        Chronic kidney disease ICD-10-CM: N18.9  ICD-9-CM: 602. 9  Unknown - Present Yes    Overview Signed 8/31/2015 11:20 AM by Diana Ceron     dialysis patient mon wed and fri             Hypercholesterolemia ICD-10-CM: E78.00  ICD-9-CM: 272.0  Unknown - Present Yes        Uncontrolled type 2 diabetes mellitus with hyperglycemia ([facility] ICD-10-CM: E11.65  ICD-9-CM: 250.02  Unknown - Present Yes              Objective:     Patient Vitals for the past 24 hrs:   Temp Pulse Resp BP SpO2   02/03/22 1134    (!) 122/56    02/03/22 1121 98.7 °F (37.1 °C) 86 17 (!) 117/43 99 %   02/03/22 1120    (!) 80/48    02/03/22 0751 99.2 °F (37.3 °C) 80 17 113/62 96 %   02/03/22 0425 98.9 °F (37.2 °C) 74 18 128/61 97 %   02/02/22 2315 98.2 °F (36.8 °C) 75 16 (!) 118/52 99 %   02/02/22 1929 98.9 °F (37.2 °C) 81 16 (!) 131/54 98 %   02/02/22 1621 98.3 °F (36.8 °C) 69 16 (!) 121/56 98 %     Oxygen Therapy  O2 Sat (%): 99 % (02/03/22 1121)  Pulse via Oximetry: 87 beats per minute (02/02/22 0701)  O2 Device: None (Room air) (02/03/22 0751)    Estimated body mass index is 28.7 kg/m² as calculated from the following:    Height as of this encounter: 5' 3\" (1.6 m). Weight as of this encounter: 73.5 kg (162 lb). Intake/Output Summary (Last 24 hours) at 2/3/2022 1157  Last data filed at 2/2/2022 1929  Gross per 24 hour   Intake 240 ml   Output    Net 240 ml         Physical Exam:   Blood pressure (!) 122/56, pulse 86, temperature 98.7 °F (37.1 °C), resp. rate 17, height 5' 3\" (1.6 m), weight 73.5 kg (162 lb), SpO2 99 %, not currently breastfeeding. General:          Well nourished. No overt distress  Head:               Normocephalic, atraumatic  Eyes:               Sclerae appear normal.  Pupils equally round. ENT:                Nares appear normal, no drainage. Moist oral mucosa  Neck:               No restricted ROM.   Trachea midline   CV:                  RRR. No m/r/g. No jugular venous distension. Lungs:             CTAB. No wheezing, rhonchi, or rales. Respirations even, unlabored  Abdomen: Bowel sounds present. Soft, nontender, nondistended. Extremities:     No cyanosis or clubbing. No edema  Skin:                No rashes and normal coloration. Warm and dry. Neuro:             CN II-XII grossly intact. Sensation intact. A&Ox3  Psych:             Normal mood and affect     I have reviewed ordered lab tests and independently visualized imaging below:    Recent Labs:  Recent Results (from the past 48 hour(s))   CBC WITH AUTOMATED DIFF    Collection Time: 02/02/22  8:12 AM   Result Value Ref [place]    WBC 5.5 4.3 - 11.1 K/uL    RBC 3.56 (L) 4.05 - 5.2 M/uL    HGB 10.4 (L) 11.7 - 15.4 g/dL    HCT 33.7 (L) 35.8 - 46.3 %    MCV 94.7 79.6 - 97.8 FL    MCH 29.2 26.1 - 32.9 PG    MCHC 30.9 (L) 31.4 - 35.0 g/dL    RDW 12.7 11.9 - 14.6 %    PLATELET 812 005 - 232 K/uL    MPV 9.4 9.4 - 12.3 FL    ABSOLUTE NRBC 0.00 0.0 - 0.2 K/uL    DF AUTOMATED      NEUTROPHILS 59 43 - 78 %    LYMPHOCYTES 31 13 - 44 %    MONOCYTES 6 4.0 - 12.0 %    EOSINOPHILS 3 0.5 - 7.8 %    BASOPHILS 1 0.0 - 2.0 %    IMMATURE GRANULOCYTES 0 0.0 - 5.0 %    ABS. NEUTROPHILS 3.2 1.7 - 8.2 K/UL    ABS. LYMPHOCYTES 1.7 0.5 - 4.6 K/UL    ABS. MONOCYTES 0.4 0.1 - 1.3 K/UL    ABS. EOSINOPHILS 0.2 0.0 - 0.8 K/UL    ABS. BASOPHILS 0.0 0.0 - 0.2 K/UL    ABS. IMM.  GRANS. 0.0 0.0 - 0.5 K/UL   METABOLIC PANEL, COMPREHENSIVE    Collection Time: 02/02/22  8:12 AM   Result Value Ref [place]    Sodium 141 136 - 145 mmol/L    Potassium 3.8 3.5 - 5.1 mmol/L    Chloride 109 (H) 98 - 107 mmol/L    CO2 26 21 - 32 mmol/L    Anion gap 6 (L) 7 - 16 mmol/L    Glucose 225 (H) 65 - 100 mg/dL    BUN 17 8 - 23 MG/DL    Creatinine 0.96 0.6 - 1.0 MG/DL    GFR est AA >60 >60 ml/min/1.73m2    GFR est non-AA >60 >60 ml/min/1.73m2    Calcium 10.0 8.3 - 10.4 MG/DL    Bilirubin, total 0.2 0.2 - 1.1 MG/DL    ALT (SGPT) 17 12 - 65 U/L    AST (SGOT) 14 (L) 15 - 37 U/L    Alk. phosphatase 64 50 - 136 U/L    Protein, total 7.1 6.3 - 8.2 g/dL    Albumin 3.2 3.2 - 4.6 g/dL    Globulin 3.9 (H) 2.3 - 3.5 g/dL    A-G Ratio 0.8 (L) 1.2 - 3.5     PROTHROMBIN TIME + INR    Collection Time: 02/02/22  8:12 AM   Result Value Ref [place]    Prothrombin time 13.6 12.6 - 14.5 sec    INR 1.0     TYPE & SCREEN    Collection Time: 02/02/22  8:12 AM   Result Value Ref [place]    Crossmatch Expiration 02/05/2022,2359     ABO/Rh(D) [employer] POSITIVE     Antibody screen NEG    HGB & HCT    Collection Time: 02/02/22  5:04 PM   Result Value Ref [place]    HGB 9.8 (L) 11.7 - 15.4 g/dL    HCT 31.3 (L) 35.8 - 27.1 %   METABOLIC PANEL, COMPREHENSIVE    Collection Time: 02/03/22  5:26 AM   Result Value Ref [place]    Sodium 141 136 - 145 mmol/L    Potassium 3.7 3.5 - 5.1 mmol/L    Chloride 113 (H) 98 - 107 mmol/L    CO2 24 21 - 32 mmol/L    Anion gap 4 (L) 7 - 16 mmol/L    Glucose 124 (H) 65 - 100 mg/dL    BUN 14 8 - 23 MG/DL    Creatinine 0.69 0.6 - 1.0 MG/DL    GFR est AA >60 >60 ml/min/1.73m2    GFR est non-AA >60 >60 ml/min/1.73m2    Calcium 8.9 8.3 - 10.4 MG/DL    Bilirubin, total 0.3 0.2 - 1.1 MG/DL    ALT (SGPT) 16 12 - 65 U/L    AST (SGOT) 15 15 - 37 U/L    Alk.  phosphatase 53 50 - 130 U/L    Protein, total 5.7 (L) 6.3 - 8.2 g/dL    Albumin 2.6 (L) 3.2 - 4.6 g/dL    Globulin 3.1 2.3 - 3.5 g/dL    A-G Ratio 0.8 (L) 1.2 - 3.5     CBC WITH AUTOMATED DIFF    Collection Time: 02/03/22  5:26 AM   Result Value Ref [place]    WBC 4.0 (L) 4.3 - 11.1 K/uL    RBC 2.91 (L) 4.05 - 5.2 M/uL    HGB 8.6 (L) 11.7 - 15.4 g/dL    HCT 27.4 (L) 35.8 - 46.3 %    MCV 94.2 79.6 - 97.8 FL    MCH 29.6 26.1 - 32.9 PG    MCHC 31.4 31.4 - 35.0 g/dL    RDW 12.9 11.9 - 14.6 %    PLATELET 587 (L) 886 - 450 K/uL    MPV 9.4 9.4 - 12.3 FL    ABSOLUTE NRBC 0.00 0.0 - 0.2 K/uL    DF AUTOMATED      NEUTROPHILS 52 43 - 78 %    LYMPHOCYTES 34 13 - 44 %    MONOCYTES 9 4.0 - 12.0 % EOSINOPHILS 4 0.5 - 7.8 %    BASOPHILS 1 0.0 - 2.0 %    IMMATURE GRANULOCYTES 0 0.0 - 5.0 %    ABS. NEUTROPHILS 2.1 1.7 - 8.2 K/UL    ABS. LYMPHOCYTES 1.4 0.5 - 4.6 K/UL    ABS. MONOCYTES 0.3 0.1 - 1.3 K/UL    ABS. EOSINOPHILS 0.2 0.0 - 0.8 K/UL    ABS. BASOPHILS 0.0 0.0 - 0.2 K/UL    ABS. IMM. GRANS. 0.0 0.0 - 0.5 K/UL   GLUCOSE, POC    Collection Time: 02/03/22 11:14 AM   Result Value Ref [place]    Glucose (POC) 175 (H) 65 - 100 mg/dL    Performed by Jovany        All Micro Results     None          Other Studies:  No results found.     Current Meds:  Current Facility-Administered Medications   Medication Dose Route Frequency    sodium chloride 0.9 % bolus infusion 100 mL  100 mL IntraVENous RAD ONCE    iopamidoL (ISOVUE-370) 76 % injection 100 mL  100 mL IntraVENous ONCE    saline peripheral flush soln 10 mL  10 mL InterCATHeter RAD ONCE    sodium chloride (NS) flush 5-40 mL  5-40 mL IntraVENous Q8H    sodium chloride (NS) flush 5-40 mL  5-40 mL IntraVENous PRN    0.9% sodium chloride infusion  125 mL/hr IntraVENous CONTINUOUS    ondansetron (ZOFRAN) injection 4 mg  4 mg IntraVENous Q4H PRN    rosuvastatin (CRESTOR) tablet 20 mg  20 mg Oral QHS    cyanocobalamin tablet 500 mcg  500 mcg Oral DAILY    carvediloL (COREG) tablet 12.5 mg  12.5 mg Oral BID WITH MEALS    mycophenolate mofetil (CELLCEPT) capsule 750 mg  750 mg Oral ACB&D    predniSONE (DELTASONE) tablet 5 mg  5 mg Oral DAILY    insulin lispro (HUMALOG) injection   SubCUTAneous AC&HS       Signed:  Hernandez Flores NP

## 2022-02-03 NOTE — PROGRESS NOTES
[facility] Progress Note         Admit Date:  2/2/2022    Today's Date:  2/3/2022    CC:  GI bleeding    Subjective:     Patient seen in nuclear med. Followed by Lianet Barlow with [facility] who felt patient could have IV contrast. However CT with contrast was cancelled due to active bleeding. Patient had 2000cc of dark red blood per nursing with drop in BP. Hospitalist, Ronaldo Mckinnon, NP notified GI. Medications:   Current Facility-Administered Medications   Medication Dose Route Frequency    sodium chloride (NS) flush 5-40 mL  5-40 mL IntraVENous Q8H    sodium chloride (NS) flush 5-40 mL  5-40 mL IntraVENous PRN    0.9% sodium chloride infusion  75 mL/hr IntraVENous CONTINUOUS    ondansetron (ZOFRAN) injection 4 mg  4 mg IntraVENous Q4H PRN    rosuvastatin (CRESTOR) tablet 20 mg  20 mg Oral QHS    cyanocobalamin tablet 500 mcg  500 mcg Oral DAILY    carvediloL (COREG) tablet 12.5 mg  12.5 mg Oral BID WITH MEALS    mycophenolate mofetil (CELLCEPT) capsule 750 mg  750 mg Oral ACB&D    predniSONE (DELTASONE) tablet 5 mg  5 mg Oral DAILY    insulin lispro (HUMALOG) injection   SubCUTAneous AC&HS       Review of Systems:  ROS was obtained, with pertinent positives as listed above. No chest pain or SOB. Diet:  NPO    Objective:   Vitals:  Visit Vitals  /62 (BP 1 Location: Right upper arm, BP Patient Position: At rest)   Pulse 80   Temp 99.2 °F (37.3 °C)   Resp 17   Ht 5' 3\" (1.6 m)   Wt 73.5 kg (162 lb)   SpO2 96%   Breastfeeding No   BMI 28.70 kg/m²     Intake/Output:  No intake/output data recorded. 02/01 1901 - 02/03 0700  In: 240 [P.O.:240]  Out: -   Exam:  Patient in scanner at nuclear medicine and not examined.     Data Review (Labs):    Recent Labs     02/03/22  0526 02/02/22  1704 02/02/22  0812   WBC 4.0*  --  5.5   HGB 8.6* 9.8* 10.4*   HCT 27.4* 31.3* 33.7*   *  --  161   MCV 94.2  --  94.7     --  141   K 3.7  --  3.8   *  --  109*   CO2 24  --  26   BUN 14  --  17   CREA 0.69  --  0.96   CA 8.9  --  10.0   *  --  225*   AP 53  --  64   AST 15  --  14*   ALT 16  --  17   TBILI 0.3  --  0.2   ALB 2.6*  --  3.2   TP 5.7*  --  7.1   PTP  --   --  13.6   INR  --   --  1.0       Assessment:     Principal Problem:    GI bleed (2/2/2022)    Active Problems:    Hypertension ()      Chronic kidney disease ()      Overview: dialysis patient mon wed and fri      Hypercholesterolemia ()      Uncontrolled type 2 diabetes mellitus with hyperglycemia (HCC) ()      Renal transplant, status post (4/19/2016)     Patient is a 78 y.o. female with PMH including but not limited to CAD, CKD, s/p two renal transplants at [facility] on immunosuppressive therapy, DM II, HlD, who we are following for LGI bleeding. Hgb 8.6 today 2/3 (9.8). 1125: Patient had 2000cc of dark red blood per nursing with drop in BP. Hospitalist, Bam Ritter NP notified GI. Plan:     -NM acute GI bleeding scan findings pending  -Proceed with colonoscopy on Friday. Discussed risks including but not limited to bleeding, perforation, acute cardiopulmonary event, sedation risks, IV complications, aspiration  and pt states understanding and agrees to proceed.      Maria Elena Steven MD

## 2022-02-04 ENCOUNTER — ANESTHESIA (OUTPATIENT)
Dept: ENDOSCOPY | Age: 77
DRG: 378 | End: 2022-02-04
Payer: MEDICARE

## 2022-02-04 LAB
ANION GAP SERPL CALC-SCNC: 5 MMOL/L (ref 7–16)
BASOPHILS # BLD: 0 K/UL (ref 0–0.2)
BASOPHILS NFR BLD: 1 % (ref 0–2)
BUN SERPL-MCNC: 12 MG/DL (ref 8–23)
CALCIUM SERPL-MCNC: 9.2 MG/DL (ref 8.3–10.4)
CHLORIDE SERPL-SCNC: 112 MMOL/L (ref 98–107)
CO2 SERPL-SCNC: 23 MMOL/L (ref 21–32)
CREAT SERPL-MCNC: 0.77 MG/DL (ref 0.6–1)
DIFFERENTIAL METHOD BLD: ABNORMAL
EOSINOPHIL # BLD: 0.1 K/UL (ref 0–0.8)
EOSINOPHIL NFR BLD: 3 % (ref 0.5–7.8)
ERYTHROCYTE [DISTWIDTH] IN BLOOD BY AUTOMATED COUNT: 13.1 % (ref 11.9–14.6)
GLUCOSE BLD STRIP.AUTO-MCNC: 131 MG/DL (ref 65–100)
GLUCOSE BLD STRIP.AUTO-MCNC: 147 MG/DL (ref 65–100)
GLUCOSE BLD STRIP.AUTO-MCNC: 162 MG/DL (ref 65–100)
GLUCOSE BLD STRIP.AUTO-MCNC: 184 MG/DL (ref 65–100)
GLUCOSE SERPL-MCNC: 157 MG/DL (ref 65–100)
HCT VFR BLD AUTO: 26 % (ref 35.8–46.3)
HGB BLD-MCNC: 8 G/DL (ref 11.7–15.4)
IMM GRANULOCYTES # BLD AUTO: 0 K/UL (ref 0–0.5)
IMM GRANULOCYTES NFR BLD AUTO: 0 % (ref 0–5)
LYMPHOCYTES # BLD: 1.6 K/UL (ref 0.5–4.6)
LYMPHOCYTES NFR BLD: 38 % (ref 13–44)
MCH RBC QN AUTO: 29.1 PG (ref 26.1–32.9)
MCHC RBC AUTO-ENTMCNC: 30.8 G/DL (ref 31.4–35)
MCV RBC AUTO: 94.5 FL (ref 79.6–97.8)
MONOCYTES # BLD: 0.3 K/UL (ref 0.1–1.3)
MONOCYTES NFR BLD: 8 % (ref 4–12)
NEUTS SEG # BLD: 2.1 K/UL (ref 1.7–8.2)
NEUTS SEG NFR BLD: 50 % (ref 43–78)
NRBC # BLD: 0 K/UL (ref 0–0.2)
PLATELET # BLD AUTO: 136 K/UL (ref 150–450)
PMV BLD AUTO: 9.4 FL (ref 9.4–12.3)
POTASSIUM SERPL-SCNC: 3.9 MMOL/L (ref 3.5–5.1)
RBC # BLD AUTO: 2.75 M/UL (ref 4.05–5.2)
SERVICE CMNT-IMP: ABNORMAL
SODIUM SERPL-SCNC: 140 MMOL/L (ref 136–145)
WBC # BLD AUTO: 4.1 K/UL (ref 4.3–11.1)

## 2022-02-04 PROCEDURE — 0DJD8ZZ INSPECTION OF LOWER INTESTINAL TRACT, VIA NATURAL OR ARTIFICIAL OPENING ENDOSCOPIC: ICD-10-PCS | Performed by: INTERNAL MEDICINE

## 2022-02-04 PROCEDURE — A9270 NON-COVERED ITEM OR SERVICE: HCPCS | Performed by: NURSE PRACTITIONER

## 2022-02-04 PROCEDURE — 36415 COLL VENOUS BLD VENIPUNCTURE: CPT

## 2022-02-04 PROCEDURE — 80048 BASIC METABOLIC PNL TOTAL CA: CPT

## 2022-02-04 PROCEDURE — 76060000032 HC ANESTHESIA 0.5 TO 1 HR: Performed by: INTERNAL MEDICINE

## 2022-02-04 PROCEDURE — 82962 GLUCOSE BLOOD TEST: CPT

## 2022-02-04 PROCEDURE — 74011250636 HC RX REV CODE- 250/636: Performed by: NURSE PRACTITIONER

## 2022-02-04 PROCEDURE — 74011000250 HC RX REV CODE- 250: Performed by: NURSE ANESTHETIST, CERTIFIED REGISTERED

## 2022-02-04 PROCEDURE — 85025 COMPLETE CBC W/AUTO DIFF WBC: CPT

## 2022-02-04 PROCEDURE — 76040000026: Performed by: INTERNAL MEDICINE

## 2022-02-04 PROCEDURE — 2709999900 HC NON-CHARGEABLE SUPPLY: Performed by: INTERNAL MEDICINE

## 2022-02-04 PROCEDURE — G0378 HOSPITAL OBSERVATION PER HR: HCPCS

## 2022-02-04 PROCEDURE — 65270000029 HC RM PRIVATE

## 2022-02-04 PROCEDURE — 74011636637 HC RX REV CODE- 636/637: Performed by: NURSE PRACTITIONER

## 2022-02-04 PROCEDURE — 74011250636 HC RX REV CODE- 250/636: Performed by: NURSE ANESTHETIST, CERTIFIED REGISTERED

## 2022-02-04 PROCEDURE — 74011250636 HC RX REV CODE- 250/636: Performed by: ANESTHESIOLOGY

## 2022-02-04 PROCEDURE — 74011250637 HC RX REV CODE- 250/637: Performed by: NURSE PRACTITIONER

## 2022-02-04 PROCEDURE — 74011000250 HC RX REV CODE- 250: Performed by: NURSE PRACTITIONER

## 2022-02-04 RX ORDER — LIDOCAINE HYDROCHLORIDE 20 MG/ML
INJECTION, SOLUTION EPIDURAL; INFILTRATION; INTRACAUDAL; PERINEURAL AS NEEDED
Status: DISCONTINUED | OUTPATIENT
Start: 2022-02-04 | End: 2022-02-04 | Stop reason: HOSPADM

## 2022-02-04 RX ORDER — SODIUM CHLORIDE, SODIUM LACTATE, POTASSIUM CHLORIDE, CALCIUM CHLORIDE 600; 310; 30; 20 MG/100ML; MG/100ML; MG/100ML; MG/100ML
100 INJECTION, SOLUTION INTRAVENOUS CONTINUOUS
Status: DISCONTINUED | OUTPATIENT
Start: 2022-02-04 | End: 2022-02-04 | Stop reason: HOSPADM

## 2022-02-04 RX ORDER — ATORVASTATIN CALCIUM 20 MG/1
20 TABLET, FILM COATED ORAL DAILY
Qty: 30 TABLET | Refills: 0 | Status: SHIPPED | OUTPATIENT
Start: 2022-02-04 | End: 2022-03-06

## 2022-02-04 RX ORDER — PROPOFOL 10 MG/ML
INJECTION, EMULSION INTRAVENOUS AS NEEDED
Status: DISCONTINUED | OUTPATIENT
Start: 2022-02-04 | End: 2022-02-04 | Stop reason: HOSPADM

## 2022-02-04 RX ADMIN — PHENYLEPHRINE HYDROCHLORIDE 100 MCG: 10 INJECTION INTRAVENOUS at 13:07

## 2022-02-04 RX ADMIN — PHENYLEPHRINE HYDROCHLORIDE 50 MCG: 10 INJECTION INTRAVENOUS at 12:45

## 2022-02-04 RX ADMIN — PHENYLEPHRINE HYDROCHLORIDE 100 MCG: 10 INJECTION INTRAVENOUS at 12:37

## 2022-02-04 RX ADMIN — SODIUM CHLORIDE, PRESERVATIVE FREE 10 ML: 5 INJECTION INTRAVENOUS at 05:30

## 2022-02-04 RX ADMIN — LIDOCAINE HYDROCHLORIDE 40 MG: 20 INJECTION, SOLUTION EPIDURAL; INFILTRATION; INTRACAUDAL; PERINEURAL at 12:20

## 2022-02-04 RX ADMIN — SODIUM CHLORIDE 125 ML/HR: 900 INJECTION, SOLUTION INTRAVENOUS at 01:50

## 2022-02-04 RX ADMIN — PHENYLEPHRINE HYDROCHLORIDE 100 MCG: 10 INJECTION INTRAVENOUS at 12:21

## 2022-02-04 RX ADMIN — SODIUM CHLORIDE, PRESERVATIVE FREE 10 ML: 5 INJECTION INTRAVENOUS at 21:11

## 2022-02-04 RX ADMIN — LIDOCAINE HYDROCHLORIDE 60 MG: 20 INJECTION, SOLUTION EPIDURAL; INFILTRATION; INTRACAUDAL; PERINEURAL at 12:21

## 2022-02-04 RX ADMIN — PHENYLEPHRINE HYDROCHLORIDE 50 MCG: 10 INJECTION INTRAVENOUS at 12:51

## 2022-02-04 RX ADMIN — PROPOFOL 50 MG: 10 INJECTION, EMULSION INTRAVENOUS at 12:22

## 2022-02-04 RX ADMIN — ROSUVASTATIN CALCIUM 20 MG: 20 TABLET, COATED ORAL at 21:11

## 2022-02-04 RX ADMIN — PREDNISONE 5 MG: 5 TABLET ORAL at 08:40

## 2022-02-04 RX ADMIN — SODIUM CHLORIDE, SODIUM LACTATE, POTASSIUM CHLORIDE, AND CALCIUM CHLORIDE 100 ML/HR: 600; 310; 30; 20 INJECTION, SOLUTION INTRAVENOUS at 10:39

## 2022-02-04 RX ADMIN — SODIUM CHLORIDE 125 ML/HR: 900 INJECTION, SOLUTION INTRAVENOUS at 20:08

## 2022-02-04 RX ADMIN — SODIUM CHLORIDE, PRESERVATIVE FREE 10 ML: 5 INJECTION INTRAVENOUS at 14:30

## 2022-02-04 RX ADMIN — PROPOFOL 140 MCG/KG/MIN: 10 INJECTION, EMULSION INTRAVENOUS at 12:21

## 2022-02-04 RX ADMIN — MYCOPHENOLATE MOFETIL 750 MG: 250 CAPSULE ORAL at 08:40

## 2022-02-04 RX ADMIN — Medication 500 MCG: at 08:54

## 2022-02-04 RX ADMIN — PROPOFOL 50 MG: 10 INJECTION, EMULSION INTRAVENOUS at 12:20

## 2022-02-04 RX ADMIN — MYCOPHENOLATE MOFETIL 750 MG: 250 CAPSULE ORAL at 16:24

## 2022-02-04 RX ADMIN — INSULIN LISPRO 2 UNITS: 100 INJECTION, SOLUTION INTRAVENOUS; SUBCUTANEOUS at 16:23

## 2022-02-04 NOTE — PROGRESS NOTES
TRANSFER - OUT REPORT:    Verbal report given to Jose G Palafox RN [facility]  [facility] to [facility] for ordered procedure       Report consisted of patients Situation, Background, Assessment and   Recommendations(SBAR). Information from the following report(s) SBAR was reviewed with the receiving nurse. Lines:   Peripheral IV 02/03/22 Anterior;Right Hand (Active)   Site Assessment Clean, dry, & intact 02/04/22 0910   Phlebitis Assessment 0 02/04/22 0910   Infiltration Assessment 0 02/04/22 0910   Dressing Status Clean, dry, & intact 02/04/22 0910   Dressing Type Tape;Transparent 02/04/22 0910   Hub Color/Line Status Blue; Infusing 02/04/22 0593   Action Taken Other (comment) 02/03/22 1416   Alcohol Cap Used No 02/04/22 0910        Opportunity for questions and clarification was provided. Vitals were stable, Bp on the lower side, made periop nurse aware, medications given reviewed with periop nurse.   Patient transported with:   Registered Nurse

## 2022-02-04 NOTE — INTERVAL H&P NOTE
Update History & Physical    The Patient's History and Physical of February 4, 2022 was reviewed with the patient and I examined the patient. There was no change. The surgical site was confirmed by the patient and me. Plan:  The risk, benefits, expected outcome, and alternative to the recommended procedure have been discussed with the patient. Patient understands and wants to proceed with the procedure.     Electronically signed by Isai Banuelos MD on 2/4/2022 at 12:15 PM

## 2022-02-04 NOTE — PROGRESS NOTES
END OF SHIFT NOTE:    Intake/Output  02/04 0701 - 02/04 1900  In: 1335.6 [I.V.:1335.6]  Out: 60    Voiding: YES  Catheter: NO  Drain:      Stool:  2 occurrences. Stool Assessment  Stool Color: Red;Maroon (02/04/22 0201)  Stool Appearance: Loose;Bloody; Other (comment) (some soft stool mixed in blood and loose stool) (02/04/22 0800)  Stool Amount: Large (02/04/22 0201)  Stool Source/Status: Rectum (02/04/22 0201)    Emesis:  0 occurrences. VITAL SIGNS  Patient Vitals for the past 12 hrs:   Temp Pulse Resp BP SpO2   02/04/22 1554 98 °F (36.7 °C) 81 18 (!) 113/53 99 %   02/04/22 1448 97.9 °F (36.6 °C) 78 18 (!) 126/45 100 %   02/04/22 1327  80 16 (!) 129/59 97 %   02/04/22 1322  79 16 126/60 96 %   02/04/22 1317  81 16 125/60 97 %   02/04/22 1312  81 16 130/60 97 %   02/04/22 1310  78  (!) 155/65 96 %   02/04/22 1307  81 18 (!) 94/48 97 %   02/04/22 1218  80 16 (!) 152/56 98 %   02/04/22 0906    (!) 118/52    02/04/22 0902    (!) 112/52    02/04/22 0825 98.4 °F (36.9 °C) 77 18 (!) 108/48 96 %       Pain Assessment  Pain 1  Pain Scale 1: Numeric (0 - 10) (02/04/22 1554)  Pain Intensity 1: 0 (02/04/22 1554)  Patient Stated Pain Goal: 0 (02/04/22 1554)  Pain Reassessment 1: Yes (02/04/22 1218)    Ambulating  Yes    Additional Information:     Pt had colonoscopy, resulted as no new bleeding, see results, Pt had low Bp throughout shift, held BP medication per physician. Pt is resting in room with family at bedside and no questions or concerns at this time. Shift report given to oncoming nurse at the bedside.     Richardo Angelucci

## 2022-02-04 NOTE — DISCHARGE SUMMARY
Hospitalist Discharge Summary   Admit Date:  2022  7:00 AM   DC Note date: 2022  Name:  Kristina Mohr   Age:  78 y.o. Sex:  female  :  1945   MRN:  [id number]   Room:  [facility]  PCP:  Devan Vann MD    Presenting Complaint: Rectal Bleeding    Initial Admission Diagnosis: GI bleed [K92.2]     Problem List for this Hospitalization:  Hospital Problems as of 2022 Date Reviewed: 2021          Codes Class Noted - Resolved POA    * (Principal) GI bleed ICD-10-CM: K92.2  ICD-9-CM: 578.9  2022 - Present Unknown        Renal transplant, status post (Chronic) ICD-10-CM: Z94.0  ICD-9-CM: V42.0  2016 - Present Yes        Hypertension ICD-10-CM: I10  ICD-9-CM: 401.9  Unknown - Present Yes        Chronic kidney disease ICD-10-CM: N18.9  ICD-9-CM: 873. 9  Unknown - Present Yes    Overview Signed 2015 11:20 AM by Parisa Beebe     dialysis patient mon wed and fri             Hypercholesterolemia ICD-10-CM: E78.00  ICD-9-CM: 272.0  Unknown - Present Yes        Uncontrolled type 2 diabetes mellitus with hyperglycemia ([facility] ICD-10-CM: E11.65  ICD-9-CM: 250.02  Unknown - Present Yes            Did Patient have Sepsis (YES OR NO): No    Hospital Course:  78 y. o. female with medical history of CKD s/p renal transplant X2, HTN, DM II, CVA,  who presented with c/o 2 episodes of large volume BRBPR. She denied n/v, abd pain.  States got up to use the restroom around 0300 and noticed large amount of blood in the toilet. She does report intermittent right lower quadrant abd discomfort for several months.  Hgb 10 (baseline around 11).  Reports last colonoscopy 2 years ago and was \"normal\".  Denies unintentional weight loss. Patient admitted for lower GI bleed. NM 2/3 showed no acute bleeding, colonoscopy 2/4 showed small external hemorrhoid, moderate amount of diverticulosis and diverticular bleed of left colon. GI Recommend supportive care. Patient hemoglobin dropped to 6.1 next day post procedure. Patient received 1 unit PRBC. Hemoglobin remained stable. No more episodes of blood per rectum. Patient diet advanced and tolerated well. On day of discharge hemoglobin 7.1 and she was given IV iron x 1, also potassium 3.1 which was repleted. All other chronic medical conditions are stable and we continued with essential home meds. She is alert and oriented x3. Patient is stable for discharge home today. Will follow up with PCP early next week for CBC recheck. GI 4 weeks. Disposition: Home or Self Care  Diet: ADULT DIET Dysphagia - Minced & Moist; 3 carb choices (45 gm/meal)  Code Status: Full Code    Follow Up Orders: Follow-up Appointments   Procedures    FOLLOW UP VISIT Appointment in: 3 - 5 Days PCP GI follow up 4 weeks     PCP    GI follow up 4 weeks     Standing Status:   Standing     Number of Occurrences:   1     Order Specific Question:   Appointment in     Answer:   3 - 5 Days       Follow-up Information     Follow up With Specialties Details Why Contact Info    Dominga Etienne MD Gastroenterology  OFFICE CLOSED FOR WEEKEND R Gary Amos [address]      Filipe Yeh MD Family Medicine   [address] [place] [address]  [phone]            Follow up labs/diagnostics (ultimately defer to outpatient provider):  CBC    Time spent in patient discharge and coordination 40 minutes. Plan was discussed with patient and . All questions answered. Patient was stable at time of discharge. Instructions given to call a physician or return if any concerns. Discharge Info:   Current Discharge Medication List      START taking these medications    Details   ferrous sulfate 325 mg (65 mg iron) tablet Take 1 Tablet by mouth two (2) times daily (with meals) for 30 days.   Qty: 60 Tablet, Refills: 0  Start date: 2/6/2022, End date: 3/8/2022         CONTINUE these medications which have CHANGED    Details   atorvastatin (LIPITOR) 20 mg tablet Take 1 Tablet by mouth daily for 30 days. Qty: 30 Tablet, Refills: 0  Start date: 2/4/2022, End date: 3/6/2022         CONTINUE these medications which have NOT CHANGED    Details   rosuvastatin (CRESTOR) 20 mg tablet Take 20 mg by mouth daily. insulin NPH (HUMULIN N) 100 unit/mL (3 mL) inpn 25 Units by SubCUTAneous route Daily (before breakfast). Qty: 24 mL, Refills: 3    Comments: PLEASE NOTE increased fill for 90 day supply, dispense 8 pens or 24 mL  Associated Diagnoses: Uncontrolled type 2 diabetes mellitus with hyperglycemia (HCC)      Trulicity 1.5 OT/0.2 mL sub-q pen 0.5 mL by SubCUTAneous route every seven (7) days. Qty: 12 Each, Refills: 3    Comments: 12 pens per 90 days  Associated Diagnoses: Uncontrolled type 2 diabetes mellitus with hyperglycemia (HCC)      carvediloL (COREG) 12.5 mg tablet TAKE 1 TABLET BY MOUTH TWICE DAILY  Qty: 30 Tablet, Refills: 1      cyanocobalamin (Vitamin B-12) 250 mcg tablet Take 1 Tablet by mouth daily. VITAMIN B COMPLEX PO Take 1 Tablet by mouth daily. Gvoke HypoPen 2-Pack 1 mg/0.2 mL atIn 1 mg by SubCUTAneous route as needed (severe hypoglycemia). Qty: 2 Syringe, Refills: 1    Associated Diagnoses: Uncontrolled type 2 diabetes mellitus with hyperglycemia (HCC)      FreeStyle Rhonda 14 Day Sensor kit Use to monitor blood glucose. E11.65  Qty: 2 Kit, Refills: 11      belatacept (NULOJIX) 250 mg injection 500 mg by IntraVENous route.      glucagon (GLUCAGEN) 1 mg injection UAD , IM injection for severe hypoglycemia, include syringe and vial      !! Insulin Needles, Disposable, 32 gauge x 5/32\" ndle Use as directed 3 times a day      mycophenolate mofetil (CELLCEPT) 250 mg capsule TK 3 CS PO BID  Refills: 5      multivitamin (ONE A DAY) tablet Take 1 Tab by mouth daily. ascorbic acid (VITAMIN C) 500 mg tablet Take  by mouth. Cholecalciferol, Vitamin D3, (VITAMIN D3) 1,000 unit cap Take 1,000 Units by mouth daily.       aspirin delayed-release 81 mg tablet Take  by mouth daily. !! RUTH PEN NEEDLE 32 x 5/32 \" ndle       predniSONE (DELTASONE) 5 mg tablet Take 5 mg by mouth daily. !! - Potential duplicate medications found. Please discuss with provider. Procedures done this admission:  Procedure(s) with comments:  COLONOSCOPY/ BMI 29 ROOM 442 - Colonoscopy    Consults this admission:  IP CONSULT TO GASTROENTEROLOGY    Echocardiogram/EKG results:  No results found for this or any previous visit. EKG Results     None          Diagnostic Imaging/Tests:   NM ACUTE GI BLEED SCAN    Result Date: 2/3/2022  Nuclear Medicine GI Bleed Study Indication: Multiple bloody bowel movements Radiopharmaceutical: 25 mCi Tc-99m ultra tag labeled RBC's Findings: Dynamic and planar imaging performed of the abdomen. Imaging was carried out to 50 minutes. Transplant kidney noted in the left pelvis. Urine contamination and/or urine within the urinary bladder. No active bleeding identified. No active gastrointestinal bleeding identified. All Micro Results     Procedure Component Value Units Date/Time    COVID-19 RAPID TEST [[id number]] Collected: 02/03/22 1627    Order Status: Completed Specimen: Nasopharyngeal Updated: 02/03/22 1704     Specimen source Nasopharyngeal        COVID-19 rapid test Not detected        Comment:      The specimen is NEGATIVE for SARS-CoV-2, the novel coronavirus associated with COVID-19. A negative result does not rule out COVID-19. This test has been authorized by the FDA under an Emergency Use Authorization (EUA) for use by authorized laboratories.         Fact sheet for Healthcare Providers: ConventionUpdate.co.nz  Fact sheet for Patients: ConventionUpdate.co.nz       Methodology: Isothermal Nucleic Acid Amplification               Labs: Results:       BMP, Mg, Phos Recent Labs     02/06/22  0814 02/05/22  0520 02/04/22  0220   * 143 140   K 3.1* 3.5 3.9   * 115* 112*   CO2 20* 24 23   AGAP 4* 4* 5*   BUN 4* 7* 12   CREA 0.41* 0.55* 0.77   CA 7.3* 8.9 9.2   GLU 96 90 157*      CBC Recent Labs     02/06/22  0814 02/05/22  0520 02/04/22  0220   WBC 3.8* 3.0* 4.1*   RBC 2.41* 2.07* 2.75*   HGB 7.1* 6.1* 8.0*   HCT 22.2* 19.8* 26.0*   * 119* 136*   GRANS 56 46 50   LYMPH 33 42 38   EOS 3 4 3   MONOS 7 8 8   BASOS 1 1 1   IG 0 0 0   ANEU 2.1 1.4* 2.1   ABL 1.3 1.3 1.6   LZU ELENA 0.1 0.1 0.1   ABM 0.3 0.3 0.3   ABB 0.0 0.0 0.0   AIG 0.0 0.0 0.0      LFT No results for input(s): ALT, TBIL, AP, TP, ALB, GLOB, AGRAT in the last 72 hours.     No lab exists for component: SGOT, GPT   Cardiac Testing No results found for: BNPP, BNP, CPK, RCK1, RCK2, RCK3, RCK4, CKMB, CKNDX, CKND1, TROPT, TROIQ   Coagulation Tests Lab Results   Component Value Date/Time    Prothrombin time 13.6 02/02/2022 08:12 AM    Prothrombin time 13.8 05/30/2021 12:47 PM    INR 1.0 02/02/2022 08:12 AM    INR 1.0 05/30/2021 12:47 PM      A1c Lab Results   Component Value Date/Time    Hemoglobin A1c 7.5 (H) 05/31/2021 05:13 AM    Hemoglobin A1c 12.3 (H) 02/17/2021 03:46 PM    Hemoglobin A1c 12.6 (H) 12/16/2019 11:19 AM    Hemoglobin A1c, External 11.6 06/25/2015 12:00 AM      Lipid Panel Lab Results   Component Value Date/Time    Cholesterol, total 243 (H) 10/20/2021 11:52 AM    HDL Cholesterol 69 10/20/2021 11:52 AM    LDL, calculated 149 (H) 10/20/2021 11:52 AM    LDL, calculated 152.8 (H) 05/31/2021 05:13 AM    VLDL, calculated 25 10/20/2021 11:52 AM    VLDL, calculated 20.2 05/31/2021 05:13 AM    Triglyceride 143 10/20/2021 11:52 AM    CHOL/HDL Ratio 2.9 05/31/2021 05:13 AM      Thyroid Panel Lab Results   Component Value Date/Time    TSH 1.640 10/20/2021 11:52 AM    TSH 0.760 02/17/2021 03:46 PM        Most Recent UA No results found for: COLOR, APPRN, REFSG, PATTI, PROTU, GLUCU, KETU, BILU, BLDU, UROU, MILAD, LEUKU, WBCU, RBCU, UEPI, BACTU, CASTS, UCRY, MUCUS, UCOM       All Labs from Last 24 Hrs:  Recent Results (from the past 24 hour(s))   GLUCOSE, POC    Collection Time: 02/05/22 11:45 AM   Result Value Ref Range    Glucose (POC) 191 (H) 65 - 100 mg/dL    Performed by Haroldo    GLUCOSE, POC    Collection Time: 02/05/22  4:18 PM   Result Value Ref Range    Glucose (POC) 195 (H) 65 - 100 mg/dL    Performed by Codi    GLUCOSE, POC    Collection Time: 02/05/22  8:44 PM   Result Value Ref Range    Glucose (POC) 109 (H) 65 - 100 mg/dL    Performed by Mara    GLUCOSE, POC    Collection Time: 02/06/22  4:02 AM   Result Value Ref Range    Glucose (POC) 96 65 - 100 mg/dL    Performed by ElizabethKERRY    CBC WITH AUTOMATED DIFF    Collection Time: 02/06/22  8:14 AM   Result Value Ref Range    WBC 3.8 (L) 4.3 - 11.1 K/uL    RBC 2.41 (L) 4.05 - 5.2 M/uL    HGB 7.1 (L) 11.7 - 15.4 g/dL    HCT 22.2 (L) 35.8 - 46.3 %    MCV 92.1 79.6 - 97.8 FL    MCH 29.5 26.1 - 32.9 PG    MCHC 32.0 31.4 - 35.0 g/dL    RDW 13.8 11.9 - 14.6 %    PLATELET 711 (L) 545 - 450 K/uL    MPV 8.9 (L) 9.4 - 12.3 FL    ABSOLUTE NRBC 0.00 0.0 - 0.2 K/uL    DF AUTOMATED      NEUTROPHILS 56 43 - 78 %    LYMPHOCYTES 33 13 - 44 %    MONOCYTES 7 4.0 - 12.0 %    EOSINOPHILS 3 0.5 - 7.8 %    BASOPHILS 1 0.0 - 2.0 %    IMMATURE GRANULOCYTES 0 0.0 - 5.0 %    ABS. NEUTROPHILS 2.1 1.7 - 8.2 K/UL    ABS. LYMPHOCYTES 1.3 0.5 - 4.6 K/UL    ABS. MONOCYTES 0.3 0.1 - 1.3 K/UL    ABS. EOSINOPHILS 0.1 0.0 - 0.8 K/UL    ABS. BASOPHILS 0.0 0.0 - 0.2 K/UL    ABS. IMM.  GRANS. 0.0 0.0 - 0.5 K/UL   METABOLIC PANEL, BASIC    Collection Time: 02/06/22  8:14 AM   Result Value Ref Range    Sodium 146 (H) 136 - 145 mmol/L    Potassium 3.1 (L) 3.5 - 5.1 mmol/L    Chloride 122 (H) 98 - 107 mmol/L    CO2 20 (L) 21 - 32 mmol/L    Anion gap 4 (L) 7 - 16 mmol/L    Glucose 96 65 - 100 mg/dL    BUN 4 (L) 8 - 23 MG/DL    Creatinine 0.41 (L) 0.6 - 1.0 MG/DL    GFR est AA >60 >60 ml/min/1.73m2    GFR est non-AA >60 >60 ml/min/1.73m2    Calcium 7.3 (L) 8.3 - 10.4 MG/DL GLUCOSE, POC    Collection Time: 02/06/22  8:39 AM   Result Value Ref Range    Glucose (POC) 141 (H) 65 - 100 mg/dL    Performed by [address] List in Hospital:   Current Facility-Administered Medications   Medication Dose Route Frequency    potassium chloride 20 mEq in 100 ml IVPB  20 mEq IntraVENous ONCE    ferrous sulfate tablet 325 mg  1 Tablet Oral BID WITH MEALS    ferric gluconate (FERRLECIT) 250 mg in 0.9% sodium chloride 250 mL IVPB  250 mg IntraVENous ONCE    0.9% sodium chloride infusion 250 mL  250 mL IntraVENous PRN    sodium chloride (NS) flush 5-40 mL  5-40 mL IntraVENous Q8H    sodium chloride (NS) flush 5-40 mL  5-40 mL IntraVENous PRN    ondansetron (ZOFRAN) injection 4 mg  4 mg IntraVENous Q4H PRN    rosuvastatin (CRESTOR) tablet 20 mg  20 mg Oral QHS    cyanocobalamin tablet 500 mcg  500 mcg Oral DAILY    carvediloL (COREG) tablet 12.5 mg  12.5 mg Oral BID WITH MEALS    mycophenolate mofetil (CELLCEPT) capsule 750 mg  750 mg Oral ACB&D    predniSONE (DELTASONE) tablet 5 mg  5 mg Oral DAILY    insulin lispro (HUMALOG) injection   SubCUTAneous AC&HS       Allergies   Allergen Reactions    Lisinopril Cough     cough     Immunization History   Administered Date(s) Administered    Influenza Vaccine 10/16/2014, 10/01/2015, 11/03/2016    Influenza Vaccine (Quad) Mdck Pf (>2 Yrs Flucelvax QUAD 22196) 10/06/2017    Influenza Vaccine (Quad) PF (>6 Mo Flulaval, Fluarix, and >3 Yrs Afluria, Fluzone [id number]) 09/27/2018, 12/16/2019, 10/20/2021    Influenza Vaccine PF 10/16/2015    Pneumococcal Conjugate (PCV-7) 01/01/2012       Recent Vital Data:  Patient Vitals for the past 24 hrs:   Temp Pulse Resp BP SpO2   02/06/22 0742 98.1 °F (36.7 °C) 70 20 (!) 146/64 99 %   02/06/22 0401 98.9 °F (37.2 °C) 72 19 (!) 143/64 97 %   02/05/22 2303 98.4 °F (36.9 °C) 66 18 (!) 140/48 99 %   02/05/22 1944 98.7 °F (37.1 °C) 67 19 (!) 129/58 98 %   02/05/22 1456 98.4 °F (36.9 °C) 74 18 (!) 115/57 97 %   02/05/22 1440 98.4 °F (36.9 °C) 74 18 (!) 104/55 97 %   02/05/22 1146 98.3 °F (36.8 °C) 78 18 (!) 111/53 98 %     Oxygen Therapy  O2 Sat (%): 99 % (02/06/22 0742)  Pulse via Oximetry: 80 beats per minute (02/04/22 1218)  O2 Device: None (Room air) (02/06/22 0316)  Skin Assessment: Clean, dry, & intact (02/04/22 1218)  Skin Protection for O2 Device: No (02/04/22 1218)  O2 Flow Rate (L/min): 3 l/min (02/04/22 1218)  ETCO2 (mmHg): 36 mmHg (02/04/22 1218)    Estimated body mass index is 30.29 kg/m² as calculated from the following:    Height as of this encounter: 5' 3\" (1.6 m). Weight as of this encounter: 77.6 kg (171 lb). Intake/Output Summary (Last 24 hours) at 2/6/2022 1033  Last data filed at 2/5/2022 2247  Gross per 24 hour   Intake 2469.1 ml   Output    Net 2469.1 ml         Physical Exam:    General:    No overt distress  Head:  Normocephalic, atraumatic  Eyes:  Sclerae appear pale. Pupils equally round. HENT:  Nares appear normal, no drainage. Moist mucous membranes  Neck:  No restricted ROM. Trachea midline  CV:   RRR. No m/r/g. No JVD  Lungs:   CTAB. No wheezing, rhonchi, or rales. Even, unlabored  Abdomen:   Soft, nontender, nondistended. Extremities: Warm and dry. No cyanosis or clubbing. No edema. Skin:     No rashes. Normal coloration  Neuro:  CN II-XII grossly intact. Psych:  Normal mood and affect.       Signed:  Soraya Yen MD

## 2022-02-04 NOTE — PROGRESS NOTES
Hospitalist Progress Note   Admit Date:  2022  7:00 AM   Name:  Dayana Nurse   Age:  78 y.o. Sex:  female  :  1945   MRN:  [id number]   Room:  [facility]/01    Presenting Complaint: Rectal Bleeding    Reason(s) for Admission: GI bleed [facility] Hospital Course & Interval History:    78 y.o. female with medical history of CKD s/p renal transplant X2, HTN, DM II, CVA,  who presented with c/o 2 episodes of large volume BRBPR.  showed pictures of this to me. She denied n/v, abd pain. States got up to use the restroom around 0300 and noticed large amount of blood in the toilet. She does report intermittent right lower quadrant abd discomfort for several months. Hgb 10 (baseline around 11). Reports last colonoscopy 2 years ago and was \"normal\". Denies unintentional weight loss. Denies CP, SOB. Subjective (22): Minimal amount melena per patient. BP stable, hemoglobin 8. Colonoscopy planned for today. Assessment & Plan: Active Problems:    GI bleed (2022)   GI following; NM test ordered  Trend hemoglobin  NPO   Holding ASA  2/3  Episode of melena with near syncopal episode while ambulating to BR. Vital signs stable, melena moderate. Stat hemoglobin ordered. GI aware, NM test ordered. IVF  ml/h  Repeat hemoglobin stable 8.1  2/4  Colonoscopy planned for today.  Hemoglobin 8, patient reports minimal melena today.      Essential HTN  Home meds  controlled     CKD s/p renal transplant  Continue home meds  Is followed at [facility] in [facility]     DM II  Holding home meds    SSI  2/4  BGL controlled.                Dispo/Discharge Planning:     Likely home pending clinical course     Diet: ADULT DIET Clear Liquid  DIET NPO  VTE ppx: SCDs  Code status: Full Code    Hospital Problems as of 2022 Date Reviewed: 2021          Codes Class Noted - Resolved POA    * (Principal) GI bleed ICD-10-CM: K92.2  ICD-9-CM: 578.9  2022 - Present Unknown        Renal transplant, status post (Chronic) ICD-10-CM: Z94.0  ICD-9-CM: V42.0  4/19/2016 - Present Yes        Hypertension ICD-10-CM: I10  ICD-9-CM: 401.9  Unknown - Present Yes        Chronic kidney disease ICD-10-CM: N18.9  ICD-9-CM: 679. 9  Unknown - Present Yes    Overview Signed 8/31/2015 11:20 AM by Christin Recinos     dialysis patient mon wed and fri             Hypercholesterolemia ICD-10-CM: E78.00  ICD-9-CM: 272.0  Unknown - Present Yes        Uncontrolled type 2 diabetes mellitus with hyperglycemia ([facility] [address] ICD-10-CM: E11.65  ICD-9-CM: 250.02  Unknown - Present Yes              Objective:     Patient Vitals for the past 24 hrs:   Temp Pulse Resp BP SpO2   02/04/22 0906    (!) 118/52    02/04/22 0902    (!) 112/52    02/04/22 0825 98.4 °F (36.9 °C) 77 18 (!) 108/48 96 %   02/04/22 0302 98.1 °F (36.7 °C) 81 18 133/65 100 %   02/03/22 2245 98.2 °F (36.8 °C) 73 18 (!) 111/53 99 %   02/03/22 1849 99 °F (37.2 °C) 77 17 (!) 125/57 99 %   02/03/22 1612 98.9 °F (37.2 °C) 75 18 (!) 146/64 98 %   02/03/22 1447 99.2 °F (37.3 °C) 82 18 138/62 98 %   02/03/22 1346  81  (!) 127/58    02/03/22 1242 99.6 °F (37.6 °C) 86  (!) 123/49    02/03/22 1221  80  (!) 117/56    02/03/22 1134    (!) 122/56    02/03/22 1121 98.7 °F (37.1 °C) 86 17 (!) 117/43 99 %   02/03/22 1120    (!) 80/48      Oxygen Therapy  O2 Sat (%): 96 % (02/04/22 0825)  Pulse via Oximetry: 87 beats per minute (02/02/22 0701)  O2 Device: None (Room air) (02/03/22 0751)    Estimated body mass index is 30.29 kg/m² as calculated from the following:    Height as of this encounter: 5' 3\" (1.6 m). Weight as of this encounter: 77.6 kg (171 lb). Intake/Output Summary (Last 24 hours) at 2/4/2022 1001  Last data filed at 2/4/2022 0310  Gross per 24 hour   Intake 1604 ml   Output    Net 1604 ml         Physical Exam:   Blood pressure (!) 118/52, pulse 77, temperature 98.4 °F (36.9 °C), resp.  rate 18, height 5' 3\" (1.6 m), weight 77.6 kg (171 lb), SpO2 96 %, not currently breastfeeding. General:          Well nourished. No overt distress  Head:               Normocephalic, atraumatic  Eyes:               Sclerae appear normal.  Pupils equally round. ENT:                Nares appear normal, no drainage. Moist oral mucosa  Neck:               No restricted ROM. Trachea midline   CV:                  RRR. No m/r/g. No jugular venous distension. Lungs:             CTAB. No wheezing, rhonchi, or rales. Respirations even, unlabored  Abdomen: Bowel sounds present. Soft, nontender, nondistended. Extremities:     No cyanosis or clubbing. No edema  Skin:                No rashes and normal coloration. Warm and dry. Neuro:             CN II-XII grossly intact. Sensation intact. A&Ox3  Psych:             Normal mood and affect     I have reviewed ordered lab tests and independently visualized imaging below:    Recent Labs:  Recent Results (from the past 48 hour(s))   HGB & HCT    Collection Time: 02/02/22  5:04 PM   Result Value Ref Range    HGB 9.8 (L) 11.7 - 15.4 g/dL    HCT 31.3 (L) 35.8 - 93.8 %   METABOLIC PANEL, COMPREHENSIVE    Collection Time: 02/03/22  5:26 AM   Result Value Ref Range    Sodium 141 136 - 145 mmol/L    Potassium 3.7 3.5 - 5.1 mmol/L    Chloride 113 (H) 98 - 107 mmol/L    CO2 24 21 - 32 mmol/L    Anion gap 4 (L) 7 - 16 mmol/L    Glucose 124 (H) 65 - 100 mg/dL    BUN 14 8 - 23 MG/DL    Creatinine 0.69 0.6 - 1.0 MG/DL    GFR est AA >60 >60 ml/min/1.73m2    GFR est non-AA >60 >60 ml/min/1.73m2    Calcium 8.9 8.3 - 10.4 MG/DL    Bilirubin, total 0.3 0.2 - 1.1 MG/DL    ALT (SGPT) 16 12 - 65 U/L    AST (SGOT) 15 15 - 37 U/L    Alk.  phosphatase 53 50 - 130 U/L    Protein, total 5.7 (L) 6.3 - 8.2 g/dL    Albumin 2.6 (L) 3.2 - 4.6 g/dL    Globulin 3.1 2.3 - 3.5 g/dL    A-G Ratio 0.8 (L) 1.2 - 3.5     CBC WITH AUTOMATED DIFF    Collection Time: 02/03/22  5:26 AM   Result Value Ref Range    WBC 4.0 (L) 4.3 - 11.1 K/uL    RBC 2.91 (L) 4.05 - 5.2 M/uL    HGB 8.6 (L) 11.7 - 15.4 g/dL    HCT 27.4 (L) 35.8 - 46.3 %    MCV 94.2 79.6 - 97.8 FL    MCH 29.6 26.1 - 32.9 PG    MCHC 31.4 31.4 - 35.0 g/dL    RDW 12.9 11.9 - 14.6 %    PLATELET 532 (L) 962 - 450 K/uL    MPV 9.4 9.4 - 12.3 FL    ABSOLUTE NRBC 0.00 0.0 - 0.2 K/uL    DF AUTOMATED      NEUTROPHILS 52 43 - 78 %    LYMPHOCYTES 34 13 - 44 %    MONOCYTES 9 4.0 - 12.0 %    EOSINOPHILS 4 0.5 - 7.8 %    BASOPHILS 1 0.0 - 2.0 %    IMMATURE GRANULOCYTES 0 0.0 - 5.0 %    ABS. NEUTROPHILS 2.1 1.7 - 8.2 K/UL    ABS. LYMPHOCYTES 1.4 0.5 - 4.6 K/UL    ABS. MONOCYTES 0.3 0.1 - 1.3 K/UL    ABS. EOSINOPHILS 0.2 0.0 - 0.8 K/UL    ABS. BASOPHILS 0.0 0.0 - 0.2 K/UL    ABS. IMM.  GRANS. 0.0 0.0 - 0.5 K/UL   GLUCOSE, POC    Collection Time: 02/03/22 11:14 AM   Result Value Ref Range    Glucose (POC) 175 (H) 65 - 100 mg/dL    Performed by Jovany    HGB & HCT    Collection Time: 02/03/22 12:47 PM   Result Value Ref Range    HGB 8.1 (L) 11.7 - 15.4 g/dL    HCT 26.5 (L) 35.8 - 46.3 %   COVID-19 RAPID TEST    Collection Time: 02/03/22  4:27 PM   Result Value Ref Range    Specimen source Nasopharyngeal      COVID-19 rapid test Not detected NOTD     GLUCOSE, POC    Collection Time: 02/03/22  4:41 PM   Result Value Ref Range    Glucose (POC) 169 (H) 65 - 100 mg/dL    Performed by Jovany    GLUCOSE, POC    Collection Time: 02/03/22  9:00 PM   Result Value Ref Range    Glucose (POC) 193 (H) 65 - 100 mg/dL    Performed by Tiki    GLUCOSE, POC    Collection Time: 02/04/22  1:52 AM   Result Value Ref Range    Glucose (POC) 162 (H) 65 - 100 mg/dL    Performed by Riya    CBC WITH AUTOMATED DIFF    Collection Time: 02/04/22  2:20 AM   Result Value Ref Range    WBC 4.1 (L) 4.3 - 11.1 K/uL    RBC 2.75 (L) 4.05 - 5.2 M/uL    HGB 8.0 (L) 11.7 - 15.4 g/dL    HCT 26.0 (L) 35.8 - 46.3 %    MCV 94.5 79.6 - 97.8 FL    MCH 29.1 26.1 - 32.9 PG    MCHC 30.8 (L) 31.4 - 35.0 g/dL    RDW 13.1 11.9 - 14.6 % PLATELET 967 (L) 082 - 450 K/uL    MPV 9.4 9.4 - 12.3 FL    ABSOLUTE NRBC 0.00 0.0 - 0.2 K/uL    NEUTROPHILS 50 43 - 78 %    LYMPHOCYTES 38 13 - 44 %    MONOCYTES 8 4.0 - 12.0 %    EOSINOPHILS 3 0.5 - 7.8 %    BASOPHILS 1 0.0 - 2.0 %    IMMATURE GRANULOCYTES 0 0.0 - 5.0 %    ABS. NEUTROPHILS 2.1 1.7 - 8.2 K/UL    ABS. LYMPHOCYTES 1.6 0.5 - 4.6 K/UL    ABS. MONOCYTES 0.3 0.1 - 1.3 K/UL    ABS. EOSINOPHILS 0.1 0.0 - 0.8 K/UL    ABS. BASOPHILS 0.0 0.0 - 0.2 K/UL    ABS. IMM. GRANS. 0.0 0.0 - 0.5 K/UL    DF AUTOMATED     METABOLIC PANEL, BASIC    Collection Time: 02/04/22  2:20 AM   Result Value Ref Range    Sodium 140 136 - 145 mmol/L    Potassium 3.9 3.5 - 5.1 mmol/L    Chloride 112 (H) 98 - 107 mmol/L    CO2 23 21 - 32 mmol/L    Anion gap 5 (L) 7 - 16 mmol/L    Glucose 157 (H) 65 - 100 mg/dL    BUN 12 8 - 23 MG/DL    Creatinine 0.77 0.6 - 1.0 MG/DL    GFR est AA >60 >60 ml/min/1.73m2    GFR est non-AA >60 >60 ml/min/1.73m2    Calcium 9.2 8.3 - 10.4 MG/DL   GLUCOSE, POC    Collection Time: 02/04/22  8:23 AM   Result Value Ref Range    Glucose (POC) 147 (H) 65 - 100 mg/dL    Performed by Prenova        All Micro Results     Procedure Component Value Units Date/Time    COVID-19 RAPID TEST [[id number]] Collected: 02/03/22 1627    Order Status: Completed Specimen: Nasopharyngeal Updated: 02/03/22 1702     Specimen source Nasopharyngeal        COVID-19 rapid test Not detected        Comment:      The specimen is NEGATIVE for SARS-CoV-2, the novel coronavirus associated with COVID-19. A negative result does not rule out COVID-19. This test has been authorized by the FDA under an Emergency Use Authorization (EUA) for use by authorized laboratories.         Fact sheet for Healthcare Providers: ConventionUpdate.co.nz  Fact sheet for Patients: ConventionUpdate.co.nz       Methodology: Isothermal Nucleic Acid Amplification               Other Studies:  NM ACUTE GI BLEED SCAN    Result Date: 2/3/2022  Nuclear Medicine GI Bleed Study Indication: Multiple bloody bowel movements Radiopharmaceutical: 25 mCi Tc-99m ultra tag labeled RBC's Findings: Dynamic and planar imaging performed of the abdomen. Imaging was carried out to 50 minutes. Transplant kidney noted in the left pelvis. Urine contamination and/or urine within the urinary bladder. No active bleeding identified. No active gastrointestinal bleeding identified.        Current Meds:  Current Facility-Administered Medications   Medication Dose Route Frequency    sodium chloride (NS) flush 5-40 mL  5-40 mL IntraVENous Q8H    sodium chloride (NS) flush 5-40 mL  5-40 mL IntraVENous PRN    0.9% sodium chloride infusion  125 mL/hr IntraVENous CONTINUOUS    ondansetron (ZOFRAN) injection 4 mg  4 mg IntraVENous Q4H PRN    rosuvastatin (CRESTOR) tablet 20 mg  20 mg Oral QHS    cyanocobalamin tablet 500 mcg  500 mcg Oral DAILY    carvediloL (COREG) tablet 12.5 mg  12.5 mg Oral BID WITH MEALS    mycophenolate mofetil (CELLCEPT) capsule 750 mg  750 mg Oral ACB&D    predniSONE (DELTASONE) tablet 5 mg  5 mg Oral DAILY    insulin lispro (HUMALOG) injection   SubCUTAneous AC&HS       Signed:  Jovana Lange NP

## 2022-02-04 NOTE — PROCEDURES
COLONOSCOPY    DATE of PROCEDURE: 2/4/2022    INDICATION:  1. Lower GI bleeding    POSTPROCEDURE DIAGNOSIS:  1. Diverticulosis of colon  2. External hemorrhoids    MEDICATION: MAC. Further details per anesthesia. INSTRUMENT: [id number]    PROCEDURE: After obtaining informed consent, the patient was placed in the left lateral position and sedated. The endoscope was advanced to the cecum where the appendiceal orifice and ileocecal valve were identified. On withdrawal, the colon was carefully visualized in a 360 degree fashion. The patient was taken to the recovery area in stable condition. FINDINGS:    There was maroon blood and small clots limited to left colon at beginning of procedure. Right colon was negative for any evidence of active or recent bleeding. Extensive washing was performed on withdrawal of scope. Left colon was completely cleared of blood and clots. Exam was negative for active bleeding. Rectum: Small external hemorrhoids. Otherwise normal exam.  Sigmoid: Mild to moderate diverticulosis. Otherwise normal exam.  Descending Colon:  Mild to moderate diverticulosis. Otherwise normal exam.  Transverse Colon: normal  Ascending Colon: normal  Cecum: normal  Terminal ileum: not entered    Estimated blood loss: 0-minimal   Specimens obtained during procedure: none      ASSESSMENT/PLAN:  1. Diverticular bleed of left colon  2.  Supportive care    Thu Muir MD

## 2022-02-04 NOTE — ANESTHESIA PREPROCEDURE EVALUATION
Relevant Problems   NEUROLOGY   (+) Acute ischemic stroke (HCC)      CARDIOVASCULAR   (+) Hypertension      RENAL FAILURE   (+) Chronic kidney disease   (+) Renal failure      ENDOCRINE   (+) Hypothyroidism   (+) Severe obesity (HCC)       Anesthetic History   No history of anesthetic complications            Review of Systems / Medical History  Patient summary reviewed and pertinent labs reviewed    Pulmonary                   Neuro/Psych       CVA       Cardiovascular    Hypertension              Exercise tolerance: >4 METS     GI/Hepatic/Renal         Renal disease: CRI       Endo/Other    Diabetes  Hypothyroidism  Obesity and anemia     Other Findings   Comments: S/p renal Tx           Physical Exam    Airway  Mallampati: II  TM Distance: 4 - 6 cm  Neck ROM: normal range of motion   Mouth opening: Normal     Cardiovascular    Rhythm: regular  Rate: normal         Dental  No notable dental hx       Pulmonary  Breath sounds clear to auscultation               Abdominal  GI exam deferred       Other Findings            Anesthetic Plan    ASA: 3  Anesthesia type: total IV anesthesia          Induction: Intravenous  Anesthetic plan and risks discussed with: Patient

## 2022-02-04 NOTE — DISCHARGE INSTRUCTIONS
Gastrointestinal Colonoscopy/Flexible Sigmoidoscopy - Lower Exam Discharge Instructions  1. Call [address] for any problems or questions. 2. Contact the doctors office for follow up appointment as directed  3. Medication may cause drowsiness for several hours, therefore:  · Do not drive or operate machinery for reminder of the day. · No alcohol today. · Do not make any important or legal decisions for 24 hours. · Do not sign any legal documents for 24 hours. 4. Ordinarily, you may resume regular diet and activity after exam unless otherwise specified by your physician. 5. Because of air put into your colon during exam, you may experience some abdominal distension, relieved by the passage of gas, for several hours. 6. Contact your physician if you have any of the following:  · Excessive amount of bleeding - large amount when having a bowel movement. Occasional specks of blood with bowel movement would not be unusual.  · Severe abdominal pain  · Fever or Chills        Instructions given to Juancarlos Ayers and other family members.   Instructions given by:  Severino Bevaer RN

## 2022-02-04 NOTE — PERIOP NOTES
TRANSFER - IN REPORT:    Verbal report received from Woody Guadalupe on [employer]  being received from Med surg for routine progression of care      Report consisted of patients Situation, Background, Assessment and   Recommendations(SBAR). Information from the following report(s) SBAR, MAR and Recent Results was reviewed with the receiving nurse. Opportunity for questions and clarification was provided. Assessment completed upon patients arrival to unit and care assumed.

## 2022-02-04 NOTE — PERIOP NOTES
TRANSFER - OUT REPORT:    Verbal report given to receiving nurse Aurelia(name) on Odette Castaneda being transferred to [facility](unit) for routine progression of care       Report consisted of patient's Situation, Background, Assessment and   Recommendations(SBAR). Information from the following report(s) OR Summary, Procedure Summary, Intake/Output and MAR was reviewed with the receiving nurse. Opportunity for questions and clarification was provided.       Patient transported with:   [employer]

## 2022-02-04 NOTE — PROGRESS NOTES
TRANSFER - IN REPORT:    Verbal report received from Yanelis Rios rn on [facility]  being received from post op for routine post - op      Report consisted of patients Situation, Background, Assessment and   Recommendations(SBAR). Information from the following report(s) SBAR was reviewed with the receiving nurse. Opportunity for questions and clarification was provided. Assessment completed upon patients arrival to unit and care assumed.

## 2022-02-05 LAB
ANION GAP SERPL CALC-SCNC: 4 MMOL/L (ref 7–16)
BASOPHILS # BLD: 0 K/UL (ref 0–0.2)
BASOPHILS NFR BLD: 1 % (ref 0–2)
BUN SERPL-MCNC: 7 MG/DL (ref 8–23)
CALCIUM SERPL-MCNC: 8.9 MG/DL (ref 8.3–10.4)
CHLORIDE SERPL-SCNC: 115 MMOL/L (ref 98–107)
CO2 SERPL-SCNC: 24 MMOL/L (ref 21–32)
CREAT SERPL-MCNC: 0.55 MG/DL (ref 0.6–1)
DIFFERENTIAL METHOD BLD: ABNORMAL
EOSINOPHIL # BLD: 0.1 K/UL (ref 0–0.8)
EOSINOPHIL NFR BLD: 4 % (ref 0.5–7.8)
ERYTHROCYTE [DISTWIDTH] IN BLOOD BY AUTOMATED COUNT: 13.2 % (ref 11.9–14.6)
GLUCOSE BLD STRIP.AUTO-MCNC: 107 MG/DL (ref 65–100)
GLUCOSE BLD STRIP.AUTO-MCNC: 109 MG/DL (ref 65–100)
GLUCOSE BLD STRIP.AUTO-MCNC: 191 MG/DL (ref 65–100)
GLUCOSE BLD STRIP.AUTO-MCNC: 195 MG/DL (ref 65–100)
GLUCOSE SERPL-MCNC: 90 MG/DL (ref 65–100)
HCT VFR BLD AUTO: 19.8 % (ref 35.8–46.3)
HGB BLD-MCNC: 6.1 G/DL (ref 11.7–15.4)
HISTORY CHECKED?,CKHIST: NORMAL
IMM GRANULOCYTES # BLD AUTO: 0 K/UL (ref 0–0.5)
IMM GRANULOCYTES NFR BLD AUTO: 0 % (ref 0–5)
LYMPHOCYTES # BLD: 1.3 K/UL (ref 0.5–4.6)
LYMPHOCYTES NFR BLD: 42 % (ref 13–44)
MCH RBC QN AUTO: 29.5 PG (ref 26.1–32.9)
MCHC RBC AUTO-ENTMCNC: 30.8 G/DL (ref 31.4–35)
MCV RBC AUTO: 95.7 FL (ref 79.6–97.8)
MONOCYTES # BLD: 0.3 K/UL (ref 0.1–1.3)
MONOCYTES NFR BLD: 8 % (ref 4–12)
NEUTS SEG # BLD: 1.4 K/UL (ref 1.7–8.2)
NEUTS SEG NFR BLD: 46 % (ref 43–78)
NRBC # BLD: 0 K/UL (ref 0–0.2)
PLATELET # BLD AUTO: 119 K/UL (ref 150–450)
PMV BLD AUTO: 9.5 FL (ref 9.4–12.3)
POTASSIUM SERPL-SCNC: 3.5 MMOL/L (ref 3.5–5.1)
RBC # BLD AUTO: 2.07 M/UL (ref 4.05–5.2)
SERVICE CMNT-IMP: ABNORMAL
SODIUM SERPL-SCNC: 143 MMOL/L (ref 136–145)
WBC # BLD AUTO: 3 K/UL (ref 4.3–11.1)

## 2022-02-05 PROCEDURE — 74011250636 HC RX REV CODE- 250/636: Performed by: NURSE PRACTITIONER

## 2022-02-05 PROCEDURE — 36430 TRANSFUSION BLD/BLD COMPNT: CPT

## 2022-02-05 PROCEDURE — 65270000029 HC RM PRIVATE

## 2022-02-05 PROCEDURE — 74011250637 HC RX REV CODE- 250/637: Performed by: NURSE PRACTITIONER

## 2022-02-05 PROCEDURE — 80048 BASIC METABOLIC PNL TOTAL CA: CPT

## 2022-02-05 PROCEDURE — 74011000250 HC RX REV CODE- 250: Performed by: NURSE PRACTITIONER

## 2022-02-05 PROCEDURE — 74011636637 HC RX REV CODE- 636/637: Performed by: NURSE PRACTITIONER

## 2022-02-05 PROCEDURE — P9016 RBC LEUKOCYTES REDUCED: HCPCS

## 2022-02-05 PROCEDURE — 36415 COLL VENOUS BLD VENIPUNCTURE: CPT

## 2022-02-05 PROCEDURE — 85025 COMPLETE CBC W/AUTO DIFF WBC: CPT

## 2022-02-05 PROCEDURE — 82962 GLUCOSE BLOOD TEST: CPT

## 2022-02-05 PROCEDURE — 2709999900 HC NON-CHARGEABLE SUPPLY

## 2022-02-05 RX ORDER — SODIUM CHLORIDE 9 MG/ML
250 INJECTION, SOLUTION INTRAVENOUS AS NEEDED
Status: DISCONTINUED | OUTPATIENT
Start: 2022-02-05 | End: 2022-02-06 | Stop reason: HOSPADM

## 2022-02-05 RX ADMIN — PREDNISONE 5 MG: 5 TABLET ORAL at 08:09

## 2022-02-05 RX ADMIN — MYCOPHENOLATE MOFETIL 750 MG: 250 CAPSULE ORAL at 07:49

## 2022-02-05 RX ADMIN — CARVEDILOL 12.5 MG: 6.25 TABLET, FILM COATED ORAL at 16:32

## 2022-02-05 RX ADMIN — SODIUM CHLORIDE, PRESERVATIVE FREE 10 ML: 5 INJECTION INTRAVENOUS at 13:35

## 2022-02-05 RX ADMIN — SODIUM CHLORIDE, PRESERVATIVE FREE 10 ML: 5 INJECTION INTRAVENOUS at 05:31

## 2022-02-05 RX ADMIN — INSULIN LISPRO 2 UNITS: 100 INJECTION, SOLUTION INTRAVENOUS; SUBCUTANEOUS at 17:01

## 2022-02-05 RX ADMIN — CARVEDILOL 12.5 MG: 6.25 TABLET, FILM COATED ORAL at 07:49

## 2022-02-05 RX ADMIN — ROSUVASTATIN CALCIUM 20 MG: 20 TABLET, COATED ORAL at 21:48

## 2022-02-05 RX ADMIN — INSULIN LISPRO 2 UNITS: 100 INJECTION, SOLUTION INTRAVENOUS; SUBCUTANEOUS at 12:07

## 2022-02-05 RX ADMIN — SODIUM CHLORIDE, PRESERVATIVE FREE 10 ML: 5 INJECTION INTRAVENOUS at 22:00

## 2022-02-05 RX ADMIN — MYCOPHENOLATE MOFETIL 750 MG: 250 CAPSULE ORAL at 16:32

## 2022-02-05 RX ADMIN — SODIUM CHLORIDE 125 ML/HR: 900 INJECTION, SOLUTION INTRAVENOUS at 04:13

## 2022-02-05 RX ADMIN — Medication 500 MCG: at 08:09

## 2022-02-05 NOTE — PROGRESS NOTES
Hospitalist Progress Note   Admit Date:  2022  7:00 AM   Name:  Екатерина Canchola   Age:  78 y.o. Sex:  female  :  1945   MRN:  [id number]   Room:  [facility]/01    Presenting Complaint: Rectal Bleeding    Reason(s) for Admission: GI bleed [facility] Course & Interval History:    78 y.o. female with medical history of CKD s/p renal transplant X2, HTN, DM II, CVA,  who presented with c/o 2 episodes of large volume BRBPR. GI consulted and patient status post colonoscopy on  with diverticular bleed of left colon as possible source of bleeding. GI recommended supportive care. Hemoglobin dropped to 6.1 post colonoscopy and patient received 1 unit PRBC. Subjective (22): Patient is seen at the bedside. Patient is feeling better. Denies chest pain, palpitation, nausea, vomiting or abdominal pain. No more bleeding episodes. Assessment & Plan:     GI bleed:  GI consulted and patient status post colonoscopy on  with diverticular bleed of left colon as possible source of bleeding  GI recommend supportive care  : Hemoglobin dropped to 6.1. Will transfuse 1 unit PRBC. Continue supportive care. Full liquid diet    Essential HTN  Home meds  controlled     CKD s/p renal transplant  Continue home meds  Is followed at [facility] in [place]     DM II  Continue sliding scale       Dispo/Discharge Planning:     Likely home pending clinical course     Diet:  For liquid diet  VTE ppx: SCDs  Code status: Full Code    Hospital Problems as of 2022 Date Reviewed: 2021          Codes Class Noted - Resolved POA    * (Principal) GI bleed ICD-10-CM: K92.2  ICD-9-CM: 578.9  2022 - Present Unknown        Renal transplant, status post (Chronic) ICD-10-CM: Z94.0  ICD-9-CM: V42.0  2016 - Present Yes        Hypertension ICD-10-CM: I10  ICD-9-CM: 401.9  Unknown - Present Yes        Chronic kidney disease ICD-10-CM: N18.9  ICD-9-CM: 494. 9  Unknown - Present Yes    Overview Signed 2015 11:20 AM by Lawrnce Scheuermann     dialysis patient mon wed and fri             Hypercholesterolemia ICD-10-CM: E78.00  ICD-9-CM: 272.0  Unknown - Present Yes        Uncontrolled type 2 diabetes mellitus with hyperglycemia [facility] ICD-10-CM: E11.65  ICD-9-CM: 250.02  Unknown - Present Yes              Objective:     Patient Vitals for the past 24 hrs:   Temp Pulse Resp BP SpO2   02/05/22 1440 98.4 °F (36.9 °C) 74 18 (!) 104/55 97 %   02/05/22 1146 98.3 °F (36.8 °C) 78 18 (!) 111/53 98 %   02/05/22 0732 98.7 °F (37.1 °C) 75 18 132/72 97 %   02/05/22 0313 98.3 °F (36.8 °C) 83 18 (!) 145/61 100 %   02/04/22 2315 98.2 °F (36.8 °C) 74 18 (!) 133/52 99 %   02/04/22 1923 98.2 °F (36.8 °C) 76 18 (!) 107/50 100 %   02/04/22 1554 98 °F (36.7 °C) 81 18 (!) 113/53 99 %     Oxygen Therapy  O2 Sat (%): 97 % (02/05/22 1440)  Pulse via Oximetry: 80 beats per minute (02/04/22 1218)  O2 Device: None (Room air) (02/04/22 1327)  Skin Assessment: Clean, dry, & intact (02/04/22 1218)  Skin Protection for O2 Device: No (02/04/22 1218)  O2 Flow Rate (L/min): 3 l/min (02/04/22 1218)  ETCO2 (mmHg): 36 mmHg (02/04/22 1218)    Estimated body mass index is 30.29 kg/m² as calculated from the following:    Height as of this encounter: 5' 3\" (1.6 m). Weight as of this encounter: 77.6 kg (171 lb). Intake/Output Summary (Last 24 hours) at 2/5/2022 1456  Last data filed at 2/5/2022 1441  Gross per 24 hour   Intake 1719 ml   Output 350 ml   Net 1369 ml         Physical Exam:   Blood pressure (!) 104/55, pulse 74, temperature 98.4 °F (36.9 °C), resp. rate 18, height 5' 3\" (1.6 m), weight 77.6 kg (171 lb), SpO2 97 %, not currently breastfeeding. General:          Well nourished. No overt distress  Head:               Normocephalic, atraumatic  Eyes:               Sclerae appear normal.  Pupils equally round. ENT:                Nares appear normal, no drainage. Moist oral mucosa  Neck:               No restricted ROM.   Trachea midline   CV: RRR.  No m/r/g. No jugular venous distension. Lungs:             CTAB. No wheezing, rhonchi, or rales. Respirations even, unlabored  Abdomen: Bowel sounds present. Soft, nontender, nondistended. Extremities:     No cyanosis or clubbing. No edema  Skin:                No rashes and normal coloration. Warm and dry. Neuro:             CN II-XII grossly intact. Sensation intact. A&Ox3  Psych:             Normal mood and affect     I have reviewed ordered lab tests and independently visualized imaging below:    Recent Labs:  Recent Results (from the past 48 hour(s))   COVID-19 RAPID TEST    Collection Time: 02/03/22  4:27 PM   Result Value Ref Range    Specimen source Nasopharyngeal      COVID-19 rapid test Not detected NOTD     GLUCOSE, POC    Collection Time: 02/03/22  4:41 PM   Result Value Ref Range    Glucose (POC) 169 (H) 65 - 100 mg/dL    Performed by Jovany    GLUCOSE, POC    Collection Time: 02/03/22  9:00 PM   Result Value Ref Range    Glucose (POC) 193 (H) 65 - 100 mg/dL    Performed by Tiki    GLUCOSE, POC    Collection Time: 02/04/22  1:52 AM   Result Value Ref Range    Glucose (POC) 162 (H) 65 - 100 mg/dL    Performed by Riya    CBC WITH AUTOMATED DIFF    Collection Time: 02/04/22  2:20 AM   Result Value Ref Range    WBC 4.1 (L) 4.3 - 11.1 K/uL    RBC 2.75 (L) 4.05 - 5.2 M/uL    HGB 8.0 (L) 11.7 - 15.4 g/dL    HCT 26.0 (L) 35.8 - 46.3 %    MCV 94.5 79.6 - 97.8 FL    MCH 29.1 26.1 - 32.9 PG    MCHC 30.8 (L) 31.4 - 35.0 g/dL    RDW 13.1 11.9 - 14.6 %    PLATELET 972 (L) 334 - 450 K/uL    MPV 9.4 9.4 - 12.3 FL    ABSOLUTE NRBC 0.00 0.0 - 0.2 K/uL    NEUTROPHILS 50 43 - 78 %    LYMPHOCYTES 38 13 - 44 %    MONOCYTES 8 4.0 - 12.0 %    EOSINOPHILS 3 0.5 - 7.8 %    BASOPHILS 1 0.0 - 2.0 %    IMMATURE GRANULOCYTES 0 0.0 - 5.0 %    ABS. NEUTROPHILS 2.1 1.7 - 8.2 K/UL    ABS. LYMPHOCYTES 1.6 0.5 - 4.6 K/UL    ABS. MONOCYTES 0.3 0.1 - 1.3 K/UL    ABS. EOSINOPHILS 0.1 0.0 - 0.8 K/UL    ABS. BASOPHILS 0.0 0.0 - 0.2 K/UL    ABS. IMM. GRANS. 0.0 0.0 - 0.5 K/UL    DF AUTOMATED     METABOLIC PANEL, BASIC    Collection Time: 02/04/22  2:20 AM   Result Value Ref Range    Sodium 140 136 - 145 mmol/L    Potassium 3.9 3.5 - 5.1 mmol/L    Chloride 112 (H) 98 - 107 mmol/L    CO2 23 21 - 32 mmol/L    Anion gap 5 (L) 7 - 16 mmol/L    Glucose 157 (H) 65 - 100 mg/dL    BUN 12 8 - 23 MG/DL    Creatinine 0.77 0.6 - 1.0 MG/DL    GFR est AA >60 >60 ml/min/1.73m2    GFR est non-AA >60 >60 ml/min/1.73m2    Calcium 9.2 8.3 - 10.4 MG/DL   GLUCOSE, POC    Collection Time: 02/04/22  8:23 AM   Result Value Ref Range    Glucose (POC) 147 (H) 65 - 100 mg/dL    Performed by Rayna    GLUCOSE, POC    Collection Time: 02/04/22  4:11 PM   Result Value Ref Range    Glucose (POC) 184 (H) 65 - 100 mg/dL    Performed by Seble    GLUCOSE, POC    Collection Time: 02/04/22  7:25 PM   Result Value Ref Range    Glucose (POC) 131 (H) 65 - 100 mg/dL    Performed by Mara    CBC WITH AUTOMATED DIFF    Collection Time: 02/05/22  5:20 AM   Result Value Ref Range    WBC 3.0 (L) 4.3 - 11.1 K/uL    RBC 2.07 (L) 4.05 - 5.2 M/uL    HGB 6.1 (LL) 11.7 - 15.4 g/dL    HCT 19.8 (L) 35.8 - 46.3 %    MCV 95.7 79.6 - 97.8 FL    MCH 29.5 26.1 - 32.9 PG    MCHC 30.8 (L) 31.4 - 35.0 g/dL    RDW 13.2 11.9 - 14.6 %    PLATELET 211 (L) 861 - 450 K/uL    MPV 9.5 9.4 - 12.3 FL    ABSOLUTE NRBC 0.00 0.0 - 0.2 K/uL    DF AUTOMATED      NEUTROPHILS 46 43 - 78 %    LYMPHOCYTES 42 13 - 44 %    MONOCYTES 8 4.0 - 12.0 %    EOSINOPHILS 4 0.5 - 7.8 %    BASOPHILS 1 0.0 - 2.0 %    IMMATURE GRANULOCYTES 0 0.0 - 5.0 %    ABS. NEUTROPHILS 1.4 (L) 1.7 - 8.2 K/UL    ABS. LYMPHOCYTES 1.3 0.5 - 4.6 K/UL    ABS. MONOCYTES 0.3 0.1 - 1.3 K/UL    ABS. EOSINOPHILS 0.1 0.0 - 0.8 K/UL    ABS. BASOPHILS 0.0 0.0 - 0.2 K/UL    ABS. IMM.  GRANS. 0.0 0.0 - 0.5 K/UL   METABOLIC PANEL, BASIC    Collection Time: 02/05/22  5:20 AM   Result Value Ref Range    Sodium 143 136 - 145 mmol/L    Potassium 3.5 3.5 - 5.1 mmol/L    Chloride 115 (H) 98 - 107 mmol/L    CO2 24 21 - 32 mmol/L    Anion gap 4 (L) 7 - 16 mmol/L    Glucose 90 65 - 100 mg/dL    BUN 7 (L) 8 - 23 MG/DL    Creatinine 0.55 (L) 0.6 - 1.0 MG/DL    GFR est AA >60 >60 ml/min/1.73m2    GFR est non-AA >60 >60 ml/min/1.73m2    Calcium 8.9 8.3 - 10.4 MG/DL   RBC, ALLOCATE    Collection Time: 02/05/22  6:30 AM   Result Value Ref Range    HISTORY CHECKED? Historical check performed    GLUCOSE, POC    Collection Time: 02/05/22  8:14 AM   Result Value Ref Range    Glucose (POC) 107 (H) 65 - 100 mg/dL    Performed by Seble    GLUCOSE, POC    Collection Time: 02/05/22 11:45 AM   Result Value Ref Range    Glucose (POC) 191 (H) 65 - 100 mg/dL    Performed by Haroldo        All Micro Results     Procedure Component Value Units Date/Time    COVID-19 RAPID TEST [[id number]] Collected: 02/03/22 1627    Order Status: Completed Specimen: Nasopharyngeal Updated: 02/03/22 1704     Specimen source Nasopharyngeal        COVID-19 rapid test Not detected        Comment:      The specimen is NEGATIVE for SARS-CoV-2, the novel coronavirus associated with COVID-19. A negative result does not rule out COVID-19. This test has been authorized by the FDA under an Emergency Use Authorization (EUA) for use by authorized laboratories. Fact sheet for Healthcare Providers: ConventionUpdate.co.nz  Fact sheet for Patients: ConventionUpdate.co.nz       Methodology: Isothermal Nucleic Acid Amplification               Other Studies:  No results found.     Current Meds:  Current Facility-Administered Medications   Medication Dose Route Frequency    0.9% sodium chloride infusion 250 mL  250 mL IntraVENous PRN    sodium chloride (NS) flush 5-40 mL  5-40 mL IntraVENous Q8H    sodium chloride (NS) flush 5-40 mL  5-40 mL IntraVENous PRN    0.9% sodium chloride infusion  125 mL/hr IntraVENous CONTINUOUS    ondansetron (ZOFRAN) injection 4 mg  4 mg IntraVENous Q4H PRN    rosuvastatin (CRESTOR) tablet 20 mg  20 mg Oral QHS    cyanocobalamin tablet 500 mcg  500 mcg Oral DAILY    carvediloL (COREG) tablet 12.5 mg  12.5 mg Oral BID WITH MEALS    mycophenolate mofetil (CELLCEPT) capsule 750 mg  750 mg Oral ACB&D    predniSONE (DELTASONE) tablet 5 mg  5 mg Oral DAILY    insulin lispro (HUMALOG) injection   SubCUTAneous AC&HS       Signed:  Emilee Brooks MD

## 2022-02-05 NOTE — PROGRESS NOTES
Pt vss pt A&O X's 4 no complaint of pian or discomfort voiding well no BM during shift no blood noted in urine pt received 1 unit of blood tolerated it well no reactions noted pt resting in bed lowest position call light in reach instructed to call with any needs

## 2022-02-05 NOTE — PROGRESS NOTES
[facility] Progress Note         Admit Date:  2/2/2022    Today's Date:  2/5/2022    CC:  GI bleeding    Subjective:     No active bleeding but hgb 6.1. To get transfused. Medications:   Current Facility-Administered Medications   Medication Dose Route Frequency    0.9% sodium chloride infusion 250 mL  250 mL IntraVENous PRN    sodium chloride (NS) flush 5-40 mL  5-40 mL IntraVENous Q8H    sodium chloride (NS) flush 5-40 mL  5-40 mL IntraVENous PRN    0.9% sodium chloride infusion  125 mL/hr IntraVENous CONTINUOUS    ondansetron (ZOFRAN) injection 4 mg  4 mg IntraVENous Q4H PRN    rosuvastatin (CRESTOR) tablet 20 mg  20 mg Oral QHS    cyanocobalamin tablet 500 mcg  500 mcg Oral DAILY    carvediloL (COREG) tablet 12.5 mg  12.5 mg Oral BID WITH MEALS    mycophenolate mofetil (CELLCEPT) capsule 750 mg  750 mg Oral ACB&D    predniSONE (DELTASONE) tablet 5 mg  5 mg Oral DAILY    insulin lispro (HUMALOG) injection   SubCUTAneous AC&HS       Review of Systems:  ROS was obtained, with pertinent positives as listed above. No chest pain or SOB. Diet:  NPO    Objective:   Vitals:  Visit Vitals  BP (!) 111/53 (BP 1 Location: Right upper arm, BP Patient Position: Semi fowlers)   Pulse 78   Temp 98.3 °F (36.8 °C)   Resp 18   Ht 5' 3\" (1.6 m)   Wt 77.6 kg (171 lb)   SpO2 98%   Breastfeeding No   BMI 30.29 kg/m²     Intake/Output:  No intake/output data recorded.   02/03 1901 - 02/05 0700  In: 4333.6 [P.O.:1080; I.V.:3253.6]  Out: 80 [Urine:350]  Exam:  Lungs CTA  CV RRR  Abdomen mild right sided abdominal pain    Data Review (Labs):    Recent Labs     02/05/22  0520 02/04/22  0220 02/03/22  1247 02/03/22  0526 02/02/22  1704   WBC 3.0* 4.1*  --  4.0*  --    HGB 6.1* 8.0* 8.1* 8.6* 9.8*   HCT 19.8* 26.0* 26.5* 27.4* 31.3*   * 136*  --  134*  --    MCV 95.7 94.5  --  94.2  --     140  --  141  --    K 3.5 3.9  --  3.7  --    * 112*  --  113*  --    CO2 24 23  --  24  --    BUN 7* 12  --  14  --    CREA 0.55* 0.77  --  0.69  --    CA 8.9 9.2  --  8.9  --    GLU 90 157*  --  124*  --    AP  --   --   --  53  --    AST  --   --   --  15  --    ALT  --   --   --  16  --    TBILI  --   --   --  0.3  --    ALB  --   --   --  2.6*  --    TP  --   --   --  5.7*  --      COLONOSCOPY     DATE of PROCEDURE: 2/4/2022     INDICATION:  1. Lower GI bleeding       FINDINGS:     There was maroon blood and small clots limited to left colon at beginning of procedure. Right colon was negative for any evidence of active or recent bleeding. Extensive washing was performed on withdrawal of scope. Left colon was completely cleared of blood and clots. Exam was negative for active bleeding.          Rectum: Small external hemorrhoids. Otherwise normal exam.  Sigmoid: Mild to moderate diverticulosis. Otherwise normal exam.  Descending Colon:  Mild to moderate diverticulosis. Otherwise normal exam.  Transverse Colon: normal  Ascending Colon: normal  Cecum: normal  Terminal ileum: not entered       ASSESSMENT/PLAN:  1. Diverticular bleed of left colon  2. Supportive care     Radha Hess MD            Assessment:     Principal Problem:    GI bleed (2/2/2022)    Active Problems:    Hypertension ()      Chronic kidney disease ()      Overview: dialysis patient mon wed and fri      Hypercholesterolemia ()      Uncontrolled type 2 diabetes mellitus with hyperglycemia (HCC) ()      Renal transplant, status post (4/19/2016)     Patient is a 78 y.o. female with PMH including but not limited to CAD, CKD, s/p two renal transplants at [facility] on immunosuppressive therapy, DM II, HlD, who we are following for LGI bleeding. Left sided diverticular bleeding likely source. Plan:      - Transfuse per your discretion  -   Continue full liquids  -  ? Home next few days      SHELTON Driscoll MD

## 2022-02-06 VITALS
RESPIRATION RATE: 20 BRPM | SYSTOLIC BLOOD PRESSURE: 146 MMHG | OXYGEN SATURATION: 99 % | HEART RATE: 70 BPM | HEIGHT: 63 IN | BODY MASS INDEX: 30.3 KG/M2 | TEMPERATURE: 98.1 F | WEIGHT: 171 LBS | DIASTOLIC BLOOD PRESSURE: 64 MMHG

## 2022-02-06 LAB
ABO + RH BLD: NORMAL
ANION GAP SERPL CALC-SCNC: 4 MMOL/L (ref 7–16)
BASOPHILS # BLD: 0 K/UL (ref 0–0.2)
BASOPHILS NFR BLD: 1 % (ref 0–2)
BLD PROD TYP BPU: NORMAL
BLOOD GROUP ANTIBODIES SERPL: NORMAL
BPU ID: NORMAL
BUN SERPL-MCNC: 4 MG/DL (ref 8–23)
CALCIUM SERPL-MCNC: 7.3 MG/DL (ref 8.3–10.4)
CHLORIDE SERPL-SCNC: 122 MMOL/L (ref 98–107)
CO2 SERPL-SCNC: 20 MMOL/L (ref 21–32)
CREAT SERPL-MCNC: 0.41 MG/DL (ref 0.6–1)
CROSSMATCH RESULT,%XM: NORMAL
DIFFERENTIAL METHOD BLD: ABNORMAL
EOSINOPHIL # BLD: 0.1 K/UL (ref 0–0.8)
EOSINOPHIL NFR BLD: 3 % (ref 0.5–7.8)
ERYTHROCYTE [DISTWIDTH] IN BLOOD BY AUTOMATED COUNT: 13.8 % (ref 11.9–14.6)
GLUCOSE BLD STRIP.AUTO-MCNC: 141 MG/DL (ref 65–100)
GLUCOSE BLD STRIP.AUTO-MCNC: 96 MG/DL (ref 65–100)
GLUCOSE SERPL-MCNC: 96 MG/DL (ref 65–100)
HCT VFR BLD AUTO: 22.2 % (ref 35.8–46.3)
HGB BLD-MCNC: 7.1 G/DL (ref 11.7–15.4)
IMM GRANULOCYTES # BLD AUTO: 0 K/UL (ref 0–0.5)
IMM GRANULOCYTES NFR BLD AUTO: 0 % (ref 0–5)
LYMPHOCYTES # BLD: 1.3 K/UL (ref 0.5–4.6)
LYMPHOCYTES NFR BLD: 33 % (ref 13–44)
MCH RBC QN AUTO: 29.5 PG (ref 26.1–32.9)
MCHC RBC AUTO-ENTMCNC: 32 G/DL (ref 31.4–35)
MCV RBC AUTO: 92.1 FL (ref 79.6–97.8)
MONOCYTES # BLD: 0.3 K/UL (ref 0.1–1.3)
MONOCYTES NFR BLD: 7 % (ref 4–12)
NEUTS SEG # BLD: 2.1 K/UL (ref 1.7–8.2)
NEUTS SEG NFR BLD: 56 % (ref 43–78)
NRBC # BLD: 0 K/UL (ref 0–0.2)
PLATELET # BLD AUTO: 119 K/UL (ref 150–450)
PMV BLD AUTO: 8.9 FL (ref 9.4–12.3)
POTASSIUM SERPL-SCNC: 3.1 MMOL/L (ref 3.5–5.1)
RBC # BLD AUTO: 2.41 M/UL (ref 4.05–5.2)
SERVICE CMNT-IMP: ABNORMAL
SERVICE CMNT-IMP: NORMAL
SODIUM SERPL-SCNC: 146 MMOL/L (ref 136–145)
SPECIMEN EXP DATE BLD: NORMAL
STATUS OF UNIT,%ST: NORMAL
UNIT DIVISION, %UDIV: 0
WBC # BLD AUTO: 3.8 K/UL (ref 4.3–11.1)

## 2022-02-06 PROCEDURE — 83735 ASSAY OF MAGNESIUM: CPT

## 2022-02-06 PROCEDURE — 36415 COLL VENOUS BLD VENIPUNCTURE: CPT

## 2022-02-06 PROCEDURE — 80048 BASIC METABOLIC PNL TOTAL CA: CPT

## 2022-02-06 PROCEDURE — 85025 COMPLETE CBC W/AUTO DIFF WBC: CPT

## 2022-02-06 PROCEDURE — 82962 GLUCOSE BLOOD TEST: CPT

## 2022-02-06 PROCEDURE — 74011250637 HC RX REV CODE- 250/637: Performed by: NURSE PRACTITIONER

## 2022-02-06 PROCEDURE — 74011250636 HC RX REV CODE- 250/636: Performed by: NURSE PRACTITIONER

## 2022-02-06 PROCEDURE — 74011636637 HC RX REV CODE- 636/637: Performed by: NURSE PRACTITIONER

## 2022-02-06 PROCEDURE — 74011000250 HC RX REV CODE- 250: Performed by: NURSE PRACTITIONER

## 2022-02-06 PROCEDURE — 74011250636 HC RX REV CODE- 250/636: Performed by: FAMILY MEDICINE

## 2022-02-06 PROCEDURE — 74011250637 HC RX REV CODE- 250/637: Performed by: FAMILY MEDICINE

## 2022-02-06 RX ORDER — POTASSIUM CHLORIDE 14.9 MG/ML
20 INJECTION INTRAVENOUS ONCE
Status: COMPLETED | OUTPATIENT
Start: 2022-02-06 | End: 2022-02-06

## 2022-02-06 RX ORDER — POTASSIUM CHLORIDE 20 MEQ/1
40 TABLET, EXTENDED RELEASE ORAL
Status: COMPLETED | OUTPATIENT
Start: 2022-02-06 | End: 2022-02-06

## 2022-02-06 RX ORDER — LANOLIN ALCOHOL/MO/W.PET/CERES
1 CREAM (GRAM) TOPICAL 2 TIMES DAILY WITH MEALS
Status: DISCONTINUED | OUTPATIENT
Start: 2022-02-06 | End: 2022-02-06 | Stop reason: HOSPADM

## 2022-02-06 RX ORDER — LANOLIN ALCOHOL/MO/W.PET/CERES
325 CREAM (GRAM) TOPICAL 2 TIMES DAILY WITH MEALS
Qty: 60 TABLET | Refills: 0 | Status: SHIPPED | OUTPATIENT
Start: 2022-02-06 | End: 2022-03-08

## 2022-02-06 RX ADMIN — POTASSIUM CHLORIDE 20 MEQ: 14.9 INJECTION, SOLUTION INTRAVENOUS at 10:26

## 2022-02-06 RX ADMIN — FERROUS SULFATE TAB 325 MG (65 MG ELEMENTAL FE) 325 MG: 325 (65 FE) TAB at 10:23

## 2022-02-06 RX ADMIN — Medication 500 MCG: at 10:24

## 2022-02-06 RX ADMIN — PREDNISONE 5 MG: 5 TABLET ORAL at 10:26

## 2022-02-06 RX ADMIN — POTASSIUM CHLORIDE 40 MEQ: 20 TABLET, EXTENDED RELEASE ORAL at 10:23

## 2022-02-06 RX ADMIN — SODIUM CHLORIDE, PRESERVATIVE FREE 10 ML: 5 INJECTION INTRAVENOUS at 06:00

## 2022-02-06 RX ADMIN — MYCOPHENOLATE MOFETIL 750 MG: 250 CAPSULE ORAL at 10:23

## 2022-02-06 RX ADMIN — CARVEDILOL 12.5 MG: 6.25 TABLET, FILM COATED ORAL at 10:23

## 2022-02-06 NOTE — PROGRESS NOTES
Patient discharged. Instructions reviewed with patient. Verbalized understanding. Left floor via wheelchair with  at [phone].

## 2022-02-06 NOTE — PROGRESS NOTES
Patient is anticipated to discharge today. Plan for discharge is as follows:    Care Management Interventions  PCP Verified by CM: Yes [place])  Mode of Transport at Discharge: Other (see comment) (Family)  Transition of Care Consult (CM Consult): Discharge Planning  MyChart Signup: No  Discharge Durable Medical Equipment: No  Physical Therapy Consult: No  Occupational Therapy Consult: No  Speech Therapy Consult: No  Support Systems: Spouse/Significant Other  Confirm Follow Up Transport: Family  The Plan for Transition of Care is Related to the Following Treatment Goals : Work with patient until back to baseline  The Patient and/or Patient Representative was Provided with a Choice of Provider and Agrees with the Discharge Plan?: Yes  Freedom of Choice List was Provided with Basic Dialogue that Supports the Patient's Individualized Plan of Care/Goals, Treatment Preferences and Shares the Quality Data Associated with the Providers?: Yes  Discharge Location  Patient Expects to be Discharged to[de-identified] Home    Milestones and interventions have been entered.      Pratibha Benitez LMSW    [facility]    * [email or web address]

## 2022-02-08 LAB — MAGNESIUM SERPL-MCNC: 1.6 MG/DL (ref 1.6–2.3)

## 2022-02-08 NOTE — ANESTHESIA POSTPROCEDURE EVALUATION
Procedure(s):  COLONOSCOPY/ BMI 29 ROOM 442.     total IV anesthesia    Anesthesia Post Evaluation      Multimodal analgesia: multimodal analgesia used between 6 hours prior to anesthesia start to PACU discharge  Patient location during evaluation: bedside  Patient participation: complete - patient participated  Level of consciousness: responsive to verbal stimuli  Pain management: adequate  Airway patency: patent  Anesthetic complications: no  Cardiovascular status: hemodynamically stable  Respiratory status: spontaneous ventilation  Hydration status: stable    Final Post Anesthesia Temperature Assessment:  Normothermia (36.0-37.5 degrees C)      INITIAL Post-op Vital signs:   Vitals Value Taken Time   /59 02/04/22 1327   Temp     Pulse 80 02/04/22 1327   Resp 16 02/04/22 1327   SpO2 97 % 02/04/22 1327

## 2022-03-18 PROBLEM — R09.89 SUSPECTED STROKE PATIENT LAST KNOWN TO BE WELL MORE THAN 2 HOURS AGO: Status: ACTIVE | Noted: 2021-05-30

## 2022-03-18 PROBLEM — E11.3213 BOTH EYES AFFECTED BY MILD NONPROLIFERATIVE DIABETIC RETINOPATHY WITH MACULAR EDEMA, ASSOCIATED WITH TYPE 2 DIABETES MELLITUS (HCC): Status: ACTIVE | Noted: 2021-04-09

## 2022-03-18 PROBLEM — E66.01 SEVERE OBESITY (HCC): Status: ACTIVE | Noted: 2018-11-01

## 2022-03-19 PROBLEM — K92.2 GI BLEED: Status: ACTIVE | Noted: 2022-02-02

## 2022-03-19 PROBLEM — I63.9 ACUTE ISCHEMIC STROKE (HCC): Status: ACTIVE | Noted: 2021-05-31

## 2022-04-05 ENCOUNTER — HOSPITAL ENCOUNTER (OUTPATIENT)
Dept: PHYSICAL THERAPY | Age: 77
Discharge: HOME | End: 2022-04-05
Attending: FAMILY MEDICINE
Payer: MEDICARE

## 2022-04-05 NOTE — PROGRESS NOTES
Robb Rowan  : 1945  [place]  Secondary: Manuel Lindsay 75 at [facility] [address], [address] \[facility] [place] [facility]", [address]  [phone]   [phone]        CANCELLATION NOTE    Ms. Denver Quivers was a same-day cancellation for today's appointment.      # Recent No Shows/Same-Day Cancellations: 1    2022  SHARMILA ConnorT

## 2022-04-12 ENCOUNTER — HOSPITAL ENCOUNTER (OUTPATIENT)
Dept: PHYSICAL THERAPY | Age: 77
Discharge: HOME | End: 2022-04-12
Attending: FAMILY MEDICINE
Payer: MEDICARE

## 2022-04-12 PROCEDURE — 97110 THERAPEUTIC EXERCISES: CPT | Performed by: PHYSICAL THERAPIST

## 2022-04-12 PROCEDURE — 97162 PT EVAL MOD COMPLEX 30 MIN: CPT | Performed by: PHYSICAL THERAPIST

## 2022-04-12 NOTE — PROGRESS NOTES
Emmanuel Reed  : 1945  [place] / [place] / Product Type: Medicare /  [address]  at [facility]  [address] \[facility] [place] [facility]", [address]  [phone]   [phone]      OUTPATIENT PHYSICAL THERAPY: Daily Treatment Note 2022  Visit Count:  1    ICD-10: Treatment Diagnosis: ICD-10: Treatment Diagnosis:   M62.81 Muscle Weakness Generalized  R26.2 Difficulty in Walking, Not elsewhere classified    Precautions/Allergies:   Lisinopril     TREATMENT PLAN:  Effective Dates: 2022 TO 2022 (90 days). Frequency/Duration: 2 times a week for 90 Days        PRE-TREATMENT SYMPTOMS/COMPLAINTS:  Weakness and instability     MEDICATIONS REVIEWED:  2022   TREATMENT:   (In addition to Assessment/Re-Assessment sessions the following treatments were rendered)    THERAPEUTIC EXERCISE: (15 minutes):  Exercises per grid below to improve mobility, strength and balance. Required minimal visual and verbal cues to promote proper body alignment and promote proper body posture. Progressed resistance, range and complexity of movement as indicated. Date:  2022 Date:   Date:     Activity/Exercise Parameters Parameters Parameters   Education POC     Ambulation 3 x 25'     Balance protocol [id number]                               MANUAL THERAPY: (0 minutes): Joint mobilization, Soft tissue mobilization and Manipulation was utilized and necessary because of the patient's restricted joint motion, painful spasm, loss of articular motion and restricted motion of soft tissue. (Used abbreviations: MET - muscle energy technique; PNF - proprioceptive neuromuscular facilitation; NMR - neuromuscular re-education; AP - anterior to posterior; PA - posterior to anterior)    MODALITIES: (0 minutes):      none      TREATMENT/SESSION ASSESSMENT:  Emmanuel Reed verbalized understanding of role of PT and POC.     Education: POC and fall prevention    RECOMMENDATIONS/INTENT FOR NEXT TREATMENT SESSION: \"Next visit will focus on advancements to more challenging activities\".     PAIN: Initial: NA/10 Post Session:  NA/10     Med[facility] Portal    Total Treatment Billable Duration: 15 minutes + untimed evaluation   PT Patient Time In/Time Out  Time In: 1300  Time Out: [address], PT, DPT    Future Appointments   Date Time Provider Manav Maria C   4/14/2022 10:00 AM Dannielle Ledezma PA-C END BS ENDO   4/19/2022  1:45 PM [facility] SFDORPT SFD   4/21/2022  1:00 PM [facility] SFDORPT D   4/26/2022  1:00 PM [facility] SFDORPT D   4/28/2022  1:00 PM Dakota Suazo, [facility]   5/3/2022  1:00 PM [facility]   5/5/2022  1:00 PM [facility]PT [facility]   5/10/2022  1:00 PM Dakota Suazo, [facility]   5/12/2022  1:00 PM [facility]   8/10/2022  3:30 PM PST LAB [facility] PST PST   8/17/2022 11:20 AM Shen Mckinley MD [facility] PST PST       Visit Approval Visit # Therapist initials Date A NS / Cx < 24 hr >24 hr Cx Comments    1 SJ 4/12 [x]  [] [] Initial evaluation       [] [] []        [] [] []        [] [] []        [] [] []        [] [] []        [] [] []        [] [] []        [] [] []        [] [] []        [] [] []        [] [] []        [] [] []        [] [] []        [] [] []        [] [] []        [] [] []        [] [] []

## 2022-04-12 NOTE — THERAPY EVALUATION
Hansa Valenzuela  : 1945  [place] / [place] / Product Type: Medicare /  [address]  at [facility]  [address] [facility], [address]  [phone]   [phone]        OUTPATIENT PHYSICAL THERAPY:Initial Assessment 2022    ICD-10: Treatment Diagnosis:   M62.81 Muscle Weakness Generalized  R26.2 Difficulty in Walking, Not elsewhere classified    Precautions/Allergies:   Lisinopril   Fall Risk Score: (? 5 = High Risk)  MD Orders:  MEDICAL/REFERRING DIAGNOSIS:  Weakness [R53.1]   DATE OF ONSET:   REFERRING PHYSICIAN: Andi Reynoso MD  RETURN PHYSICIAN APPOINTMENT: TBD by Pt     ASSESSMENT:  Ms. Cesar Mccarthy has attended 1 physical therapy session including the initial evaluation. Hansa Valenzuela presents with decreased activity tolerance, decreased gait mechanics, bilateral LE weakness, and fall risk. According to her poor performance on the TUG, Odette Castaneda is at high risk for a severe fall. Recommending skilled PT: manual therapeutic techniques (as appropriate), therapeutic exercises and activities, balance interventions, and a comprehensive home exercise program to address the current impairments, as listed above. Hansa Valenzuela will benefit from skilled PT (medically necessary) to address above deficits affecting participation in basic ADLs and overall functional tolerance. PROBLEM LIST (Impacting functional limitations):  1. Decreased Strength  2. Decreased ADL/Functional Activities  3. Decreased Transfer Abilities  4. Decreased Ambulation Ability/Technique  5. Decreased Balance  6. Increased Pain  7. Decreased Activity Tolerance  8. Decreased Flexibility/Joint Mobility  9. Decreased [place] with Home Exercise Program INTERVENTIONS PLANNED:  1. Balance Exercise  2. Cold  3. Cryotherapy  4. Electrical Stimulation  5. Family Education  6. Gait Training  7. Heat  8. Home Exercise Program (HEP)  9.  Manual Therapy  10. Neuromuscular Re-education/Strengthening  11. Range of Motion (ROM)  12. Therapeutic Activites  13. Therapeutic Exercise/Strengthening  14. Transcutaneous Electrical Nerve Stimulation (TENS)  15. Transfer Training   16. Vasopneumatic Device  17. Dry Needling   TREATMENT PLAN:  TREATMENT PLAN:  Effective Dates: 4/12/2022 TO 7/11/2022 (90 days). Frequency/Duration: 2 times a week for 90 Days  GOALS: (Goals have been discussed and agreed upon with patient.)  Short-Term Functional Goals: Time Frame: 4 weeks  1. Padmaja Yee will be compliant with HEP. 2. Padmaja Yee will perform the TUG with her rollator in 45 seconds or less to reflect decreasing fall risk  3. Padmaja Yee will score 22/80 on LEFS demonstrating overall improvements in functional mobility  4. Padmaja Yee will demonstrate at least 4/5 B LE strength to improve gait mechanics   Discharge Goals: Time Frame: 12 weeks  1. Padmaja Yee will be independent with HEP. 2. Padmaja eYe will the TUG with her rollator in 30 seconds or less to reflect decreasing fall risk   3. Odette Castaneda will score 26/80 on LEFS demonstrating overall improvements in functional mobility  4. Padmaja Yee will demonstrate at least 30 seconds of bilateral tandem step length to reflect increasing gait mechanics   Rehabilitation Potential For Stated Goals: Good    Outcome Measure: Tool Used: Lower Extremity Functional Scale (LEFS)  Score:  Initial: 18/80 Most Recent: X/80 (Date: -- )   Interpretation of Score: 20 questions each scored on a 5 point scale with 0 representing \"extreme difficulty or unable to perform\" and 4 representing \"no difficulty\". The lower the score, the greater the functional disability. 80/80 represents no disability. Minimal detectable change is 9 points. Tool Used: Timed Up and Go (TUG)  Score:  Initial: 68 seconds Most Recent: X seconds (Date: -- )   Interpretation of Score:  The test measures, in seconds, the time taken by an individual to stand up from a standard arm chair (seat height 46 cm [18 in], arm height 65 cm [25.6 in]), walk a distance of 3 meters (118 in, approx 10 ft), turn, walk back to the chair and sit down. If the individual takes longer than 14 seconds to complete TUG, this indicates risk for falls. Medical Necessity:   · Skilled intervention continues to be required due to decreased strength, balance, ROM and pain for return to PLOF and functional mobility. Reason for Services/Other Comments:  · Patient continues to require skilled intervention due to decreased strength, blaance, ROM, and increased pain for return to functional mobility and ADLs. ·   Regarding Odette Castaneda's therapy, I certify that the treatment plan above will be carried out by a therapist or under their direction. Thank you for this referral,  Susy Necessary     Referring Physician Signature: Leslie Franco MD              Date                    The information in this section was collected on 4/12/22 (except where otherwise noted). HISTORY:   History of Present Injury/Illness (Reason for Referral):   Mrs. Yong Smith reports a history of worsening weakness and unsteadiness. 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 About a month or two after the ministroke, she began using a rollator due to weakness and persistent L knee pain (total L knee planned for later this year). She also reports having a GI bleed in feb 2022 that left her very weak following the hospitalization. She reports that she only walks in home distances from room to room with her rollator.       CC/Primary Concern:        -weakness and instability    Treatment Side: Bilateral    Past Medical History/Comorbidities:   Ms. Yong Smith  has a past medical history of Anemia NEC, Carotid artery disease ([facility] [address] Chronic kidney disease, Diabetes mellitus, type II ([facility] [address] Dyspepsia and other specified disorders of function of stomach, Gout, Hypercholesterolemia, Hypertension, Hypothyroidism, Menopausal syndrome, Neuropathy, Renal failure, Right shoulder pain, and Thyroid nodule. Ms. Roxi Bruner  has a past surgical history that includes hx vascular access; hx transplant; hx urological; hx partial hysterectomy; vascular surgery procedure unlist; hx colonoscopy; hx orthopaedic; hx cataract removal (Left); hx cataract removal (Right); hx orthopaedic; and colonoscopy (N/A, 2/4/2022). Diagnostic Imaging:    Occupation: retired        Prior Level of Function/Work/Activity:      Patient Goals:     Current Medications:       Current Outpatient Medications:     rosuvastatin (CRESTOR) 20 mg tablet, Take 20 mg by mouth daily. , Disp: , Rfl:     insulin NPH (HUMULIN N) 100 unit/mL (3 mL) inpn, 25 Units by SubCUTAneous route Daily (before breakfast). , Disp: 24 mL, Rfl: 3    Trulicity 1.5 AE/5.0 mL sub-q pen, 0.5 mL by SubCUTAneous route every seven (7) days. , Disp: 12 Each, Rfl: 3    carvediloL (COREG) 12.5 mg tablet, TAKE 1 TABLET BY MOUTH TWICE DAILY, Disp: 30 Tablet, Rfl: 1    cyanocobalamin (Vitamin B-12) 250 mcg tablet, Take 1 Tablet by mouth daily. , Disp: , Rfl:     VITAMIN B COMPLEX PO, Take 1 Tablet by mouth daily. , Disp: , Rfl:     Gvoke HypoPen 2-Pack 1 mg/0.2 mL atIn, 1 mg by SubCUTAneous route as needed (severe hypoglycemia). , Disp: 2 Syringe, Rfl: 1    FreeStyle Rhonda 14 Day Sensor kit, Use to monitor blood glucose. E11.65, Disp: 2 Kit, Rfl: 11    belatacept (NULOJIX) 250 mg injection, 500 mg by IntraVENous route., Disp: , Rfl:     glucagon (GLUCAGEN) 1 mg injection, UAD , IM injection for severe hypoglycemia, include syringe and vial, Disp: , Rfl:     Insulin Needles, Disposable, 32 gauge x 5/32\" ndle, Use as directed 3 times a day, Disp: , Rfl:     mycophenolate mofetil (CELLCEPT) 250 mg capsule, TK 3 CS PO BID, Disp: , Rfl: 5    multivitamin (ONE A DAY) tablet, Take 1 Tab by mouth daily. , Disp: , Rfl:     ascorbic acid (VITAMIN C) 500 mg tablet, Take  by mouth., Disp: , Rfl:     Cholecalciferol, Vitamin D3, (VITAMIN D3) 1,000 unit cap, Take 1,000 Units by mouth daily. , Disp: , Rfl:     aspirin delayed-release 81 mg tablet, Take  by mouth daily. , Disp: , Rfl:     RUTH PEN NEEDLE 32 x 5/32 \" ndle, , Disp: , Rfl:     predniSONE (DELTASONE) 5 mg tablet, Take 5 mg by mouth daily. , Disp: , Rfl:    Date Last Reviewed:  4/12/2022       Ambulatory/Rehab Services H2 Model Falls Risk Assessment    Risk Factors:       No Risk Factors Identified Ability to Rise from Chair:       (3)  Multiple attempts, but successful    Falls Prevention Plan:       Exercise/Equipment Adaptation (specify):  rollator   Total: (5 or greater = High Risk): 3    ©2010 [facility] [employer] All Rights Reserved. [facility] States Patent #[id number].  Federal Law prohibits the replication, distribution or use without written permission from [facility] [employer]        Number of Personal Factors/Comorbidities that affect the Plan of Care: 1-2: MODERATE COMPLEXITY   EXAMINATION:   Inspection                  Additional Comments:   ________________________________________________________________________________________________  Observation           Gait:  Flexed, decreased step length, decreased step clearance, no hip extension, narrow ROSALIO        Assistive Device: rollator                   ________________________________________________________________________________________________  Range of Motion        Lower  Joint: Passive Passive    Right (Degrees) Left (Degrees)   Hip Flexion 105 130   Hip Internal Rotation 50%  75%   Hip External Rotation 50% WNL   Knee Flexion 100 WNL   Knee Extension -5 WNL             Additional Comments:   ________________________________________________________________________________________________  Strength            Lower Extremity  Joint:      RIGHT LEFT   Hip Flexion 2+/5 4/5   Hip Extension 2+/5 3/5   Hip Abduction 3-/5 3-/5   Knee Flexion 4/5 4/5   Knee Extension 3/5 4/5   Dorsiflexion 5/5 5/5     BALANCE Date: 4/12/2022 Date: 4/12/2022   Right Unable to perform Unable to perform   Left Unable to perform Unable to perform      ________________________________________________________________________________________________      Additional Comments:     [facility]        C/O Radicular Symptoms: No                     ________________________________________________________________________________________________  :     Body Structures Involved:  1. Nerves  2. Bones  3. Joints  4. Muscles  5. Ligaments Body Functions Affected:  1. Sensory/Pain  2. Neuromusculoskeletal  3. Movement Related Activities and Participation Affected:  1. General Tasks and Demands  2. Mobility  3. Self Care  4. Domestic Life  5. Interpersonal Interactions and Relationships  6.  Community, Social and [place]   Number of elements (examined above) that affect the Plan of Care: 3: MODERATE COMPLEXITY   CLINICAL PRESENTATION:   Presentation: Evolving clinical presentation with changing clinical characteristics: MODERATE COMPLEXITY   CLINICAL DECISION MAKING:      Use of outcome tool(s) and clinical judgement create a POC that gives a: Questionable prediction of patient's progress: MODERATE COMPLEXITY

## 2022-04-19 ENCOUNTER — HOSPITAL ENCOUNTER (OUTPATIENT)
Dept: PHYSICAL THERAPY | Age: 77
Discharge: HOME | End: 2022-04-19
Attending: FAMILY MEDICINE
Payer: MEDICARE

## 2022-04-19 NOTE — PROGRESS NOTES
Santy Tirado  : 1945  [place] / Product Type: Medicare /  [address]  at [facility]  [address] [facility], [address]  [phone]   [phone]      OUTPATIENT PHYSICAL THERAPY: Daily Treatment Note 2022  Visit Count:  2    ICD-10: Treatment Diagnosis: ICD-10: Treatment Diagnosis:   M62.81 Muscle Weakness Generalized  R26.2 Difficulty in Walking, Not elsewhere classified    Precautions/Allergies:   Lisinopril     TREATMENT PLAN:  Effective Dates: 2022 TO 2022 (90 days). Frequency/Duration: 2 times a week for 90 Days        PRE-TREATMENT SYMPTOMS/COMPLAINTS:  Mrs. Apoorva Casanova reports ***    MEDICATIONS REVIEWED:  2022   TREATMENT:   (In addition to Assessment/Re-Assessment sessions the following treatments were rendered)    THERAPEUTIC EXERCISE: (15 minutes):  Exercises per grid below to improve mobility, strength and balance. Required minimal visual and verbal cues to promote proper body alignment and promote proper body posture. Progressed resistance, range and complexity of movement as indicated. Date:  2022 Date:  22 Date:     Activity/Exercise Parameters Parameters Parameters   Education POC     Ambulation 3 x 25'     Balance protocol TUGx2     Bridges      Clamshells  ***                  MANUAL THERAPY: (0 minutes): Joint mobilization, Soft tissue mobilization and Manipulation was utilized and necessary because of the patient's restricted joint motion, painful spasm, loss of articular motion and restricted motion of soft tissue. (Used abbreviations: MET - muscle energy technique; PNF - proprioceptive neuromuscular facilitation; NMR - neuromuscular re-education; AP - anterior to posterior; PA - posterior to anterior)    MODALITIES: (0 minutes):      none      TREATMENT/SESSION ASSESSMENT:  Santy Tirado verbalized understanding of role of PT and POC.     Education: POC and fall prevention    RECOMMENDATIONS/INTENT FOR NEXT TREATMENT SESSION: \"Next visit will focus on advancements to more challenging activities\".     PAIN: Initial: NA/10 Post Session:  NA/10     Med[employer] Portal    Total Treatment Billable Duration: *** minutes     Tigre Campa, PT, DPT    Future Appointments   Date Time Provider Manav Maria C   4/19/2022  1:45 PM Emily Ivanoff [facility]   4/21/2022  1:00 PM Emily Ivanoff [facility]   4/26/2022  1:00 PM Emily Ivanoff [facility]   4[facility]28[facility]2022  [facility]   5/3/2022  1:00 PM Emily Ivanoff [facility]   5/5/2022  1:00 PM Emily Ivanoff [facility]   5/10/2022  1:00 [facility]   [facility]  [facility]   8/10/2022  3:30 PM PST LAB [facility] PST PST   8/17/2022 11:20 AM Queen Nery MD [facility] PST PST   8/19/2022 10:00 AM Keisha Ledezma PA-C END BS ENDO       Visit Approval Visit # Therapist initials Date A NS / Cx < 24 hr >24 hr Cx Comments    1 SJ 4/12 [x]  [] [] Initial evaluation    2 SJ 4/19 [x] [] []        [] [] []        [] [] []        [] [] []        [] [] []        [] [] []        [] [] []        [] [] []        [] [] []        [] [] []        [] [] []        [] [] []        [] [] []        [] [] []        [] [] []        [] [] []        [] [] []

## 2022-04-21 ENCOUNTER — APPOINTMENT (OUTPATIENT)
Dept: PHYSICAL THERAPY | Age: 77
End: 2022-04-21
Attending: FAMILY MEDICINE
Payer: MEDICARE

## 2022-04-26 ENCOUNTER — HOSPITAL ENCOUNTER (OUTPATIENT)
Dept: PHYSICAL THERAPY | Age: 77
Discharge: HOME | End: 2022-04-26
Attending: FAMILY MEDICINE
Payer: MEDICARE

## 2022-04-26 NOTE — THERAPY EVALUATION
Imani Cutler  : 1945  [place] / [place] / Product Type: Medicare /  [address]  [facility], [address]  [phone]   [phone]        OUTPATIENT PHYSICAL THERAPY:Discontinuation Summary 2022    ICD-10: Treatment Diagnosis:   M62.81 Muscle Weakness Generalized  R26.2 Difficulty in Walking, Not elsewhere classified    Precautions/Allergies:   Lisinopril   Fall Risk Score: (? 5 = High Risk)  MD Orders:  MEDICAL/REFERRING DIAGNOSIS:  Weakness [R53.1]   DATE OF ONSET:   REFERRING PHYSICIAN: Inez Cerda MD  RETURN PHYSICIAN APPOINTMENT: TBD by Pt     ASSESSMENT:  Ms. Roxi Bruner has attended 1 physical therapy session including the initial evaluation. Imani Cutler presents with decreased activity tolerance, decreased gait mechanics, bilateral LE weakness, and fall risk. According to her poor performance on the TUG, Odette Castaneda is at high risk for a severe fall. Recommending skilled PT: manual therapeutic techniques (as appropriate), therapeutic exercises and activities, balance interventions, and a comprehensive home exercise program to address the current impairments, as listed above. Imani Cutler will benefit from skilled PT (medically necessary) to address above deficits affecting participation in basic ADLs and overall functional tolerance. DISCONTINUATION SUMMARY: Ms. Roxi Bruner attended one therapy appointment for her initial evaluation. Unfortunately, she was unable to attend any further appointments due to her 's illness and the resulting lack of transportation. She was encouraged to seek therapy again when things normalize. PROBLEM LIST (Impacting functional limitations):  1. Decreased Strength  2. Decreased ADL/Functional Activities  3. Decreased Transfer Abilities  4. Decreased Ambulation Ability/Technique  5. Decreased Balance  6. Increased Pain  7.  Decreased Activity Tolerance  8. Decreased Flexibility/Joint Mobility  9. Decreased Trigg with Home Exercise Program INTERVENTIONS PLANNED:  1. Balance Exercise  2. Cold  3. Cryotherapy  4. Electrical Stimulation  5. Family Education  6. Gait Training  7. Heat  8. Home Exercise Program (HEP)  9. Manual Therapy  10. Neuromuscular Re-education/Strengthening  11. Range of Motion (ROM)  12. Therapeutic Activites  13. Therapeutic Exercise/Strengthening  14. Transcutaneous Electrical Nerve Stimulation (TENS)  15. Transfer Training   16. Vasopneumatic Device  17. Dry Needling   TREATMENT PLAN:  TREATMENT PLAN:  Effective Dates: 4/12/2022 TO 7/11/2022 (90 days). Frequency/Duration: 2 times a week for 90 Days  GOALS: (Goals have been discussed and agreed upon with patient.)  Short-Term Functional Goals: Time Frame: 4 weeks  1. Maxx Noel will be compliant with HEP. 2. Maxx Noel will perform the TUG with her rollator in 45 seconds or less to reflect decreasing fall risk  3. Maxx Noel will score 22/80 on LEFS demonstrating overall improvements in functional mobility  4. Maxx Noel will demonstrate at least 4/5 B LE strength to improve gait mechanics   Discharge Goals: Time Frame: 12 weeks  1. Maxx Noel will be independent with HEP. 2. Maxx Noel will the TUG with her rollator in 30 seconds or less to reflect decreasing fall risk   3. Odette Castaneda will score 26/80 on LEFS demonstrating overall improvements in functional mobility  4. Maxx Noel will demonstrate at least 30 seconds of bilateral tandem step length to reflect increasing gait mechanics   Rehabilitation Potential For Stated Goals: Good    Outcome Measure: Tool Used: Lower Extremity Functional Scale (LEFS)  Score:  Initial: 18/80 Most Recent: X/80 (Date: -- )   Interpretation of Score: 20 questions each scored on a 5 point scale with 0 representing \"extreme difficulty or unable to perform\" and 4 representing \"no difficulty\".   The lower the score, the greater the functional disability. 80/80 represents no disability. Minimal detectable change is 9 points. Tool Used: Timed Up and Go (TUG)  Score:  Initial: 68 seconds Most Recent: X seconds (Date: -- )   Interpretation of Score: The test measures, in seconds, the time taken by an individual to stand up from a standard arm chair (seat height 46 cm [18 in], arm height 65 cm [25.6 in]), walk a distance of 3 meters (118 in, approx 10 ft), turn, walk back to the chair and sit down. If the individual takes longer than 14 seconds to complete TUG, this indicates risk for falls. Medical Necessity:   · Skilled intervention continues to be required due to decreased strength, balance, ROM and pain for return to PLOF and functional mobility. Reason for Services/Other Comments:  · Patient continues to require skilled intervention due to decreased strength, blaance, ROM, and increased pain for return to functional mobility and ADLs. ·   Regarding Odette Castaneda's therapy, I certify that the treatment plan above will be carried out by a therapist or under their direction. Thank you for this referral,  Cathie Reese     Referring Physician Signature: Quincy Mauricio MD              Date                    The information in this section was collected on 4/12/22 (except where otherwise noted). HISTORY:   History of Present Injury/Illness (Reason for Referral):   Mrs. Berenice Bell reports a history of worsening weakness and unsteadiness. 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 About a month or two after the ministroke, she began using a rollator due to weakness and persistent L knee pain (total L knee planned for later this year). She also reports having a GI bleed in feb 2022 that left her very weak following the hospitalization. She reports that she only walks in home distances from room to room with her rollator.       CC/Primary Concern:        -weakness and instability    Treatment Side: Bilateral    Past Medical History/Comorbidities:   Ms. Denver Quivers  has a past medical history of Anemia NEC, Carotid artery disease ([facility] [address] Chronic kidney disease, Diabetes mellitus, type II ([address] Dyspepsia and other specified disorders of function of stomach, Gout, Hypercholesterolemia, Hypertension, Hypothyroidism, Menopausal syndrome, Neuropathy, Renal failure, Right shoulder pain, and Thyroid nodule. Ms. Denver Quivers  has a past surgical history that includes hx vascular access; hx transplant; hx urological; hx partial hysterectomy; vascular surgery procedure unlist; hx colonoscopy; hx orthopaedic; hx cataract removal (Left); hx cataract removal (Right); hx orthopaedic; and colonoscopy (N/A, 2/4/2022). Diagnostic Imaging:    Occupation: retired        Prior Level of Function/Work/Activity:      Patient Goals:     Current Medications:       Current Outpatient Medications:     insulin NPH (HUMULIN N) 100 unit/mL (3 mL) inpn, 20 Units by SubCUTAneous route Daily (before breakfast). , Disp: 24 mL, Rfl: 3    HumaLOG KwikPen Insulin 100 unit/mL kwikpen, 3 units AC brunch plus correction 1/50>200, max daily dose 20 units, Disp: 18 mL, Rfl: 3    rosuvastatin (CRESTOR) 20 mg tablet, Take 20 mg by mouth daily. , Disp: , Rfl:     Trulicity 1.5 JR/5.7 mL sub-q pen, 0.5 mL by SubCUTAneous route every seven (7) days. , Disp: 12 Each, Rfl: 3    carvediloL (COREG) 12.5 mg tablet, TAKE 1 TABLET BY MOUTH TWICE DAILY, Disp: 30 Tablet, Rfl: 1    cyanocobalamin (Vitamin B-12) 250 mcg tablet, Take 1 Tablet by mouth daily. , Disp: , Rfl:     VITAMIN B COMPLEX PO, Take 1 Tablet by mouth daily. , Disp: , Rfl:     Gvoke HypoPen 2-Pack 1 mg/0.2 mL atIn, 1 mg by SubCUTAneous route as needed (severe hypoglycemia). (Patient not taking: Reported on 4/14/2022), Disp: 2 Syringe, Rfl: 1    FreeStyle Rhonda 14 Day Sensor kit, Use to monitor blood glucose.  E11.65, Disp: 2 Kit, Rfl: 11    belatacept (NULOJIX) 250 mg injection, 500 mg by IntraVENous route., Disp: , Rfl:     glucagon (GLUCAGEN) 1 mg injection, UAD , IM injection for severe hypoglycemia, include syringe and vial (Patient not taking: Reported on 4/14/2022), Disp: , Rfl:     Insulin Needles, Disposable, 32 gauge x 5/32\" ndle, Use as directed 3 times a day, Disp: , Rfl:     mycophenolate mofetil (CELLCEPT) 250 mg capsule, TK 3 CS PO BID, Disp: , Rfl: 5    multivitamin (ONE A DAY) tablet, Take 1 Tab by mouth daily. , Disp: , Rfl:     ascorbic acid (VITAMIN C) 500 mg tablet, Take  by mouth., Disp: , Rfl:     Cholecalciferol, Vitamin D3, (VITAMIN D3) 1,000 unit cap, Take 1,000 Units by mouth daily. , Disp: , Rfl:     aspirin delayed-release 81 mg tablet, Take  by mouth daily. , Disp: , Rfl:     RUTH PEN NEEDLE 32 x 5/32 \" ndle, , Disp: , Rfl:     predniSONE (DELTASONE) 5 mg tablet, Take 5 mg by mouth daily. , Disp: , Rfl:    Date Last Reviewed:  4/26/2022       Ambulatory/Rehab Services H2 Model Falls Risk Assessment    Risk Factors:       No Risk Factors Identified Ability to Rise from Chair:       (3)  Multiple attempts, but successful    Falls Prevention Plan:       Exercise/Equipment Adaptation (specify):  rollator   Total: (5 or greater = High Risk): 3    ©2010 [facility] [address] States Patent #[id number].  Federal Law prohibits the replication, distribution or use without written permission from [facility]        Number of Personal Factors/Comorbidities that affect the Plan of Care: 1-2: MODERATE COMPLEXITY   EXAMINATION:   Inspection                  Additional Comments:   ________________________________________________________________________________________________  Observation           Gait:  Flexed, decreased step length, decreased step clearance, no hip extension, narrow ROSALIO        Assistive Device: rollator ________________________________________________________________________________________________  Range of Motion        Lower  Joint: Passive Passive    Right (Degrees) Left (Degrees)   Hip Flexion 105 130   Hip Internal Rotation 50%  75%   Hip External Rotation 50% WNL   Knee Flexion 100 WNL   Knee Extension -5 WNL             Additional Comments:   ________________________________________________________________________________________________  Strength            Lower Extremity  Joint:      RIGHT LEFT   Hip Flexion 2+/5 4/5   Hip Extension 2+/5 3/5   Hip Abduction 3-/5 3-/5   Knee Flexion 4/5 4/5   Knee Extension 3/5 4/5   Dorsiflexion 5/5 5/5     BALANCE Date: 4/12/2022 Date: 4/12/2022   Right Unable to perform Unable to perform   Left Unable to perform Unable to perform      ________________________________________________________________________________________________      Additional Comments:     [facility]        C/O Radicular Symptoms: No                     ________________________________________________________________________________________________  :     Body Structures Involved:  1. Nerves  2. Bones  3. Joints  4. Muscles  5. Ligaments Body Functions Affected:  1. Sensory/Pain  2. Neuromusculoskeletal  3. Movement Related Activities and Participation Affected:  1. General Tasks and Demands  2. Mobility  3. Self Care  4. Domestic Life  5. Interpersonal Interactions and Relationships  6.  Community, Social and [place]   Number of elements (examined above) that affect the Plan of Care: 3: MODERATE COMPLEXITY   CLINICAL PRESENTATION:   Presentation: Evolving clinical presentation with changing clinical characteristics: MODERATE COMPLEXITY   CLINICAL DECISION MAKING:      Use of outcome tool(s) and clinical judgement create a POC that gives a: Questionable prediction of patient's progress: MODERATE COMPLEXITY

## 2022-04-28 ENCOUNTER — APPOINTMENT (OUTPATIENT)
Dept: PHYSICAL THERAPY | Age: 77
End: 2022-04-28
Attending: FAMILY MEDICINE
Payer: MEDICARE

## 2022-05-03 ENCOUNTER — APPOINTMENT (OUTPATIENT)
Dept: PHYSICAL THERAPY | Age: 77
End: 2022-05-03
Attending: FAMILY MEDICINE

## 2022-05-05 ENCOUNTER — APPOINTMENT (OUTPATIENT)
Dept: PHYSICAL THERAPY | Age: 77
End: 2022-05-05
Attending: FAMILY MEDICINE

## 2022-05-10 ENCOUNTER — APPOINTMENT (OUTPATIENT)
Dept: PHYSICAL THERAPY | Age: 77
End: 2022-05-10
Attending: FAMILY MEDICINE

## 2022-05-12 ENCOUNTER — APPOINTMENT (OUTPATIENT)
Dept: PHYSICAL THERAPY | Age: 77
End: 2022-05-12
Attending: FAMILY MEDICINE

## 2022-06-16 ENCOUNTER — TELEPHONE (OUTPATIENT)
Dept: NEUROLOGY | Age: 77
End: 2022-06-16

## 2022-06-24 NOTE — TELEPHONE ENCOUNTER
I lvm for Mr Osei Hutchinson to call back so that I can let him know the forms are ready for pickup. Copy sent for scanning.

## 2022-07-29 ENCOUNTER — OFFICE VISIT (OUTPATIENT)
Dept: ORTHOPEDIC SURGERY | Age: 77
End: 2022-07-29
Payer: MEDICARE

## 2022-07-29 DIAGNOSIS — M17.11 LOCALIZED OSTEOARTHRITIS OF RIGHT KNEE: Primary | ICD-10-CM

## 2022-07-29 PROCEDURE — 20611 DRAIN/INJ JOINT/BURSA W/US: CPT | Performed by: ORTHOPAEDIC SURGERY

## 2022-07-29 RX ORDER — TRIAMCINOLONE ACETONIDE 40 MG/ML
40 INJECTION, SUSPENSION INTRA-ARTICULAR; INTRAMUSCULAR ONCE
Status: COMPLETED | OUTPATIENT
Start: 2022-07-29 | End: 2022-07-29

## 2022-07-29 RX ADMIN — TRIAMCINOLONE ACETONIDE 40 MG: 40 INJECTION, SUSPENSION INTRA-ARTICULAR; INTRAMUSCULAR at 10:23

## 2022-08-19 ENCOUNTER — TELEMEDICINE (OUTPATIENT)
Dept: ENDOCRINOLOGY | Age: 77
End: 2022-08-19
Payer: MEDICARE

## 2022-08-19 DIAGNOSIS — E11.65 TYPE 2 DIABETES MELLITUS WITH HYPERGLYCEMIA, WITH LONG-TERM CURRENT USE OF INSULIN (HCC): Primary | ICD-10-CM

## 2022-08-19 DIAGNOSIS — Z94.0 KIDNEY TRANSPLANT STATUS: ICD-10-CM

## 2022-08-19 DIAGNOSIS — Z79.4 TYPE 2 DIABETES MELLITUS WITH HYPERGLYCEMIA, WITH LONG-TERM CURRENT USE OF INSULIN (HCC): Primary | ICD-10-CM

## 2022-08-19 DIAGNOSIS — I10 ESSENTIAL (PRIMARY) HYPERTENSION: ICD-10-CM

## 2022-08-19 PROCEDURE — 95251 CONT GLUC MNTR ANALYSIS I&R: CPT | Performed by: PHYSICIAN ASSISTANT

## 2022-08-19 PROCEDURE — 2022F DILAT RTA XM EVC RTNOPTHY: CPT | Performed by: PHYSICIAN ASSISTANT

## 2022-08-19 PROCEDURE — 3046F HEMOGLOBIN A1C LEVEL >9.0%: CPT | Performed by: PHYSICIAN ASSISTANT

## 2022-08-19 PROCEDURE — G8400 PT W/DXA NO RESULTS DOC: HCPCS | Performed by: PHYSICIAN ASSISTANT

## 2022-08-19 PROCEDURE — 1123F ACP DISCUSS/DSCN MKR DOCD: CPT | Performed by: PHYSICIAN ASSISTANT

## 2022-08-19 PROCEDURE — 3017F COLORECTAL CA SCREEN DOC REV: CPT | Performed by: PHYSICIAN ASSISTANT

## 2022-08-19 PROCEDURE — 1090F PRES/ABSN URINE INCON ASSESS: CPT | Performed by: PHYSICIAN ASSISTANT

## 2022-08-19 PROCEDURE — 99214 OFFICE O/P EST MOD 30 MIN: CPT | Performed by: PHYSICIAN ASSISTANT

## 2022-08-19 PROCEDURE — G8427 DOCREV CUR MEDS BY ELIG CLIN: HCPCS | Performed by: PHYSICIAN ASSISTANT

## 2022-08-19 RX ORDER — INSULIN LISPRO 100 [IU]/ML
INJECTION, SOLUTION INTRAVENOUS; SUBCUTANEOUS
COMMUNITY
End: 2022-08-19 | Stop reason: SDUPTHER

## 2022-08-19 RX ORDER — INSULIN HUMAN 100 [IU]/ML
18 INJECTION, SUSPENSION SUBCUTANEOUS EVERY MORNING
Qty: 54 ML | Refills: 3 | Status: SHIPPED | OUTPATIENT
Start: 2022-08-19

## 2022-08-19 RX ORDER — DULAGLUTIDE 1.5 MG/.5ML
1.5 INJECTION, SOLUTION SUBCUTANEOUS WEEKLY
COMMUNITY
End: 2022-08-19 | Stop reason: SDUPTHER

## 2022-08-19 RX ORDER — ROSUVASTATIN CALCIUM 10 MG/1
TABLET, COATED ORAL
COMMUNITY
Start: 2022-06-12 | End: 2022-09-12 | Stop reason: SDUPTHER

## 2022-08-19 RX ORDER — DULAGLUTIDE 1.5 MG/.5ML
1.5 INJECTION, SOLUTION SUBCUTANEOUS WEEKLY
Qty: 12 PEN | Refills: 3 | Status: SHIPPED | OUTPATIENT
Start: 2022-08-19

## 2022-08-19 RX ORDER — INSULIN LISPRO 100 [IU]/ML
INJECTION, SOLUTION INTRAVENOUS; SUBCUTANEOUS
Qty: 30 ML | Refills: 3 | Status: SHIPPED | OUTPATIENT
Start: 2022-08-19

## 2022-08-19 NOTE — PROGRESS NOTES
[facility] ENDOCRINOLOGY   AND   THYROID NODULE CLINIC    Pamela Dejesus PA-C  [address] [facility] Endocrinology and Thyroid Nodule Clinic  [address], [address]  [place], [place]  Phone [phone]  Braulio Contreras [phone]          Broderick Wyatt is a 78 y.o. female seen 8/19/2022 for follow up evaluation of type 2 diabetes s/p kidney transplant        Assessment and Plan:    Pursuant to the emergency declaration under the [facility] waiver authority and the [employer] Act, this Virtual Visit was conducted, with patient's consent, to reduce the patient's risk of exposure to COVID-19 and provide continuity of care for an established patient. Services were provided through a video synchronous discussion virtually to substitute for in-person clinic visit. Total Time: minutes: 21-30 minutes. Interpretation of 72 hour glucose monitor: At least 72 hours of data were reviewed. The patient utilizes a [employer] 14 day continuous glucose monitoring system. The average glucose during the reviewed timeframe was 146 . There is a pattern of frequent hypoglycemia, typically fasting or late afternoon      1. Type 2 diabetes mellitus with hyperglycemia, with long-term current use of insulin (HCC)  Beatties status post renal transplant on daily steroids on NPH once daily plus weekly GLP-1 and single dose prandial insulin. Later likely discussion regarding diet patient appears to have a high carb diet. We discussed the importance of having protein throughout the day and with her bedtime snack instead of sugar cookies alone. We will decrease NPH from 20 units down to 18 units today I will increase her single dose prandial insulin from 3 units up to 4 units to target postprandial hyperglycemia. We discussed the most appropriate times to use correction scale 3 times daily AC, do not use at bedtime.   Do not use after meals. Do not use between meals. Risk of hypoglycemia outweighs benefit of tight glycemic control.      - HI CONTINUOUS GLUCOSE MONITORING ANALYSIS I&R  - TRULICITY 1.5 EE/1.9SU SOPN; Inject 1.5 mg into the skin once a week  Dispense: 12 pen; Refill: 3  - HUMALOG KWIKPEN 100 UNIT/ML SOPN; 4 units AC brunch plus correction 1/50>200, max daily dose 30 units  Dispense: 30 mL; Refill: 3  - insulin NPH (HUMULIN N KWIKPEN) 100 UNIT/ML injection pen; Inject 18 Units into the skin every morning  Dispense: 54 mL; Refill: 3    2. Essential (primary) hypertension  BP Readings from Last 3 Encounters:   04/01/22 126/72   02/11/22 (!) 114/58   10/20/21 136/72         3. Kidney transplant status  Has good follow up with nephro, will limit some treatment options      Alayna was seen today for diabetes. Diagnoses and all orders for this visit:    Type 2 diabetes mellitus with hyperglycemia, with long-term current use of insulin (HCC)  -     HI CONTINUOUS GLUCOSE MONITORING ANALYSIS I&R  -     TRULICITY 1.5 JR/2.5MP SOPN; Inject 1.5 mg into the skin once a week  -     HUMALOG KWIKPEN 100 UNIT/ML SOPN; 4 units AC brunch plus correction 1/50>200, max daily dose 30 units  -     insulin NPH (HUMULIN N KWIKPEN) 100 UNIT/ML injection pen; Inject 18 Units into the skin every morning    Essential (primary) hypertension    Kidney transplant status          History of Present Illness:    8/19/2022  VIRTUAL VISIT  Candy Glynn is a 78 y.o. female who was seen by synchronous (real-time) audio-video technology on 8/19/2022 . The patient provided consent for this audio-video interaction. The [employer] platform was used. Patient was located at their home and provider in the office. Patient meets for virtual follow-up of type 2 diabetes status post renal transplant on daily steroids taking NPH and single dose prandial insulin plus weekly GLP-1.   Hypoglycemia persists typically fasting             Current Regimen: Humalog by correction 3 units AC breakfast plus 1/50>200, NPH 20 in am, Trulicity 8.9BI on sunday 4/14/2022   VIRTUAL VISIT   Kristi Kramer is a 78 y.o.  female who was seen by synchronous (real-time) audio-video technology on 4/14/2022 . The  patient provided consent for this audio-video interaction. The [employer] platform was used. Patient was located at their home and provider in the office. Recent GI bleed in the context of kidney             12/13/2021   VIRTUAL VISIT   Kristi Kramer is a 78 y.o. female who was seen by synchronous (real-time) audio-video technology on 12/13/2021 . The patient provided consent for this audio-video interaction. The [employer] platform was used. Patient was located at their home and  provider in the office. Doing well. Feeling well. Much improved hypoglycemia, often disparity between fingerstick and CGM                  7/28/2021   VIRTUAL VISIT   Kristi Kramer is a 78 y.o. female who was seen by synchronous (real-time) audio-video technology on 7/28/2021 . The patient provided consent for this audio-video interaction. The [employer] platform was used. Patient and provider were  located at their individual homes. Pt doing well, convalescing from stroke. Pt with resolution of hypoglycemia. Now tolerating 0.75mg trulcity with improved control. Admits to drinking grape juice with \"brunch\"                   5/25/2021   Presents for hospital follow-up after hypoglycemia associated with UTI/sepsis. New patient to this office on Humalog 75/25. Was prescribed Trulicity that was never started. Continues to use 75/25 at lower dose as indicated at last visit. Of  note, takes prednisone daily for renal transplant       Had a severe low requiring EMS transport in the context of sepsis associated with UTI on current treatment regimen           4/9/2021        DIABETES MELLITUS   Alayna Ramos is here for follow up evaluation and treatment of worsening type 2 diabetes mellitus.          Review of Records: medical history significant for renal failure  status post transplant x2 followed by nephrology, diabetes, hypothyroidism, hypertension, high cholesterol, high triglycerides and obesity referred for management of diabetes           Date of diagnosis: 1994 after kidney transplant with prednisone use   Transplant secondary to \"losing protein\" found on annual exam       Second kidney transplant in 2012       Used insulin at diagnosis       glucatrol with poor control       Denies HX pancreatitis, DKA, gastroparesis, foot ulcer       History obtained from patient and              Home blood glucose monitoring frequency:   By review of CGM download over past 28 days  Average blood glucose 146   Time in range 67%  High 17%, Very High 8%  Low 5%, Very Low 3%     Typical   Fasting 103   AC lunch 196   AC supper 147   Bedtime 144     Blood glucose levels are uncontrolled, most significant elevations are at lunch       Diet: I love my vegetables, some juice, some sweet tea, no ETOH       Exercise:  No routine, c/o chronic       Notes increase in blood glucose with pound cake       Body weight trend: fluctuating a bit                                            Wt Readings from Last 3 Encounters:        04/09/21  161 lb (73 kg)     02/17/21  154 lb (69.9 kg)     12/16/19  180 lb (81.6 kg)                     Diabetes education: The patient has received formal diabetes education. Diabetic complications:                     Retinopathy: Last eye exam was AUG 2022 and demonstrated bilateral diabetic  retinopathy with macular edema, undergoing going injection early 2021. Eye care specialist is Dr. Arnold Navarro.                    Albuminuria/nephropathy:                           Dr Mi Ross, s/p kidney transplant                        12/01/2021      Cr 0.62, GFR >60    08/03/2022 Cr 0.74, GFR 84                   Neuropathy:  No reported symptoms           Hypoglycemia: occasional, hypoglycemic aware < 90 Symptoms include shaky, sweaty       Hyperglycemia: without symptoms       Hemoglobin A1c:               02/15/2018      13.7%               12/16/2019      12.6%               02/17/2021      12.3%               05/25/2021      7.7%          12/14/2021      7.8%           Other pertinent labs:                   Lipids:                            02/17/2021  TC- 273, LDL- 158, VLDL- 9,  HDL- 76, TG- 214                               Thyroid:        Records reveal:       Thyroid function result : Euthyroid in 01/2020  Thyroid Ultrasound done in 01/2020 at [facility] consistent with multiple bilateral nodules without any significant change, stable compared to ultrasound done in 2018. (Schedule repeat in 8/2020)  Biopsy  benign on the right sided dominant nodule and done in 3/2015 and the left sided dominant nodule done in 5/2016 and benign biopsy of the right middle posterior nodule in 4/2018 by Dr. Shamika Turpin TISSUE   12/30/2019 10:17  FINDINGS:  Comparison is made with the prior study of 10/08/2018. There is redemonstration of a multinodular goiter. The index right lobe nodule is stable in size measuring up to 10 mm in diameter. The index  nodule in the left lobe is also stable in size at 9 mm. No new nodules are seen. The right lobe is enlarged at 6.2 x 2.4 x 2.2 cm. The left lobe is enlarged at 6.1 x 2.4 x 1.8 cm. TSH               02/15/2018      1.270               12/16/2019      1.210               02/17/2021      0.760               10/20/2021      1.640             Allergies & Medications:  Reviewed in chart. Review of Systems    Vital Signs: There were no vitals taken for this visit. Return in about 6 months (around 2/19/2023) for Diabetes DM2 Follow-Up. Portions of this note were generated with the assistance of voice recogniton software. As such, some errors in transcription may be present.

## 2022-08-30 DIAGNOSIS — M17.11 LOCALIZED OSTEOARTHRITIS OF RIGHT KNEE: Primary | ICD-10-CM

## 2022-09-09 ENCOUNTER — OFFICE VISIT (OUTPATIENT)
Dept: FAMILY MEDICINE CLINIC | Facility: CLINIC | Age: 77
End: 2022-09-09
Payer: MEDICARE

## 2022-09-09 VITALS
DIASTOLIC BLOOD PRESSURE: 72 MMHG | HEART RATE: 86 BPM | RESPIRATION RATE: 18 BRPM | OXYGEN SATURATION: 98 % | HEIGHT: 63 IN | BODY MASS INDEX: 30.02 KG/M2 | SYSTOLIC BLOOD PRESSURE: 128 MMHG

## 2022-09-09 DIAGNOSIS — S31.109A OPEN WOUND OF ABDOMEN, INITIAL ENCOUNTER: Primary | ICD-10-CM

## 2022-09-09 DIAGNOSIS — B37.9 CANDIDIASIS: ICD-10-CM

## 2022-09-09 PROCEDURE — 1123F ACP DISCUSS/DSCN MKR DOCD: CPT | Performed by: NURSE PRACTITIONER

## 2022-09-09 PROCEDURE — 1036F TOBACCO NON-USER: CPT | Performed by: NURSE PRACTITIONER

## 2022-09-09 PROCEDURE — G8417 CALC BMI ABV UP PARAM F/U: HCPCS | Performed by: NURSE PRACTITIONER

## 2022-09-09 PROCEDURE — G8427 DOCREV CUR MEDS BY ELIG CLIN: HCPCS | Performed by: NURSE PRACTITIONER

## 2022-09-09 PROCEDURE — 3017F COLORECTAL CA SCREEN DOC REV: CPT | Performed by: NURSE PRACTITIONER

## 2022-09-09 PROCEDURE — G8400 PT W/DXA NO RESULTS DOC: HCPCS | Performed by: NURSE PRACTITIONER

## 2022-09-09 PROCEDURE — 1090F PRES/ABSN URINE INCON ASSESS: CPT | Performed by: NURSE PRACTITIONER

## 2022-09-09 PROCEDURE — 99214 OFFICE O/P EST MOD 30 MIN: CPT | Performed by: NURSE PRACTITIONER

## 2022-09-09 RX ORDER — L. ACIDOPHILUS/L.BULGARICUS 1MM CELL
2 TABLET ORAL DAILY
Qty: 60 TABLET | Refills: 0 | Status: SHIPPED | OUTPATIENT
Start: 2022-09-09

## 2022-09-09 RX ORDER — NYSTATIN 100000 [USP'U]/G
POWDER TOPICAL
Qty: 120 G | Refills: 2 | Status: SHIPPED | OUTPATIENT
Start: 2022-09-09

## 2022-09-09 RX ORDER — LORATADINE 10 MG/1
TABLET ORAL
COMMUNITY
Start: 2022-05-10

## 2022-09-09 RX ORDER — CLINDAMYCIN HYDROCHLORIDE 300 MG/1
300 CAPSULE ORAL 3 TIMES DAILY
Qty: 30 CAPSULE | Refills: 0 | Status: SHIPPED | OUTPATIENT
Start: 2022-09-09 | End: 2022-09-19

## 2022-09-11 ASSESSMENT — ENCOUNTER SYMPTOMS
VOMITING: 0
NAUSEA: 0
PHOTOPHOBIA: 0
TROUBLE SWALLOWING: 0
ALLERGIC/IMMUNOLOGIC NEGATIVE: 1
ABDOMINAL DISTENTION: 0
SINUS PAIN: 0
CHOKING: 0
WHEEZING: 0
EYES NEGATIVE: 1
EYE PAIN: 0
BLOOD IN STOOL: 0
SINUS PRESSURE: 0
GASTROINTESTINAL NEGATIVE: 1
BACK PAIN: 0
COLOR CHANGE: 0
EYE DISCHARGE: 0
COUGH: 0
RECTAL PAIN: 0
SORE THROAT: 0
APNEA: 0
RESPIRATORY NEGATIVE: 1
FACIAL SWELLING: 0
CONSTIPATION: 0
ANAL BLEEDING: 0
ABDOMINAL PAIN: 0
STRIDOR: 0
DIARRHEA: 0
RHINORRHEA: 0
SHORTNESS OF BREATH: 0
CHEST TIGHTNESS: 0
EYE REDNESS: 0
VOICE CHANGE: 0
EYE ITCHING: 0

## 2022-09-12 RX ORDER — ROSUVASTATIN CALCIUM 10 MG/1
TABLET, COATED ORAL
Qty: 90 TABLET | Refills: 1 | Status: SHIPPED | OUTPATIENT
Start: 2022-09-12

## 2022-09-12 RX ORDER — ROSUVASTATIN CALCIUM 10 MG/1
TABLET, COATED ORAL
Qty: 90 TABLET | OUTPATIENT
Start: 2022-09-12

## 2022-09-13 ENCOUNTER — CLINICAL DOCUMENTATION (OUTPATIENT)
Dept: FAMILY MEDICINE CLINIC | Facility: CLINIC | Age: 77
End: 2022-09-13

## 2022-09-13 NOTE — PROGRESS NOTES
[address] [address] [place] [address] spoke with Nury Workman about urgent referral that was placed on Friday she said that she world call patient today [phone]-.

## 2022-09-20 ENCOUNTER — HOSPITAL ENCOUNTER (OUTPATIENT)
Dept: WOUND CARE | Age: 77
Discharge: HOME | End: 2022-09-20
Payer: MEDICARE

## 2022-09-20 VITALS
SYSTOLIC BLOOD PRESSURE: 139 MMHG | DIASTOLIC BLOOD PRESSURE: 80 MMHG | RESPIRATION RATE: 16 BRPM | HEART RATE: 80 BPM | HEIGHT: 63 IN | TEMPERATURE: 96.6 F | BODY MASS INDEX: 30.03 KG/M2

## 2022-09-20 DIAGNOSIS — S31.109A OPEN WOUND OF ABDOMINAL WALL, INITIAL ENCOUNTER: ICD-10-CM

## 2022-09-20 PROCEDURE — 99202 OFFICE O/P NEW SF 15 MIN: CPT | Performed by: FAMILY MEDICINE

## 2022-09-20 PROCEDURE — 11042 DBRDMT SUBQ TIS 1ST 20SQCM/<: CPT

## 2022-09-20 PROCEDURE — 11042 DBRDMT SUBQ TIS 1ST 20SQCM/<: CPT | Performed by: FAMILY MEDICINE

## 2022-09-20 PROCEDURE — 99203 OFFICE O/P NEW LOW 30 MIN: CPT

## 2022-09-20 NOTE — PROGRESS NOTES
[facility]   History and Physical Note   Referring Provider: DALE Mcdermott CNP  Reason for Referral: Abdominal wound unknown etiology    Ana Aguero  MEDICAL RECORD NUMBER:  [id number]  AGE: 78 y.o. GENDER: female  : 1945  EPISODE DATE:  2022    Chief complaint and reason for visit:     Chief Complaint   Patient presents with    Wound Check     Left lower abdomen         HISTORY of PRESENT ILLNESS HPI     Ana Aguero is a 78 y.o. female who presents today for an initial evaluation of a wound/ulcer. Patient is new to the wound center. Wound duration:  2 week(s). History of Wound Context: Patient comes in today with spouse. He was assisting her getting a bath and found an abdominal wound on her left lower abdomen area. Patient states this does not hurt. Does not know of any problem that she had there. Upon questioning patient does get injectable diabetic medication weekly alternating sides. This might of been site of that at one point that festered and became an infection. Patient is currently on antibiotics. There is no sign of infection today around the wound area.   Pertinent associated symptoms: skin discoloration    PAST MEDICAL HISTORY        Diagnosis Date    Carotid artery disease ([facility] [address]     Chronic kidney disease     dialysis patient mon wed and fri    Diabetes mellitus, type II ([facility] [address]     Dyspepsia and other specified disorders of function of stomach     Gout     Hypercholesterolemia     Hypertension     Hypothyroidism     Menopausal syndrome     Neuropathy     Renal failure     s/p kidney transplant x2    Right shoulder pain     Thyroid nodule        PAST SURGICAL HISTORY  Past Surgical History:   Procedure Laterality Date    CATARACT REMOVAL Right     CATARACT REMOVAL Left     COLONOSCOPY N/A 2022    COLONOSCOPY/ BMI 29 ROOM 442 performed by Lindsey Palm MD at [address] [place] [address]      right knee surgery after an accident, has pins. ORTHOPEDIC SURGERY      left rotator cuff, bilateral shoulder surg., rt. knee, left index finger    PARTIAL HYSTERECTOMY (CERVIX NOT REMOVED)      TRANSPLANT      kidney rejected in 1994    UROLOGICAL SURGERY      [place] [address]      fistula for dialysis, rt. carotid artery    VASCULAR SURGERY      right forearm. FAMILY HISTORY  Family History   Problem Relation Age of Onset    Heart Disease Mother          age 46    Diabetes Sister     Lupus Sister     Cancer Brother         colon    Diabetes Sister     Alcohol Abuse Father     Liver Disease Father     Stroke Mother     Hypertension Mother        SOCIAL HISTORY  Social History     Tobacco Use    Smoking status: Never    Smokeless tobacco: Never   Substance Use Topics    Alcohol use: No    Drug use: No       ALLERGIES  Allergies   Allergen Reactions    Lisinopril Other (See Comments)     cough       MEDICATIONS  Current Outpatient Medications on File Prior to Encounter   Medication Sig Dispense Refill    rosuvastatin (CRESTOR) 10 MG tablet TAKE 1 TABLET BY MOUTH EVERY NIGHT 90 tablet 1    loratadine (CLARITIN) 10 MG tablet TAKE 1 TABLET BY MOUTH DAILY FOR 14 DAYS      lactobacillus acidophilus (FLORANEX) Take 2 tablets by mouth daily With food (good bacteria supplement) 60 tablet 0    nystatin (MYCOSTATIN) 238408 UNIT/GM powder Apply topically 4 times daily to skin folds to abdomen for rash.  126 g 2    TRULICITY 1.5 TC/9.4QP SOPN Inject 1.5 mg into the skin once a week 12 pen 3    HUMALOG KWIKPEN 100 UNIT/ML SOPN 4 units AC brunch plus correction /50>200, max daily dose 30 units 30 mL 3    insulin NPH (HUMULIN N KWIKPEN) 100 UNIT/ML injection pen Inject 18 Units into the skin every morning 54 mL 3    B Complex Vitamins (VITAMIN B COMPLEX PO) Take 1 tablet by mouth daily      ascorbic acid (VITAMIN C) 500 MG tablet Take by mouth      aspirin 81 MG EC tablet Take by mouth daily belatacept (NULOJIX) 250 MG SOLR Infuse 500 mg intravenously      carvedilol (COREG) 12.5 MG tablet TAKE 1 TABLET BY MOUTH TWICE DAILY      vitamin D 25 MCG (1000 UT) CAPS Take 1,000 Units by mouth daily      cyanocobalamin 250 MCG tablet Take 1 tablet by mouth daily      Glucagon (GVOKE HYPOPEN 2-PACK) 1 MG/0.2ML SOAJ Inject 1 mg into the skin as needed      mycophenolate (CELLCEPT) 250 MG capsule TK 3 CS PO BID      predniSONE (DELTASONE) 5 MG tablet Take 5 mg by mouth daily       No current facility-administered medications on file prior to encounter. REVIEW OF SYSTEMS  A comprehensive review of systems was negative except for: Pertinent items are noted in HPI. Objective:      /80   Pulse 80   Temp (!) 96.6 °F (35.9 °C) (Temporal)   Resp 16   Ht 5' 2.9\" (1.598 m)   BMI 30.03 kg/m²     Wt Readings from Last 3 Encounters:   04/01/22 169 lb (76.7 kg)   10/20/21 172 lb (78 kg)   07/20/21 169 lb 6.4 oz (76.8 kg)       PHYSICAL EXAM  General Appearance/Constitutional: alert and oriented to person, place and time,  and in no acute distress. Nontoxic. Skin: warm and dry, no rash, positive wound per LDA documentation if applicable. Head: normocephalic and atraumatic. Eyes: extraocular eye movements intact, conjunctivae normal, and sclera anicteric. ENT: hearing grossly normal bilaterally. Normal appearance. Pulmonary/Chest: no chest wall tenderness and clear anteriorly. No respiratory distress. Cardiovascular: normal rate and regular rhythm. GI: abdomen soft, non-tender and non-distended. Musculoskeletal: normal range of motion of joints. Nontender calves. No cyanosis. Nontender calves. Edema negative  Neurologic: no gross cranial nerve deficit and speech normal. No focal deficits. Mental status normal.    Medical Decision Making:     Area was debrided today and complete removed. It was very thin.   It was consistent with maybe a small abscess that might of been from an injection site of diabetic medication. Either way is not very deep so it should heal up nicely. Asked her to finish out the antibiotics and will utilize Dyllan. This should heal up nicely. We will see this weekly. Assessment required other independent historian(s): Yes. Additional Historian: patient  and spouse. Comorbid conditions affecting wound healing: As noted in [facility] and [facility] which was reviewed. Problem List Items Addressed This Visit          Other    * (Principal) Open abdominal wall wound       Wounds and Treatment Plan:  After wound was debrided this looks very clean. We will utilize Dyllan changing 2-3 times a week. We will see patient 1 week. Other diagnoses or problems addressed:  Beatties. Pertinent labs reviewed. Review of medical records and external note (s) from other providers was done for this visit. New lab or imaging orders placed:  None    Prescription drug management: N/A     Risk of complications and/or mortality of patient management: This patient has a minimal risk of morbidity and mortality from additional diagnostic testing or treatment. This is due to the above conditions affecting wound healing as well as patient and procedure risk factors. Education and discussion held with patient regarding these disease processes pertinent to wound(s). Other pertinent decisions include: minor surgery or procedures as below. The patient's diagnosis or treatment is  significantly limited by social determinants of health as noted by: nutritional resource limitations . Discussion of management or test interpretation with N/A.     Time spent with patient and patient care issues above the usual time needed for wound assessment and treatment was: [] 15-20 min  [x] 21-30 min  [] 31-44 min  [] 45 min or more  This included time retrieving and reviewing records with patient and education provided to patient regarding disease process(es), offloading or pressure relief, nutrition needed for wound healing, smoking cessation when applicable, and infection risk. This time also included physician non-face-to-face service time visit on the date of service such as  Preparing to see the patient (eg, review of tests)  Obtaining and/or reviewing separately obtained history  Performing a medically necessary appropriate examination and/or evaluation  Counseling and educating the patient/family/caregiver  Ordering medications, tests, or procedures  Referring and communicating with other health care professionals as needed  Documenting clinical information in the electronic or other health record  Independently interpreting results (not reported separately) and communicating results to the patient/family/caregiver  Care coordination (not reported separately)    Wound 09/20/22 Pelvis Anterior; Left #1 left lower abdomen (Active)   Wound Image    09/20/22 1411   Wound Etiology Pressure Stage 3 09/20/22 1411   Wound Cleansed Cleansed with saline 09/20/22 1411   Wound Length (cm) 2 cm 09/20/22 1411   Wound Width (cm) 2.8 cm 09/20/22 1411   Wound Depth (cm) 0.1 cm 09/20/22 1411   Wound Surface Area (cm^2) 5.6 cm^2 09/20/22 1411   Wound Volume (cm^3) 0.56 cm^3 09/20/22 1411   Post-Procedure Length (cm) 2 cm 09/20/22 1411   Post-Procedure Width (cm) 2.8 cm 09/20/22 1411   Post-Procedure Depth (cm) 0.2 cm 09/20/22 1411   Post-Procedure Surface Area (cm^2) 5.6 cm^2 09/20/22 1411   Post-Procedure Volume (cm^3) 1.12 cm^3 09/20/22 1411   Wound Assessment Eschar moist;Pink/red;Slough 09/20/22 1411   Drainage Amount Small 09/20/22 1411   Drainage Description Serosanguinous 09/20/22 1411   Odor None 09/20/22 1411   Abby-wound Assessment Intact 09/20/22 1411   Wound Thickness Description not for Pressure Injury Full thickness 09/20/22 1411   Number of days: 0          Procedures done this visit:   Procedure Note  Indications:  Based on my examination of this patient's wound(s)/ulcer(s) today, debridement is required to promote healing and evaluate the wound base. Performed by: Ashley Graves DO  Consent obtained:  Yes  Time out taken:  Yes  Pain Control:     Debridement: Excisional Debridement  Using curette pick ups and scissors the wound(s)/ulcer(s) was/were debrided down through and including the removal of epidermis, dermis, and subcutaneous tissue. Devitalized Tissue Debrided:  biofilm, slough, necrotic/eschar, and exudate  Pre Debridement Measurements:  Are located in the [place]  Documentation Flow Sheet  Diabetic/Pressure/Non Pressure Ulcers only:  Ulcer: Non-Pressure ulcer, fat layer exposed   Wound/Ulcer #: 1  Post Debridement Measurements:  Wound/Ulcer Descriptions are Pre Debridement except measurements: Total Surface Area Debrided:  2.2 sq cm   Estimated Blood Loss:  Minimal  Hemostasis Achieved:  by pressure  Procedural Pain:  1 / 10   Post Procedural Pain:  1 / 10   Response to treatment:  Well tolerated by patient. Written patient dismissal instructions given to patient and signed by patient or POA. Patient voiced understanding that the importance of adherence to instructions is paramount to wound healing improvement or success.      Electronically signed by Ashley Graves DO on 9/20/2022 at 4:24 PM

## 2022-09-20 NOTE — FLOWSHEET NOTE
09/20/22 1411   Wound 09/20/22 Pelvis Anterior; Left #1 left lower abdomen   Date First Assessed/Time First Assessed: 09/20/22 1411   Present on Hospital Admission: Yes  Wound Approximate Age at First Assessment (Weeks): 6 weeks  Primary Wound Type: Pressure Injury  Location: Pelvis  Wound Location Orientation: Anterior; Left  . .. Wound Image     Wound Etiology Pressure Stage 3   Wound Cleansed Cleansed with saline   Wound Length (cm) 2 cm   Wound Width (cm) 2.8 cm   Wound Depth (cm) 0.1 cm   Wound Surface Area (cm^2) 5.6 cm^2   Wound Volume (cm^3) 0.56 cm^3   Post-Procedure Length (cm) 2 cm   Post-Procedure Width (cm) 2.8 cm   Post-Procedure Depth (cm) 0.2 cm   Post-Procedure Surface Area (cm^2) 5.6 cm^2   Post-Procedure Volume (cm^3) 1.12 cm^3   Wound Assessment Eschar moist;Pink/red;Slough   Drainage Amount Small   Drainage Description Serosanguinous   Odor None   Abby-wound Assessment Intact   Wound Thickness Description not for Pressure Injury Full thickness   Patient is on asa daily.

## 2022-09-20 NOTE — WOUND CARE
Discharge Instructions for  Pollo Chavez  [address]  [address], [address]  Phone [phone]   Fax [phone]      NAME:  Diann Ontiveros OF BIRTH:  1945  MEDICAL RECORD NUMBER:  [id number]  DATE:  9/20/2022    Return Appointment:   1 week with Mariola Ron DO      Instructions: Left lower abdomen  Cleanse wound and periwound with wound cleanser or normal saline. Hydrofera Ready: Cut to wound size, place in wound bed, shiny side out. Secure with tape or tegaderm. Change 3x weekly or PRN as needed. May use 100%cotton cloth in the skin folds to  absorb moisture. Use your nystatin powder if needed for redness. Increase your protein levels to improve healing. Should you experience increased redness, swelling, pain, foul odor, size of wound(s), or have a temperature over 101 degrees please contact the [address] [place] [address] Road at [phone] or if after hours contact your primary care physician or go to the hospital emergency department. PLEASE NOTE: IF YOU ARE UNABLE TO OBTAIN WOUND SUPPLIES, CONTINUE TO USE THE SUPPLIES YOU HAVE AVAILABLE UNTIL YOU ARE ABLE TO REACH US. IT IS MOST IMPORTANT TO KEEP THE WOUND COVERED AT ALL TIMES.     Electronically signed Dorothy Banks RN on 9/20/2022 at 2:45 PM

## 2022-09-20 NOTE — DISCHARGE INSTRUCTIONS
Discharge Instructions for  Pollo Chavez  [address] [place] [address] [place], [address]  Phone [phone]   Fax [phone]      NAME:  Jose Alberto Gutiérrez OF BIRTH:  1945  MEDICAL RECORD NUMBER:  [id number]  DATE:  @ED@    Return Appointment:   1 week with Denia Dolan DO      Instructions:  Left lower abdomen  Cleanse wound and periwound with wound cleanser or normal saline. Hydrofera Ready: Cut to wound size, place in wound bed, shiny side out. Secure with tape or tegaderm. Change 3x weekly or PRN as needed. May use 100%cotton cloth in the skin folds to  absorb moisture. Use your nystatin powder if needed for redness. Increase your protein levels to improve healing. Should you experience increased redness, swelling, pain, foul odor, size of wound(s), or have a temperature over 101 degrees please contact the [address] [place] [address] Road at [phone] or if after hours contact your primary care physician or go to the hospital emergency department. PLEASE NOTE: IF YOU ARE UNABLE TO OBTAIN WOUND SUPPLIES, CONTINUE TO USE THE SUPPLIES YOU HAVE AVAILABLE UNTIL YOU ARE ABLE TO REACH US. IT IS MOST IMPORTANT TO KEEP THE WOUND COVERED AT ALL TIMES.     Electronically signed Tarah ADAMS Moe on 9/20/2022 at 2:49 PM

## 2022-09-27 ENCOUNTER — HOSPITAL ENCOUNTER (OUTPATIENT)
Dept: WOUND CARE | Age: 77
Discharge: HOME | End: 2022-09-27
Payer: MEDICARE

## 2022-09-27 VITALS
OXYGEN SATURATION: 97 % | WEIGHT: 169 LBS | RESPIRATION RATE: 20 BRPM | HEIGHT: 63 IN | BODY MASS INDEX: 29.95 KG/M2 | SYSTOLIC BLOOD PRESSURE: 115 MMHG | DIASTOLIC BLOOD PRESSURE: 62 MMHG | TEMPERATURE: 97.9 F | HEART RATE: 83 BPM

## 2022-09-27 DIAGNOSIS — S31.109A OPEN WOUND OF ABDOMINAL WALL, INITIAL ENCOUNTER: Primary | ICD-10-CM

## 2022-09-27 PROCEDURE — 11042 DBRDMT SUBQ TIS 1ST 20SQCM/<: CPT | Performed by: FAMILY MEDICINE

## 2022-09-27 PROCEDURE — 11042 DBRDMT SUBQ TIS 1ST 20SQCM/<: CPT

## 2022-09-27 RX ORDER — LIDOCAINE HYDROCHLORIDE 20 MG/ML
JELLY TOPICAL ONCE
Status: CANCELLED | OUTPATIENT
Start: 2022-09-27 | End: 2022-09-27

## 2022-09-27 RX ORDER — CLOBETASOL PROPIONATE 0.5 MG/G
OINTMENT TOPICAL ONCE
Status: CANCELLED | OUTPATIENT
Start: 2022-09-27 | End: 2022-09-27

## 2022-09-27 RX ORDER — BETAMETHASONE DIPROPIONATE 0.05 %
OINTMENT (GRAM) TOPICAL ONCE
Status: CANCELLED | OUTPATIENT
Start: 2022-09-27 | End: 2022-09-27

## 2022-09-27 RX ORDER — LIDOCAINE HYDROCHLORIDE 40 MG/ML
SOLUTION TOPICAL ONCE
Status: CANCELLED | OUTPATIENT
Start: 2022-09-27 | End: 2022-09-27

## 2022-09-27 RX ORDER — LIDOCAINE 50 MG/G
OINTMENT TOPICAL ONCE
Status: CANCELLED | OUTPATIENT
Start: 2022-09-27 | End: 2022-09-27

## 2022-09-27 RX ORDER — BACITRACIN, NEOMYCIN, POLYMYXIN B 400; 3.5; 5 [USP'U]/G; MG/G; [USP'U]/G
OINTMENT TOPICAL ONCE
Status: CANCELLED | OUTPATIENT
Start: 2022-09-27 | End: 2022-09-27

## 2022-09-27 RX ORDER — GENTAMICIN SULFATE 1 MG/G
OINTMENT TOPICAL ONCE
Status: CANCELLED | OUTPATIENT
Start: 2022-09-27 | End: 2022-09-27

## 2022-09-27 RX ORDER — GINSENG 100 MG
CAPSULE ORAL ONCE
Status: CANCELLED | OUTPATIENT
Start: 2022-09-27 | End: 2022-09-27

## 2022-09-27 RX ORDER — LIDOCAINE HYDROCHLORIDE 20 MG/ML
JELLY TOPICAL ONCE
Status: COMPLETED | OUTPATIENT
Start: 2022-09-27 | End: 2022-09-27

## 2022-09-27 RX ORDER — BACITRACIN ZINC AND POLYMYXIN B SULFATE 500; 1000 [USP'U]/G; [USP'U]/G
OINTMENT TOPICAL ONCE
Status: CANCELLED | OUTPATIENT
Start: 2022-09-27 | End: 2022-09-27

## 2022-09-27 RX ORDER — LIDOCAINE 40 MG/G
CREAM TOPICAL ONCE
Status: CANCELLED | OUTPATIENT
Start: 2022-09-27 | End: 2022-09-27

## 2022-09-27 RX ADMIN — LIDOCAINE HYDROCHLORIDE: 20 JELLY TOPICAL at 16:25

## 2022-09-27 NOTE — WOUND CARE
Discharge Instructions for  Pollo Chavez  [address] [place] [address] [place], [address]  Phone [phone]   Fax [phone]      NAME:  Don Felton OF BIRTH:  1945  MEDICAL RECORD NUMBER:  [id number]  DATE:  9/27/2022    Return Appointment:   1 week with Shawanda Price DO      Instructions:  Left lower abdomen  Cleanse wound and periwound with wound cleanser or normal saline. Hydrofera Ready: Cut to wound size, place in wound bed, shiny side out. Secure with tape or tegaderm. Change 3x weekly or PRN as needed. May use 100%cotton cloth in the skin folds to  absorb moisture. Use your nystatin powder if needed for redness. Increase your protein levels to improve healing. Increase protein in diet to at least 60 g protein per day        Should you experience increased redness, swelling, pain, foul odor, size of wound(s), or have a temperature over 101 degrees please contact the [address] [place] [address] Road at [phone] or if after hours contact your primary care physician or go to the hospital emergency department. PLEASE NOTE: IF YOU ARE UNABLE TO OBTAIN WOUND SUPPLIES, CONTINUE TO USE THE SUPPLIES YOU HAVE AVAILABLE UNTIL YOU ARE ABLE TO REACH US. IT IS MOST IMPORTANT TO KEEP THE WOUND COVERED AT ALL TIMES.     Electronically signed Pedro Lara PT, [facility] on 9/27/2022 at 2:49 PM

## 2022-09-27 NOTE — FLOWSHEET NOTE
09/27/22 1415   Wound 09/20/22 Pelvis Anterior; Left #1 left lower abdomen   Date First Assessed/Time First Assessed: 09/20/22 1411   Present on Hospital Admission: Yes  Wound Approximate Age at First Assessment (Weeks): 6 weeks  Primary Wound Type: Pressure Injury  Location: Pelvis  Wound Location Orientation: Anterior; Left  . ..    Wound Image    Wound Etiology Pressure Stage 3   Dressing Status Old drainage noted   Wound Cleansed Cleansed with saline   Dressing/Treatment Hydrofera blue;ABD;Tape/Soft cloth adhesive tape   Wound Length (cm) 1 cm   Wound Width (cm) 3 cm   Wound Depth (cm) 0.1 cm   Wound Surface Area (cm^2) 3 cm^2   Change in Wound Size % (l*w) 46.43   Wound Volume (cm^3) 0.3 cm^3   Wound Healing % 46   Wound Assessment Pink/red;Epithelialization   Drainage Amount Small   Drainage Description Serosanguinous   Odor None   Wound Thickness Description not for Pressure Injury Full thickness   Pain Assessment   Pain Assessment None - Denies Pain   Patient is currently taking Aspirin 81 mg

## 2022-09-27 NOTE — PROGRESS NOTES
Procedure Note  Indications: Based on my examination of this patient's wound(s)/ulcer(s) today, debridement is required to promote healing and evaluate the wound base. Patient here today with . Tolerating current dressings of Hydrofera Blue. Blood sugars doing well. Wound is improving substantially. Patient tolerated debridement today. We will continue with current treatment. Debridement: Excisional Debridement    Using: curette the wound(s)/ulcer(s) was/were debrided down through and including the removal of epidermis, dermis, and subcutaneous tissue. Performed by: Tino Miranda DO  Consent obtained: Yes  Time out taken: Yes  Pain Control:         Devitalized Tissue Debrided: fibrin, biofilm, and slough    Pre Debridement Measurements:  Are located in the [place]  Documentation Flow Sheet    Diabetic/Pressure/Non Pressure Ulcers only:  Ulcer: Non-Pressure ulcer, fat layer exposed     Wound/Ulcer #: 1  Post Debridement Measurements:  Wound/Ulcer Descriptions are Pre Debridement except measurements:  Wound 09/20/22 Pelvis Anterior; Left #1 left lower abdomen (Active)   Wound Image   09/27/22 1415   Wound Etiology Pressure Stage 3 09/27/22 1415   Dressing Status Old drainage noted 09/27/22 1415   Wound Cleansed Cleansed with saline 09/27/22 1415   Dressing/Treatment Hydrofera blue;ABD;Tape/Soft cloth adhesive tape 09/27/22 1415   Wound Length (cm) 1 cm 09/27/22 1415   Wound Width (cm) 3 cm 09/27/22 1415   Wound Depth (cm) 0.1 cm 09/27/22 1415   Wound Surface Area (cm^2) 3 cm^2 09/27/22 1415   Change in Wound Size % (l*w) 46.43 09/27/22 1415   Wound Volume (cm^3) 0.3 cm^3 09/27/22 1415   Wound Healing % 46 09/27/22 1415   Post-Procedure Length (cm) 2 cm 09/20/22 1411   Post-Procedure Width (cm) 2.8 cm 09/20/22 1411   Post-Procedure Depth (cm) 0.2 cm 09/20/22 1411   Post-Procedure Surface Area (cm^2) 5.6 cm^2 09/20/22 1411   Post-Procedure Volume (cm^3) 1.12 cm^3 09/20/22 1411   Wound Assessment Pink/red;Epithelialization 09/27/22 1415   Drainage Amount Small 09/27/22 1415   Drainage Description Serosanguinous 09/27/22 1415   Odor None 09/27/22 1415   Abby-wound Assessment Intact 09/20/22 1411   Wound Thickness Description not for Pressure Injury Full thickness 09/27/22 1415   Number of days: 7        Total Surface Area Debrided:  3 sq cm   Estimated Blood Loss: Minimal amount blood loss . Hemostasis Achieved:  by pressure  Procedural Pain: 0  / 10   Post Procedural Pain: 0 / 10   Response to treatment: Patient tolerated procedure well with no complaints of pain.

## 2022-09-29 DIAGNOSIS — M17.11 LOCALIZED OSTEOARTHRITIS OF RIGHT KNEE: Primary | ICD-10-CM

## 2022-10-04 ENCOUNTER — HOSPITAL ENCOUNTER (OUTPATIENT)
Dept: WOUND CARE | Age: 77
Discharge: HOME | End: 2022-10-04
Payer: MEDICARE

## 2022-10-04 VITALS
SYSTOLIC BLOOD PRESSURE: 122 MMHG | RESPIRATION RATE: 18 BRPM | TEMPERATURE: 97.7 F | HEART RATE: 78 BPM | OXYGEN SATURATION: 98 % | DIASTOLIC BLOOD PRESSURE: 78 MMHG | HEIGHT: 63 IN | WEIGHT: 155 LBS | BODY MASS INDEX: 27.46 KG/M2

## 2022-10-04 DIAGNOSIS — S31.109A OPEN WOUND OF ABDOMINAL WALL, INITIAL ENCOUNTER: Primary | ICD-10-CM

## 2022-10-04 PROCEDURE — 97597 DBRDMT OPN WND 1ST 20 CM/<: CPT

## 2022-10-04 PROCEDURE — 97597 DBRDMT OPN WND 1ST 20 CM/<: CPT | Performed by: FAMILY MEDICINE

## 2022-10-04 RX ORDER — BACITRACIN ZINC AND POLYMYXIN B SULFATE 500; 1000 [USP'U]/G; [USP'U]/G
OINTMENT TOPICAL ONCE
Status: CANCELLED | OUTPATIENT
Start: 2022-10-04 | End: 2022-10-04

## 2022-10-04 RX ORDER — CLOBETASOL PROPIONATE 0.5 MG/G
OINTMENT TOPICAL ONCE
Status: CANCELLED | OUTPATIENT
Start: 2022-10-04 | End: 2022-10-04

## 2022-10-04 RX ORDER — LIDOCAINE HYDROCHLORIDE 20 MG/ML
JELLY TOPICAL ONCE
Status: CANCELLED | OUTPATIENT
Start: 2022-10-04 | End: 2022-10-04

## 2022-10-04 RX ORDER — LIDOCAINE HYDROCHLORIDE 40 MG/ML
SOLUTION TOPICAL ONCE
Status: CANCELLED | OUTPATIENT
Start: 2022-10-04 | End: 2022-10-04

## 2022-10-04 RX ORDER — GENTAMICIN SULFATE 1 MG/G
OINTMENT TOPICAL ONCE
Status: CANCELLED | OUTPATIENT
Start: 2022-10-04 | End: 2022-10-04

## 2022-10-04 RX ORDER — LIDOCAINE HYDROCHLORIDE 20 MG/ML
JELLY TOPICAL ONCE
Status: COMPLETED | OUTPATIENT
Start: 2022-10-04 | End: 2022-10-04

## 2022-10-04 RX ORDER — BETAMETHASONE DIPROPIONATE 0.05 %
OINTMENT (GRAM) TOPICAL ONCE
Status: CANCELLED | OUTPATIENT
Start: 2022-10-04 | End: 2022-10-04

## 2022-10-04 RX ORDER — LIDOCAINE 40 MG/G
CREAM TOPICAL ONCE
Status: CANCELLED | OUTPATIENT
Start: 2022-10-04 | End: 2022-10-04

## 2022-10-04 RX ORDER — GINSENG 100 MG
CAPSULE ORAL ONCE
Status: CANCELLED | OUTPATIENT
Start: 2022-10-04 | End: 2022-10-04

## 2022-10-04 RX ORDER — LIDOCAINE 50 MG/G
OINTMENT TOPICAL ONCE
Status: CANCELLED | OUTPATIENT
Start: 2022-10-04 | End: 2022-10-04

## 2022-10-04 RX ORDER — BACITRACIN, NEOMYCIN, POLYMYXIN B 400; 3.5; 5 [USP'U]/G; MG/G; [USP'U]/G
OINTMENT TOPICAL ONCE
Status: CANCELLED | OUTPATIENT
Start: 2022-10-04 | End: 2022-10-04

## 2022-10-04 RX ADMIN — LIDOCAINE HYDROCHLORIDE: 20 JELLY TOPICAL at 15:58

## 2022-10-04 NOTE — WOUND CARE
Discharge Instructions for  Pollo Chavez  [address]  [address] , [address]  Phone [phone]   Fax [phone]      NAME:  Christen Lo OF BIRTH:  1945  MEDICAL RECORD NUMBER:  [id number]  DATE:  10/4/2022    Return Appointment:   1 week with Rea Banuelos DO    Instructions:   Left lower abdomen  Cleanse wound and periwound with wound cleanser or normal saline. Skin Pre to periwound. Puracol ECM to wound bed. Secure with Bordered Foam.  Dressing changes 3x weekly. Pannus/Skin Fold:  Discontinue Nystatin Powder since fungal issue is resolved. May use Gold Bond Powder to maintain healthy skin in skin fold. May use 100% cotton cloth in the skin folds to  absorb moisture. Increase protein in diet to 60 g protein per day . .. as permitted by Nephrologist (Kidney Doctor). Should you experience increased redness, swelling, pain, foul odor, size of wound(s), or have a temperature over 101 degrees please contact the [address] [place] [address] Road at [phone] or if after hours contact your primary care physician or go to the hospital emergency department. PLEASE NOTE: IF YOU ARE UNABLE TO OBTAIN WOUND SUPPLIES, CONTINUE TO USE THE SUPPLIES YOU HAVE AVAILABLE UNTIL YOU ARE ABLE TO REACH US. IT IS MOST IMPORTANT TO KEEP THE WOUND COVERED AT ALL TIMES. Electronically signed Breana Grossman.  Zach Cervantes on 10/4/2022 at 3:07 PM

## 2022-10-04 NOTE — DISCHARGE INSTRUCTIONS
Left lower abdomen  Cleanse wound and periwound with wound cleanser or normal saline. Skin Pre to periwound. Puracol ECM to wound bed. Secure with Bordered Foam.  Dressing changes 3x weekly. Pannus/Skin Fold:  Discontinue Nystatin Powder since fungal issue is resolved. May use Gold Bond Powder to maintain healthy skin in skin fold. May use 100% cotton cloth in the skin folds to  absorb moisture. Increase protein in diet to 60 g protein per day . .. as permitted by Nephrologist (Kidney Doctor).

## 2022-10-05 PROBLEM — S31.109A OPEN ABDOMINAL WALL WOUND: Chronic | Status: ACTIVE | Noted: 2022-09-20

## 2022-10-05 NOTE — PROGRESS NOTES
Procedure Note  Indications: Based on my examination of this patient's wound(s)/ulcer(s) today, debridement is required to promote healing and evaluate the wound base. Patient is improving. Tolerated debridement. Diabetes under control. Patient is on antirejection medications due to bilateral kidney transplant. Therefore she is immunocompromise somewhat. We will add collagen to the current wound dressing. Continue debridements. Continue good protein intake. Debridement: Selective Debridement/Non-Excisional Debridement    Using: curette the wound(s)/ulcer(s) was/were debrided down through and including the removal of epidermis and dermis. Performed by: Susan Forde DO  Consent obtained: Yes  Time out taken: Yes  Pain Control:         Devitalized Tissue Debrided: fibrin, biofilm, and slough    Pre Debridement Measurements:  Are located in the [place]  Documentation Flow Sheet    Diabetic/Pressure/Non Pressure Ulcers only:  Ulcer: Non-Pressure ulcer, fat layer exposed     Wound/Ulcer #: 1  Post Debridement Measurements:  Wound/Ulcer Descriptions are Pre Debridement except measurements:  Wound 09/20/22 Pelvis Anterior; Left #1 left lower abdomen (Active)   Wound Image   10/04/22 1433   Wound Etiology Pressure Stage 3 10/04/22 1433   Dressing Status New drainage noted 10/04/22 1433   Wound Cleansed Cleansed with saline 10/04/22 1433   Dressing/Treatment Hydrofera blue 10/04/22 1433   Wound Length (cm) 1.5 cm 10/04/22 1433   Wound Width (cm) 3 cm 10/04/22 1433   Wound Depth (cm) 0.1 cm 10/04/22 1433   Wound Surface Area (cm^2) 4.5 cm^2 10/04/22 1433   Change in Wound Size % (l*w) 19.64 10/04/22 1433   Wound Volume (cm^3) 0.45 cm^3 10/04/22 1433   Wound Healing % 20 10/04/22 1433   Post-Procedure Length (cm) 2 cm 09/20/22 1411   Post-Procedure Width (cm) 2.8 cm 09/20/22 1411   Post-Procedure Depth (cm) 0.2 cm 09/20/22 1411   Post-Procedure Surface Area (cm^2) 5.6 cm^2 09/20/22 1411   Post-Procedure Volume (cm^3) 1.12 cm^3 09/20/22 1411   Wound Assessment Granulation tissue 10/04/22 1433   Drainage Amount Small 10/04/22 1433   Drainage Description Serosanguinous 10/04/22 1433   Odor None 10/04/22 1433   Abby-wound Assessment Fragile 10/04/22 1433   Margins Attached edges 10/04/22 1433   Wound Thickness Description not for Pressure Injury Full thickness 10/04/22 1433   Number of days: 14        Total Surface Area Debrided:  4.5 sq cm   Estimated Blood Loss: Minimal amount blood loss . Hemostasis Achieved:  by pressure  Procedural Pain: 0  / 10   Post Procedural Pain: 0 / 10   Response to treatment: Patient tolerated procedure well with no complaints of pain.

## 2022-10-10 ENCOUNTER — PREP FOR PROCEDURE (OUTPATIENT)
Dept: ORTHOPEDIC SURGERY | Age: 77
End: 2022-10-10

## 2022-10-10 DIAGNOSIS — Z01.818 PREOP EXAMINATION: Primary | ICD-10-CM

## 2022-10-10 RX ORDER — SODIUM CHLORIDE 0.9 % (FLUSH) 0.9 %
5-40 SYRINGE (ML) INJECTION EVERY 12 HOURS SCHEDULED
Status: CANCELLED | OUTPATIENT
Start: 2022-10-10

## 2022-10-10 RX ORDER — SODIUM CHLORIDE 0.9 % (FLUSH) 0.9 %
5-40 SYRINGE (ML) INJECTION PRN
Status: CANCELLED | OUTPATIENT
Start: 2022-10-10

## 2022-10-10 RX ORDER — SODIUM CHLORIDE 9 MG/ML
INJECTION, SOLUTION INTRAVENOUS PRN
Status: CANCELLED | OUTPATIENT
Start: 2022-10-10

## 2022-10-11 ENCOUNTER — HOSPITAL ENCOUNTER (OUTPATIENT)
Dept: WOUND CARE | Age: 77
Discharge: HOME | End: 2022-10-11
Payer: MEDICARE

## 2022-10-11 VITALS
BODY MASS INDEX: 26.75 KG/M2 | HEIGHT: 63 IN | HEART RATE: 78 BPM | WEIGHT: 151 LBS | SYSTOLIC BLOOD PRESSURE: 140 MMHG | TEMPERATURE: 97.2 F | DIASTOLIC BLOOD PRESSURE: 68 MMHG

## 2022-10-11 DIAGNOSIS — S31.109A OPEN WOUND OF ABDOMINAL WALL, INITIAL ENCOUNTER: Primary | ICD-10-CM

## 2022-10-11 PROCEDURE — 11042 DBRDMT SUBQ TIS 1ST 20SQCM/<: CPT | Performed by: FAMILY MEDICINE

## 2022-10-11 PROCEDURE — 11042 DBRDMT SUBQ TIS 1ST 20SQCM/<: CPT

## 2022-10-11 RX ORDER — GENTAMICIN SULFATE 1 MG/G
OINTMENT TOPICAL ONCE
Status: CANCELLED | OUTPATIENT
Start: 2022-10-11 | End: 2022-10-11

## 2022-10-11 RX ORDER — LIDOCAINE HYDROCHLORIDE 20 MG/ML
JELLY TOPICAL ONCE
Status: CANCELLED | OUTPATIENT
Start: 2022-10-11 | End: 2022-10-11

## 2022-10-11 RX ORDER — BACITRACIN ZINC AND POLYMYXIN B SULFATE 500; 1000 [USP'U]/G; [USP'U]/G
OINTMENT TOPICAL ONCE
Status: CANCELLED | OUTPATIENT
Start: 2022-10-11 | End: 2022-10-11

## 2022-10-11 RX ORDER — LIDOCAINE HYDROCHLORIDE 40 MG/ML
SOLUTION TOPICAL ONCE
Status: CANCELLED | OUTPATIENT
Start: 2022-10-11 | End: 2022-10-11

## 2022-10-11 RX ORDER — BETAMETHASONE DIPROPIONATE 0.05 %
OINTMENT (GRAM) TOPICAL ONCE
Status: CANCELLED | OUTPATIENT
Start: 2022-10-11 | End: 2022-10-11

## 2022-10-11 RX ORDER — BACITRACIN, NEOMYCIN, POLYMYXIN B 400; 3.5; 5 [USP'U]/G; MG/G; [USP'U]/G
OINTMENT TOPICAL ONCE
Status: CANCELLED | OUTPATIENT
Start: 2022-10-11 | End: 2022-10-11

## 2022-10-11 RX ORDER — CLOBETASOL PROPIONATE 0.5 MG/G
OINTMENT TOPICAL ONCE
Status: CANCELLED | OUTPATIENT
Start: 2022-10-11 | End: 2022-10-11

## 2022-10-11 RX ORDER — LIDOCAINE 40 MG/G
CREAM TOPICAL ONCE
Status: CANCELLED | OUTPATIENT
Start: 2022-10-11 | End: 2022-10-11

## 2022-10-11 RX ORDER — LIDOCAINE 50 MG/G
OINTMENT TOPICAL ONCE
Status: CANCELLED | OUTPATIENT
Start: 2022-10-11 | End: 2022-10-11

## 2022-10-11 RX ORDER — GINSENG 100 MG
CAPSULE ORAL ONCE
Status: CANCELLED | OUTPATIENT
Start: 2022-10-11 | End: 2022-10-11

## 2022-10-11 NOTE — FLOWSHEET NOTE
Post debridement     10/11/22 1503   Wound 09/20/22 Pelvis Anterior; Left #1 left lower abdomen   Date First Assessed/Time First Assessed: 09/20/22 1411   Present on Hospital Admission: Yes  Wound Approximate Age at First Assessment (Weeks): 6 weeks  Primary Wound Type: Pressure Injury  Location: Pelvis  Wound Location Orientation: Anterior; Left  . .. Wound Image     Wound Etiology Pressure Stage 3   Dressing Status Old drainage noted; Intact   Wound Cleansed Cleansed with saline   Dressing/Treatment Collagen   Wound Length (cm) 0.8 cm   Wound Width (cm) 2.4 cm   Wound Depth (cm) 0.1 cm   Wound Surface Area (cm^2) 1.92 cm^2   Change in Wound Size % (l*w) 65.71   Wound Volume (cm^3) 0.192 cm^3   Wound Healing % 66   Post-Procedure Length (cm) 0.8 cm   Post-Procedure Width (cm) 2.4 cm   Post-Procedure Depth (cm) 0.1 cm   Post-Procedure Surface Area (cm^2) 1.92 cm^2   Post-Procedure Volume (cm^3) 0.192 cm^3   Wound Assessment Granulation tissue   Drainage Amount Small   Drainage Description Serosanguinous   Odor None   Abby-wound Assessment Intact   Margins Attached edges   Wound Thickness Description not for Pressure Injury Full thickness

## 2022-10-11 NOTE — FLOWSHEET NOTE
10/11/22 1503   Wound 09/20/22 Pelvis Anterior; Left #1 left lower abdomen   Date First Assessed/Time First Assessed: 09/20/22 1411   Present on Hospital Admission: Yes  Wound Approximate Age at First Assessment (Weeks): 6 weeks  Primary Wound Type: Pressure Injury  Location: Pelvis  Wound Location Orientation: Anterior; Left  . .. Wound Image    Wound Etiology Pressure Stage 3   Dressing Status Old drainage noted; Intact   Wound Cleansed Cleansed with saline   Dressing/Treatment Collagen   Wound Length (cm) 0.8 cm   Wound Width (cm) 2.4 cm   Wound Depth (cm) 0.1 cm   Wound Surface Area (cm^2) 1.92 cm^2   Change in Wound Size % (l*w) 65.71   Wound Volume (cm^3) 0.192 cm^3   Wound Healing % 66   Wound Assessment Granulation tissue   Drainage Amount Small   Drainage Description Serosanguinous   Odor None   Abby-wound Assessment Intact   Margins Attached edges   Wound Thickness Description not for Pressure Injury Full thickness

## 2022-10-11 NOTE — WOUND CARE
Discharge Instructions for  Pollo Chavez  [address] [facility] [address]  [address], [address]  Phone [phone]   Fax [phone]      NAME:  Luis Gifford OF BIRTH:  1945  MEDICAL RECORD NUMBER:  [id number]  DATE:  10/11/2022    Return Appointment:   1 week with José Miguel Zapata,     Instructions: Left lower abdomen  Cleanse wound and periwound with wound cleanser or normal saline. Skin Pre to periwound. Puracol ECM to wound bed. Secure with Bordered Foam.  Dressing changes 3x weekly. Pannus/Skin Fold:  Discontinue Nystatin Powder since fungal issue is resolved. May use Gold Bond Powder to maintain healthy skin in skin fold. May use 100% cotton cloth in the skin folds to  absorb moisture. Increase protein in diet to 60 g protein per day . .. as permitted by Nephrologist (Kidney Doctor). Should you experience increased redness, swelling, pain, foul odor, size of wound(s), or have a temperature over 101 degrees please contact the [address] [place] [address] Road at [phone] or if after hours contact your primary care physician or go to the hospital emergency department. PLEASE NOTE: IF YOU ARE UNABLE TO OBTAIN WOUND SUPPLIES, CONTINUE TO USE THE SUPPLIES YOU HAVE AVAILABLE UNTIL YOU ARE ABLE TO REACH US. IT IS MOST IMPORTANT TO KEEP THE WOUND COVERED AT ALL TIMES.     Electronically signed Tosha Verdin RN on 10/11/2022 at 3:36 PM

## 2022-10-11 NOTE — DISCHARGE INSTRUCTIONS
Discharge Instructions for  Pollo Chavez  [address] [place] [address] [address], [address]  Phone [phone]   Fax [phone]        NAME:  Ceci Castellanos OF BIRTH:  1945  MEDICAL RECORD NUMBER:  [id number]  DATE:  10/11/2022     Return Appointment:   1 week with Elyse Turner DO     Instructions: Left lower abdomen  Cleanse wound and periwound with wound cleanser or normal saline. Skin Pre to periwound. Puracol ECM to wound bed. Secure with Bordered Foam.  Dressing changes 3x weekly. Pannus/Skin Fold:  Discontinue Nystatin Powder since fungal issue is resolved. May use Gold Bond Powder to maintain healthy skin in skin fold. May use 100% cotton cloth in the skin folds to  absorb moisture. Increase protein in diet to 60 g protein per day . .. as permitted by Nephrologist (Kidney Doctor). Should you experience increased redness, swelling, pain, foul odor, size of wound(s), or have a temperature over 101 degrees please contact the [address] [place] [address] Road at [phone] or if after hours contact your primary care physician or go to the hospital emergency department. PLEASE NOTE: IF YOU ARE UNABLE TO OBTAIN WOUND SUPPLIES, CONTINUE TO USE THE SUPPLIES YOU HAVE AVAILABLE UNTIL YOU ARE ABLE TO REACH US. IT IS MOST IMPORTANT TO KEEP THE WOUND COVERED AT ALL TIMES.      Electronically signed Anastasiya Hammonds RN on 10/11/2022 at 3:36 PM

## 2022-10-13 NOTE — PROGRESS NOTES
Procedure Note  Indications: Based on my examination of this patient's wound(s)/ulcer(s) today, debridement is required to promote healing and evaluate the wound base. Wound is definitely improving. We will continue with the collagen and alginate. Continue good blood sugar control. Continue good excellent protein intake. Debridement: Selective Debridement/Non-Excisional Debridement    Using: curette the wound(s)/ulcer(s) was/were debrided down through and including the removal of epidermis, dermis, and subcutaneous tissue. Performed by: Sophia Corbin DO  Consent obtained: Yes  Time out taken: Yes  Pain Control:         Devitalized Tissue Debrided: fibrin, biofilm, and slough    Pre Debridement Measurements:  Are located in the [place]  Documentation Flow Sheet    Diabetic/Pressure/Non Pressure Ulcers only:  Ulcer: Non-Pressure ulcer, fat layer exposed     Wound/Ulcer #: 1  Post Debridement Measurements:  Wound/Ulcer Descriptions are Pre Debridement except measurements:  Wound 09/20/22 Pelvis Anterior; Left #1 left lower abdomen (Active)   Wound Image    10/11/22 1503   Wound Etiology Pressure Stage 3 10/11/22 1503   Dressing Status Old drainage noted; Intact 10/11/22 1503   Wound Cleansed Cleansed with saline 10/11/22 1503   Dressing/Treatment Collagen 10/11/22 1503   Wound Length (cm) 0.8 cm 10/11/22 1503   Wound Width (cm) 2.4 cm 10/11/22 1503   Wound Depth (cm) 0.1 cm 10/11/22 1503   Wound Surface Area (cm^2) 1.92 cm^2 10/11/22 1503   Change in Wound Size % (l*w) 65.71 10/11/22 1503   Wound Volume (cm^3) 0.192 cm^3 10/11/22 1503   Wound Healing % 66 10/11/22 1503   Post-Procedure Length (cm) 0.8 cm 10/11/22 1503   Post-Procedure Width (cm) 2.4 cm 10/11/22 1503   Post-Procedure Depth (cm) 0.1 cm 10/11/22 1503   Post-Procedure Surface Area (cm^2) 1.92 cm^2 10/11/22 1503   Post-Procedure Volume (cm^3) 0.192 cm^3 10/11/22 1503   Wound Assessment Granulation tissue 10/11/22 1503   Drainage Amount Small 10/11/22 1503   Drainage Description Serosanguinous 10/11/22 1503   Odor None 10/11/22 1503   Abby-wound Assessment Intact 10/11/22 1503   Margins Attached edges 10/11/22 1503   Wound Thickness Description not for Pressure Injury Full thickness 10/11/22 1503   Number of days: 23        Total Surface Area Debrided:  1.9 sq cm   Estimated Blood Loss: Minimal amount blood loss . Hemostasis Achieved:  by pressure  Procedural Pain: 0  / 10   Post Procedural Pain: 0 / 10   Response to treatment: Patient tolerated procedure well with no complaints of pain.

## 2022-10-18 ENCOUNTER — HOSPITAL ENCOUNTER (OUTPATIENT)
Dept: WOUND CARE | Age: 77
Discharge: HOME | End: 2022-10-18
Payer: MEDICARE

## 2022-10-18 VITALS
TEMPERATURE: 98.9 F | HEIGHT: 63 IN | RESPIRATION RATE: 18 BRPM | HEART RATE: 85 BPM | SYSTOLIC BLOOD PRESSURE: 159 MMHG | DIASTOLIC BLOOD PRESSURE: 79 MMHG | WEIGHT: 152 LBS | OXYGEN SATURATION: 98 % | BODY MASS INDEX: 26.93 KG/M2

## 2022-10-18 PROCEDURE — 97597 DBRDMT OPN WND 1ST 20 CM/<: CPT | Performed by: FAMILY MEDICINE

## 2022-10-18 PROCEDURE — 97597 DBRDMT OPN WND 1ST 20 CM/<: CPT

## 2022-10-18 NOTE — DISCHARGE INSTRUCTIONS
Return Appointment:   1 week with Nathan Summers DO        Instructions: Left lower abdomen  Cleanse wound and periwound with wound cleanser or normal saline. Puracol Ultra ECM to wound bed. Secure with Bordered Foam.  Dressing changes 3x weekly. May use 100% cotton cloth in the skin folds to  absorb moisture. Increase protein in diet to 60 g protein per day as permitted by Nephrologist (Kidney Doctor).   Obtain prescription for antiviral medication at pharmacy

## 2022-10-18 NOTE — FLOWSHEET NOTE
10/18/22 1441   Wound 09/20/22 Pelvis Anterior; Left #1 left lower abdomen   Date First Assessed/Time First Assessed: 09/20/22 1411   Present on Hospital Admission: Yes  Wound Approximate Age at First Assessment (Weeks): 6 weeks  Primary Wound Type: Pressure Injury  Location: Pelvis  Wound Location Orientation: Anterior; Left  . .. Wound Image     Wound Etiology Pressure Stage 3   Dressing Status Old drainage noted   Wound Cleansed Cleansed with saline   Dressing/Treatment Collagen;ABD;Tape change   Wound Length (cm) 0.5 cm   Wound Width (cm) 2.1 cm   Wound Depth (cm) 0.1 cm   Wound Surface Area (cm^2) 1.05 cm^2   Change in Wound Size % (l*w) 81.25   Wound Volume (cm^3) 0.105 cm^3   Wound Healing % 81   Post-Procedure Length (cm) 0.6 cm   Post-Procedure Width (cm) 2.2 cm   Post-Procedure Depth (cm) 0.1 cm   Post-Procedure Surface Area (cm^2) 1.32 cm^2   Post-Procedure Volume (cm^3) 0.132 cm^3   Wound Assessment Granulation tissue; Epithelialization;Slough   Drainage Amount Small   Drainage Description Serosanguinous   Odor None   Abby-wound Assessment Intact   Wound Thickness Description not for Pressure Injury Full thickness   Pain Assessment   Pain Assessment None - Denies Pain   Post-debridement photo and measurements as noted above

## 2022-10-18 NOTE — WOUND CARE
Discharge Instructions for  Pollo Chavez  [facility]  [address], [address]  Phone [phone]   Fax [phone]      NAME:  Sallie Aparicio OF BIRTH:  1945  MEDICAL RECORD NUMBER:  [id number]  DATE:  10/18/2022    Return Appointment:   1 week with Bette Celaya DO      Instructions: Left lower abdomen  Cleanse wound and periwound with wound cleanser or normal saline. Puracol Ultra ECM to wound bed. Secure with Bordered Foam.  Dressing changes 3x weekly. May use 100% cotton cloth in the skin folds to  absorb moisture. Increase protein in diet to 60 g protein per day as permitted by Nephrologist (Kidney Doctor). Obtain prescription for antiviral medication at pharmacy        Should you experience increased redness, swelling, pain, foul odor, size of wound(s), or have a temperature over 101 degrees please contact the [address] [place] [address] Road at [phone] or if after hours contact your primary care physician or go to the hospital emergency department. PLEASE NOTE: IF YOU ARE UNABLE TO OBTAIN WOUND SUPPLIES, CONTINUE TO USE THE SUPPLIES YOU HAVE AVAILABLE UNTIL YOU ARE ABLE TO REACH US. IT IS MOST IMPORTANT TO KEEP THE WOUND COVERED AT ALL TIMES.     Electronically signed Nahum Chauhan PT, [address] [facility] on 10/18/2022 at 3:27 PM

## 2022-10-18 NOTE — FLOWSHEET NOTE
10/18/22 1441   Wound 09/20/22 Pelvis Anterior; Left #1 left lower abdomen   Date First Assessed/Time First Assessed: 09/20/22 1411   Present on Hospital Admission: Yes  Wound Approximate Age at First Assessment (Weeks): 6 weeks  Primary Wound Type: Pressure Injury  Location: Pelvis  Wound Location Orientation: Anterior; Left  . .. Wound Image    Wound Etiology Pressure Stage 3   Dressing Status Old drainage noted   Wound Cleansed Cleansed with saline   Dressing/Treatment Collagen;ABD;Tape change   Wound Length (cm) 0.5 cm   Wound Width (cm) 2.1 cm   Wound Depth (cm) 0.1 cm   Wound Surface Area (cm^2) 1.05 cm^2   Change in Wound Size % (l*w) 81.25   Wound Volume (cm^3) 0.105 cm^3   Wound Healing % 81   Wound Assessment Granulation tissue; Epithelialization;Slough   Drainage Amount Small   Drainage Description Serosanguinous   Odor None   Abby-wound Assessment Intact   Wound Thickness Description not for Pressure Injury Full thickness   Pain Assessment   Pain Assessment None - Denies Pain   Patient is currently taking Aspirin 81 mg

## 2022-10-19 RX ORDER — VALACYCLOVIR HYDROCHLORIDE 1 G/1
500 TABLET, FILM COATED ORAL 3 TIMES DAILY
Qty: 21 TABLET | Refills: 0 | Status: SHIPPED | OUTPATIENT
Start: 2022-10-19 | End: 2022-10-26

## 2022-10-23 NOTE — PROGRESS NOTES
Procedure Note  Indications: Based on my examination of this patient's wound(s)/ulcer(s) today, debridement is required to promote healing and evaluate the wound base. Debridement: Selective Debridement/Non-Excisional Debridement    Using: curette the wound(s)/ulcer(s) was/were debrided down through and including the removal of epidermis and dermis. Performed by: Denisa Concepcion DO  Consent obtained: Yes  Time out taken: Yes  Pain Control:         Devitalized Tissue Debrided: fibrin, biofilm, and slough    Pre Debridement Measurements:  Are located in the [place]  Documentation Flow Sheet    Diabetic/Pressure/Non Pressure Ulcers only:  Ulcer: Non-Pressure ulcer, limited to breakdown of skin     Wound/Ulcer #: 1  Post Debridement Measurements:  Wound/Ulcer Descriptions are Pre Debridement except measurements:  Wound 09/20/22 Pelvis Anterior; Left #1 left lower abdomen (Active)   Wound Image    10/18/22 1441   Wound Etiology Pressure Stage 3 10/18/22 1441   Dressing Status Old drainage noted 10/18/22 1441   Wound Cleansed Cleansed with saline 10/18/22 1441   Dressing/Treatment Collagen;ABD;Tape change 10/18/22 1441   Wound Length (cm) 0.5 cm 10/18/22 1441   Wound Width (cm) 2.1 cm 10/18/22 1441   Wound Depth (cm) 0.1 cm 10/18/22 1441   Wound Surface Area (cm^2) 1.05 cm^2 10/18/22 1441   Change in Wound Size % (l*w) 81.25 10/18/22 1441   Wound Volume (cm^3) 0.105 cm^3 10/18/22 1441   Wound Healing % 81 10/18/22 1441   Post-Procedure Length (cm) 0.6 cm 10/18/22 1441   Post-Procedure Width (cm) 2.2 cm 10/18/22 1441   Post-Procedure Depth (cm) 0.1 cm 10/18/22 1441   Post-Procedure Surface Area (cm^2) 1.32 cm^2 10/18/22 1441   Post-Procedure Volume (cm^3) 0.132 cm^3 10/18/22 1441   Wound Assessment Granulation tissue; Epithelialization;Slough 10/18/22 1441   Drainage Amount Small 10/18/22 1441   Drainage Description Serosanguinous 10/18/22 1441   Odor None 10/18/22 1441   Abby-wound Assessment Intact 10/18/22 1441 Margins Attached edges 10/11/22 1503   Wound Thickness Description not for Pressure Injury Full thickness 10/18/22 1441   Number of days: 32        Total Surface Area Debrided:  1 sq cm   Estimated Blood Loss: Minimal amount blood loss . Hemostasis Achieved:  by pressure  Procedural Pain: 0  / 10   Post Procedural Pain: 0 / 10   Response to treatment: Patient tolerated procedure well with no complaints of pain.

## 2022-10-25 ENCOUNTER — HOSPITAL ENCOUNTER (OUTPATIENT)
Dept: WOUND CARE | Age: 77
Discharge: HOME | End: 2022-10-25
Payer: MEDICARE

## 2022-10-25 VITALS
WEIGHT: 150 LBS | RESPIRATION RATE: 18 BRPM | HEART RATE: 82 BPM | SYSTOLIC BLOOD PRESSURE: 140 MMHG | BODY MASS INDEX: 27.6 KG/M2 | TEMPERATURE: 98.8 F | HEIGHT: 62 IN | DIASTOLIC BLOOD PRESSURE: 71 MMHG

## 2022-10-25 PROCEDURE — 99213 OFFICE O/P EST LOW 20 MIN: CPT

## 2022-10-25 PROCEDURE — 99213 OFFICE O/P EST LOW 20 MIN: CPT | Performed by: FAMILY MEDICINE

## 2022-10-25 NOTE — PROGRESS NOTES
[address] and [address] [facility]   Medical Staff Progress Note     Sal Pereyra  MEDICAL RECORD NUMBER:  [id number]  AGE: 78 y.o. GENDER: female  : 1945  EPISODE DATE:  10/25/2022    Chief complaint and reason for visit:     Chief Complaint   Patient presents with    Wound Check     Abdominal wound      Patient presenting for follow up evaluation of wound(s) per chief complaint. Patient presents today with . Very minimal drainage on left abdomen. She finished the medications for shingles and has no pain right now. Rash is drying up. Subjective and ROS: Symptoms, wound related issues, or other pertinent wound history since last visit: no new wound care issues. Tolerating current treatment. No systemic complaints above baseline. History of Wound Context: Per original history and physical on this patient. Changes in history since last evaluation: none    Medical Decision Making:     Problem List Items Addressed This Visit    None      Wounds and Treatment Plan:  Abdominal wound is almost healed. Has scattered epithelial cells so no debridement today. We will utilize Xeroform gauze on this daily over the next week. This may be resolved next week. Shingles rash is improving and finish course of antiviral medication. Other associated diagnoses or problems addressed:  Diabetes and history of kidney transplant    Pertinent imaging reviewed including independent interpretation include:  None    Pertinent labs reviewed. Medical records and review of external note (s) from other providers done as well. New lab or imaging orders placed:  None     Prescription drug management: N/A     Discussion of management or test interpretation with N/A. Comorbid conditions affecting wound healing: As per PMH which was reviewed. Risk of complications and/or mortality of patient management:   This patient has a minimal risk of morbidity and mortality from additional diagnostic testing or treatment. This is due to the above conditions affecting wound healing as well as patient and procedure risk factors. Education and discussion held with patient regarding these disease processes pertinent to wound(s). Other pertinent decisions include: minor surgery or procedures as below. The patient's diagnosis or treatment is  significantly limited by social determinants of health as noted by: nutritional resource limitations . Wound 09/20/22 Pelvis Anterior; Left #1 left lower abdomen (Active)   Wound Image   10/25/22 1442   Wound Etiology Pressure Stage 3 10/25/22 1442   Dressing Status Old drainage noted 10/25/22 1442   Wound Cleansed Cleansed with saline 10/25/22 1442   Dressing/Treatment Collagen 10/25/22 1442   Wound Length (cm) 0.5 cm 10/25/22 1442   Wound Width (cm) 1.5 cm 10/25/22 1442   Wound Depth (cm) 0.1 cm 10/25/22 1442   Wound Surface Area (cm^2) 0.75 cm^2 10/25/22 1442   Change in Wound Size % (l*w) 86.61 10/25/22 1442   Wound Volume (cm^3) 0.075 cm^3 10/25/22 1442   Wound Healing % 87 10/25/22 1442   Post-Procedure Length (cm) 0.6 cm 10/18/22 1441   Post-Procedure Width (cm) 2.2 cm 10/18/22 1441   Post-Procedure Depth (cm) 0.1 cm 10/18/22 1441   Post-Procedure Surface Area (cm^2) 1.32 cm^2 10/18/22 1441   Post-Procedure Volume (cm^3) 0.132 cm^3 10/18/22 1441   Wound Assessment Epithelialization;Pink/red 10/25/22 1442   Drainage Amount Moderate 10/25/22 1442   Drainage Description Serous 10/25/22 1442   Odor None 10/25/22 1442   Abby-wound Assessment Intact 10/25/22 1442   Margins Attached edges 10/11/22 1503   Wound Thickness Description not for Pressure Injury Full thickness 10/25/22 1442   Number of days: 35          TIME: E/M Time spent with patient and/or patient care issues: [] 15-20 min  [x] 21-30 min  [] 31-44 min  [] 45 min or more.    This is above the usual time needed to address patient's chief complaint today: [x] Yes  [] No  This time includes physician non-face-to-face service time visit on the date of service such as  Preparing to see the patient (eg, review of tests)  Obtaining and/or reviewing separately obtained history  Performing a medically necessary appropriate examination and/or evaluation  Counseling and educating the patient/family/caregiver  Ordering medications, tests, or procedures  Referring and communicating with other health care professionals as needed  Documenting clinical information in the electronic or other health record  Independently interpreting results (not reported separately) and communicating results to the patient/family/caregiver  Care coordination (not reported separately)    Objective:    BP (!) 140/71   Pulse 82   Temp 98.8 °F (37.1 °C) (Oral)   Resp 18   Ht 5' 2\" (1.575 m)   Wt 150 lb (68 kg)   BMI 27.44 kg/m²   Wt Readings from Last 3 Encounters:   10/25/22 150 lb (68 kg)   10/18/22 152 lb (68.9 kg)   10/11/22 151 lb (68.5 kg)       PHYSICAL EXAM  General: Alert and in no acute distress. Normal appearing  Skin: Warm and dry, no rash, see picture of abdominal wound above. No erythema. Epithelialization over most of the wound. Shingles rash on lower left buttocks and left leg are improving and drying up. Head: Normocephalic and atraumatic  Eyes: Extraocular eye movements intact, conjunctivae normal, and sclera anicteric  ENT: Hearing grossly normal bilaterally. Normal appearance  Respiratory: no chest wall tenderness. no respiratory distress  GI: Abdomen non-tender and benign  Musculoskeletal: Baseline range of motion in joints. Nontender calves. No cyanosis. Edema trace. Neurologic: Speech normal. At baseline without new focal deficits.  Mental status normal or at baseline    PAST MEDICAL HISTORY        Diagnosis Date    Carotid artery disease ([facility] [address]     Chronic kidney disease     dialysis patient mon wed and fri    Diabetes mellitus, type II ([address]     Dyspepsia and other specified disorders of function of stomach     Gout     Hypercholesterolemia     Hypertension     Hypothyroidism     Menopausal syndrome     Neuropathy     Renal failure     s/p kidney transplant x2    Right shoulder pain     Thyroid nodule        PAST SURGICAL HISTORY    Past Surgical History:   Procedure Laterality Date    CATARACT REMOVAL Right     CATARACT REMOVAL Left     COLONOSCOPY N/A 2022    COLONOSCOPY/ BMI 29 ROOM 442 performed by Fatoumata Wayne MD at [address] [place] [address]      right knee surgery after an accident, has pins. ORTHOPEDIC SURGERY      left rotator cuff, bilateral shoulder surg., rt. knee, left index finger    PARTIAL HYSTERECTOMY (CERVIX NOT REMOVED)      TRANSPLANT      kidney rejected in 1994    UROLOGICAL SURGERY      [place] [address]      fistula for dialysis, rt. carotid artery    VASCULAR SURGERY      right forearm.        FAMILY HISTORY    Family History   Problem Relation Age of Onset    Heart Disease Mother          age 46    Diabetes Sister     Lupus Sister     Cancer Brother         colon    Diabetes Sister     Alcohol Abuse Father     Liver Disease Father     Stroke Mother     Hypertension Mother        SOCIAL HISTORY    Social History     Tobacco Use    Smoking status: Never    Smokeless tobacco: Never   Substance Use Topics    Alcohol use: No    Drug use: No       ALLERGIES    Allergies   Allergen Reactions    Lisinopril Other (See Comments)     cough       MEDICATIONS    Current Outpatient Medications on File Prior to Encounter   Medication Sig Dispense Refill    valACYclovir (VALTREX) 1 g tablet Take 0.5 tablets by mouth 3 times daily for 7 days 21 tablet 0    rosuvastatin (CRESTOR) 10 MG tablet TAKE 1 TABLET BY MOUTH EVERY NIGHT 90 tablet 1    loratadine (CLARITIN) 10 MG tablet TAKE 1 TABLET BY MOUTH DAILY FOR 14 DAYS      lactobacillus acidophilus (FLORANEX) Take 2 tablets by mouth daily With food (good bacteria supplement) 60 tablet 0    nystatin (MYCOSTATIN) 767020 UNIT/GM powder Apply topically 4 times daily to skin folds to abdomen for rash. 932 g 2    TRULICITY 1.5 IG/0.3LO SOPN Inject 1.5 mg into the skin once a week 12 pen 3    HUMALOG KWIKPEN 100 UNIT/ML SOPN 4 units AC brunch plus correction 1/50>200, max daily dose 30 units 30 mL 3    insulin NPH (HUMULIN N KWIKPEN) 100 UNIT/ML injection pen Inject 18 Units into the skin every morning 54 mL 3    B Complex Vitamins (VITAMIN B COMPLEX PO) Take 1 tablet by mouth daily      ascorbic acid (VITAMIN C) 500 MG tablet Take by mouth      aspirin 81 MG EC tablet Take by mouth daily      belatacept (NULOJIX) 250 MG SOLR Infuse 500 mg intravenously      carvedilol (COREG) 12.5 MG tablet TAKE 1 TABLET BY MOUTH TWICE DAILY      vitamin D 25 MCG (1000 UT) CAPS Take 1,000 Units by mouth daily      cyanocobalamin 250 MCG tablet Take 1 tablet by mouth daily      Glucagon (GVOKE HYPOPEN 2-PACK) 1 MG/0.2ML SOAJ Inject 1 mg into the skin as needed      mycophenolate (CELLCEPT) 250 MG capsule TK 3 CS PO BID      predniSONE (DELTASONE) 5 MG tablet Take 5 mg by mouth daily       No current facility-administered medications on file prior to encounter. Written patient dismissal instructions given to patient and signed by patient or POA.          Electronically signed by Navarro Dupree DO on 10/25/2022 at 3:21 PM

## 2022-11-01 ENCOUNTER — HOSPITAL ENCOUNTER (OUTPATIENT)
Dept: WOUND CARE | Age: 77
Discharge: HOME | End: 2022-11-01
Payer: MEDICARE

## 2022-11-01 VITALS
RESPIRATION RATE: 18 BRPM | BODY MASS INDEX: 29.26 KG/M2 | HEART RATE: 65 BPM | WEIGHT: 159 LBS | HEIGHT: 62 IN | DIASTOLIC BLOOD PRESSURE: 75 MMHG | TEMPERATURE: 98.7 F | OXYGEN SATURATION: 99 % | SYSTOLIC BLOOD PRESSURE: 159 MMHG

## 2022-11-01 DIAGNOSIS — S31.109A OPEN WOUND OF ABDOMINAL WALL, INITIAL ENCOUNTER: Primary | ICD-10-CM

## 2022-11-01 PROCEDURE — 17250 CHEM CAUT OF GRANLTJ TISSUE: CPT

## 2022-11-01 PROCEDURE — 17250 CHEM CAUT OF GRANLTJ TISSUE: CPT | Performed by: FAMILY MEDICINE

## 2022-11-01 RX ORDER — LIDOCAINE HYDROCHLORIDE 40 MG/ML
SOLUTION TOPICAL ONCE
OUTPATIENT
Start: 2022-11-01 | End: 2022-11-01

## 2022-11-01 RX ORDER — CLOBETASOL PROPIONATE 0.5 MG/G
OINTMENT TOPICAL ONCE
OUTPATIENT
Start: 2022-11-01 | End: 2022-11-01

## 2022-11-01 RX ORDER — LIDOCAINE 40 MG/G
CREAM TOPICAL ONCE
OUTPATIENT
Start: 2022-11-01 | End: 2022-11-01

## 2022-11-01 RX ORDER — LIDOCAINE 50 MG/G
OINTMENT TOPICAL ONCE
OUTPATIENT
Start: 2022-11-01 | End: 2022-11-01

## 2022-11-01 RX ORDER — LIDOCAINE HYDROCHLORIDE 20 MG/ML
JELLY TOPICAL ONCE
Status: COMPLETED | OUTPATIENT
Start: 2022-11-01 | End: 2022-11-01

## 2022-11-01 RX ORDER — LIDOCAINE HYDROCHLORIDE 20 MG/ML
JELLY TOPICAL ONCE
OUTPATIENT
Start: 2022-11-01 | End: 2022-11-01

## 2022-11-01 RX ORDER — GENTAMICIN SULFATE 1 MG/G
OINTMENT TOPICAL ONCE
OUTPATIENT
Start: 2022-11-01 | End: 2022-11-01

## 2022-11-01 RX ORDER — GINSENG 100 MG
CAPSULE ORAL ONCE
OUTPATIENT
Start: 2022-11-01 | End: 2022-11-01

## 2022-11-01 RX ORDER — BACITRACIN, NEOMYCIN, POLYMYXIN B 400; 3.5; 5 [USP'U]/G; MG/G; [USP'U]/G
OINTMENT TOPICAL ONCE
OUTPATIENT
Start: 2022-11-01 | End: 2022-11-01

## 2022-11-01 RX ORDER — BACITRACIN ZINC AND POLYMYXIN B SULFATE 500; 1000 [USP'U]/G; [USP'U]/G
OINTMENT TOPICAL ONCE
OUTPATIENT
Start: 2022-11-01 | End: 2022-11-01

## 2022-11-01 RX ORDER — BETAMETHASONE DIPROPIONATE 0.05 %
OINTMENT (GRAM) TOPICAL ONCE
OUTPATIENT
Start: 2022-11-01 | End: 2022-11-01

## 2022-11-01 RX ADMIN — LIDOCAINE HYDROCHLORIDE: 20 JELLY TOPICAL at 15:57

## 2022-11-01 NOTE — WOUND CARE
Discharge Instructions for  Pollo Chavez  [address]  [address], [address]  Phone [phone]   Fax [phone]      NAME:  Bryson Rueda OF BIRTH:  1945  MEDICAL RECORD NUMBER:  [id number]  DATE:  11/1/2022    Return Appointment:   2 weeks with Erickson Suarez, DO      Instructions:   Left lower abdomen  Cleanse wound and periwound with wound cleanser or normal saline. Xeroform- apply to wound bed. Secure with Bordered Foam.  Dressing changes 3x weekly. May use 100% cotton cloth in the skin folds to  absorb moisture. Increase protein in diet to 60 g protein per day as permitted by Nephrologist (Kidney Doctor). Silver Nitrate applied today. Should you experience increased redness, swelling, pain, foul odor, size of wound(s), or have a temperature over 101 degrees please contact the [address] [place] [address] Road at [phone] or if after hours contact your primary care physician or go to the hospital emergency department. PLEASE NOTE: IF YOU ARE UNABLE TO OBTAIN WOUND SUPPLIES, CONTINUE TO USE THE SUPPLIES YOU HAVE AVAILABLE UNTIL YOU ARE ABLE TO REACH US. IT IS MOST IMPORTANT TO KEEP THE WOUND COVERED AT ALL TIMES. Electronically signed Sarah Cooper on 11/1/2022 at 3:17 PM

## 2022-11-01 NOTE — DISCHARGE INSTRUCTIONS
Left lower abdomen  Cleanse wound and periwound with wound cleanser or normal saline. Xeroform- apply to wound bed. Secure with Bordered Foam.  Dressing changes 3x weekly. May use 100% cotton cloth in the skin folds to  absorb moisture. Increase protein in diet to 60 g protein per day as permitted by Nephrologist (Kidney Doctor). Silver Nitrate applied today.

## 2022-11-01 NOTE — FLOWSHEET NOTE
11/01/22 1502   Wound 09/20/22 Pelvis Anterior; Left #1 left lower abdomen   Date First Assessed/Time First Assessed: 09/20/22 1411   Present on Hospital Admission: Yes  Wound Approximate Age at First Assessment (Weeks): 6 weeks  Primary Wound Type: Pressure Injury  Location: Pelvis  Wound Location Orientation: Anterior; Left  . .. Wound Image    Wound Etiology Pressure Stage 3   Dressing Status New drainage noted   Wound Cleansed Cleansed with saline   Dressing/Treatment   (Bordered Foam)   Wound Length (cm) 0.2 cm   Wound Width (cm) 0.5 cm   Wound Depth (cm) 0.1 cm   Wound Surface Area (cm^2) 0.1 cm^2   Change in Wound Size % (l*w) 98.21   Wound Volume (cm^3) 0.01 cm^3   Wound Healing % 98   Wound Assessment Epithelialization;Granulation tissue   Drainage Amount Small   Drainage Description Serosanguinous   Odor None   Abby-wound Assessment Fragile  (New Skin)   Margins Attached edges   Wound Thickness Description not for Pressure Injury Full thickness   Pain Assessment   Pain Assessment None - Denies Pain   Takes ASA occasionally.

## 2022-11-06 NOTE — PROGRESS NOTES
Chemical Cautery  Performed by: Ishaan Wilks DO  Consent obtained: Yes  Time out taken: Yes  Tissue Type: Hypergranulation  Pain Control:     Method: silver nitrate    Location of Chemical Cautery:   Wound/Ulcer #1  Procedural Pain: 0  / 10   Post Procedural Pain: 0 / 10   Response to treatment: Patient tolerated procedure well with no complaints of pain.

## 2022-12-06 ENCOUNTER — HOSPITAL ENCOUNTER (OUTPATIENT)
Dept: WOUND CARE | Age: 77
Discharge: HOME | End: 2022-12-06
Payer: MEDICARE

## 2022-12-06 VITALS
HEIGHT: 62 IN | TEMPERATURE: 97.5 F | BODY MASS INDEX: 28.89 KG/M2 | SYSTOLIC BLOOD PRESSURE: 137 MMHG | WEIGHT: 157 LBS | HEART RATE: 78 BPM | DIASTOLIC BLOOD PRESSURE: 81 MMHG

## 2022-12-06 DIAGNOSIS — S31.109A OPEN WOUND OF ABDOMINAL WALL, INITIAL ENCOUNTER: Primary | ICD-10-CM

## 2022-12-06 PROCEDURE — 99212 OFFICE O/P EST SF 10 MIN: CPT

## 2022-12-06 RX ORDER — CLOBETASOL PROPIONATE 0.5 MG/G
OINTMENT TOPICAL ONCE
Status: CANCELLED | OUTPATIENT
Start: 2022-12-06 | End: 2022-12-06

## 2022-12-06 RX ORDER — LIDOCAINE HYDROCHLORIDE 20 MG/ML
JELLY TOPICAL ONCE
Status: CANCELLED | OUTPATIENT
Start: 2022-12-06 | End: 2022-12-06

## 2022-12-06 RX ORDER — GINSENG 100 MG
CAPSULE ORAL ONCE
Status: CANCELLED | OUTPATIENT
Start: 2022-12-06 | End: 2022-12-06

## 2022-12-06 RX ORDER — GENTAMICIN SULFATE 1 MG/G
OINTMENT TOPICAL ONCE
Status: CANCELLED | OUTPATIENT
Start: 2022-12-06 | End: 2022-12-06

## 2022-12-06 RX ORDER — BACITRACIN, NEOMYCIN, POLYMYXIN B 400; 3.5; 5 [USP'U]/G; MG/G; [USP'U]/G
OINTMENT TOPICAL ONCE
Status: CANCELLED | OUTPATIENT
Start: 2022-12-06 | End: 2022-12-06

## 2022-12-06 RX ORDER — LIDOCAINE 50 MG/G
OINTMENT TOPICAL ONCE
Status: CANCELLED | OUTPATIENT
Start: 2022-12-06 | End: 2022-12-06

## 2022-12-06 RX ORDER — LIDOCAINE 40 MG/G
CREAM TOPICAL ONCE
Status: CANCELLED | OUTPATIENT
Start: 2022-12-06 | End: 2022-12-06

## 2022-12-06 RX ORDER — BETAMETHASONE DIPROPIONATE 0.05 %
OINTMENT (GRAM) TOPICAL ONCE
Status: CANCELLED | OUTPATIENT
Start: 2022-12-06 | End: 2022-12-06

## 2022-12-06 RX ORDER — BACITRACIN ZINC AND POLYMYXIN B SULFATE 500; 1000 [USP'U]/G; [USP'U]/G
OINTMENT TOPICAL ONCE
Status: CANCELLED | OUTPATIENT
Start: 2022-12-06 | End: 2022-12-06

## 2022-12-06 RX ORDER — LIDOCAINE HYDROCHLORIDE 40 MG/ML
SOLUTION TOPICAL ONCE
Status: CANCELLED | OUTPATIENT
Start: 2022-12-06 | End: 2022-12-06

## 2022-12-06 NOTE — FLOWSHEET NOTE
12/06/22 1133   Wound 09/20/22 Pelvis Anterior; Left #1 left lower abdomen   Date First Assessed/Time First Assessed: 09/20/22 1411   Present on Hospital Admission: Yes  Wound Approximate Age at First Assessment (Weeks): 6 weeks  Primary Wound Type: Pressure Injury  Location: Pelvis  Wound Location Orientation: Anterior; Left  . ..    Wound Image    Wound Etiology Pressure Stage 3   Dressing Status Dry   Wound Cleansed Cleansed with saline   Dressing/Treatment Xeroform   Wound Length (cm) 0 cm   Wound Width (cm) 0 cm   Wound Depth (cm) 0 cm   Wound Surface Area (cm^2) 0 cm^2   Change in Wound Size % (l*w) 100   Wound Volume (cm^3) 0 cm^3   Wound Healing % 100   Wound Assessment Epithelialization   Drainage Amount None   Odor None   Abby-wound Assessment Intact

## 2022-12-06 NOTE — WOUND CARE
Discharge Instructions for  Pollo Chavez  [address] [place] [address] [address], [address] [facility]  Phone [phone]   Fax [phone]      NAME:  Carlitos Bushant OF BIRTH:  1945  MEDICAL RECORD NUMBER:  [id number]  DATE:  12/6/2022    Return Appointment:   Discharge with Ervin Denny DO      Instructions: left lower abdomen wound is healed    Apply vaseline to area daily for 1 month        Should you experience increased redness, swelling, pain, foul odor, size of wound(s), or have a temperature over 101 degrees please contact the [address] [place] [address] Road at [phone] or if after hours contact your primary care physician or go to the hospital emergency department. PLEASE NOTE: IF YOU ARE UNABLE TO OBTAIN WOUND SUPPLIES, CONTINUE TO USE THE SUPPLIES YOU HAVE AVAILABLE UNTIL YOU ARE ABLE TO REACH US. IT IS MOST IMPORTANT TO KEEP THE WOUND COVERED AT ALL TIMES.     Electronically signed Maria Guadalupe Ji RN on 12/6/2022 at 11:47 AM

## 2022-12-09 NOTE — PROGRESS NOTES
[address] [facility]   Medical Staff Progress Note     Soham Valdes  MEDICAL RECORD NUMBER:  [id number]  AGE: 78 y.o. GENDER: female  : 1945  EPISODE DATE:  2022    Chief complaint and reason for visit:     Chief Complaint   Patient presents with    Wound Check      Patient presenting for follow up evaluation of wound(s) per chief complaint. Subjective: Symptoms, wound related issues, or other pertinent wound history since last visit: Here follow-up regarding abdominal wound. There is no drainage on this any longer. Here with . Medical Decision Making:     Problem List Items Addressed This Visit          Other    Open abdominal wall wound - Primary       Wounds and Treatment Plan:  Is healed. Recommend Vaseline twice a day over the next several weeks. Return if any problems reopen. Other associated diagnoses or problems addressed:  ABD same chronic renal disease. Pertinent imaging reviewed including independent interpretation include:  No imaging new. Pertinent labs reviewed. Medical records and review of external note (s) from other providers done as well. New lab or imaging orders placed:  No new labs. Prescription drug management: N/A     Discussion of management or test interpretation with N/A. Comorbid conditions affecting wound healing: As per PMH which was reviewed. Risk of complications and/or mortality of patient management: This patient has a minimal risk of morbidity and mortality from additional diagnostic testing or treatment. This is due to the above conditions affecting wound healing as well as patient and procedure risk factors. Education and discussion held with patient regarding these disease processes pertinent to wound(s). Other pertinent decisions include: N/A .   The patient's diagnosis or treatment is  significantly limited by social determinants of health as noted by: nutritional resource limitations . TIME: E/M Time spent with patient and/or patient care issues: [] 15-20 min  [] 21-30 min  [] 31-44 min  [] 45 min or more. This is above the usual time needed to address patient's chief complaint today: [] Yes  [] No  This time includes physician non-face-to-face service time visit on the date of service such as  Preparing to see the patient (eg, review of tests)  Obtaining and/or reviewing separately obtained history  Performing a medically necessary appropriate examination and/or evaluation  Counseling and educating the patient/family/caregiver  Ordering medications, tests, or procedures  Referring and communicating with other health care professionals as needed  Documenting clinical information in the electronic or other health record  Independently interpreting results (not reported separately) and communicating results to the patient/family/caregiver  Care coordination (not reported separately)    HISTORY of PRESENT ILLNESS ALESHA Conroy is a 78 y.o. female who presents today for wound/ulcer evaluation. History of Wound Context: Per original history and physical on this patient. Changes in history since last evaluation: Wound has healed. No new issues. Objective:    /81   Pulse 78   Temp 97.5 °F (36.4 °C) (Temporal)   Ht 5' 2\" (1.575 m)   Wt 157 lb (71.2 kg)   BMI 28.72 kg/m²   Wt Readings from Last 3 Encounters:   12/06/22 157 lb (71.2 kg)   11/01/22 159 lb (72.1 kg)   10/25/22 150 lb (68 kg)       PHYSICAL EXAM  General: Alert and in no acute distress. Normal appearing  Skin: Warm and dry, no rash  Head: Normocephalic and atraumatic  Eyes: Extraocular eye movements intact, conjunctivae normal, and sclera anicteric  ENT: Hearing grossly normal bilaterally. Normal appearance  Respiratory: no chest wall tenderness. no respiratory distress  GI: Abdomen non-tender and benign  Musculoskeletal: Baseline range of motion in joints.  Nontender calves. No cyanosis. Edema trace. Neurologic: Speech normal. At baseline without new focal deficits. Mental status normal or at baseline    PAST MEDICAL HISTORY        Diagnosis Date    Carotid artery disease ([facility] [address]     Chronic kidney disease     dialysis patient mon wed and fri    Diabetes mellitus, type II ([facility] [address]     Dyspepsia and other specified disorders of function of stomach     Gout     Hypercholesterolemia     Hypertension     Hypothyroidism     Menopausal syndrome     Neuropathy     Renal failure     s/p kidney transplant x2    Right shoulder pain     Thyroid nodule        PAST SURGICAL HISTORY    Past Surgical History:   Procedure Laterality Date    CATARACT REMOVAL Right     CATARACT REMOVAL Left     COLONOSCOPY N/A 2022    COLONOSCOPY/ BMI 29 ROOM 442 performed by Steven Reeves MD at [address] [place] [address]      right knee surgery after an accident, has pins. ORTHOPEDIC SURGERY      left rotator cuff, bilateral shoulder surg., rt. knee, left index finger    PARTIAL HYSTERECTOMY (CERVIX NOT REMOVED)      TRANSPLANT      kidney rejected in 1994    UROLOGICAL SURGERY      [place] [address]      fistula for dialysis, rt. carotid artery    VASCULAR SURGERY      right forearm.        FAMILY HISTORY    Family History   Problem Relation Age of Onset    Heart Disease Mother          age 46    Diabetes Sister     Lupus Sister     Cancer Brother         colon    Diabetes Sister     Alcohol Abuse Father     Liver Disease Father     Stroke Mother     Hypertension Mother        SOCIAL HISTORY    Social History     Tobacco Use    Smoking status: Never    Smokeless tobacco: Never   Substance Use Topics    Alcohol use: No    Drug use: No       ALLERGIES    Allergies   Allergen Reactions    Lisinopril Other (See Comments)     cough       MEDICATIONS    Current Outpatient Medications on File Prior to Encounter   Medication Sig Dispense Refill rosuvastatin (CRESTOR) 10 MG tablet TAKE 1 TABLET BY MOUTH EVERY NIGHT 90 tablet 1    loratadine (CLARITIN) 10 MG tablet TAKE 1 TABLET BY MOUTH DAILY FOR 14 DAYS      lactobacillus acidophilus (FLORANEX) Take 2 tablets by mouth daily With food (good bacteria supplement) 60 tablet 0    nystatin (MYCOSTATIN) 584689 UNIT/GM powder Apply topically 4 times daily to skin folds to abdomen for rash. 713 g 2    TRULICITY 1.5 LP/0.6YG SOPN Inject 1.5 mg into the skin once a week 12 pen 3    HUMALOG KWIKPEN 100 UNIT/ML SOPN 4 units AC brunch plus correction 1/50>200, max daily dose 30 units 30 mL 3    insulin NPH (HUMULIN N KWIKPEN) 100 UNIT/ML injection pen Inject 18 Units into the skin every morning 54 mL 3    B Complex Vitamins (VITAMIN B COMPLEX PO) Take 1 tablet by mouth daily      ascorbic acid (VITAMIN C) 500 MG tablet Take by mouth      aspirin 81 MG EC tablet Take by mouth daily      belatacept (NULOJIX) 250 MG SOLR Infuse 500 mg intravenously      carvedilol (COREG) 12.5 MG tablet TAKE 1 TABLET BY MOUTH TWICE DAILY      vitamin D 25 MCG (1000 UT) CAPS Take 1,000 Units by mouth daily      cyanocobalamin 250 MCG tablet Take 1 tablet by mouth daily      Glucagon (GVOKE HYPOPEN 2-PACK) 1 MG/0.2ML SOAJ Inject 1 mg into the skin as needed      mycophenolate (CELLCEPT) 250 MG capsule TK 3 CS PO BID      predniSONE (DELTASONE) 5 MG tablet Take 5 mg by mouth daily       No current facility-administered medications on file prior to encounter. REVIEW OF SYSTEMS  A comprehensive review of systems was negative except for what has been indicated above and: Wound has healed. No new issues. Written patient dismissal instructions given to patient and signed by patient or POA.          Discharge Instructions         Discharge Instructions for  [facility]  [address] [facility] [address]  [address], [address]  Phone [phone]   Fax [phone]      NAME:  Rena Us  DATE OF BIRTH: 1945  MEDICAL RECORD NUMBER:  [id number]  DATE:  @ED@    Return Appointment:   Discharge with Nrom Smart DO      Instructions: left lower abdomen wound is healed    Apply vaseline to area daily for 1 month        Should you experience increased redness, swelling, pain, foul odor, size of wound(s), or have a temperature over 101 degrees please contact the [address] [place] [address] Road at [phone] or if after hours contact your primary care physician or go to the hospital emergency department. PLEASE NOTE: IF YOU ARE UNABLE TO OBTAIN WOUND SUPPLIES, CONTINUE TO USE THE SUPPLIES YOU HAVE AVAILABLE UNTIL YOU ARE ABLE TO REACH US. IT IS MOST IMPORTANT TO KEEP THE WOUND COVERED AT ALL TIMES.     Electronically signed Garry Metcalf RN on 12/6/2022 at 11:48 AM          Electronically signed by Mehdi Dill DO on 12/9/2022 at 3:13 PM

## 2023-01-13 ENCOUNTER — NURSE ONLY (OUTPATIENT)
Dept: FAMILY MEDICINE CLINIC | Facility: CLINIC | Age: 78
End: 2023-01-13
Payer: MEDICARE

## 2023-01-13 ENCOUNTER — NURSE ONLY (OUTPATIENT)
Dept: ENDOCRINOLOGY | Age: 78
End: 2023-01-13
Payer: MEDICARE

## 2023-01-13 DIAGNOSIS — Z79.4 TYPE 2 DIABETES MELLITUS WITH HYPERGLYCEMIA, WITH LONG-TERM CURRENT USE OF INSULIN (HCC): Primary | ICD-10-CM

## 2023-01-13 DIAGNOSIS — E11.65 TYPE 2 DIABETES MELLITUS WITH HYPERGLYCEMIA, WITH LONG-TERM CURRENT USE OF INSULIN (HCC): Primary | ICD-10-CM

## 2023-01-13 DIAGNOSIS — Z23 NEED FOR INFLUENZA VACCINATION: Primary | ICD-10-CM

## 2023-01-13 LAB — HBA1C MFR BLD: 7.3 %

## 2023-01-13 PROCEDURE — 90674 CCIIV4 VAC NO PRSV 0.5 ML IM: CPT | Performed by: FAMILY MEDICINE

## 2023-01-13 PROCEDURE — 83036 HEMOGLOBIN GLYCOSYLATED A1C: CPT | Performed by: PHYSICIAN ASSISTANT

## 2023-01-13 PROCEDURE — G0008 ADMIN INFLUENZA VIRUS VAC: HCPCS | Performed by: FAMILY MEDICINE

## 2023-02-28 ENCOUNTER — TELEMEDICINE (OUTPATIENT)
Dept: ENDOCRINOLOGY | Age: 78
End: 2023-02-28
Payer: MEDICARE

## 2023-02-28 DIAGNOSIS — E11.65 UNCONTROLLED TYPE 2 DIABETES MELLITUS WITH HYPERGLYCEMIA (HCC): Primary | ICD-10-CM

## 2023-02-28 DIAGNOSIS — E78.00 HYPERCHOLESTEROLEMIA: ICD-10-CM

## 2023-02-28 DIAGNOSIS — I10 PRIMARY HYPERTENSION: ICD-10-CM

## 2023-02-28 DIAGNOSIS — E04.2 MULTINODULAR GOITER: ICD-10-CM

## 2023-02-28 PROCEDURE — 3017F COLORECTAL CA SCREEN DOC REV: CPT | Performed by: PHYSICIAN ASSISTANT

## 2023-02-28 PROCEDURE — 99214 OFFICE O/P EST MOD 30 MIN: CPT | Performed by: PHYSICIAN ASSISTANT

## 2023-02-28 PROCEDURE — 95251 CONT GLUC MNTR ANALYSIS I&R: CPT | Performed by: PHYSICIAN ASSISTANT

## 2023-02-28 PROCEDURE — G8427 DOCREV CUR MEDS BY ELIG CLIN: HCPCS | Performed by: PHYSICIAN ASSISTANT

## 2023-02-28 PROCEDURE — 1090F PRES/ABSN URINE INCON ASSESS: CPT | Performed by: PHYSICIAN ASSISTANT

## 2023-02-28 PROCEDURE — 1123F ACP DISCUSS/DSCN MKR DOCD: CPT | Performed by: PHYSICIAN ASSISTANT

## 2023-02-28 PROCEDURE — 2022F DILAT RTA XM EVC RTNOPTHY: CPT | Performed by: PHYSICIAN ASSISTANT

## 2023-02-28 PROCEDURE — 3046F HEMOGLOBIN A1C LEVEL >9.0%: CPT | Performed by: PHYSICIAN ASSISTANT

## 2023-02-28 PROCEDURE — G8400 PT W/DXA NO RESULTS DOC: HCPCS | Performed by: PHYSICIAN ASSISTANT

## 2023-02-28 RX ORDER — DULAGLUTIDE 3 MG/.5ML
3 INJECTION, SOLUTION SUBCUTANEOUS WEEKLY
Qty: 12 ADJUSTABLE DOSE PRE-FILLED PEN SYRINGE | Refills: 3 | Status: SHIPPED | OUTPATIENT
Start: 2023-02-28

## 2023-02-28 NOTE — PROGRESS NOTES
[facility] ENDOCRINOLOGY   AND   THYROID NODULE CLINIC    Declan Rosales PA-C  [employer] [facility]  [address], [address]  [place]  Phone [phone]  Sheri Pacer [phone]          Tresa Clemens is a 78 y.o. female seen 2/28/2023 for follow up evaluation of type 2 diabetes        Assessment and Plan:    Pursuant to the emergency declaration under the [address] [place] [address], [facility] waiver authority and the [employer] Act, this Virtual Visit was conducted, with patient's consent, to reduce the patient's risk of exposure to COVID-19 and provide continuity of care for an established patient. Services were provided through a video synchronous discussion virtually to substitute for in-person clinic visit. Total Time: minutes: 21-30 minutes. Interpretation of 72 hour glucose monitor: At least 72 hours of data were reviewed. The patient utilizes a [employer] 14- day continuous glucose monitoring system. The average glucose during the reviewed timeframe was 189 . There is a pattern of frequent postprandial hyperglycemia after meals. 1. Uncontrolled type 2 diabetes mellitus with hyperglycemia (HCC)  Glycemic control is suboptimal with frequent and recurrent postprandial hyperglycemia. Risk of hypoglycemia vastly outweighs benefit of tight glycemic control. Patient is currently tolerating 1.5 mg Trulicity. Increase to 3 mg weekly. Continue insulin as currently prescribed, reviewed appropriate use of correction scale at the 4 before's      - TRULICITY 3 TF/9.9SO SOPN; Inject 3 mg into the skin once a week  Dispense: 12 Adjustable Dose Pre-filled Pen Syringe; Refill: 3  - Comprehensive Metabolic Panel; Future  - CBC with Auto Differential; Future  - Microalbumin / Creatinine Urine Ratio; Future  - Lipid Panel; Future  - Hemoglobin A1C; Future  - TSH with Reflex;  Future  - CT CONTINUOUS GLUCOSE MONITORING ANALYSIS I&R      2. Primary hypertension  BP Readings from Last 3 Encounters:   12/06/22 137/81   11/01/22 (!) 159/75   10/25/22 (!) 140/71       - Microalbumin / Creatinine Urine Ratio; Future    3. Hypercholesterolemia  Last LDL ABOVE goal on rosuvastatin - 10 MG  Lab Results   Component Value Date    LDLCALC 89 03/29/2022       - Lipid Panel; Future    4. Multinodular goiter  Sees in 2015 and 2016 as below. Has not had repeat ultrasound to document stability. Order thyroid ultrasound to document nodule stability and if unchanged consider this 5-year recheck and future repeat ultrasounds are unlikely to be necessary unless physical exam or compressive  symptoms develop  - US HEAD NECK SOFT TISSUE THYROID; Future  - TSH with Reflex; Future      Diagnoses and all orders for this visit:    Uncontrolled type 2 diabetes mellitus with hyperglycemia ([facility] [address]  -     TRULICITY 3 JZ/6.8TN SOPN; Inject 3 mg into the skin once a week  -     Comprehensive Metabolic Panel; Future  -     CBC with Auto Differential; Future  -     Microalbumin / Creatinine Urine Ratio; Future  -     Lipid Panel; Future  -     Hemoglobin A1C; Future  -     TSH with Reflex; Future  -     CT CONTINUOUS GLUCOSE MONITORING ANALYSIS I&R    Primary hypertension  -     Microalbumin / Creatinine Urine Ratio; Future    Hypercholesterolemia  -     Lipid Panel; Future    Multinodular goiter  -     US HEAD NECK SOFT TISSUE THYROID; Future  -     TSH with Reflex; Future          History of Present Illness:    2/28/2023  VIRTUAL VISIT  Augusto Galvez is a 78 y.o. female who was seen by synchronous (real-time) audio-video technology on 2/28/2023 . The patient provided consent for this audio-video interaction. The [employer] platform was used. Patient was located at their home and provider in the office. 2/28/2023   Interim diabetes HPI:  Doing Well overall.   Continues to crave sweets    Interim medical history changes: -  Lifestyle Update:  Grieving loss of sister    Current Regimen:  Humalog by correction 4 units AC breakfast plus 1/50>200, NPH 18 in am, Trulicity 6.7CB on Sunday        Glucose data:    Home blood glucose monitoring frequency:   By review of CGM download over past 28 days  Average blood glucose 198  Time in range 45%  High 33%, Very High 22%  Low 0%, Very Low 0%     Average    Fasting 147    AC lunch 259    AC supper 207    Bedtime 192      Blood glucose levels are uncontrolled, most significant elevations are post prandial'    Failed past therapies: =   glucatrol with poor control      Relevant co morbidities:  1994 after kidney transplant with prednisone use   Transplant secondary to \"losing protein\" found on annual exam        Second kidney transplant in 2012    Denies HX pancreatitis, DKA, gastroparesis, foot ulcer    Optho:   Last eye exam was mid 2022 and demonstrated bilateral diabetic  retinopathy with macular edema, undergoing going injection early 2021. Eye care specialist is Dr. Bah Bachelor. Obesity:         There is no height or weight on file to calculate BMI. fluctuating a bit      Wt Readings from Last 3 Encounters:   12/06/22 157 lb (71.2 kg)   11/01/22 159 lb (72.1 kg)   10/25/22 150 lb (68 kg)         CardioVascular:    Carotid artery disease    Ischemic stroke ~2021    HTN     Renal:    Under care of nephro? Yes, Dr. Becerril Monday    On ARB/ACE-I  ALLERGY to lisinopril- listed as coughThis patient does not have an active medication from one of the medication groupers.        12/01/2021      Cr 0.62, GFR >60    02/08/2023 Cr 0.54, GFR >90     Lipids:     Current therapy rosuvastatin - 10 MG with good compliance    02/17/2021  TC- 273, LDL- 158, VLDL- 9,  HDL- 76, TG- 214    03/29/2022  TC- 183, LDL- 89, VLDL- 27,  HDL- 67, TG- 158        Lab Results   Component Value Date    CHOL 183 03/29/2022    CHOL 243 (H) 10/20/2021    CHOL 262 05/31/2021     Lab Results   Component Value Date    LDLCALC 89 03/29/2022    LDLCALC 149 (H) 10/20/2021    LDLCALC 152.8 (H) 05/31/2021      Lab Results   Component Value Date    LABVLDL 20.2 05/31/2021    LABVLDL 26 12/16/2019    LABVLDL 32 08/20/2019    VLDL 27 03/29/2022    VLDL 25 10/20/2021    VLDL 39 02/17/2021      Lab Results   Component Value Date    HDL 67 03/29/2022    HDL 69 10/20/2021    HDL 89 (H) 05/31/2021      Lab Results   Component Value Date    HDL 67 03/29/2022    HDL 69 10/20/2021    HDL 89 (H) 05/31/2021      Lab Results   Component Value Date    TRIG 158 (H) 03/29/2022    TRIG 143 10/20/2021    TRIG 101 05/31/2021     Lab Results   Component Value Date    CHOLHDLRATIO 2.9 05/31/2021         Hemoglobin A1c:        01/13/2023 7.3%  Hemoglobin A1C, POC   Date Value Ref Range Status   01/13/2023 7.3 % Final   12/14/2021 7.8 % Final   05/25/2021 7.7 % Final        Thyroid:                Records reveal:       Thyroid function result : Euthyroid in 01/2020  Thyroid Ultrasound done in 01/2020 at [facility] consistent with multiple bilateral nodules without any significant change, stable compared to ultrasound done in 2018. (Schedule repeat in 8/2020)  Biopsy  benign on the right sided dominant nodule and done in 3/2015 and the left sided dominant nodule done in 5/2016 and benign biopsy of the right middle posterior nodule in 4/2018 by Dr. Amalia Olmos TISSUE   12/30/2019 10:17  FINDINGS:  Comparison is made with the prior study of 10/08/2018. There is redemonstration of a multinodular goiter. The index right lobe nodule is stable in size measuring up to 10 mm in diameter. The index  nodule in the left lobe is also stable in size at 9 mm. No new nodules are seen. The right lobe is enlarged at 6.2 x 2.4 x 2.2 cm. The left lobe is enlarged at 6.1 x 2.4 x 1.8 cm.            TSH               02/15/2018      1.270               12/16/2019      1.210               02/17/2021      0.760               10/20/2021 1.640      Lab Results   Component Value Date/Time    TSH 1.640 10/20/2021 11:52 AM    TSH 0.760 02/17/2021 03:46 PM    TSH 1.210 12/16/2019 11:19 AM          Reviewed and updated this visit by provider:  Tobacco  Allergies  Meds  Problems  Med Hx  Surg Hx  Fam Hx                      Allergies & Medications:  Reviewed in chart. Review of Systems    Vital Signs: There were no vitals taken for this visit. Return ~4 mo, for Diabetes DM2 Follow-Up. Portions of this note were generated with the assistance of voice recogniton software. As such, some errors in transcription may be present.

## 2023-03-15 RX ORDER — ROSUVASTATIN CALCIUM 10 MG/1
TABLET, COATED ORAL
Qty: 100 TABLET | Refills: 0 | Status: SHIPPED | OUTPATIENT
Start: 2023-03-15

## 2023-03-23 ENCOUNTER — HOSPITAL ENCOUNTER (OUTPATIENT)
Dept: ULTRASOUND IMAGING | Age: 78
Discharge: HOME | End: 2023-03-23
Payer: MEDICARE

## 2023-03-23 DIAGNOSIS — E04.2 MULTINODULAR GOITER: ICD-10-CM

## 2023-03-23 PROCEDURE — 76536 US EXAM OF HEAD AND NECK: CPT

## 2023-04-27 ENCOUNTER — TELEPHONE (OUTPATIENT)
Dept: ENDOCRINOLOGY | Age: 78
End: 2023-04-27

## 2023-04-27 NOTE — TELEPHONE ENCOUNTER
Pt's  LVM requesting the final results of patient's biopsy. I called patient to give her results below  \"Carlos Jauregui MD sent to [place], [place]  Please let the patient know that her thyroid biopsies 4/14/2023 were benign. \"    I called patient but there was no answer. I left them a voicemail to call back.

## 2023-06-01 RX ORDER — ROSUVASTATIN CALCIUM 10 MG/1
TABLET, COATED ORAL
Qty: 100 TABLET | Refills: 0 | Status: SHIPPED | OUTPATIENT
Start: 2023-06-01

## 2023-06-09 DIAGNOSIS — E11.65 UNCONTROLLED TYPE 2 DIABETES MELLITUS WITH HYPERGLYCEMIA (HCC): ICD-10-CM

## 2023-07-11 ENCOUNTER — TELEMEDICINE (OUTPATIENT)
Dept: ENDOCRINOLOGY | Age: 78
End: 2023-07-11
Payer: MEDICARE

## 2023-07-11 ENCOUNTER — TELEPHONE (OUTPATIENT)
Dept: ENDOCRINOLOGY | Age: 78
End: 2023-07-11

## 2023-07-11 DIAGNOSIS — E11.65 UNCONTROLLED TYPE 2 DIABETES MELLITUS WITH HYPERGLYCEMIA (HCC): Primary | ICD-10-CM

## 2023-07-11 DIAGNOSIS — E78.00 HYPERCHOLESTEROLEMIA: ICD-10-CM

## 2023-07-11 DIAGNOSIS — E04.2 MULTINODULAR GOITER: ICD-10-CM

## 2023-07-11 DIAGNOSIS — I10 PRIMARY HYPERTENSION: ICD-10-CM

## 2023-07-11 PROCEDURE — 99214 OFFICE O/P EST MOD 30 MIN: CPT | Performed by: PHYSICIAN ASSISTANT

## 2023-07-11 PROCEDURE — G8427 DOCREV CUR MEDS BY ELIG CLIN: HCPCS | Performed by: PHYSICIAN ASSISTANT

## 2023-07-11 PROCEDURE — 2022F DILAT RTA XM EVC RTNOPTHY: CPT | Performed by: PHYSICIAN ASSISTANT

## 2023-07-11 PROCEDURE — G8400 PT W/DXA NO RESULTS DOC: HCPCS | Performed by: PHYSICIAN ASSISTANT

## 2023-07-11 PROCEDURE — 1036F TOBACCO NON-USER: CPT | Performed by: PHYSICIAN ASSISTANT

## 2023-07-11 PROCEDURE — 1090F PRES/ABSN URINE INCON ASSESS: CPT | Performed by: PHYSICIAN ASSISTANT

## 2023-07-11 PROCEDURE — 1123F ACP DISCUSS/DSCN MKR DOCD: CPT | Performed by: PHYSICIAN ASSISTANT

## 2023-07-11 PROCEDURE — G8417 CALC BMI ABV UP PARAM F/U: HCPCS | Performed by: PHYSICIAN ASSISTANT

## 2023-07-11 PROCEDURE — 3046F HEMOGLOBIN A1C LEVEL >9.0%: CPT | Performed by: PHYSICIAN ASSISTANT

## 2023-07-11 PROCEDURE — 3017F COLORECTAL CA SCREEN DOC REV: CPT | Performed by: PHYSICIAN ASSISTANT

## 2023-07-11 PROCEDURE — 95251 CONT GLUC MNTR ANALYSIS I&R: CPT | Performed by: PHYSICIAN ASSISTANT

## 2023-07-11 RX ORDER — DULAGLUTIDE 3 MG/.5ML
3 INJECTION, SOLUTION SUBCUTANEOUS WEEKLY
Qty: 12 ADJUSTABLE DOSE PRE-FILLED PEN SYRINGE | Refills: 3 | Status: SHIPPED | OUTPATIENT
Start: 2023-07-11

## 2023-09-25 ENCOUNTER — OFFICE VISIT (OUTPATIENT)
Dept: FAMILY MEDICINE CLINIC | Facility: CLINIC | Age: 78
End: 2023-09-25
Payer: MEDICARE

## 2023-09-25 VITALS
HEART RATE: 58 BPM | WEIGHT: 166.6 LBS | DIASTOLIC BLOOD PRESSURE: 76 MMHG | SYSTOLIC BLOOD PRESSURE: 120 MMHG | OXYGEN SATURATION: 97 % | HEIGHT: 62 IN | BODY MASS INDEX: 30.66 KG/M2

## 2023-09-25 DIAGNOSIS — Z87.448 HISTORY OF RENAL FAILURE: ICD-10-CM

## 2023-09-25 DIAGNOSIS — E03.4 HYPOTHYROIDISM DUE TO ACQUIRED ATROPHY OF THYROID: ICD-10-CM

## 2023-09-25 DIAGNOSIS — Z79.4 TYPE 2 DIABETES MELLITUS WITH HYPERGLYCEMIA, WITH LONG-TERM CURRENT USE OF INSULIN (HCC): ICD-10-CM

## 2023-09-25 DIAGNOSIS — R41.3 MEMORY LOSS: Primary | ICD-10-CM

## 2023-09-25 DIAGNOSIS — E11.65 TYPE 2 DIABETES MELLITUS WITH HYPERGLYCEMIA, WITH LONG-TERM CURRENT USE OF INSULIN (HCC): ICD-10-CM

## 2023-09-25 DIAGNOSIS — I10 PRIMARY HYPERTENSION: ICD-10-CM

## 2023-09-25 DIAGNOSIS — R26.81 UNSTEADINESS: ICD-10-CM

## 2023-09-25 LAB
ALBUMIN SERPL-MCNC: 3.4 G/DL (ref 3.2–4.6)
ALBUMIN/GLOB SERPL: 0.9 (ref 0.4–1.6)
ALP SERPL-CCNC: 71 U/L (ref 50–136)
ALT SERPL-CCNC: 26 U/L (ref 12–65)
ANION GAP SERPL CALC-SCNC: 5 MMOL/L (ref 2–11)
AST SERPL-CCNC: 17 U/L (ref 15–37)
BASOPHILS # BLD: 0 K/UL (ref 0–0.2)
BASOPHILS NFR BLD: 1 % (ref 0–2)
BILIRUB SERPL-MCNC: 0.4 MG/DL (ref 0.2–1.1)
BUN SERPL-MCNC: 12 MG/DL (ref 8–23)
CALCIUM SERPL-MCNC: 9.7 MG/DL (ref 8.3–10.4)
CHLORIDE SERPL-SCNC: 108 MMOL/L (ref 101–110)
CO2 SERPL-SCNC: 27 MMOL/L (ref 21–32)
CREAT SERPL-MCNC: 0.7 MG/DL (ref 0.6–1)
DIFFERENTIAL METHOD BLD: ABNORMAL
EOSINOPHIL # BLD: 0.1 K/UL (ref 0–0.8)
EOSINOPHIL NFR BLD: 2 % (ref 0.5–7.8)
ERYTHROCYTE [DISTWIDTH] IN BLOOD BY AUTOMATED COUNT: 13.4 % (ref 11.9–14.6)
GLOBULIN SER CALC-MCNC: 4 G/DL (ref 2.8–4.5)
GLUCOSE SERPL-MCNC: 227 MG/DL (ref 65–100)
HCT VFR BLD AUTO: 36.3 % (ref 35.8–46.3)
HGB BLD-MCNC: 11 G/DL (ref 11.7–15.4)
IMM GRANULOCYTES # BLD AUTO: 0 K/UL (ref 0–0.5)
IMM GRANULOCYTES NFR BLD AUTO: 0 % (ref 0–5)
LYMPHOCYTES # BLD: 1.2 K/UL (ref 0.5–4.6)
LYMPHOCYTES NFR BLD: 21 % (ref 13–44)
MCH RBC QN AUTO: 29.3 PG (ref 26.1–32.9)
MCHC RBC AUTO-ENTMCNC: 30.3 G/DL (ref 31.4–35)
MCV RBC AUTO: 96.5 FL (ref 82–102)
MONOCYTES # BLD: 0.3 K/UL (ref 0.1–1.3)
MONOCYTES NFR BLD: 6 % (ref 4–12)
NEUTS SEG # BLD: 4 K/UL (ref 1.7–8.2)
NEUTS SEG NFR BLD: 70 % (ref 43–78)
NRBC # BLD: 0 K/UL (ref 0–0.2)
PLATELET # BLD AUTO: 162 K/UL (ref 150–450)
PMV BLD AUTO: 9.4 FL (ref 9.4–12.3)
POTASSIUM SERPL-SCNC: 4.4 MMOL/L (ref 3.5–5.1)
PROT SERPL-MCNC: 7.4 G/DL (ref 6.3–8.2)
RBC # BLD AUTO: 3.76 M/UL (ref 4.05–5.2)
SODIUM SERPL-SCNC: 140 MMOL/L (ref 133–143)
TSH, 3RD GENERATION: 0.73 UIU/ML (ref 0.36–3.74)
VIT B12 SERPL-MCNC: 675 PG/ML (ref 193–986)
WBC # BLD AUTO: 5.7 K/UL (ref 4.3–11.1)

## 2023-09-25 PROCEDURE — 1090F PRES/ABSN URINE INCON ASSESS: CPT | Performed by: FAMILY MEDICINE

## 2023-09-25 PROCEDURE — 1036F TOBACCO NON-USER: CPT | Performed by: FAMILY MEDICINE

## 2023-09-25 PROCEDURE — 3074F SYST BP LT 130 MM HG: CPT | Performed by: FAMILY MEDICINE

## 2023-09-25 PROCEDURE — 99215 OFFICE O/P EST HI 40 MIN: CPT | Performed by: FAMILY MEDICINE

## 2023-09-25 PROCEDURE — G8427 DOCREV CUR MEDS BY ELIG CLIN: HCPCS | Performed by: FAMILY MEDICINE

## 2023-09-25 PROCEDURE — G8400 PT W/DXA NO RESULTS DOC: HCPCS | Performed by: FAMILY MEDICINE

## 2023-09-25 PROCEDURE — G8417 CALC BMI ABV UP PARAM F/U: HCPCS | Performed by: FAMILY MEDICINE

## 2023-09-25 PROCEDURE — 1123F ACP DISCUSS/DSCN MKR DOCD: CPT | Performed by: FAMILY MEDICINE

## 2023-09-25 PROCEDURE — 3078F DIAST BP <80 MM HG: CPT | Performed by: FAMILY MEDICINE

## 2023-09-25 NOTE — PROGRESS NOTES
is warm. Findings: No rash. Neurological:      Mental Status: She is alert. Comments: Mini-Mental status exam performed. Patient is oriented to person place but not time. She thought the year was 2020. She is unable to name 3 objects in 5 minutes. She is able to name 2 presidents in sequential order but no more than that. She is able to name 10 different animals in roughly 40 seconds or less. Psychiatric:         Mood and Affect: Mood normal.         Behavior: Behavior normal.         Thought Content: Thought content normal.         Judgment: Judgment normal.         Medical problems and test results were reviewed with the patient today. ASSESSMENT and PLAN    1. Memory loss. Discussed with the family and patient the complexity of memory loss and the work-up thereof. We will check TSH, CBC, metabolic panel, H89 level and RPR. We will check MRI brain with attention to hippocampus. Review of the patient's medication list does not reveal any obvious potential contributors. 2.  Unsteadiness. Unlikely but we will look for the potential for normal pressure hydrocephalus. Refer for physical therapy for balance training. 3.  Renal failure. Status post renal transplant. Continue follow-up with transplant team.    4.  Diabetes. Continue follow-up with endocrinology. Checking random blood sugar today. 5.  Hypothyroidism. Check TSH. 6.  Hypertension. /76. Elements of this note have been dictated using speech recognition software. As a result, errors of speech recognition may have occurred.

## 2023-09-26 LAB — RPR SER QL: NONREACTIVE

## 2023-09-29 ENCOUNTER — TELEPHONE (OUTPATIENT)
Dept: FAMILY MEDICINE CLINIC | Facility: CLINIC | Age: 78
End: 2023-09-29

## 2023-09-29 NOTE — TELEPHONE ENCOUNTER
Pt's  called inquiring if pt could be prescribed lidoderm patches prior to her physical therapy. Rx previously prescribed in April 2022.

## 2023-10-02 NOTE — TELEPHONE ENCOUNTER
Mr Francis Man was called. No answer. He was left a message that Dr Ermias Hampton would like pt to use OTC Salonpas.       Rody Toledo MD sent to Senthil Denton MA  Caller: Unspecified (3 days ago, 12:53 PM)  Should be able to get the same relief with over-the-counter Salonpas

## 2023-10-07 ENCOUNTER — HOSPITAL ENCOUNTER (OUTPATIENT)
Dept: MRI IMAGING | Age: 78
End: 2023-10-07
Attending: FAMILY MEDICINE
Payer: MEDICARE

## 2023-10-07 DIAGNOSIS — R41.3 MEMORY LOSS: ICD-10-CM

## 2023-10-07 PROCEDURE — 70551 MRI BRAIN STEM W/O DYE: CPT

## 2023-10-13 RX ORDER — CARVEDILOL 12.5 MG/1
TABLET ORAL
Qty: 180 TABLET | Refills: 1 | Status: SHIPPED | OUTPATIENT
Start: 2023-10-13

## 2023-10-17 ENCOUNTER — HOSPITAL ENCOUNTER (OUTPATIENT)
Dept: PHYSICAL THERAPY | Age: 78
Setting detail: RECURRING SERIES
Discharge: HOME | End: 2023-10-20
Attending: FAMILY MEDICINE
Payer: MEDICARE

## 2023-10-17 DIAGNOSIS — R26.89 IMBALANCE: ICD-10-CM

## 2023-10-17 DIAGNOSIS — M62.81 MUSCLE WEAKNESS (GENERALIZED): ICD-10-CM

## 2023-10-17 DIAGNOSIS — R26.2 DIFFICULTY IN WALKING, NOT ELSEWHERE CLASSIFIED: Primary | ICD-10-CM

## 2023-10-17 PROCEDURE — 97162 PT EVAL MOD COMPLEX 30 MIN: CPT

## 2023-10-17 PROCEDURE — 97530 THERAPEUTIC ACTIVITIES: CPT

## 2023-10-17 ASSESSMENT — PAIN SCALES - GENERAL: PAINLEVEL_OUTOF10: 7

## 2023-10-17 NOTE — PROGRESS NOTES
Shukri Hodgson  : 1945  Primary: Medicare Part A And B (Medicare)  Secondary: [address] @ [place] [address]  Phone: [phone]  Fax: [phone] Plan Frequency: twice a week for 12 weeks    Plan of Care/Certification Expiration Date: 01/15/24      PT Visit Info:  Plan Frequency: twice a week for 12 weeks  Plan of Care/Certification Expiration Date: 01/15/24  Progress Note Counter: 1      Visit Count:  1    OUTPATIENT PHYSICAL THERAPY:OP NOTE TYPE: Treatment Note 10/17/2023       Episode  }Appt Desk             Treatment Diagnosis:    Visit Diagnoses         Codes    Difficulty in walking, not elsewhere classified    -  Primary R26.2    Imbalance     R26.89    Muscle weakness (generalized)     M62.81          Medical/Referring Diagnosis:  Unsteadiness [R26.81]  Referring Physician:  Hua Dockery MD MD Orders:  PT Eval and Treat   Date of Onset:  Onset Date: 21 (date of stroke)     Allergies:   Lisinopril  Restrictions/Precautions:    None  Interventions Planned See assessment in chart. Subjective Comments:  See history in  chart. Initial:}    7 (R UE)/10Post Session:        (no pain reported)/10  Medications Last Reviewed:  10/17/2023  Updated Objective Findings:  See Evaluation Note from today  Treatment   Therapeutic Activity:  ( 9 minutes):  Pt education for general safety with ambulation and transfers, postural awareness and anatomy and physiology of present condition/healing time. Treatment/Session Summary:    Treatment Assessment:  Evaluation note today -s ee assessment in chart. Communication/Consultation:  None today  Equipment provided today:  None  Recommendations/Intent for next treatment session: Next visit will focus on generalized strengthening; gait training; balance.     Total Treatment Billable Duration:  9 minutes  Time In: 3551  Time Out: [address] Stafford PT

## 2023-10-24 ENCOUNTER — HOSPITAL ENCOUNTER (OUTPATIENT)
Dept: PHYSICAL THERAPY | Age: 78
Setting detail: RECURRING SERIES
End: 2023-10-24
Attending: FAMILY MEDICINE
Payer: MEDICARE

## 2023-10-24 NOTE — PROGRESS NOTES
Pearle Hamman  : 1945  Primary: Medicare Part A And B  Secondary: [address] @ [address] [place], [place] [address]  Phone: [phone]  Fax: [phone] Plan Frequency: twice a week for 12 weeks    Plan of Care/Certification Expiration Date: 01/15/24      PT Visit Info:  Progress Note Counter: 1  Canceled Appointment: 1         OUTPATIENT PHYSICAL THERAPY 10/24/2023     Appt Desk   Episode   MyChart      Ms. Missy Mills was a same-day cancellation for today's appointment. Patient left  noting that she has a cough.     Cancellation Number: 1       Ankush Waite, PT, DPT    Future Appointments   Date Time Provider [address]  [facility]   10/25/2023  2:50 PM Danya Hartman MD [facility] AMB   10/26/2023  1:00 PM Ankush Waite, PT [facility] SFD   10/30/2023  2:30 PM Danya Hartman MD PST [facility] AMB   10/31/2023  1:45 PM Ankush Waite PT [facility] SFD   2023  1:45 PM Anderson MICHAUD PT [facility] SFD   2023  1:45 PM Anderson MICHAUD, PT [facility] SFD   2023  1:45 PM Patel Rojas PT [facility]

## 2023-10-25 ENCOUNTER — OFFICE VISIT (OUTPATIENT)
Dept: FAMILY MEDICINE CLINIC | Facility: CLINIC | Age: 78
End: 2023-10-25
Payer: MEDICARE

## 2023-10-25 VITALS
WEIGHT: 167.6 LBS | SYSTOLIC BLOOD PRESSURE: 134 MMHG | DIASTOLIC BLOOD PRESSURE: 82 MMHG | BODY MASS INDEX: 30.84 KG/M2 | HEIGHT: 62 IN

## 2023-10-25 DIAGNOSIS — J20.8 ACUTE BACTERIAL BRONCHITIS: ICD-10-CM

## 2023-10-25 DIAGNOSIS — E03.4 HYPOTHYROIDISM DUE TO ACQUIRED ATROPHY OF THYROID: ICD-10-CM

## 2023-10-25 DIAGNOSIS — R09.89 CHEST CONGESTION: ICD-10-CM

## 2023-10-25 DIAGNOSIS — Z94.0 RENAL TRANSPLANT, STATUS POST: ICD-10-CM

## 2023-10-25 DIAGNOSIS — E11.65 UNCONTROLLED TYPE 2 DIABETES MELLITUS WITH HYPERGLYCEMIA (HCC): ICD-10-CM

## 2023-10-25 DIAGNOSIS — B96.89 ACUTE BACTERIAL BRONCHITIS: ICD-10-CM

## 2023-10-25 DIAGNOSIS — R41.3 MEMORY LOSS: ICD-10-CM

## 2023-10-25 DIAGNOSIS — E78.00 PURE HYPERCHOLESTEROLEMIA: ICD-10-CM

## 2023-10-25 DIAGNOSIS — I10 PRIMARY HYPERTENSION: ICD-10-CM

## 2023-10-25 DIAGNOSIS — R05.1 ACUTE COUGH: Primary | ICD-10-CM

## 2023-10-25 PROCEDURE — 99214 OFFICE O/P EST MOD 30 MIN: CPT | Performed by: FAMILY MEDICINE

## 2023-10-25 PROCEDURE — G8484 FLU IMMUNIZE NO ADMIN: HCPCS | Performed by: FAMILY MEDICINE

## 2023-10-25 PROCEDURE — G8400 PT W/DXA NO RESULTS DOC: HCPCS | Performed by: FAMILY MEDICINE

## 2023-10-25 PROCEDURE — 1090F PRES/ABSN URINE INCON ASSESS: CPT | Performed by: FAMILY MEDICINE

## 2023-10-25 PROCEDURE — G8427 DOCREV CUR MEDS BY ELIG CLIN: HCPCS | Performed by: FAMILY MEDICINE

## 2023-10-25 PROCEDURE — 3075F SYST BP GE 130 - 139MM HG: CPT | Performed by: FAMILY MEDICINE

## 2023-10-25 PROCEDURE — 1036F TOBACCO NON-USER: CPT | Performed by: FAMILY MEDICINE

## 2023-10-25 PROCEDURE — G8417 CALC BMI ABV UP PARAM F/U: HCPCS | Performed by: FAMILY MEDICINE

## 2023-10-25 PROCEDURE — 1123F ACP DISCUSS/DSCN MKR DOCD: CPT | Performed by: FAMILY MEDICINE

## 2023-10-25 PROCEDURE — 3079F DIAST BP 80-89 MM HG: CPT | Performed by: FAMILY MEDICINE

## 2023-10-25 RX ORDER — AZITHROMYCIN 250 MG/1
TABLET, FILM COATED ORAL
Qty: 6 TABLET | Refills: 0 | Status: SHIPPED | OUTPATIENT
Start: 2023-10-25

## 2023-10-25 ASSESSMENT — PATIENT HEALTH QUESTIONNAIRE - PHQ9
1. LITTLE INTEREST OR PLEASURE IN DOING THINGS: 0
2. FEELING DOWN, DEPRESSED OR HOPELESS: 1
SUM OF ALL RESPONSES TO PHQ9 QUESTIONS 1 & 2: 1
SUM OF ALL RESPONSES TO PHQ QUESTIONS 1-9: 1

## 2023-10-25 NOTE — PROGRESS NOTES
back: Normal range of motion and neck supple. Right lower leg: No edema. Left lower leg: No edema. Skin:     General: Skin is warm. Findings: No rash. Neurological:      Mental Status: She is alert. Psychiatric:         Mood and Affect: Mood normal.         Behavior: Behavior normal.         Thought Content: Thought content normal.         Judgment: Judgment normal.         Medical problems and test results were reviewed with the patient today. ASSESSMENT and PLAN    1. Cough. Over-the-counter Delsym as needed. 2.  Chest congestion. Over-the-counter plain Mucinex as needed. 3.  Bronchitis. Z-Yogesh as directed. Return if symptoms persist or worsen. 4.  Memory loss. Appears to be likely vascular dementia with vascular changes on MRI and normal labs. Discussed with  and the patient implications of this diagnosis and options. Patient elects for observation. They will discuss with other family members and let me know if there is any changes in plan. Otherwise risk factor modification including blood pressure control and control of diabetes. 5.  Diabetes. Per endocrinology. 6.  Hypertension. /82. 7.  Renal failure. Status post transplant x2.    8.  Hypothyroidism. TSH at goal.    9.  High cholesterol. Continue statin. Elements of this note have been dictated using speech recognition software. As a result, errors of speech recognition may have occurred.

## 2023-10-26 ENCOUNTER — HOSPITAL ENCOUNTER (OUTPATIENT)
Dept: PHYSICAL THERAPY | Age: 78
Setting detail: RECURRING SERIES
End: 2023-10-26
Attending: FAMILY MEDICINE
Payer: MEDICARE

## 2023-10-26 NOTE — PROGRESS NOTES
Margot Gonzalez  : 1945  Primary: Medicare Part A And B  Secondary: [address] @ [address] [place] [address]  [address]  Phone: [phone]  Fax: [phone] Plan Frequency: twice a week for 12 weeks    Plan of Care/Certification Expiration Date: 01/15/24      PT Visit Info:  Progress Note Counter: 1  Canceled Appointment: 2         OUTPATIENT PHYSICAL THERAPY 10/26/2023     Appt Desk   Episode   MyChart      Ms. Francis Man was a same-day cancellation for today's appointment. Patient stating she is still sick.     Cancellation Number: 2       Meera Simons, PT, DPT    Future Appointments   Date Time Provider [address]   10/26/2023  1:00 PM Meera Simons [facility]   10/31/2023  1:45 PM Meera Simons, REBECCA [facility]   2023  1:45 PM Onesimo MICHAUD, PT [facility]   2023  1:45 PM Onesimo MICHAUD, PT [facility] SFD   2023  1:45 PM Onesimo MICHAUD, PT [facility] SFD   2024  9:50 AM PST LAB PST G[facility] AMB   2024  2:30 PM Rody Toledo MD PST G[facility] AMB

## 2023-10-30 ENCOUNTER — TELEPHONE (OUTPATIENT)
Dept: ENDOCRINOLOGY | Age: 78
End: 2023-10-30

## 2023-10-30 DIAGNOSIS — E04.2 MULTINODULAR GOITER: Primary | ICD-10-CM

## 2023-10-30 NOTE — TELEPHONE ENCOUNTER
Please let patient know I have ordered a repeat thyroid US (6 month follow up) as recommended by Dr. Tamela Esparza after her benign biopsy in April 2023

## 2023-10-30 NOTE — TELEPHONE ENCOUNTER
----- Message from Taty Valera MD sent at 4/25/2023 12:01 PM EDT -----  See biopsy results. Please have this patient repeat ultrasound in radiology in 6 months rather than 12 months. If there is any change, please let me know. Thanks.

## 2023-10-31 ENCOUNTER — HOSPITAL ENCOUNTER (OUTPATIENT)
Dept: PHYSICAL THERAPY | Age: 78
Setting detail: RECURRING SERIES
Discharge: HOME | End: 2023-11-03
Attending: FAMILY MEDICINE
Payer: MEDICARE

## 2023-10-31 DIAGNOSIS — R26.2 DIFFICULTY IN WALKING, NOT ELSEWHERE CLASSIFIED: Primary | ICD-10-CM

## 2023-10-31 DIAGNOSIS — M62.81 MUSCLE WEAKNESS (GENERALIZED): ICD-10-CM

## 2023-10-31 DIAGNOSIS — R53.1 WEAKNESS: ICD-10-CM

## 2023-10-31 DIAGNOSIS — R26.89 IMBALANCE: ICD-10-CM

## 2023-10-31 PROCEDURE — 97530 THERAPEUTIC ACTIVITIES: CPT

## 2023-10-31 NOTE — PROGRESS NOTES
Miya Mendieta  : 1945  Primary: Medicare Part A And B (Medicare)  Secondary: [address] @ [address] [place] [address]  Phone: [phone]  Fax: [phone] Plan Frequency: twice a week for 12 weeks    Plan of Care/Certification Expiration Date: 01/15/24            >PT Visit Info:  Plan Frequency: twice a week for 12 weeks  Plan of Care/Certification Expiration Date: 01/15/24  Total # of Visits to Date: 2  Progress Note Counter: 2  Progress Note Due Date: 23 (or 8th visit)  Canceled Appointment: 2      Visit Count: 2    OUTPATIENT PHYSICAL THERAPY:  Treatment Note 10/31/2023       Episode (difficulty walking) Appt Desk             Treatment Diagnosis:    Visit Diagnoses         Codes    Difficulty in walking, not elsewhere classified    -  Primary R26.2    Imbalance     R26.89    Muscle weakness (generalized)     M62.81    Weakness     R53.1          Medical/Referring Diagnosis:   Unsteadiness [R26.81]  Referring Physician: Valarie Ferrer MD MD Orders: PT Eval and Treat  Date of Onset: Onset Date: 21 (date of stroke)       Allergies: Lisinopril  Restrictions/Precautions:  No data recordedNo data recorded       Interventions Planned (Treatment may consist of any combination of the following):    No data recorded       >Subjective Comments:  her right knee has been bothering her. Pt  and daughter present at today's visit - they will come to next visit as well to help her with her HEP.     >Initial:      5/10 right knee pain       >Post Session:        X/10 - none stated by patient    Medications Last Reviewed: 10/31/2023    Updated Objective Findings:   + extensor lag with SLR B    Treatment     GAIT TRAINING: (0 minutes): Gait training to improve and/or restore physical functioning as related to mobility, strength, balance and coordination.  Ambulated with minimal visual, verbal, and tactile cueing

## 2023-11-02 ENCOUNTER — HOSPITAL ENCOUNTER (OUTPATIENT)
Dept: PHYSICAL THERAPY | Age: 78
Setting detail: RECURRING SERIES
Discharge: HOME | End: 2023-11-05
Attending: FAMILY MEDICINE
Payer: MEDICARE

## 2023-11-02 PROCEDURE — 97110 THERAPEUTIC EXERCISES: CPT

## 2023-11-02 PROCEDURE — 97530 THERAPEUTIC ACTIVITIES: CPT

## 2023-11-02 ASSESSMENT — PAIN SCALES - GENERAL: PAINLEVEL_OUTOF10: 7

## 2023-11-02 NOTE — PROGRESS NOTES
Baldo Malone  : 1945  Primary: Medicare Part A And B (Medicare)  Secondary: [address] @ [place] [address]  Phone: [phone]  Fax: [phone] Plan Frequency: twice a week for 12 weeks    Plan of Care/Certification Expiration Date: 01/15/24            >PT Visit Info:  Plan Frequency: twice a week for 12 weeks  Plan of Care/Certification Expiration Date: 01/15/24  Total # of Visits to Date: 3  Progress Note Counter: 3  Progress Note Due Date: 23 (or 8th visit)  Canceled Appointment: 2      Visit Count: 3    OUTPATIENT PHYSICAL THERAPY:  Treatment Note 2023       Episode (difficulty walking) Appt Desk             Treatment Diagnosis:         Codes     Difficulty in walking, not elsewhere classified    -  Primary R26.2     Imbalance     R26.89     Muscle weakness (generalized)     M62.81     Weakness          Medical/Referring Diagnosis:   Unsteadiness [R26.81]  Referring Physician: Diya Hernandez MD MD Orders: PT Eval and Treat  Date of Onset: Onset Date: 21 (date of stroke)       Allergies: Lisinopril  Restrictions/Precautions:  No data recordedNo data recorded       Interventions Planned (Treatment may consist of any combination of the following):    No data recorded       Subjective Comments: Pt states she has been having pain in her R shoulder (states this is where she had rotator cuff surgery)    >Initial:     7 (R UE)/10     >Post Session:        (no pain level reported)    Medications Last Reviewed: 2023    Updated Objective Findings: None today    Treatment     GAIT TRAINING: (0 minutes): Gait training to improve and/or restore physical functioning as related to mobility, strength, balance and coordination. Ambulated with minimal visual, verbal, and tactile cueing related to stance phase, stride length, push off and heel strike.      THERAPEUTIC EXERCISE: (30 minutes): Exercises

## 2023-11-07 ENCOUNTER — HOSPITAL ENCOUNTER (OUTPATIENT)
Dept: PHYSICAL THERAPY | Age: 78
Setting detail: RECURRING SERIES
End: 2023-11-07
Attending: FAMILY MEDICINE
Payer: MEDICARE

## 2023-11-07 ENCOUNTER — OFFICE VISIT (OUTPATIENT)
Dept: FAMILY MEDICINE CLINIC | Facility: CLINIC | Age: 78
End: 2023-11-07
Payer: MEDICARE

## 2023-11-07 VITALS
WEIGHT: 167.8 LBS | BODY MASS INDEX: 30.88 KG/M2 | DIASTOLIC BLOOD PRESSURE: 84 MMHG | SYSTOLIC BLOOD PRESSURE: 132 MMHG | HEIGHT: 62 IN

## 2023-11-07 DIAGNOSIS — J30.9 ALLERGIC RHINITIS, UNSPECIFIED SEASONALITY, UNSPECIFIED TRIGGER: ICD-10-CM

## 2023-11-07 DIAGNOSIS — R05.1 ACUTE COUGH: Primary | ICD-10-CM

## 2023-11-07 DIAGNOSIS — J20.8 ACUTE BACTERIAL BRONCHITIS: ICD-10-CM

## 2023-11-07 DIAGNOSIS — B96.89 ACUTE BACTERIAL BRONCHITIS: ICD-10-CM

## 2023-11-07 PROCEDURE — G8484 FLU IMMUNIZE NO ADMIN: HCPCS | Performed by: FAMILY MEDICINE

## 2023-11-07 PROCEDURE — G8400 PT W/DXA NO RESULTS DOC: HCPCS | Performed by: FAMILY MEDICINE

## 2023-11-07 PROCEDURE — 3079F DIAST BP 80-89 MM HG: CPT | Performed by: FAMILY MEDICINE

## 2023-11-07 PROCEDURE — 1090F PRES/ABSN URINE INCON ASSESS: CPT | Performed by: FAMILY MEDICINE

## 2023-11-07 PROCEDURE — 96372 THER/PROPH/DIAG INJ SC/IM: CPT | Performed by: FAMILY MEDICINE

## 2023-11-07 PROCEDURE — G8427 DOCREV CUR MEDS BY ELIG CLIN: HCPCS | Performed by: FAMILY MEDICINE

## 2023-11-07 PROCEDURE — G8417 CALC BMI ABV UP PARAM F/U: HCPCS | Performed by: FAMILY MEDICINE

## 2023-11-07 PROCEDURE — 3075F SYST BP GE 130 - 139MM HG: CPT | Performed by: FAMILY MEDICINE

## 2023-11-07 PROCEDURE — 1123F ACP DISCUSS/DSCN MKR DOCD: CPT | Performed by: FAMILY MEDICINE

## 2023-11-07 PROCEDURE — 1036F TOBACCO NON-USER: CPT | Performed by: FAMILY MEDICINE

## 2023-11-07 PROCEDURE — 99213 OFFICE O/P EST LOW 20 MIN: CPT | Performed by: FAMILY MEDICINE

## 2023-11-07 RX ORDER — TRIAMCINOLONE ACETONIDE 40 MG/ML
40 INJECTION, SUSPENSION INTRA-ARTICULAR; INTRAMUSCULAR ONCE
Status: COMPLETED | OUTPATIENT
Start: 2023-11-07 | End: 2023-11-07

## 2023-11-07 RX ORDER — CEFDINIR 300 MG/1
300 CAPSULE ORAL 2 TIMES DAILY
Qty: 20 CAPSULE | Refills: 0 | Status: SHIPPED | OUTPATIENT
Start: 2023-11-07 | End: 2023-11-17

## 2023-11-07 RX ORDER — BENZONATATE 200 MG/1
200 CAPSULE ORAL 3 TIMES DAILY PRN
Qty: 30 CAPSULE | Refills: 0 | Status: SHIPPED | OUTPATIENT
Start: 2023-11-07

## 2023-11-07 RX ADMIN — TRIAMCINOLONE ACETONIDE 40 MG: 40 INJECTION, SUSPENSION INTRA-ARTICULAR; INTRAMUSCULAR at 17:38

## 2023-11-07 ASSESSMENT — PATIENT HEALTH QUESTIONNAIRE - PHQ9
SUM OF ALL RESPONSES TO PHQ QUESTIONS 1-9: 0
1. LITTLE INTEREST OR PLEASURE IN DOING THINGS: 0
SUM OF ALL RESPONSES TO PHQ QUESTIONS 1-9: 0
2. FEELING DOWN, DEPRESSED OR HOPELESS: 0
SUM OF ALL RESPONSES TO PHQ9 QUESTIONS 1 & 2: 0

## 2023-11-07 NOTE — PROGRESS NOTES
Lisandra Margot  : 1945  Primary: Medicare Part A And B  Secondary: [address] @ [address] [place] [address]  Phone: [phone]  Fax: [phone] Plan Frequency: twice a week for 12 weeks    Plan of Care/Certification Expiration Date: 01/15/24      PT Visit Info: Total # of Visits to Date: 3  Progress Note Counter: 3  Progress Note Due Date: 23 (or 8th visit)  Canceled Appointment: 3         OUTPATIENT PHYSICAL THERAPY 2023     Appt Desk   Episode   MyChart      Ms. Ana Farr was a same-day cancellation for today's appointment due to still being sick.     Cancellation Number: Nitin Brown PT    Future Appointments   Date Time Provider [address]   2023  1:45 PM Yudith Perea, PT [facility]   2023  4:20 PM Aylin Pickering MD PST [facility] AMB   2023  1:45 PM Ilene MICHAUD, PT [facility] SFD   2023  1:45 PM Ilene MICHAUD, PT [facility] SFD   2023  1:45 [facility]   2024  9:50 AM PST LAB PST [facility] AMB   2024  2:30 PM Aylin Pickering MD PST [facility] AMB

## 2023-11-07 NOTE — PROGRESS NOTES
SUBJECTIVE:   Keith Artis is a 70 y.o. female who has a past medical history significant for renal failure status post transplant x2 followed by nephrology, diabetes followed by endocrinology, hypothyroidism, hypertension, high cholesterol, high triglycerides, degenerative arthritis, history of stroke, acute GI bleed with blood loss anemia and obesity. Patient reports that her allergies continue to be problematic with drainage and congestion. She recently was diagnosed with a bronchitis as well and was given a Z-Yogesh. Does not feel like her symptoms have completely resolved. She reports no chest pain, shortness of breath, orthopnea or PND. No fever or body aches. HPI  See above    Past Medical History, Past Surgical History, Family history, Social History, and Medications were all reviewed with the patient today and updated as necessary. Current Outpatient Medications   Medication Sig Dispense Refill    carvedilol (COREG) 12.5 MG tablet TAKE 1 TABLET BY MOUTH TWICE DAILY 629 tablet 1    TRULICITY 3 NZ/9.7TX SOPN Inject 3 mg into the skin once a week 12 Adjustable Dose Pre-filled Pen Syringe 3    Insulin Pen Needle 32G X 4 MM MISC Use with insulin up to 4 times daily, E11.65 400 each 3    rosuvastatin (CRESTOR) 10 MG tablet TAKE 1 TABLET BY MOUTH EVERY NIGHT. A VISIT WITH PHYSICIAN IS REQUIRED BEFORE FUTURE REFILLS ARE GIVEN. 100 tablet 0    nystatin (MYCOSTATIN) 182736 UNIT/GM powder Apply topically 4 times daily to skin folds to abdomen for rash.  120 g 2    HUMALOG KWIKPEN 100 UNIT/ML SOPN 4 units AC brunch plus correction 1/50>200, max daily dose 30 units 30 mL 3    insulin NPH (HUMULIN N KWIKPEN) 100 UNIT/ML injection pen Inject 18 Units into the skin every morning 54 mL 3    B Complex Vitamins (VITAMIN B COMPLEX PO) Take 1 tablet by mouth daily      ascorbic acid (VITAMIN C) 500 MG tablet Take by mouth      aspirin 81 MG EC tablet Take by mouth daily      belatacept (NULOJIX) 250 MG SOLR Infuse

## 2023-11-09 ENCOUNTER — HOSPITAL ENCOUNTER (OUTPATIENT)
Dept: PHYSICAL THERAPY | Age: 78
Setting detail: RECURRING SERIES
Discharge: HOME | End: 2023-11-12
Attending: FAMILY MEDICINE
Payer: MEDICARE

## 2023-11-09 PROCEDURE — 97140 MANUAL THERAPY 1/> REGIONS: CPT

## 2023-11-09 PROCEDURE — 97530 THERAPEUTIC ACTIVITIES: CPT

## 2023-11-09 PROCEDURE — 97110 THERAPEUTIC EXERCISES: CPT

## 2023-11-09 ASSESSMENT — PAIN SCALES - GENERAL: PAINLEVEL_OUTOF10: 6

## 2023-11-09 NOTE — PROGRESS NOTES
balance. Required minimal visual, verbal and tactile cues to promote proper body alignment, posture and body mechanics. Progressed resistance, range and repetitions as indicated. THERAPEUTIC ACTIVITY: (15 minutes): Therapeutic activities per grid below to improve mobility, strength and balance. Required minimal visual, verbal and tactile cues to promote static and dynamic balance in sitting/standing, as well as coordination of bilateral extremities.                                                        Most recent tx:  Treatment Area:   Difficulty Walking Date:  11/9/2023 Date:  11/2/2023 Date:   10/31/23 Date:    Activity/Exercise       Recumbent stepper for LE strength / endurance - large Nustep L4 resistance for 8 minutes with B LEs and UEs L3 resistance for 8 minutes with B LEs and UEs L1   6 minutes  Seat 6, Arms 7    Bridge*  20 reps/1 set with cues to slowly lower and to increase hip extension AROM; cues to avoid letting knees fall toward R side with lifting hips DL 2x10    Clamshells*  20 reps/1 set with max cues for initial positioning Hooklying, 2x10 knee fallout    Marching   Hooklying, 2x20    SLR*  20 reps/1 set each LE with cues to maintain knee extension 2x10 B    LAQ*   1x20 B    Hip Abd Supine: 15 reps/1 set each LE (pillowcase under heel for reduced friction, minimal assist at R to initiate movement)  Std: 2x10 B - tactile cues for upright posture + visual cues for demo    Hip Ext   Std: 1x12 B    STS from elevated mat table Several reps from wheelchair and mat with cues to push up from stationary surface (rather than reaching to rollator with both hands) Several reps from wheelchair and mat with cues to push up from stationary surface (rather than reaching to rollator with both hands) >>>    Lower trunk rotation* 20 reps/1 set with 2-3 second hold; cues to avoid painful range 20 reps/1 set with 2-3 second hold; cues to avoid painful range     Ambulation over level ground cues for safe gait

## 2023-11-14 ENCOUNTER — HOSPITAL ENCOUNTER (OUTPATIENT)
Dept: PHYSICAL THERAPY | Age: 78
Setting detail: RECURRING SERIES
Discharge: HOME | End: 2023-11-17
Attending: FAMILY MEDICINE
Payer: MEDICARE

## 2023-11-14 ENCOUNTER — TELEPHONE (OUTPATIENT)
Dept: ENDOCRINOLOGY | Age: 78
End: 2023-11-14

## 2023-11-14 DIAGNOSIS — E04.2 MULTINODULAR GOITER: Primary | ICD-10-CM

## 2023-11-14 PROCEDURE — 97110 THERAPEUTIC EXERCISES: CPT

## 2023-11-14 PROCEDURE — 97530 THERAPEUTIC ACTIVITIES: CPT

## 2023-11-14 ASSESSMENT — PAIN SCALES - GENERAL: PAINLEVEL_OUTOF10: 0

## 2023-11-14 NOTE — PROGRESS NOTES
Mark Capps  : 1945  Primary: Medicare Part A And B (Medicare)  Secondary: [address] @ [address] [place] [address]  Phone: [phone]  Fax: [phone] Plan Frequency: twice a week for 12 weeks    Plan of Care/Certification Expiration Date: 01/15/24        Plan of Care/Certification Expiration Date:  Plan of Care/Certification Expiration Date: 01/15/24    Frequency/Duration: Plan Frequency: twice a week for 12 weeks      Time In/Out:   Time In: 1345  Time Out: 1430    PT Visit Info:    Plan Frequency: twice a week for 12 weeks  Progress Note Due Date: 23 (or 8th visit)  Total # of Visits to Date: 5  Progress Note Counter: 5  Canceled Appointment: 3        Visit Count:  5     OUTPATIENT PHYSICAL THERAPY:  Treatment Note 2023       Episode (difficulty walking) Appt Desk             Treatment Diagnosis:         Codes     Difficulty in walking, not elsewhere classified    -  Primary R26.2     Imbalance     R26.89     Muscle weakness (generalized)     M62.81     Weakness          Medical/Referring Diagnosis:   Unsteadiness [R26.81]  Referring Physician: Delia Alvarado MD MD Orders: PT Eval and Treat  Date of Onset: Onset Date: 21 (date of stroke)       Allergies: Lisinopril  Restrictions/Precautions:  none  Interventions Planned (Treatment may consist of any combination of the following):  See assessment in chart       Subjective Comments: Pt states she is working on the exercises some at home, but states she is not doing them every day    >Initial:     0 (no pain in legs)/10     >Post Session:       0 (no pain reported)/10    Medications Last Reviewed: 2023    Updated Objective Findings: None today    Treatment     GAIT TRAINING: (0 minutes): Gait training to improve and/or restore physical functioning as related to mobility, strength, balance and coordination.  Ambulated with minimal visual,

## 2023-11-14 NOTE — TELEPHONE ENCOUNTER
----- Message from Carlos Larsen MD sent at 4/25/2023 12:01 PM EDT -----  See biopsy results.  Please have this patient repeat ultrasound in radiology in 6 months rather than 12 months.  If there is any change, please let me know.  Thanks.

## 2023-11-14 NOTE — TELEPHONE ENCOUNTER
Please encourage pt to have thyroid US repeated in November or December as ordered. Please give radiology phone number to schedule    Please set follow up appt

## 2023-11-16 ENCOUNTER — HOSPITAL ENCOUNTER (OUTPATIENT)
Dept: PHYSICAL THERAPY | Age: 78
Setting detail: RECURRING SERIES
End: 2023-11-16
Attending: FAMILY MEDICINE
Payer: MEDICARE

## 2023-11-20 RX ORDER — FLASH GLUCOSE SENSOR
KIT MISCELLANEOUS
Qty: 2 EACH | OUTPATIENT
Start: 2023-11-20

## 2023-11-20 NOTE — TELEPHONE ENCOUNTER
Patient left message stating she is out of Flor sensors. Need refill. [facility] pharmacy

## 2023-11-22 NOTE — TELEPHONE ENCOUNTER
They use [facility] but she is out early and [facility] won't send them can we send the Freestyle Flor 2 sensors to [facility]

## 2023-11-27 ENCOUNTER — TELEPHONE (OUTPATIENT)
Dept: FAMILY MEDICINE CLINIC | Facility: CLINIC | Age: 78
End: 2023-11-27

## 2023-11-27 NOTE — TELEPHONE ENCOUNTER
Patient hasnt seen Dr Megan Cerrato 2021 and is requesting a referral to go back.  She was seeing him after her stroke

## 2023-12-28 ENCOUNTER — OFFICE VISIT (OUTPATIENT)
Dept: NEUROLOGY | Age: 78
End: 2023-12-28
Payer: MEDICARE

## 2023-12-28 VITALS
OXYGEN SATURATION: 98 % | HEIGHT: 62 IN | WEIGHT: 159.2 LBS | HEART RATE: 65 BPM | DIASTOLIC BLOOD PRESSURE: 87 MMHG | SYSTOLIC BLOOD PRESSURE: 165 MMHG | BODY MASS INDEX: 29.3 KG/M2

## 2023-12-28 DIAGNOSIS — F01.B18 MODERATE VASCULAR DEMENTIA WITH OTHER BEHAVIORAL DISTURBANCE (HCC): Primary | ICD-10-CM

## 2023-12-28 DIAGNOSIS — E78.2 MIXED HYPERLIPIDEMIA: ICD-10-CM

## 2023-12-28 DIAGNOSIS — I10 PRIMARY HYPERTENSION: ICD-10-CM

## 2023-12-28 DIAGNOSIS — F02.80 ALZHEIMER DISEASE (HCC): ICD-10-CM

## 2023-12-28 DIAGNOSIS — G30.9 ALZHEIMER DISEASE (HCC): ICD-10-CM

## 2023-12-28 PROCEDURE — 3077F SYST BP >= 140 MM HG: CPT | Performed by: PHYSICAL THERAPIST

## 2023-12-28 PROCEDURE — 1090F PRES/ABSN URINE INCON ASSESS: CPT | Performed by: PHYSICAL THERAPIST

## 2023-12-28 PROCEDURE — 3079F DIAST BP 80-89 MM HG: CPT | Performed by: PHYSICAL THERAPIST

## 2023-12-28 PROCEDURE — G8417 CALC BMI ABV UP PARAM F/U: HCPCS | Performed by: PHYSICAL THERAPIST

## 2023-12-28 PROCEDURE — 1123F ACP DISCUSS/DSCN MKR DOCD: CPT | Performed by: PHYSICAL THERAPIST

## 2023-12-28 PROCEDURE — G8427 DOCREV CUR MEDS BY ELIG CLIN: HCPCS | Performed by: PHYSICAL THERAPIST

## 2023-12-28 PROCEDURE — 1036F TOBACCO NON-USER: CPT | Performed by: PHYSICAL THERAPIST

## 2023-12-28 PROCEDURE — 99214 OFFICE O/P EST MOD 30 MIN: CPT | Performed by: PHYSICAL THERAPIST

## 2023-12-28 PROCEDURE — G8400 PT W/DXA NO RESULTS DOC: HCPCS | Performed by: PHYSICAL THERAPIST

## 2023-12-28 PROCEDURE — G8484 FLU IMMUNIZE NO ADMIN: HCPCS | Performed by: PHYSICAL THERAPIST

## 2023-12-28 RX ORDER — DONEPEZIL HYDROCHLORIDE 5 MG/1
5 TABLET, FILM COATED ORAL NIGHTLY
Qty: 30 TABLET | Refills: 3 | Status: SHIPPED | OUTPATIENT
Start: 2023-12-28

## 2023-12-28 NOTE — PROGRESS NOTES
Rfl: 3    carvedilol (COREG) 12.5 MG tablet, TAKE 1 TABLET BY MOUTH TWICE DAILY, Disp: 180 tablet, Rfl: 1    TRULICITY 3 TQ/9.9IV SOPN, Inject 3 mg into the skin once a week, Disp: 12 Adjustable Dose Pre-filled Pen Syringe, Rfl: 3    Insulin Pen Needle 32G X 4 MM MISC, Use with insulin up to 4 times daily, E11.65, Disp: 400 each, Rfl: 3    rosuvastatin (CRESTOR) 10 MG tablet, TAKE 1 TABLET BY MOUTH EVERY NIGHT. A VISIT WITH PHYSICIAN IS REQUIRED BEFORE FUTURE REFILLS ARE GIVEN., Disp: 100 tablet, Rfl: 0    HUMALOG KWIKPEN 100 UNIT/ML SOPN, 4 units AC brunch plus correction 1/50>200, max daily dose 30 units, Disp: 30 mL, Rfl: 3    insulin NPH (HUMULIN N KWIKPEN) 100 UNIT/ML injection pen, Inject 18 Units into the skin every morning, Disp: 54 mL, Rfl: 3    B Complex Vitamins (VITAMIN B COMPLEX PO), Take 1 tablet by mouth daily, Disp: , Rfl:     ascorbic acid (VITAMIN C) 500 MG tablet, Take by mouth, Disp: , Rfl:     aspirin 81 MG EC tablet, Take by mouth daily, Disp: , Rfl:     belatacept (NULOJIX) 250 MG SOLR, Infuse 500 mg intravenously, Disp: , Rfl:     vitamin D 25 MCG (1000 UT) CAPS, Take 1 capsule by mouth daily, Disp: , Rfl:     cyanocobalamin 250 MCG tablet, Take 1 tablet by mouth daily, Disp: , Rfl:     Glucagon (GVOKE HYPOPEN 2-PACK) 1 MG/0.2ML SOAJ, Inject 1 mg into the skin as needed, Disp: , Rfl:     mycophenolate (CELLCEPT) 250 MG capsule, TK 3 CS PO BID, Disp: , Rfl:     predniSONE (DELTASONE) 5 MG tablet, Take 1 tablet by mouth daily, Disp: , Rfl:     benzonatate (TESSALON) 200 MG capsule, Take 1 capsule by mouth 3 times daily as needed for Cough (Patient not taking: Reported on 12/27/2023), Disp: 30 capsule, Rfl: 0    nystatin (MYCOSTATIN) 127189 UNIT/GM powder, Apply topically 4 times daily to skin folds to abdomen for rash.  (Patient not taking: Reported on 12/27/2023), Disp: 120 g, Rfl: 2    Allergies   Allergen Reactions    Lisinopril Other (See Comments)     cough       REVIEW OF

## 2024-01-05 ENCOUNTER — TELEPHONE (OUTPATIENT)
Dept: NEUROLOGY | Age: 79
End: 2024-01-05

## 2024-01-05 NOTE — TELEPHONE ENCOUNTER
Called spoke with daughter Mrs Chino relayed message from [facility] This is not a side effect of donepezil. Infection or lack of sleep could be worsening symptoms. They need to ask pt if she having UTI symptoms (increased urination, foul smelling, increased urgency, or decreased urination) and call PCP for work up of other metabolic risk factors. Would recommend melatonin to help with sleep as well. If all of that is normal then we will get her back in to potentially start a mood stabilizing therapy

## 2024-01-05 NOTE — TELEPHONE ENCOUNTER
Pt daughter came by stating since mother came to appointment she is having more episodes of hallucinating , seeing  & people things more frequent. She is getting more frustrated.  She started the new med Aricept . . This is out of norm . I did not ask if they call PCP . They have not

## 2024-01-10 ENCOUNTER — TELEPHONE (OUTPATIENT)
Dept: NEUROLOGY | Age: 79
End: 2024-01-10

## 2024-01-10 DIAGNOSIS — E78.5 HYPERLIPIDEMIA, UNSPECIFIED HYPERLIPIDEMIA TYPE: Primary | ICD-10-CM

## 2024-01-10 DIAGNOSIS — F01.B18 MODERATE VASCULAR DEMENTIA WITH OTHER BEHAVIORAL DISTURBANCE (HCC): ICD-10-CM

## 2024-01-10 NOTE — TELEPHONE ENCOUNTER
Spoke with pt and pts  to let them know that orders have been placed for lipids and instructed patient that she should be fasting prior to this test.

## 2024-01-10 NOTE — TELEPHONE ENCOUNTER
Fall called stating that Alayna's PCP said they have not done a lipid test since 2022 and asked that you order this test for her. Thank you

## 2024-01-11 DIAGNOSIS — F01.B18 MODERATE VASCULAR DEMENTIA WITH OTHER BEHAVIORAL DISTURBANCE (HCC): ICD-10-CM

## 2024-01-11 DIAGNOSIS — E78.5 HYPERLIPIDEMIA, UNSPECIFIED HYPERLIPIDEMIA TYPE: ICD-10-CM

## 2024-01-11 LAB
CHOLEST SERPL-MCNC: 151 MG/DL
HDLC SERPL-MCNC: 69 MG/DL (ref 40–60)
HDLC SERPL: 2.2
LDLC SERPL CALC-MCNC: 51.4 MG/DL
TRIGL SERPL-MCNC: 153 MG/DL (ref 35–150)
VLDLC SERPL CALC-MCNC: 30.6 MG/DL (ref 6–23)

## 2024-01-22 ENCOUNTER — HOSPITAL ENCOUNTER (OUTPATIENT)
Dept: WOUND CARE | Age: 79
Discharge: HOME | End: 2024-01-22
Payer: MEDICARE

## 2024-01-22 VITALS
WEIGHT: 152 LBS | DIASTOLIC BLOOD PRESSURE: 71 MMHG | HEIGHT: 62 IN | SYSTOLIC BLOOD PRESSURE: 131 MMHG | HEART RATE: 76 BPM | BODY MASS INDEX: 27.97 KG/M2

## 2024-01-22 PROCEDURE — 11042 DBRDMT SUBQ TIS 1ST 20SQCM/<: CPT

## 2024-01-22 NOTE — DISCHARGE INSTRUCTIONS
Discharge Instructions for  [facility]  [address] [place] [address]  Phone [phone]   Fax [phone]      NAME:  Alayna Fall  YOB: 1945  MEDICAL RECORD NUMBER:  [id number]  DATE:  @ED@    Return Appointment:   1 week with Todd Rizzo DO      Instructions: ***        Should you experience increased redness, swelling, pain, foul odor, size of wound(s), or have a temperature over 101 degrees please contact the Wound Healing Center at [phone] or if after hours contact your primary care physician or go to the hospital emergency department.    PLEASE NOTE: IF YOU ARE UNABLE TO OBTAIN WOUND SUPPLIES, CONTINUE TO USE THE SUPPLIES YOU HAVE AVAILABLE UNTIL YOU ARE ABLE TO REACH US. IT IS MOST IMPORTANT TO KEEP THE WOUND COVERED AT ALL TIMES.    Electronically signed Precious Barksdale RN on 1/22/2024 at 2:02 PM

## 2024-01-22 NOTE — FLOWSHEET NOTE
01/22/24 1336   Wound 01/22/24 Heel Left;Plantar #1   Date First Assessed/Time First Assessed: 01/22/24 1336   Present on Original Admission: Yes  Wound Approximate Age at First Assessment (Weeks): 8 weeks  Primary Wound Type: Diabetic Ulcer  Location: Heel  Wound Location Orientation: Left;Plantar  Woun...   Wound Image     Wound Etiology Diabetic Doan 2   Dressing Status Clean;Dry;Intact   Wound Cleansed Cleansed with saline   Dressing/Treatment Gauze dressing/dressing sponge   Offloading for Diabetic Foot Ulcers Offloading ordered   Wound Length (cm) 1 cm   Wound Width (cm) 1 cm   Wound Depth (cm) 0.1 cm   Wound Surface Area (cm^2) 1 cm^2   Wound Volume (cm^3) 0.1 cm^3   Post-Procedure Length (cm) 1.5 cm   Post-Procedure Width (cm) 1.5 cm   Post-Procedure Depth (cm) 0.2 cm   Post-Procedure Surface Area (cm^2) 2.25 cm^2   Post-Procedure Volume (cm^3) 0.45 cm^3   Wound Assessment Slough;Eschar moist   Drainage Amount Small (< 25%)   Drainage Description Serosanguinous   Odor None   Abby-wound Assessment Dry/flaky   Wound Thickness Description not for Pressure Injury Full thickness     Post debridement

## 2024-01-22 NOTE — WOUND CARE
Discharge Instructions for  [facility]  [address] [place] [address]  [address]  [place] [address]  Phone [phone]   Fax [phone]      NAME:  Alayna Fall  YOB: 1945  MEDICAL RECORD NUMBER:  [id number]  DATE:  1/22/2024    Return Appointment:   1 week with Todd Rizzo DO      Instructions: Left heel:  Cleanse with normal saline  Hydrofera Blue: Cut to wound size, moisten with saline, and apply to wound bed.   Cover with rolled gauze or bandaid  Change 3 times per week        Patient to offload wound with  heel offloading boot  while in bed or chair. Or make sure heel is hanging off recliner.Do not get wound wet. May purchase cast cover at local pharmacy to keep dry in shower.  Wound healing is greatly slowed when blood glucose levels are greater than 200. Monitor glucose levels daily to ensure tight glucose control.  Increase protein intake to promote wound healing. Protein supplements such as Evelio and Ensure are great options.   Protein goal is 60 grams per day in a supplement like Glucerna and the rest from diet.        Should you experience increased redness, swelling, pain, foul odor, size of wound(s), or have a temperature over 101 degrees please contact the Wound Healing Center at [phone] or if after hours contact your primary care physician or go to the hospital emergency department.    PLEASE NOTE: IF YOU ARE UNABLE TO OBTAIN WOUND SUPPLIES, CONTINUE TO USE THE SUPPLIES YOU HAVE AVAILABLE UNTIL YOU ARE ABLE TO REACH US. IT IS MOST IMPORTANT TO KEEP THE WOUND COVERED AT ALL TIMES.    Electronically signed Precious Barksdale RN on 1/22/2024 at 1:46 PM

## 2024-01-22 NOTE — FLOWSHEET NOTE
01/22/24 1336   Wound 01/22/24 Heel Left;Plantar #1   Date First Assessed/Time First Assessed: 01/22/24 1336   Present on Original Admission: Yes  Wound Approximate Age at First Assessment (Weeks): 8 weeks  Primary Wound Type: Diabetic Ulcer  Location: Heel  Wound Location Orientation: Left;Plantar  Woun...   Wound Image    Wound Etiology Diabetic Doan 1   Dressing Status Clean;Dry;Intact   Wound Cleansed Cleansed with saline   Dressing/Treatment Gauze dressing/dressing sponge   Wound Length (cm) 1 cm   Wound Width (cm) 1 cm   Wound Depth (cm) 0.1 cm   Wound Surface Area (cm^2) 1 cm^2   Wound Volume (cm^3) 0.1 cm^3   Wound Assessment Slough;Eschar moist   Drainage Amount Small (< 25%)   Drainage Description Serosanguinous   Odor None   Abby-wound Assessment Dry/flaky   Wound Thickness Description not for Pressure Injury Full thickness

## 2024-01-29 ENCOUNTER — HOSPITAL ENCOUNTER (OUTPATIENT)
Dept: WOUND CARE | Age: 79
Discharge: HOME | End: 2024-01-29
Payer: MEDICARE

## 2024-01-29 VITALS
BODY MASS INDEX: 27.97 KG/M2 | RESPIRATION RATE: 18 BRPM | HEART RATE: 74 BPM | SYSTOLIC BLOOD PRESSURE: 133 MMHG | WEIGHT: 152 LBS | TEMPERATURE: 97.7 F | OXYGEN SATURATION: 100 % | HEIGHT: 62 IN | DIASTOLIC BLOOD PRESSURE: 87 MMHG

## 2024-01-29 DIAGNOSIS — L89.623 PRESSURE ULCER OF LEFT HEEL, STAGE 3 (HCC): ICD-10-CM

## 2024-01-29 DIAGNOSIS — L89.622 PRESSURE ULCER OF LEFT HEEL, STAGE 2 (HCC): Primary | ICD-10-CM

## 2024-01-29 PROCEDURE — 87075 CULTR BACTERIA EXCEPT BLOOD: CPT

## 2024-01-29 PROCEDURE — 87186 SC STD MICRODIL/AGAR DIL: CPT

## 2024-01-29 PROCEDURE — 11042 DBRDMT SUBQ TIS 1ST 20SQCM/<: CPT | Performed by: FAMILY MEDICINE

## 2024-01-29 PROCEDURE — 87077 CULTURE AEROBIC IDENTIFY: CPT

## 2024-01-29 PROCEDURE — 87070 CULTURE OTHR SPECIMN AEROBIC: CPT

## 2024-01-29 PROCEDURE — 87205 SMEAR GRAM STAIN: CPT

## 2024-01-29 PROCEDURE — 11042 DBRDMT SUBQ TIS 1ST 20SQCM/<: CPT

## 2024-01-29 RX ORDER — LIDOCAINE 40 MG/G
CREAM TOPICAL ONCE
OUTPATIENT
Start: 2024-01-29 | End: 2024-01-29

## 2024-01-29 RX ORDER — LIDOCAINE HYDROCHLORIDE 20 MG/ML
JELLY TOPICAL ONCE
Status: COMPLETED | OUTPATIENT
Start: 2024-01-29 | End: 2024-01-29

## 2024-01-29 RX ORDER — SODIUM CHLOR/HYPOCHLOROUS ACID 0.033 %
SOLUTION, IRRIGATION IRRIGATION ONCE
OUTPATIENT
Start: 2024-01-29 | End: 2024-01-29

## 2024-01-29 RX ORDER — GENTAMICIN SULFATE 1 MG/G
OINTMENT TOPICAL ONCE
OUTPATIENT
Start: 2024-01-29 | End: 2024-01-29

## 2024-01-29 RX ORDER — GINSENG 100 MG
CAPSULE ORAL ONCE
OUTPATIENT
Start: 2024-01-29 | End: 2024-01-29

## 2024-01-29 RX ORDER — TRIAMCINOLONE ACETONIDE 1 MG/G
OINTMENT TOPICAL ONCE
OUTPATIENT
Start: 2024-01-29 | End: 2024-01-29

## 2024-01-29 RX ORDER — LIDOCAINE HYDROCHLORIDE 40 MG/ML
SOLUTION TOPICAL ONCE
OUTPATIENT
Start: 2024-01-29 | End: 2024-01-29

## 2024-01-29 RX ORDER — LIDOCAINE 50 MG/G
OINTMENT TOPICAL ONCE
OUTPATIENT
Start: 2024-01-29 | End: 2024-01-29

## 2024-01-29 RX ORDER — CLOBETASOL PROPIONATE 0.5 MG/G
OINTMENT TOPICAL ONCE
OUTPATIENT
Start: 2024-01-29 | End: 2024-01-29

## 2024-01-29 RX ORDER — CARVEDILOL 12.5 MG/1
TABLET ORAL
Qty: 180 TABLET | Refills: 1 | Status: SHIPPED | OUTPATIENT
Start: 2024-01-29

## 2024-01-29 RX ORDER — LIDOCAINE HYDROCHLORIDE 20 MG/ML
JELLY TOPICAL ONCE
OUTPATIENT
Start: 2024-01-29 | End: 2024-01-29

## 2024-01-29 RX ORDER — BETAMETHASONE DIPROPIONATE 0.5 MG/G
CREAM TOPICAL ONCE
OUTPATIENT
Start: 2024-01-29 | End: 2024-01-29

## 2024-01-29 RX ORDER — BACITRACIN ZINC AND POLYMYXIN B SULFATE 500; 1000 [USP'U]/G; [USP'U]/G
OINTMENT TOPICAL ONCE
OUTPATIENT
Start: 2024-01-29 | End: 2024-01-29

## 2024-01-29 RX ORDER — IBUPROFEN 200 MG
TABLET ORAL ONCE
OUTPATIENT
Start: 2024-01-29 | End: 2024-01-29

## 2024-01-29 RX ADMIN — LIDOCAINE HYDROCHLORIDE: 20 JELLY TOPICAL at 13:30

## 2024-01-29 ASSESSMENT — PAIN SCALES - GENERAL: PAINLEVEL_OUTOF10: 7

## 2024-01-29 NOTE — FLOWSHEET NOTE
01/29/24 1311   Wound 01/22/24 Heel Left;Plantar #1   Date First Assessed/Time First Assessed: 01/22/24 1336   Present on Original Admission: Yes  Wound Approximate Age at First Assessment (Weeks): 8 weeks  Primary Wound Type: Diabetic Ulcer  Location: Heel  Wound Location Orientation: Left;Plantar  Woun...   Wound Image     Wound Etiology Diabetic Doan 2   Dressing Status Clean;Dry;Intact   Wound Cleansed Vashe   Dressing/Treatment Hydrofera blue   Offloading for Diabetic Foot Ulcers Offloading ordered   Wound Length (cm) 1.8 cm   Wound Width (cm) 1.9 cm   Wound Depth (cm) 0.7 cm   Wound Surface Area (cm^2) 3.42 cm^2   Change in Wound Size % (l*w) -242   Wound Volume (cm^3) 2.394 cm^3   Wound Healing % -2294   Post-Procedure Length (cm) 2 cm   Post-Procedure Width (cm) 1.9 cm   Post-Procedure Depth (cm) 0.7 cm   Post-Procedure Surface Area (cm^2) 3.8 cm^2   Post-Procedure Volume (cm^3) 2.66 cm^3   Wound Assessment Slough;Pink/red   Drainage Amount Small (< 25%)   Drainage Description Serosanguinous   Odor None   Abby-wound Assessment Dry/flaky   Wound Thickness Description not for Pressure Injury Full thickness   Pain Assessment   Pain Assessment 0-10   Pain Level 7

## 2024-01-29 NOTE — PROGRESS NOTES
Offloading ordered 01/29/24 1311   Wound Length (cm) 1.8 cm 01/29/24 1311   Wound Width (cm) 1.9 cm 01/29/24 1311   Wound Depth (cm) 0.7 cm 01/29/24 1311   Wound Surface Area (cm^2) 3.42 cm^2 01/29/24 1311   Change in Wound Size % (l*w) -242 01/29/24 1311   Wound Volume (cm^3) 2.394 cm^3 01/29/24 1311   Wound Healing % -2294 01/29/24 1311   Post-Procedure Length (cm) 2 cm 01/29/24 1311   Post-Procedure Width (cm) 1.9 cm 01/29/24 1311   Post-Procedure Depth (cm) 0.7 cm 01/29/24 1311   Post-Procedure Surface Area (cm^2) 3.8 cm^2 01/29/24 1311   Post-Procedure Volume (cm^3) 2.66 cm^3 01/29/24 1311   Wound Assessment Slough;Roxton/red 01/29/24 1311   Drainage Amount Small (< 25%) 01/29/24 1311   Drainage Description Serosanguinous 01/29/24 1311   Odor None 01/29/24 1311   Abby-wound Assessment Dry/flaky 01/29/24 1311   Wound Thickness Description not for Pressure Injury Full thickness 01/29/24 1311   Number of days: 7        Total Surface Area Debrided:  3.8 sq cm   Estimated Blood Loss: Minimal amount blood loss .  Hemostasis Achieved:  by pressure  Procedural Pain: 0  / 10   Post Procedural Pain: 1 / 10   Response to treatment: Patient tolerated procedure well with minimal complaints of pain.

## 2024-01-29 NOTE — DISCHARGE INSTRUCTIONS
Instructions: Left heel:  Cleanse with normal saline  Silver alginate to wound bed.   Cover with rolled gauze or bandaid  Change 3 times per week     Wound culture taken during today's visit.     Patient to offload wound with  heel offloading boot  while in bed or chair. Or make sure heel is hanging off recliner.Do not get wound wet. May purchase cast cover at local pharmacy to keep dry in shower.  Wound healing is greatly slowed when blood glucose levels are greater than 200. Monitor glucose levels daily to ensure tight glucose control.  Increase protein intake to promote wound healing. Protein supplements such as Evelio and Ensure are great options.   Protein goal is 60 grams per day in a supplement like Glucerna and the rest from diet.

## 2024-01-29 NOTE — WOUND CARE
Discharge Instructions for  [facility]  [address] [place] [address]  [address] [place] [address]  Phone [phone]   Fax [phone]      NAME:  Alayna Fall  YOB: 1945  MEDICAL RECORD NUMBER:  [id number]  DATE:  1/29/2024    Return Appointment:   1 week with Todd Rizzo DO      Instructions: Left heel:  Cleanse with normal saline  Silver alginate to wound bed.   Cover with rolled gauze or bandaid  Change 3 times per week    Wound culture taken during today's visit.    Patient to offload wound with  heel offloading boot  while in bed or chair. Or make sure heel is hanging off recliner.Do not get wound wet. May purchase cast cover at local pharmacy to keep dry in shower.  Wound healing is greatly slowed when blood glucose levels are greater than 200. Monitor glucose levels daily to ensure tight glucose control.  Increase protein intake to promote wound healing. Protein supplements such as Evelio and Ensure are great options.   Protein goal is 60 grams per day in a supplement like Glucerna and the rest from diet.        Should you experience increased redness, swelling, pain, foul odor, size of wound(s), or have a temperature over 101 degrees please contact the Wound Healing Center at [phone] or if after hours contact your primary care physician or go to the hospital emergency department.    PLEASE NOTE: IF YOU ARE UNABLE TO OBTAIN WOUND SUPPLIES, CONTINUE TO USE THE SUPPLIES YOU HAVE AVAILABLE UNTIL YOU ARE ABLE TO REACH US. IT IS MOST IMPORTANT TO KEEP THE WOUND COVERED AT ALL TIMES.    Electronically signed Justin Escobedo RN on 1/29/2024 at 1:41 PM

## 2024-02-01 LAB
BACTERIA SPEC CULT: ABNORMAL
GRAM STN SPEC: ABNORMAL
GRAM STN SPEC: ABNORMAL
SERVICE CMNT-IMP: ABNORMAL

## 2024-02-02 LAB
BACTERIA SPEC CULT: NORMAL
BACTERIA SPEC CULT: NORMAL
SERVICE CMNT-IMP: NORMAL

## 2024-02-05 LAB
BACTERIA SPEC CULT: NORMAL
SERVICE CMNT-IMP: NORMAL

## 2024-02-07 ENCOUNTER — HOSPITAL ENCOUNTER (OUTPATIENT)
Dept: WOUND CARE | Age: 79
Discharge: HOME | End: 2024-02-07
Payer: MEDICARE

## 2024-02-07 VITALS
SYSTOLIC BLOOD PRESSURE: 120 MMHG | BODY MASS INDEX: 27.97 KG/M2 | RESPIRATION RATE: 18 BRPM | HEIGHT: 62 IN | TEMPERATURE: 98.8 F | DIASTOLIC BLOOD PRESSURE: 49 MMHG | OXYGEN SATURATION: 100 % | WEIGHT: 152 LBS | HEART RATE: 73 BPM

## 2024-02-07 DIAGNOSIS — L89.623 PRESSURE ULCER OF LEFT HEEL, STAGE 3 (HCC): Primary | ICD-10-CM

## 2024-02-07 PROCEDURE — 11042 DBRDMT SUBQ TIS 1ST 20SQCM/<: CPT

## 2024-02-07 RX ORDER — CLOBETASOL PROPIONATE 0.5 MG/G
OINTMENT TOPICAL ONCE
OUTPATIENT
Start: 2024-02-07 | End: 2024-02-07

## 2024-02-07 RX ORDER — LIDOCAINE HYDROCHLORIDE 20 MG/ML
JELLY TOPICAL ONCE
Status: COMPLETED | OUTPATIENT
Start: 2024-02-07 | End: 2024-02-07

## 2024-02-07 RX ORDER — GINSENG 100 MG
CAPSULE ORAL ONCE
OUTPATIENT
Start: 2024-02-07 | End: 2024-02-07

## 2024-02-07 RX ORDER — IBUPROFEN 200 MG
TABLET ORAL ONCE
OUTPATIENT
Start: 2024-02-07 | End: 2024-02-07

## 2024-02-07 RX ORDER — LIDOCAINE 40 MG/G
CREAM TOPICAL ONCE
OUTPATIENT
Start: 2024-02-07 | End: 2024-02-07

## 2024-02-07 RX ORDER — SODIUM CHLOR/HYPOCHLOROUS ACID 0.033 %
SOLUTION, IRRIGATION IRRIGATION ONCE
OUTPATIENT
Start: 2024-02-07 | End: 2024-02-07

## 2024-02-07 RX ORDER — BETAMETHASONE DIPROPIONATE 0.5 MG/G
CREAM TOPICAL ONCE
OUTPATIENT
Start: 2024-02-07 | End: 2024-02-07

## 2024-02-07 RX ORDER — TRIAMCINOLONE ACETONIDE 1 MG/G
OINTMENT TOPICAL ONCE
OUTPATIENT
Start: 2024-02-07 | End: 2024-02-07

## 2024-02-07 RX ORDER — LIDOCAINE HYDROCHLORIDE 40 MG/ML
SOLUTION TOPICAL ONCE
OUTPATIENT
Start: 2024-02-07 | End: 2024-02-07

## 2024-02-07 RX ORDER — BACITRACIN ZINC AND POLYMYXIN B SULFATE 500; 1000 [USP'U]/G; [USP'U]/G
OINTMENT TOPICAL ONCE
OUTPATIENT
Start: 2024-02-07 | End: 2024-02-07

## 2024-02-07 RX ORDER — LIDOCAINE 50 MG/G
OINTMENT TOPICAL ONCE
OUTPATIENT
Start: 2024-02-07 | End: 2024-02-07

## 2024-02-07 RX ORDER — GENTAMICIN SULFATE 1 MG/G
OINTMENT TOPICAL ONCE
OUTPATIENT
Start: 2024-02-07 | End: 2024-02-07

## 2024-02-07 RX ORDER — LIDOCAINE HYDROCHLORIDE 20 MG/ML
JELLY TOPICAL ONCE
OUTPATIENT
Start: 2024-02-07 | End: 2024-02-07

## 2024-02-07 RX ADMIN — LIDOCAINE HYDROCHLORIDE: 20 JELLY TOPICAL at 14:01

## 2024-02-07 ASSESSMENT — PAIN SCALES - GENERAL: PAINLEVEL_OUTOF10: 6

## 2024-02-07 NOTE — DISCHARGE INSTRUCTIONS
Instructions: Left heel:  Cleanse with normal saline  Xeroform to wound bed.   Cover with rolled gauze or bandaide.  Change daily.     Wound culture showed infection. Antibiotics to stop infection and should start showing some results 5-7 days. Xeroform this week to soften wound.     Patient to offload wound with  heel offloading boot  while in bed or chair. Or make sure heel is hanging off recliner.Do not get wound wet. May purchase cast cover at local pharmacy to keep dry in shower.  Wound healing is greatly slowed when blood glucose levels are greater than 200. Monitor glucose levels daily to ensure tight glucose control.  Increase protein intake to promote wound healing. Protein supplements such as Evelio and Ensure are great options.   Protein goal is 60 grams per day in a supplement like Glucerna and the rest from diet.

## 2024-02-07 NOTE — WOUND CARE
Discharge Instructions for  [facility]  [address] [place] [address]  [address]  [place] [address]  Phone [phone]   Fax [phone]      NAME:  Alayna Fall  YOB: 1945  MEDICAL RECORD NUMBER:  [id number]  DATE:  2/7/2024    Return Appointment:   1 week with Todd Rizzo DO      Instructions: Left heel:  Cleanse with normal saline  Xeroform to wound bed.   Cover with rolled gauze or bandaide.  Change daily.    Wound culture showed infection. Antibiotics to stop infection and should start showing some results 5-7 days. Xeroform this week to soften wound.    Patient to offload wound with  heel offloading boot  while in bed or chair. Or make sure heel is hanging off recliner.Do not get wound wet. May purchase cast cover at local pharmacy to keep dry in shower.  Wound healing is greatly slowed when blood glucose levels are greater than 200. Monitor glucose levels daily to ensure tight glucose control.  Increase protein intake to promote wound healing. Protein supplements such as Evelio and Ensure are great options.   Protein goal is 60 grams per day in a supplement like Glucerna and the rest from diet.        Should you experience increased redness, swelling, pain, foul odor, size of wound(s), or have a temperature over 101 degrees please contact the Wound Healing Center at [phone] or if after hours contact your primary care physician or go to the hospital emergency department.    PLEASE NOTE: IF YOU ARE UNABLE TO OBTAIN WOUND SUPPLIES, CONTINUE TO USE THE SUPPLIES YOU HAVE AVAILABLE UNTIL YOU ARE ABLE TO REACH US. IT IS MOST IMPORTANT TO KEEP THE WOUND COVERED AT ALL TIMES.    Electronically signed Justin Escobedo RN on 2/7/2024 at 2:31 PM

## 2024-02-07 NOTE — FLOWSHEET NOTE
02/07/24 1351   Wound 01/22/24 Heel Left;Plantar #1   Date First Assessed/Time First Assessed: 01/22/24 1336   Present on Original Admission: Yes  Wound Approximate Age at First Assessment (Weeks): 8 weeks  Primary Wound Type: Diabetic Ulcer  Location: Heel  Wound Location Orientation: Left;Plantar  Woun...   Wound Image     Wound Etiology Diabetic Doan 2   Dressing Status Clean;Dry;Intact   Wound Cleansed Vashe   Dressing/Treatment Hydrofera blue   Offloading for Diabetic Foot Ulcers Offloading ordered   Wound Length (cm) 2 cm   Wound Width (cm) 2 cm   Wound Depth (cm) 0.5 cm   Wound Surface Area (cm^2) 4 cm^2   Change in Wound Size % (l*w) -300   Wound Volume (cm^3) 2 cm^3   Wound Healing % -1900   Post-Procedure Length (cm) 1.8 cm   Post-Procedure Width (cm) 2 cm   Post-Procedure Depth (cm) 0.5 cm   Post-Procedure Surface Area (cm^2) 3.6 cm^2   Post-Procedure Volume (cm^3) 1.8 cm^3   Wound Assessment Slough;Pink/red   Drainage Amount Scant (moist but unmeasurable)   Odor None   Abby-wound Assessment Dry/flaky   Wound Thickness Description not for Pressure Injury Full thickness   Pain Assessment   Pain Assessment 0-10   Pain Level 6     Taking ASA

## 2024-02-09 ENCOUNTER — HOSPITAL ENCOUNTER (OUTPATIENT)
Dept: INFUSION THERAPY | Age: 79
Discharge: HOME | End: 2024-02-09
Payer: MEDICARE

## 2024-02-09 VITALS
BODY MASS INDEX: 32.04 KG/M2 | DIASTOLIC BLOOD PRESSURE: 74 MMHG | RESPIRATION RATE: 16 BRPM | SYSTOLIC BLOOD PRESSURE: 111 MMHG | OXYGEN SATURATION: 100 % | HEART RATE: 70 BPM | WEIGHT: 175.2 LBS | TEMPERATURE: 98.6 F

## 2024-02-09 DIAGNOSIS — Z94.0 RENAL TRANSPLANT, STATUS POST: Primary | ICD-10-CM

## 2024-02-09 LAB
ALBUMIN SERPL-MCNC: 3.4 G/DL (ref 3.2–4.6)
ALBUMIN/GLOB SERPL: 0.9 (ref 0.4–1.6)
ALP SERPL-CCNC: 59 U/L (ref 50–136)
ALT SERPL-CCNC: 17 U/L (ref 12–65)
ANION GAP SERPL CALC-SCNC: 7 MMOL/L (ref 2–11)
AST SERPL-CCNC: 19 U/L (ref 15–37)
BASOPHILS # BLD: 0 K/UL (ref 0–0.2)
BASOPHILS NFR BLD: 1 % (ref 0–2)
BILIRUB SERPL-MCNC: 0.5 MG/DL (ref 0.2–1.1)
BUN SERPL-MCNC: 11 MG/DL (ref 8–23)
CALCIUM SERPL-MCNC: 10 MG/DL (ref 8.3–10.4)
CHLORIDE SERPL-SCNC: 104 MMOL/L (ref 103–113)
CO2 SERPL-SCNC: 26 MMOL/L (ref 21–32)
CREAT SERPL-MCNC: 0.9 MG/DL (ref 0.6–1)
DIFFERENTIAL METHOD BLD: ABNORMAL
EOSINOPHIL # BLD: 0.1 K/UL (ref 0–0.8)
EOSINOPHIL NFR BLD: 2 % (ref 0.5–7.8)
ERYTHROCYTE [DISTWIDTH] IN BLOOD BY AUTOMATED COUNT: 13.2 % (ref 11.9–14.6)
GLOBULIN SER CALC-MCNC: 4 G/DL (ref 2.8–4.5)
GLUCOSE SERPL-MCNC: 222 MG/DL (ref 65–100)
HCT VFR BLD AUTO: 37.6 % (ref 35.8–46.3)
HGB BLD-MCNC: 11.5 G/DL (ref 11.7–15.4)
IMM GRANULOCYTES # BLD AUTO: 0 K/UL (ref 0–0.5)
IMM GRANULOCYTES NFR BLD AUTO: 1 % (ref 0–5)
LYMPHOCYTES # BLD: 1 K/UL (ref 0.5–4.6)
LYMPHOCYTES NFR BLD: 21 % (ref 13–44)
MCH RBC QN AUTO: 29.3 PG (ref 26.1–32.9)
MCHC RBC AUTO-ENTMCNC: 30.6 G/DL (ref 31.4–35)
MCV RBC AUTO: 95.7 FL (ref 82–102)
MONOCYTES # BLD: 0.4 K/UL (ref 0.1–1.3)
MONOCYTES NFR BLD: 8 % (ref 4–12)
NEUTS SEG # BLD: 3.1 K/UL (ref 1.7–8.2)
NEUTS SEG NFR BLD: 67 % (ref 43–78)
NRBC # BLD: 0 K/UL (ref 0–0.2)
PLATELET # BLD AUTO: 184 K/UL (ref 150–450)
PMV BLD AUTO: 9 FL (ref 9.4–12.3)
POTASSIUM SERPL-SCNC: 3.9 MMOL/L (ref 3.5–5.1)
PROT SERPL-MCNC: 7.4 G/DL (ref 6.3–8.2)
RBC # BLD AUTO: 3.93 M/UL (ref 4.05–5.2)
SODIUM SERPL-SCNC: 137 MMOL/L (ref 136–146)
WBC # BLD AUTO: 4.6 K/UL (ref 4.3–11.1)

## 2024-02-09 PROCEDURE — 2580000003 HC RX 258: Performed by: INTERNAL MEDICINE

## 2024-02-09 PROCEDURE — 80053 COMPREHEN METABOLIC PANEL: CPT

## 2024-02-09 PROCEDURE — 96365 THER/PROPH/DIAG IV INF INIT: CPT

## 2024-02-09 PROCEDURE — 6360000002 HC RX W HCPCS: Performed by: INTERNAL MEDICINE

## 2024-02-09 PROCEDURE — 85025 COMPLETE CBC W/AUTO DIFF WBC: CPT

## 2024-02-09 RX ORDER — ALBUTEROL SULFATE 90 UG/1
4 AEROSOL, METERED RESPIRATORY (INHALATION) PRN
OUTPATIENT
Start: 2024-03-08

## 2024-02-09 RX ORDER — SODIUM CHLORIDE 9 MG/ML
5-250 INJECTION, SOLUTION INTRAVENOUS PRN
OUTPATIENT
Start: 2024-03-08

## 2024-02-09 RX ORDER — SODIUM CHLORIDE 0.9 % (FLUSH) 0.9 %
5-40 SYRINGE (ML) INJECTION PRN
OUTPATIENT
Start: 2024-03-08

## 2024-02-09 RX ORDER — DIPHENHYDRAMINE HYDROCHLORIDE 50 MG/ML
50 INJECTION INTRAMUSCULAR; INTRAVENOUS
OUTPATIENT
Start: 2024-03-08

## 2024-02-09 RX ORDER — ACETAMINOPHEN 325 MG/1
650 TABLET ORAL
OUTPATIENT
Start: 2024-03-08

## 2024-02-09 RX ORDER — ONDANSETRON 2 MG/ML
8 INJECTION INTRAMUSCULAR; INTRAVENOUS
OUTPATIENT
Start: 2024-03-08

## 2024-02-09 RX ORDER — DIPHENHYDRAMINE HYDROCHLORIDE 50 MG/ML
50 INJECTION INTRAMUSCULAR; INTRAVENOUS
Status: DISCONTINUED | OUTPATIENT
Start: 2024-02-09 | End: 2024-02-10 | Stop reason: HOSPADM

## 2024-02-09 RX ORDER — EPINEPHRINE 1 MG/ML
0.3 INJECTION, SOLUTION, CONCENTRATE INTRAVENOUS PRN
OUTPATIENT
Start: 2024-03-08

## 2024-02-09 RX ORDER — ONDANSETRON 2 MG/ML
8 INJECTION INTRAMUSCULAR; INTRAVENOUS
Status: DISCONTINUED | OUTPATIENT
Start: 2024-02-09 | End: 2024-02-10 | Stop reason: HOSPADM

## 2024-02-09 RX ORDER — SODIUM CHLORIDE 9 MG/ML
INJECTION, SOLUTION INTRAVENOUS CONTINUOUS
Status: DISCONTINUED | OUTPATIENT
Start: 2024-02-09 | End: 2024-02-10 | Stop reason: HOSPADM

## 2024-02-09 RX ORDER — SODIUM CHLORIDE 0.9 % (FLUSH) 0.9 %
5-40 SYRINGE (ML) INJECTION PRN
Status: DISCONTINUED | OUTPATIENT
Start: 2024-02-09 | End: 2024-02-10 | Stop reason: HOSPADM

## 2024-02-09 RX ORDER — EPINEPHRINE 1 MG/ML
0.3 INJECTION, SOLUTION, CONCENTRATE INTRAVENOUS PRN
Status: DISCONTINUED | OUTPATIENT
Start: 2024-02-09 | End: 2024-02-10 | Stop reason: HOSPADM

## 2024-02-09 RX ORDER — SODIUM CHLORIDE 9 MG/ML
INJECTION, SOLUTION INTRAVENOUS CONTINUOUS
OUTPATIENT
Start: 2024-03-08

## 2024-02-09 RX ORDER — HEPARIN 100 UNIT/ML
500 SYRINGE INTRAVENOUS PRN
OUTPATIENT
Start: 2024-03-08

## 2024-02-09 RX ORDER — ALBUTEROL SULFATE 90 UG/1
4 AEROSOL, METERED RESPIRATORY (INHALATION) PRN
Status: DISCONTINUED | OUTPATIENT
Start: 2024-02-09 | End: 2024-02-10 | Stop reason: HOSPADM

## 2024-02-09 RX ORDER — SODIUM CHLORIDE 9 MG/ML
5-250 INJECTION, SOLUTION INTRAVENOUS PRN
Status: DISCONTINUED | OUTPATIENT
Start: 2024-02-09 | End: 2024-02-10 | Stop reason: HOSPADM

## 2024-02-09 RX ORDER — ACETAMINOPHEN 325 MG/1
650 TABLET ORAL
Status: DISCONTINUED | OUTPATIENT
Start: 2024-02-09 | End: 2024-02-10 | Stop reason: HOSPADM

## 2024-02-09 RX ADMIN — SODIUM CHLORIDE 500 MG: 9 INJECTION, SOLUTION INTRAVENOUS at 14:49

## 2024-02-09 NOTE — PROGRESS NOTES
Arrived to the Infusion Center.  Belatacept and lab draw completed. Patient tolerated without difficulty.   Any issues or concerns during appointment: None.  Patient aware of next infusion appointment on 03/11/2024 (date) at 1330 (time).  Patient instructed to call provider with temperature of 100.4 or greater or nausea/vomiting/ diarrhea or pain not controlled by medications  Discharged via w/c with  at side.

## 2024-02-09 NOTE — PLAN OF CARE
Reviewed Belatacept infusion with patient.  Verbalizes understanding.  Patient has been taking for numerous years.

## 2024-02-19 RX ORDER — ROSUVASTATIN CALCIUM 10 MG/1
TABLET, COATED ORAL
Qty: 100 TABLET | Refills: 1 | Status: SHIPPED | OUTPATIENT
Start: 2024-02-19

## 2024-02-20 ENCOUNTER — OFFICE VISIT (OUTPATIENT)
Dept: NEUROLOGY | Age: 79
End: 2024-02-20
Payer: MEDICARE

## 2024-02-20 VITALS — SYSTOLIC BLOOD PRESSURE: 121 MMHG | DIASTOLIC BLOOD PRESSURE: 77 MMHG | OXYGEN SATURATION: 96 % | HEART RATE: 80 BPM

## 2024-02-20 DIAGNOSIS — Z91.81 AT HIGH RISK FOR FALLS: ICD-10-CM

## 2024-02-20 DIAGNOSIS — G89.29 CHRONIC PAIN OF RIGHT KNEE: ICD-10-CM

## 2024-02-20 DIAGNOSIS — G30.9 MIXED ALZHEIMER'S AND VASCULAR DEMENTIA (HCC): Primary | ICD-10-CM

## 2024-02-20 DIAGNOSIS — M25.561 CHRONIC PAIN OF RIGHT KNEE: ICD-10-CM

## 2024-02-20 DIAGNOSIS — F02.80 MIXED ALZHEIMER'S AND VASCULAR DEMENTIA (HCC): Primary | ICD-10-CM

## 2024-02-20 DIAGNOSIS — R44.0 AUDITORY HALLUCINATION: ICD-10-CM

## 2024-02-20 DIAGNOSIS — F01.50 MIXED ALZHEIMER'S AND VASCULAR DEMENTIA (HCC): Primary | ICD-10-CM

## 2024-02-20 DIAGNOSIS — L89.623 PRESSURE ULCER OF LEFT HEEL, STAGE 3 (HCC): Chronic | ICD-10-CM

## 2024-02-20 PROBLEM — E78.00 HYPERCHOLESTEROLEMIA: Status: ACTIVE | Noted: 2017-06-19

## 2024-02-20 PROCEDURE — 1036F TOBACCO NON-USER: CPT | Performed by: NURSE PRACTITIONER

## 2024-02-20 PROCEDURE — G8427 DOCREV CUR MEDS BY ELIG CLIN: HCPCS | Performed by: NURSE PRACTITIONER

## 2024-02-20 PROCEDURE — 3074F SYST BP LT 130 MM HG: CPT | Performed by: NURSE PRACTITIONER

## 2024-02-20 PROCEDURE — 1123F ACP DISCUSS/DSCN MKR DOCD: CPT | Performed by: NURSE PRACTITIONER

## 2024-02-20 PROCEDURE — G8484 FLU IMMUNIZE NO ADMIN: HCPCS | Performed by: NURSE PRACTITIONER

## 2024-02-20 PROCEDURE — 99214 OFFICE O/P EST MOD 30 MIN: CPT | Performed by: NURSE PRACTITIONER

## 2024-02-20 PROCEDURE — G8400 PT W/DXA NO RESULTS DOC: HCPCS | Performed by: NURSE PRACTITIONER

## 2024-02-20 PROCEDURE — 1090F PRES/ABSN URINE INCON ASSESS: CPT | Performed by: NURSE PRACTITIONER

## 2024-02-20 PROCEDURE — G8417 CALC BMI ABV UP PARAM F/U: HCPCS | Performed by: NURSE PRACTITIONER

## 2024-02-20 PROCEDURE — 3078F DIAST BP <80 MM HG: CPT | Performed by: NURSE PRACTITIONER

## 2024-02-20 RX ORDER — DONEPEZIL HYDROCHLORIDE 10 MG/1
10 TABLET, FILM COATED ORAL NIGHTLY
Qty: 90 TABLET | Refills: 1 | Status: SHIPPED | OUTPATIENT
Start: 2024-02-20

## 2024-02-20 ASSESSMENT — ENCOUNTER SYMPTOMS
EYES NEGATIVE: 1
RESPIRATORY NEGATIVE: 1
ALLERGIC/IMMUNOLOGIC NEGATIVE: 1
GASTROINTESTINAL NEGATIVE: 1

## 2024-02-20 ASSESSMENT — PATIENT HEALTH QUESTIONNAIRE - PHQ9
SUM OF ALL RESPONSES TO PHQ QUESTIONS 1-9: 0
SUM OF ALL RESPONSES TO PHQ9 QUESTIONS 1 & 2: 0
SUM OF ALL RESPONSES TO PHQ QUESTIONS 1-9: 0
SUM OF ALL RESPONSES TO PHQ QUESTIONS 1-9: 0
1. LITTLE INTEREST OR PLEASURE IN DOING THINGS: 0
SUM OF ALL RESPONSES TO PHQ QUESTIONS 1-9: 0
2. FEELING DOWN, DEPRESSED OR HOPELESS: 0

## 2024-02-20 NOTE — PATIENT INSTRUCTIONS
Plan:     Interventions that may decrease conversion from mild cognitive impairment to dementia or aid in the treatment of established dementia:   Reduce damage the brain by controlling vascular risk factors (SBP < 140 mmHg, normal cholesterol, BMI < 31 kg/m²).   2. Exercise 20 minutes three times per week reaching 80% maximum heart rate   3. Diet: adhere to a Mediterranean diet       Non-pharmacological intervention  Reorient the patient throughout the day  Window open and lights on during the day. Lights off, television off, noises down during the night. If able, decrease nursing checks at night  Therapies as often as possible  Avoid restraints to the best of your ability   Avoid sensory deprivation by using glasses and hearing aids, if applicable

## 2024-02-21 ENCOUNTER — HOSPITAL ENCOUNTER (OUTPATIENT)
Dept: WOUND CARE | Age: 79
Discharge: HOME | End: 2024-02-21
Payer: MEDICARE

## 2024-02-21 ENCOUNTER — HOME HEALTH ADMISSION (OUTPATIENT)
Dept: HOME HEALTH SERVICES | Facility: HOME HEALTH | Age: 79
End: 2024-02-21
Payer: MEDICARE

## 2024-02-21 VITALS
WEIGHT: 175 LBS | HEART RATE: 83 BPM | DIASTOLIC BLOOD PRESSURE: 66 MMHG | HEIGHT: 62 IN | OXYGEN SATURATION: 99 % | BODY MASS INDEX: 32.2 KG/M2 | SYSTOLIC BLOOD PRESSURE: 123 MMHG | TEMPERATURE: 98.6 F | RESPIRATION RATE: 12 BRPM

## 2024-02-21 DIAGNOSIS — L89.623 PRESSURE ULCER OF LEFT HEEL, STAGE 3 (HCC): Primary | ICD-10-CM

## 2024-02-21 PROCEDURE — 87186 SC STD MICRODIL/AGAR DIL: CPT

## 2024-02-21 PROCEDURE — 87077 CULTURE AEROBIC IDENTIFY: CPT

## 2024-02-21 PROCEDURE — 11042 DBRDMT SUBQ TIS 1ST 20SQCM/<: CPT

## 2024-02-21 PROCEDURE — 87070 CULTURE OTHR SPECIMN AEROBIC: CPT

## 2024-02-21 PROCEDURE — 87075 CULTR BACTERIA EXCEPT BLOOD: CPT

## 2024-02-21 PROCEDURE — 87205 SMEAR GRAM STAIN: CPT

## 2024-02-21 PROCEDURE — 87176 TISSUE HOMOGENIZATION CULTR: CPT

## 2024-02-21 RX ORDER — LIDOCAINE 50 MG/G
OINTMENT TOPICAL ONCE
OUTPATIENT
Start: 2024-02-21 | End: 2024-02-21

## 2024-02-21 RX ORDER — CLOBETASOL PROPIONATE 0.5 MG/G
OINTMENT TOPICAL ONCE
OUTPATIENT
Start: 2024-02-21 | End: 2024-02-21

## 2024-02-21 RX ORDER — LIDOCAINE HYDROCHLORIDE 20 MG/ML
JELLY TOPICAL ONCE
OUTPATIENT
Start: 2024-02-21 | End: 2024-02-21

## 2024-02-21 RX ORDER — GENTAMICIN SULFATE 1 MG/G
OINTMENT TOPICAL ONCE
OUTPATIENT
Start: 2024-02-21 | End: 2024-02-21

## 2024-02-21 RX ORDER — BETAMETHASONE DIPROPIONATE 0.5 MG/G
CREAM TOPICAL ONCE
OUTPATIENT
Start: 2024-02-21 | End: 2024-02-21

## 2024-02-21 RX ORDER — SODIUM CHLOR/HYPOCHLOROUS ACID 0.033 %
SOLUTION, IRRIGATION IRRIGATION ONCE
Status: COMPLETED | OUTPATIENT
Start: 2024-02-21 | End: 2024-02-21

## 2024-02-21 RX ORDER — IBUPROFEN 200 MG
TABLET ORAL ONCE
OUTPATIENT
Start: 2024-02-21 | End: 2024-02-21

## 2024-02-21 RX ORDER — BACITRACIN ZINC AND POLYMYXIN B SULFATE 500; 1000 [USP'U]/G; [USP'U]/G
OINTMENT TOPICAL ONCE
OUTPATIENT
Start: 2024-02-21 | End: 2024-02-21

## 2024-02-21 RX ORDER — SODIUM CHLOR/HYPOCHLOROUS ACID 0.033 %
SOLUTION, IRRIGATION IRRIGATION ONCE
OUTPATIENT
Start: 2024-02-21 | End: 2024-02-21

## 2024-02-21 RX ORDER — LIDOCAINE HYDROCHLORIDE 40 MG/ML
SOLUTION TOPICAL ONCE
OUTPATIENT
Start: 2024-02-21 | End: 2024-02-21

## 2024-02-21 RX ORDER — GINSENG 100 MG
CAPSULE ORAL ONCE
OUTPATIENT
Start: 2024-02-21 | End: 2024-02-21

## 2024-02-21 RX ORDER — LIDOCAINE 40 MG/G
CREAM TOPICAL ONCE
OUTPATIENT
Start: 2024-02-21 | End: 2024-02-21

## 2024-02-21 RX ORDER — TRIAMCINOLONE ACETONIDE 1 MG/G
OINTMENT TOPICAL ONCE
OUTPATIENT
Start: 2024-02-21 | End: 2024-02-21

## 2024-02-21 RX ORDER — LIDOCAINE HYDROCHLORIDE 20 MG/ML
JELLY TOPICAL ONCE
Status: COMPLETED | OUTPATIENT
Start: 2024-02-21 | End: 2024-02-21

## 2024-02-21 RX ADMIN — Medication: at 14:57

## 2024-02-21 RX ADMIN — LIDOCAINE HYDROCHLORIDE: 20 JELLY TOPICAL at 14:57

## 2024-02-21 NOTE — DISCHARGE INSTRUCTIONS
Left heel:  Cleanse with normal saline  Vashe post debridement.  Hydrofera Blue: Cut to wound size, moisten with saline, and apply to wound bed.    Cover with ABD  Wrap with Rolled Gauze.  Dressing change 3x weekly.     Wound Culture today.  Continue Cipro. We may call to prescribe alternative antibioitic.     Offloading:  Use wheelchair to minimize ambulation.  Patient to offload wound with  heel offloading boot  while in bed or chair. Float Heels.  Use cast cover for showering.  Wound healing is greatly slowed when blood glucose levels are greater than 200. Monitor glucose levels daily to ensure tight glucose control.  Increase protein intake to promote wound healing. Protein supplements such as Evelio and Ensure are great options.   Protein goal is 60 - 80 grams per day. Recommend Ensure Max or Glucerna.

## 2024-02-21 NOTE — FLOWSHEET NOTE
Pre and Post Debridement Notes   02/21/24 1438   Wound 01/22/24 Heel Left;Plantar #1   Date First Assessed/Time First Assessed: 01/22/24 1336   Present on Original Admission: Yes  Wound Approximate Age at First Assessment (Weeks): 8 weeks  Primary Wound Type: Diabetic Ulcer  Location: Heel  Wound Location Orientation: Left;Plantar  Woun...   Wound Image     Wound Etiology Diabetic Doan 2   Dressing Status Clean;Dry;Intact   Wound Cleansed Vashe   Dressing/Treatment Xeroform   Offloading for Diabetic Foot Ulcers Other (comment)  (wheelchair)   Wound Length (cm) 1.9 cm   Wound Width (cm) 1.9 cm   Wound Depth (cm) 0.2 cm   Wound Surface Area (cm^2) 3.61 cm^2   Change in Wound Size % (l*w) -261   Wound Volume (cm^3) 0.722 cm^3   Wound Healing % -622   Post-Procedure Length (cm) 2 cm   Post-Procedure Width (cm) 2.5 cm   Post-Procedure Depth (cm) 0.4 cm   Post-Procedure Surface Area (cm^2) 5 cm^2   Post-Procedure Volume (cm^3) 2 cm^3   Wound Assessment Slough;Pink/red   Drainage Amount Small (< 25%)   Drainage Description Serosanguinous   Odor None   Abby-wound Assessment Dry/flaky   Margins Attached edges   Wound Thickness Description not for Pressure Injury Full thickness   Pain Assessment   Pain Assessment 0-10   Pain Level 5   Patient's Stated Pain Goal 0 - No pain   Pain Location Foot   Pain Orientation Left     Patient takes ASA daily.

## 2024-02-21 NOTE — WOUND CARE
Discharge Instructions for  [facility]  [address] [place] [address]  [address]  [place] [address]  Phone [phone]   Fax [phone]      NAME:  Alayna Fall  YOB: 1945  MEDICAL RECORD NUMBER:  [id number]  DATE:  2/21/2024    Return Appointment:   1 week with oTdd Rizzo DO      Instructions:      Left heel:  Cleanse with normal saline  Vashe post debridement.  Hydrofera Blue: Cut to wound size, moisten with saline, and apply to wound bed.    Cover with ABD  Wrap with Rolled Gauze.  Dressing change 3x weekly.     Wound Culture today.  Continue Cipro. We may call to prescribe alternative antibioitic.     Offloading:  Use wheelchair to minimize ambulation.  Patient to offload wound with  heel offloading boot  while in bed or chair. Float Heels.  Use cast cover for showering.  Wound healing is greatly slowed when blood glucose levels are greater than 200. Monitor glucose levels daily to ensure tight glucose control.  Increase protein intake to promote wound healing. Protein supplements such as Evelio and Ensure are great options.   Protein goal is 60 - 80 grams per day. Recommend Ensure Max or Glucerna.    Should you experience increased redness, swelling, pain, foul odor, size of wound(s), or have a temperature over 101 degrees please contact the Wound Healing Center at [phone] or if after hours contact your primary care physician or go to the hospital emergency department.    PLEASE NOTE: IF YOU ARE UNABLE TO OBTAIN WOUND SUPPLIES, CONTINUE TO USE THE SUPPLIES YOU HAVE AVAILABLE UNTIL YOU ARE ABLE TO REACH US. IT IS MOST IMPORTANT TO KEEP THE WOUND COVERED AT ALL TIMES.    Electronically signed Michelle Cornejo RN on 2/21/2024 at 2:51 PM

## 2024-02-23 ENCOUNTER — TELEPHONE (OUTPATIENT)
Dept: ENDOCRINOLOGY | Age: 79
End: 2024-02-23

## 2024-02-23 ENCOUNTER — HOME CARE VISIT (OUTPATIENT)
Dept: SCHEDULING | Facility: HOME HEALTH | Age: 79
End: 2024-02-23

## 2024-02-23 ENCOUNTER — HOME CARE VISIT (OUTPATIENT)
Dept: HOME HEALTH SERVICES | Facility: HOME HEALTH | Age: 79
End: 2024-02-23

## 2024-02-23 LAB
BACTERIA SPEC CULT: NORMAL
SERVICE CMNT-IMP: NORMAL

## 2024-02-23 PROCEDURE — G0299 HHS/HOSPICE OF RN EA 15 MIN: HCPCS

## 2024-02-23 PROCEDURE — 0221000100 HH NO PAY CLAIM PROCEDURE

## 2024-02-24 VITALS
TEMPERATURE: 98.3 F | RESPIRATION RATE: 16 BRPM | OXYGEN SATURATION: 97 % | HEIGHT: 62 IN | HEART RATE: 68 BPM | BODY MASS INDEX: 28.89 KG/M2 | DIASTOLIC BLOOD PRESSURE: 82 MMHG | SYSTOLIC BLOOD PRESSURE: 124 MMHG | WEIGHT: 157 LBS

## 2024-02-24 LAB
BACTERIA SPEC CULT: ABNORMAL
BACTERIA SPEC CULT: ABNORMAL
GRAM STN SPEC: ABNORMAL
SERVICE CMNT-IMP: ABNORMAL

## 2024-02-24 ASSESSMENT — ENCOUNTER SYMPTOMS
PAIN LOCATION - PAIN QUALITY: ACHING
DYSPNEA ACTIVITY LEVEL: AFTER AMBULATING 10 - 20 FT
STOOL DESCRIPTION: LARGE

## 2024-02-26 ENCOUNTER — HOME CARE VISIT (OUTPATIENT)
Dept: SCHEDULING | Facility: HOME HEALTH | Age: 79
End: 2024-02-26

## 2024-02-26 VITALS
TEMPERATURE: 97.1 F | OXYGEN SATURATION: 98 % | DIASTOLIC BLOOD PRESSURE: 84 MMHG | HEART RATE: 67 BPM | RESPIRATION RATE: 18 BRPM | SYSTOLIC BLOOD PRESSURE: 138 MMHG

## 2024-02-26 VITALS
SYSTOLIC BLOOD PRESSURE: 118 MMHG | HEART RATE: 77 BPM | OXYGEN SATURATION: 97 % | DIASTOLIC BLOOD PRESSURE: 62 MMHG | RESPIRATION RATE: 16 BRPM | TEMPERATURE: 97.7 F

## 2024-02-26 VITALS
SYSTOLIC BLOOD PRESSURE: 122 MMHG | TEMPERATURE: 97.3 F | HEART RATE: 78 BPM | OXYGEN SATURATION: 97 % | DIASTOLIC BLOOD PRESSURE: 78 MMHG | RESPIRATION RATE: 16 BRPM

## 2024-02-26 PROCEDURE — G0153 HHCP-SVS OF S/L PATH,EA 15MN: HCPCS

## 2024-02-26 PROCEDURE — G0152 HHCP-SERV OF OT,EA 15 MIN: HCPCS

## 2024-02-26 PROCEDURE — G0299 HHS/HOSPICE OF RN EA 15 MIN: HCPCS

## 2024-02-26 ASSESSMENT — ENCOUNTER SYMPTOMS
DOUBLE VISION: 0
PAIN LOCATION - PAIN QUALITY: SORE/ACHE

## 2024-02-28 ENCOUNTER — HOME CARE VISIT (OUTPATIENT)
Dept: SCHEDULING | Facility: HOME HEALTH | Age: 79
End: 2024-02-28

## 2024-02-28 ENCOUNTER — TELEPHONE (OUTPATIENT)
Dept: NEUROLOGY | Age: 79
End: 2024-02-28

## 2024-02-28 VITALS
HEART RATE: 78 BPM | RESPIRATION RATE: 18 BRPM | OXYGEN SATURATION: 98 % | SYSTOLIC BLOOD PRESSURE: 108 MMHG | DIASTOLIC BLOOD PRESSURE: 68 MMHG | TEMPERATURE: 98 F

## 2024-02-28 LAB
BACTERIA SPEC CULT: NORMAL
SERVICE CMNT-IMP: NORMAL

## 2024-02-28 PROCEDURE — G0151 HHCP-SERV OF PT,EA 15 MIN: HCPCS

## 2024-02-28 ASSESSMENT — ENCOUNTER SYMPTOMS
SKIN LESIONS: 1
PAIN LOCATION - PAIN QUALITY: ACHE

## 2024-03-01 ENCOUNTER — HOME CARE VISIT (OUTPATIENT)
Dept: SCHEDULING | Facility: HOME HEALTH | Age: 79
End: 2024-03-01

## 2024-03-01 VITALS
RESPIRATION RATE: 16 BRPM | OXYGEN SATURATION: 98 % | TEMPERATURE: 97.8 F | DIASTOLIC BLOOD PRESSURE: 20 MMHG | HEART RATE: 78 BPM | SYSTOLIC BLOOD PRESSURE: 137 MMHG

## 2024-03-01 PROCEDURE — G0299 HHS/HOSPICE OF RN EA 15 MIN: HCPCS

## 2024-03-01 ASSESSMENT — ENCOUNTER SYMPTOMS: BOWEL INCONTINENCE: 1

## 2024-03-04 ENCOUNTER — HOME CARE VISIT (OUTPATIENT)
Dept: SCHEDULING | Facility: HOME HEALTH | Age: 79
End: 2024-03-04
Payer: MEDICARE

## 2024-03-04 VITALS
HEART RATE: 77 BPM | TEMPERATURE: 97.3 F | RESPIRATION RATE: 16 BRPM | DIASTOLIC BLOOD PRESSURE: 78 MMHG | SYSTOLIC BLOOD PRESSURE: 134 MMHG | OXYGEN SATURATION: 99 %

## 2024-03-04 VITALS
SYSTOLIC BLOOD PRESSURE: 134 MMHG | RESPIRATION RATE: 16 BRPM | TEMPERATURE: 97.3 F | HEART RATE: 76 BPM | DIASTOLIC BLOOD PRESSURE: 78 MMHG | OXYGEN SATURATION: 99 %

## 2024-03-04 VITALS
SYSTOLIC BLOOD PRESSURE: 134 MMHG | DIASTOLIC BLOOD PRESSURE: 78 MMHG | RESPIRATION RATE: 18 BRPM | HEART RATE: 77 BPM | TEMPERATURE: 97.3 F | OXYGEN SATURATION: 99 %

## 2024-03-04 PROCEDURE — G0153 HHCP-SVS OF S/L PATH,EA 15MN: HCPCS

## 2024-03-04 PROCEDURE — G0157 HHC PT ASSISTANT EA 15: HCPCS

## 2024-03-04 PROCEDURE — G0158 HHC OT ASSISTANT EA 15: HCPCS

## 2024-03-05 ENCOUNTER — HOME CARE VISIT (OUTPATIENT)
Dept: SCHEDULING | Facility: HOME HEALTH | Age: 79
End: 2024-03-05
Payer: MEDICARE

## 2024-03-06 ENCOUNTER — HOME CARE VISIT (OUTPATIENT)
Dept: SCHEDULING | Facility: HOME HEALTH | Age: 79
End: 2024-03-06
Payer: MEDICARE

## 2024-03-06 VITALS
SYSTOLIC BLOOD PRESSURE: 160 MMHG | OXYGEN SATURATION: 98 % | TEMPERATURE: 98 F | DIASTOLIC BLOOD PRESSURE: 76 MMHG | RESPIRATION RATE: 17 BRPM | HEART RATE: 76 BPM

## 2024-03-06 PROCEDURE — G0157 HHC PT ASSISTANT EA 15: HCPCS

## 2024-03-07 ENCOUNTER — HOME CARE VISIT (OUTPATIENT)
Dept: SCHEDULING | Facility: HOME HEALTH | Age: 79
End: 2024-03-07
Payer: MEDICARE

## 2024-03-07 ENCOUNTER — TELEPHONE (OUTPATIENT)
Dept: NEUROLOGY | Age: 79
End: 2024-03-07

## 2024-03-07 PROCEDURE — G0158 HHC OT ASSISTANT EA 15: HCPCS

## 2024-03-07 NOTE — TELEPHONE ENCOUNTER
[employer] brought in by daughter . I did let her know that ALIZA Simon I believe that she stated not filling out forms for family. She is the only family to take her mother to appointments . She asked PCP and they said it should be Neurology. She ask if this time one time could be filled out and she will be looking for other options or maybe the other provider at [facility] will continue it once she sees them . I told her I would ask , that I don't know   Forms in ALIZA SIMON FOLDER to [place]

## 2024-03-08 ENCOUNTER — HOME CARE VISIT (OUTPATIENT)
Dept: SCHEDULING | Facility: HOME HEALTH | Age: 79
End: 2024-03-08
Payer: MEDICARE

## 2024-03-08 VITALS
DIASTOLIC BLOOD PRESSURE: 74 MMHG | RESPIRATION RATE: 19 BRPM | TEMPERATURE: 98.3 F | SYSTOLIC BLOOD PRESSURE: 120 MMHG | HEART RATE: 72 BPM

## 2024-03-08 VITALS
DIASTOLIC BLOOD PRESSURE: 70 MMHG | TEMPERATURE: 97.8 F | OXYGEN SATURATION: 97 % | HEART RATE: 68 BPM | RESPIRATION RATE: 18 BRPM | SYSTOLIC BLOOD PRESSURE: 124 MMHG

## 2024-03-08 VITALS
OXYGEN SATURATION: 97 % | HEART RATE: 72 BPM | DIASTOLIC BLOOD PRESSURE: 70 MMHG | TEMPERATURE: 97.8 F | SYSTOLIC BLOOD PRESSURE: 130 MMHG | RESPIRATION RATE: 16 BRPM

## 2024-03-08 PROCEDURE — G0156 HHCP-SVS OF AIDE,EA 15 MIN: HCPCS

## 2024-03-08 PROCEDURE — G0299 HHS/HOSPICE OF RN EA 15 MIN: HCPCS

## 2024-03-11 ENCOUNTER — HOME CARE VISIT (OUTPATIENT)
Dept: SCHEDULING | Facility: HOME HEALTH | Age: 79
End: 2024-03-11
Payer: MEDICARE

## 2024-03-11 ENCOUNTER — HOSPITAL ENCOUNTER (OUTPATIENT)
Dept: INFUSION THERAPY | Age: 79
Setting detail: INFUSION SERIES
Discharge: HOME | End: 2024-03-11
Payer: MEDICARE

## 2024-03-11 VITALS
TEMPERATURE: 97.6 F | BODY MASS INDEX: 28.72 KG/M2 | RESPIRATION RATE: 16 BRPM | DIASTOLIC BLOOD PRESSURE: 76 MMHG | WEIGHT: 157 LBS | SYSTOLIC BLOOD PRESSURE: 120 MMHG | HEART RATE: 80 BPM | OXYGEN SATURATION: 98 %

## 2024-03-11 VITALS
DIASTOLIC BLOOD PRESSURE: 60 MMHG | RESPIRATION RATE: 16 BRPM | OXYGEN SATURATION: 98 % | TEMPERATURE: 98.1 F | HEART RATE: 66 BPM | SYSTOLIC BLOOD PRESSURE: 130 MMHG

## 2024-03-11 VITALS
OXYGEN SATURATION: 97 % | WEIGHT: 157 LBS | SYSTOLIC BLOOD PRESSURE: 148 MMHG | HEART RATE: 65 BPM | BODY MASS INDEX: 28.72 KG/M2 | RESPIRATION RATE: 16 BRPM | TEMPERATURE: 98.5 F | DIASTOLIC BLOOD PRESSURE: 72 MMHG

## 2024-03-11 DIAGNOSIS — Z94.0 RENAL TRANSPLANT, STATUS POST: Primary | ICD-10-CM

## 2024-03-11 LAB
ALBUMIN SERPL-MCNC: 3.3 G/DL (ref 3.2–4.6)
ALBUMIN/GLOB SERPL: 0.8 (ref 0.4–1.6)
ALP SERPL-CCNC: 64 U/L (ref 50–136)
ALT SERPL-CCNC: 20 U/L (ref 12–65)
ANION GAP SERPL CALC-SCNC: 8 MMOL/L (ref 2–11)
AST SERPL-CCNC: 18 U/L (ref 15–37)
BASOPHILS # BLD: 0 K/UL (ref 0–0.2)
BASOPHILS NFR BLD: 1 % (ref 0–2)
BILIRUB SERPL-MCNC: 0.4 MG/DL (ref 0.2–1.1)
BUN SERPL-MCNC: 13 MG/DL (ref 8–23)
CALCIUM SERPL-MCNC: 10 MG/DL (ref 8.3–10.4)
CHLORIDE SERPL-SCNC: 108 MMOL/L (ref 103–113)
CO2 SERPL-SCNC: 27 MMOL/L (ref 21–32)
CREAT SERPL-MCNC: 0.6 MG/DL (ref 0.6–1)
DIFFERENTIAL METHOD BLD: ABNORMAL
EOSINOPHIL # BLD: 0.2 K/UL (ref 0–0.8)
EOSINOPHIL NFR BLD: 3 % (ref 0.5–7.8)
ERYTHROCYTE [DISTWIDTH] IN BLOOD BY AUTOMATED COUNT: 14 % (ref 11.9–14.6)
GLOBULIN SER CALC-MCNC: 4.1 G/DL (ref 2.8–4.5)
GLUCOSE SERPL-MCNC: 103 MG/DL (ref 65–100)
HCT VFR BLD AUTO: 35.6 % (ref 35.8–46.3)
HGB BLD-MCNC: 10.8 G/DL (ref 11.7–15.4)
IMM GRANULOCYTES # BLD AUTO: 0 K/UL (ref 0–0.5)
IMM GRANULOCYTES NFR BLD AUTO: 1 % (ref 0–5)
LYMPHOCYTES # BLD: 1.2 K/UL (ref 0.5–4.6)
LYMPHOCYTES NFR BLD: 17 % (ref 13–44)
MCH RBC QN AUTO: 29.3 PG (ref 26.1–32.9)
MCHC RBC AUTO-ENTMCNC: 30.3 G/DL (ref 31.4–35)
MCV RBC AUTO: 96.5 FL (ref 82–102)
MONOCYTES # BLD: 0.4 K/UL (ref 0.1–1.3)
MONOCYTES NFR BLD: 5 % (ref 4–12)
NEUTS SEG # BLD: 5.2 K/UL (ref 1.7–8.2)
NEUTS SEG NFR BLD: 73 % (ref 43–78)
NRBC # BLD: 0.02 K/UL (ref 0–0.2)
PLATELET # BLD AUTO: 179 K/UL (ref 150–450)
PMV BLD AUTO: 9.8 FL (ref 9.4–12.3)
POTASSIUM SERPL-SCNC: 3.9 MMOL/L (ref 3.5–5.1)
PROT SERPL-MCNC: 7.4 G/DL (ref 6.3–8.2)
RBC # BLD AUTO: 3.69 M/UL (ref 4.05–5.2)
SODIUM SERPL-SCNC: 143 MMOL/L (ref 136–146)
WBC # BLD AUTO: 7 K/UL (ref 4.3–11.1)

## 2024-03-11 PROCEDURE — 6360000002 HC RX W HCPCS: Performed by: INTERNAL MEDICINE

## 2024-03-11 PROCEDURE — 96365 THER/PROPH/DIAG IV INF INIT: CPT

## 2024-03-11 PROCEDURE — G0299 HHS/HOSPICE OF RN EA 15 MIN: HCPCS

## 2024-03-11 PROCEDURE — 80053 COMPREHEN METABOLIC PANEL: CPT

## 2024-03-11 PROCEDURE — 85025 COMPLETE CBC W/AUTO DIFF WBC: CPT

## 2024-03-11 PROCEDURE — 2580000003 HC RX 258: Performed by: INTERNAL MEDICINE

## 2024-03-11 PROCEDURE — G0158 HHC OT ASSISTANT EA 15: HCPCS

## 2024-03-11 RX ORDER — EPINEPHRINE 1 MG/ML
0.3 INJECTION, SOLUTION, CONCENTRATE INTRAVENOUS PRN
Status: DISCONTINUED | OUTPATIENT
Start: 2024-03-11 | End: 2024-03-12 | Stop reason: HOSPADM

## 2024-03-11 RX ORDER — EPINEPHRINE 1 MG/ML
0.3 INJECTION, SOLUTION, CONCENTRATE INTRAVENOUS PRN
OUTPATIENT
Start: 2024-03-22

## 2024-03-11 RX ORDER — SODIUM CHLORIDE 9 MG/ML
5-250 INJECTION, SOLUTION INTRAVENOUS PRN
OUTPATIENT
Start: 2024-03-22

## 2024-03-11 RX ORDER — SODIUM CHLORIDE 9 MG/ML
5-250 INJECTION, SOLUTION INTRAVENOUS PRN
Status: DISCONTINUED | OUTPATIENT
Start: 2024-03-11 | End: 2024-03-12 | Stop reason: HOSPADM

## 2024-03-11 RX ORDER — DIPHENHYDRAMINE HYDROCHLORIDE 50 MG/ML
50 INJECTION INTRAMUSCULAR; INTRAVENOUS
OUTPATIENT
Start: 2024-03-22

## 2024-03-11 RX ORDER — SODIUM CHLORIDE 0.9 % (FLUSH) 0.9 %
5-40 SYRINGE (ML) INJECTION PRN
Status: DISCONTINUED | OUTPATIENT
Start: 2024-03-11 | End: 2024-03-12 | Stop reason: HOSPADM

## 2024-03-11 RX ORDER — SODIUM CHLORIDE 0.9 % (FLUSH) 0.9 %
5-40 SYRINGE (ML) INJECTION PRN
OUTPATIENT
Start: 2024-03-22

## 2024-03-11 RX ORDER — HEPARIN 100 UNIT/ML
500 SYRINGE INTRAVENOUS PRN
OUTPATIENT
Start: 2024-03-22

## 2024-03-11 RX ORDER — ONDANSETRON 2 MG/ML
8 INJECTION INTRAMUSCULAR; INTRAVENOUS
OUTPATIENT
Start: 2024-03-22

## 2024-03-11 RX ORDER — DIPHENHYDRAMINE HYDROCHLORIDE 50 MG/ML
50 INJECTION INTRAMUSCULAR; INTRAVENOUS
Status: DISCONTINUED | OUTPATIENT
Start: 2024-03-11 | End: 2024-03-12 | Stop reason: HOSPADM

## 2024-03-11 RX ORDER — ACETAMINOPHEN 325 MG/1
650 TABLET ORAL
Status: DISCONTINUED | OUTPATIENT
Start: 2024-03-11 | End: 2024-03-12 | Stop reason: HOSPADM

## 2024-03-11 RX ORDER — ACETAMINOPHEN 325 MG/1
650 TABLET ORAL
OUTPATIENT
Start: 2024-03-22

## 2024-03-11 RX ORDER — ALBUTEROL SULFATE 90 UG/1
4 AEROSOL, METERED RESPIRATORY (INHALATION) PRN
OUTPATIENT
Start: 2024-03-22

## 2024-03-11 RX ORDER — SODIUM CHLORIDE 9 MG/ML
INJECTION, SOLUTION INTRAVENOUS CONTINUOUS
OUTPATIENT
Start: 2024-03-22

## 2024-03-11 RX ORDER — ONDANSETRON 2 MG/ML
8 INJECTION INTRAMUSCULAR; INTRAVENOUS
Status: DISCONTINUED | OUTPATIENT
Start: 2024-03-11 | End: 2024-03-12 | Stop reason: HOSPADM

## 2024-03-11 RX ORDER — ALBUTEROL SULFATE 90 UG/1
4 AEROSOL, METERED RESPIRATORY (INHALATION) PRN
Status: DISCONTINUED | OUTPATIENT
Start: 2024-03-11 | End: 2024-03-12 | Stop reason: HOSPADM

## 2024-03-11 RX ADMIN — SODIUM CHLORIDE 100 ML/HR: 9 INJECTION, SOLUTION INTRAVENOUS at 14:27

## 2024-03-11 RX ADMIN — SODIUM CHLORIDE 500 MG: 9 INJECTION, SOLUTION INTRAVENOUS at 15:02

## 2024-03-11 RX ADMIN — SODIUM CHLORIDE, PRESERVATIVE FREE 10 ML: 5 INJECTION INTRAVENOUS at 14:35

## 2024-03-11 NOTE — PROGRESS NOTES
's initial visit with Alayna, patient's preferred name, and her spouse Claude to convey care and concern. The couple welcomed the visit and I empathically listened as they shared about their [employer] abundio, family, and  Alayna's health. The couple expressed gratitude for their care, especially God's care. Claude and Alayna are grateful for their family and marriage of 47 years as well. No concerns were voiced in the visit except a request for prayer. I offered prayer as requested for family. It was a pleasure to meet Alayna and Claude.     Reverend Glenroy Howard MDiv  Board Certified

## 2024-03-11 NOTE — PROGRESS NOTES
Arrived to the Infusion Center. Nulojix completed. Patient tolerated Nulojix.   Any issues or concerns during appointment: none.  Patient aware of next infusion appointment on 4/9/24 (date) at 1400 (time).  Patient instructed to call provider with temperature of 100.4 or greater or nausea/vomiting/ diarrhea or pain not controlled by medications  Discharged ambulatory.

## 2024-03-12 ENCOUNTER — HOSPITAL ENCOUNTER (OUTPATIENT)
Dept: WOUND CARE | Age: 79
Discharge: HOME | End: 2024-03-12
Payer: MEDICARE

## 2024-03-12 VITALS
OXYGEN SATURATION: 99 % | WEIGHT: 157 LBS | HEART RATE: 83 BPM | TEMPERATURE: 98.2 F | BODY MASS INDEX: 28.89 KG/M2 | RESPIRATION RATE: 18 BRPM | HEIGHT: 62 IN | SYSTOLIC BLOOD PRESSURE: 118 MMHG | DIASTOLIC BLOOD PRESSURE: 63 MMHG

## 2024-03-12 DIAGNOSIS — L89.623 PRESSURE ULCER OF LEFT HEEL, STAGE 3 (HCC): Primary | ICD-10-CM

## 2024-03-12 PROCEDURE — 11042 DBRDMT SUBQ TIS 1ST 20SQCM/<: CPT

## 2024-03-12 RX ORDER — BETAMETHASONE DIPROPIONATE 0.5 MG/G
CREAM TOPICAL ONCE
OUTPATIENT
Start: 2024-03-12 | End: 2024-03-12

## 2024-03-12 RX ORDER — BACITRACIN ZINC AND POLYMYXIN B SULFATE 500; 1000 [USP'U]/G; [USP'U]/G
OINTMENT TOPICAL ONCE
OUTPATIENT
Start: 2024-03-12 | End: 2024-03-12

## 2024-03-12 RX ORDER — GINSENG 100 MG
CAPSULE ORAL ONCE
OUTPATIENT
Start: 2024-03-12 | End: 2024-03-12

## 2024-03-12 RX ORDER — SODIUM CHLOR/HYPOCHLOROUS ACID 0.033 %
SOLUTION, IRRIGATION IRRIGATION ONCE
OUTPATIENT
Start: 2024-03-12 | End: 2024-03-12

## 2024-03-12 RX ORDER — CLOBETASOL PROPIONATE 0.5 MG/G
OINTMENT TOPICAL ONCE
OUTPATIENT
Start: 2024-03-12 | End: 2024-03-12

## 2024-03-12 RX ORDER — LIDOCAINE HYDROCHLORIDE 40 MG/ML
SOLUTION TOPICAL ONCE
OUTPATIENT
Start: 2024-03-12 | End: 2024-03-12

## 2024-03-12 RX ORDER — TRIAMCINOLONE ACETONIDE 1 MG/G
OINTMENT TOPICAL ONCE
OUTPATIENT
Start: 2024-03-12 | End: 2024-03-12

## 2024-03-12 RX ORDER — LIDOCAINE 40 MG/G
CREAM TOPICAL ONCE
OUTPATIENT
Start: 2024-03-12 | End: 2024-03-12

## 2024-03-12 RX ORDER — LIDOCAINE 50 MG/G
OINTMENT TOPICAL ONCE
OUTPATIENT
Start: 2024-03-12 | End: 2024-03-12

## 2024-03-12 RX ORDER — LIDOCAINE HYDROCHLORIDE 20 MG/ML
JELLY TOPICAL ONCE
OUTPATIENT
Start: 2024-03-12 | End: 2024-03-12

## 2024-03-12 RX ORDER — LIDOCAINE HYDROCHLORIDE 20 MG/ML
JELLY TOPICAL ONCE
Status: COMPLETED | OUTPATIENT
Start: 2024-03-12 | End: 2024-03-12

## 2024-03-12 RX ORDER — IBUPROFEN 200 MG
TABLET ORAL ONCE
OUTPATIENT
Start: 2024-03-12 | End: 2024-03-12

## 2024-03-12 RX ORDER — GENTAMICIN SULFATE 1 MG/G
OINTMENT TOPICAL ONCE
OUTPATIENT
Start: 2024-03-12 | End: 2024-03-12

## 2024-03-12 RX ADMIN — LIDOCAINE HYDROCHLORIDE: 20 JELLY TOPICAL at 16:47

## 2024-03-12 NOTE — WOUND CARE
Discharge Instructions for  [facility]  [address] [place] [address]  [address] [place] [address]  Phone [phone]   Fax [phone]      NAME:  Alayna Fall  YOB: 1945  MEDICAL RECORD NUMBER:  [id number]  DATE:  3/12/2024    Return Appointment:   2 weeks with Todd Rizzo DO      Instructions:     Left heel:  Cleanse with normal saline  Vashe post debridement.  Xeroform- apply to wound bed.   Cover with ABD  Wrap with Rolled Gauze.  Apply bordered foam to dorsal foot as needed  Dressing change daily.     Continue taking liquid Augmentin until complete.    Offloading:  Use wheelchair to minimize ambulation.  Patient to offload wound with pillow under calf while in bed or chair.   Use cast cover for showering.  Wound healing is greatly slowed when blood glucose levels are greater than 200. Monitor glucose levels daily to ensure tight glucose control.  Increase protein intake to promote wound healing. Protein supplements such as Evelio and Ensure are great options.   Protein goal is 60 - 80 grams per day. Recommend Ensure Max or Glucerna.        Should you experience increased redness, swelling, pain, foul odor, size of wound(s), or have a temperature over 101 degrees please contact the Wound Healing Center at [phone] or if after hours contact your primary care physician or go to the hospital emergency department.    PLEASE NOTE: IF YOU ARE UNABLE TO OBTAIN WOUND SUPPLIES, CONTINUE TO USE THE SUPPLIES YOU HAVE AVAILABLE UNTIL YOU ARE ABLE TO REACH US. IT IS MOST IMPORTANT TO KEEP THE WOUND COVERED AT ALL TIMES.    Electronically signed Alejandra Chong PT, WCC on 3/12/2024 at 2:51 PM

## 2024-03-12 NOTE — FLOWSHEET NOTE
03/12/24 1421   Wound 01/22/24 Heel Left;Plantar #1   Date First Assessed/Time First Assessed: 01/22/24 1336   Present on Original Admission: Yes  Wound Approximate Age at First Assessment (Weeks): 8 weeks  Primary Wound Type: Pressure Injury  Location: Heel  Wound Location Orientation: Left;Plantar  Wou...   Wound Image    Wound Etiology Diabetic Doan 2   Dressing Status Dry;Intact   Wound Cleansed Cleansed with saline   Dressing/Treatment Hydrofera blue;ABD;Roll gauze   Wound Length (cm) 2 cm   Wound Width (cm) 2.7 cm   Wound Depth (cm) 0.2 cm   Wound Surface Area (cm^2) 5.4 cm^2   Change in Wound Size % (l*w) -440   Wound Volume (cm^3) 1.08 cm^3   Wound Healing % -980   Wound Assessment Eschar dry   Drainage Amount None (dry)   Odor None   Abby-wound Assessment Edematous   Wound Thickness Description not for Pressure Injury Full thickness   Pain Assessment   Pain Assessment 0-10   Pain Level 1   Patient's Stated Pain Goal 0 - No pain   Pain Location Foot   Pain Orientation Left   Pain Descriptors Aching     Patient is currently taking Aspirin 81 mg

## 2024-03-12 NOTE — FLOWSHEET NOTE
03/12/24 1421   Wound 01/22/24 Heel Left;Plantar #1   Date First Assessed/Time First Assessed: 01/22/24 1336   Present on Original Admission: Yes  Wound Approximate Age at First Assessment (Weeks): 8 weeks  Primary Wound Type: Pressure Injury  Location: Heel  Wound Location Orientation: Left;Plantar  Wou...   Wound Image     Wound Etiology Diabetic Doan 2   Dressing Status Dry;Intact   Wound Cleansed Cleansed with saline   Dressing/Treatment Hydrofera blue;ABD;Roll gauze   Wound Length (cm) 2 cm   Wound Width (cm) 2.7 cm   Wound Depth (cm) 0.2 cm   Wound Surface Area (cm^2) 5.4 cm^2   Change in Wound Size % (l*w) -440   Wound Volume (cm^3) 1.08 cm^3   Wound Healing % -980   Post-Procedure Length (cm) 2 cm   Post-Procedure Width (cm) 2.7 cm   Post-Procedure Depth (cm) 0.2 cm   Post-Procedure Surface Area (cm^2) 5.4 cm^2   Post-Procedure Volume (cm^3) 1.08 cm^3   Wound Assessment Eschar dry   Drainage Amount None (dry)   Odor None   Abby-wound Assessment Edematous   Wound Thickness Description not for Pressure Injury Full thickness   Pain Assessment   Pain Assessment 0-10   Pain Level 1   Patient's Stated Pain Goal 0 - No pain   Pain Location Foot   Pain Orientation Left   Pain Descriptors Aching     Post-debridement

## 2024-03-12 NOTE — DISCHARGE INSTRUCTIONS
Return Appointment:   2 weeks with Todd Rizzo DO        Instructions:      Left heel:  Cleanse with normal saline  Vashe post debridement.  Xeroform- apply to wound bed.   Cover with ABD  Wrap with Rolled Gauze.  Apply bordered foam to dorsal foot as needed  Dressing change daily.     Continue taking liquid Augmentin until complete.     Offloading:  Use wheelchair to minimize ambulation.  Patient to offload wound with pillow under calf while in bed or chair.   Use cast cover for showering.  Wound healing is greatly slowed when blood glucose levels are greater than 200. Monitor glucose levels daily to ensure tight glucose control.  Increase protein intake to promote wound healing. Protein supplements such as Evelio and Ensure are great options.   Protein goal is 60 - 80 grams per day. Recommend Ensure Max or Glucerna.

## 2024-03-13 ENCOUNTER — HOME CARE VISIT (OUTPATIENT)
Dept: SCHEDULING | Facility: HOME HEALTH | Age: 79
End: 2024-03-13
Payer: MEDICARE

## 2024-03-13 VITALS
OXYGEN SATURATION: 99 % | RESPIRATION RATE: 17 BRPM | HEART RATE: 82 BPM | DIASTOLIC BLOOD PRESSURE: 80 MMHG | SYSTOLIC BLOOD PRESSURE: 138 MMHG | TEMPERATURE: 98 F

## 2024-03-13 PROCEDURE — G0157 HHC PT ASSISTANT EA 15: HCPCS

## 2024-03-13 ASSESSMENT — ENCOUNTER SYMPTOMS: PAIN LOCATION - PAIN QUALITY: SORE

## 2024-03-14 ENCOUNTER — HOME CARE VISIT (OUTPATIENT)
Dept: SCHEDULING | Facility: HOME HEALTH | Age: 79
End: 2024-03-14
Payer: MEDICARE

## 2024-03-14 VITALS
OXYGEN SATURATION: 97 % | TEMPERATURE: 98.1 F | SYSTOLIC BLOOD PRESSURE: 136 MMHG | HEART RATE: 72 BPM | DIASTOLIC BLOOD PRESSURE: 84 MMHG | RESPIRATION RATE: 16 BRPM

## 2024-03-14 PROCEDURE — G0158 HHC OT ASSISTANT EA 15: HCPCS

## 2024-03-15 ENCOUNTER — TELEPHONE (OUTPATIENT)
Dept: WOUND CARE | Age: 79
End: 2024-03-15

## 2024-03-15 ENCOUNTER — HOME CARE VISIT (OUTPATIENT)
Dept: SCHEDULING | Facility: HOME HEALTH | Age: 79
End: 2024-03-15
Payer: MEDICARE

## 2024-03-15 VITALS
SYSTOLIC BLOOD PRESSURE: 120 MMHG | HEART RATE: 68 BPM | DIASTOLIC BLOOD PRESSURE: 78 MMHG | OXYGEN SATURATION: 98 % | RESPIRATION RATE: 17 BRPM | TEMPERATURE: 97.4 F

## 2024-03-15 VITALS
RESPIRATION RATE: 19 BRPM | DIASTOLIC BLOOD PRESSURE: 72 MMHG | TEMPERATURE: 99.4 F | SYSTOLIC BLOOD PRESSURE: 132 MMHG | HEART RATE: 68 BPM

## 2024-03-15 VITALS — RESPIRATION RATE: 18 BRPM

## 2024-03-15 PROCEDURE — G0156 HHCP-SVS OF AIDE,EA 15 MIN: HCPCS

## 2024-03-15 PROCEDURE — G0299 HHS/HOSPICE OF RN EA 15 MIN: HCPCS

## 2024-03-15 PROCEDURE — G0157 HHC PT ASSISTANT EA 15: HCPCS

## 2024-03-15 NOTE — TELEPHONE ENCOUNTER
Spoke with Gordon, nurse with [facility], who called to clarify need for daily dressing changes.  Explained desire to soften and overall increase moisture to wound base. He expressed understanding. He stated that  could perform dressing changes in between his visits better with bordered foam dressing. Verbal orders given.

## 2024-03-18 ENCOUNTER — HOME CARE VISIT (OUTPATIENT)
Dept: SCHEDULING | Facility: HOME HEALTH | Age: 79
End: 2024-03-18
Payer: MEDICARE

## 2024-03-18 VITALS
SYSTOLIC BLOOD PRESSURE: 120 MMHG | BODY MASS INDEX: 28.72 KG/M2 | HEART RATE: 77 BPM | OXYGEN SATURATION: 98 % | RESPIRATION RATE: 16 BRPM | TEMPERATURE: 97.7 F | WEIGHT: 157 LBS | DIASTOLIC BLOOD PRESSURE: 70 MMHG

## 2024-03-18 VITALS
HEART RATE: 77 BPM | TEMPERATURE: 98.1 F | RESPIRATION RATE: 18 BRPM | SYSTOLIC BLOOD PRESSURE: 122 MMHG | OXYGEN SATURATION: 98 % | DIASTOLIC BLOOD PRESSURE: 70 MMHG

## 2024-03-18 PROCEDURE — G0151 HHCP-SERV OF PT,EA 15 MIN: HCPCS

## 2024-03-18 PROCEDURE — G0299 HHS/HOSPICE OF RN EA 15 MIN: HCPCS

## 2024-03-18 ASSESSMENT — ENCOUNTER SYMPTOMS: BOWEL INCONTINENCE: 1

## 2024-03-19 PROBLEM — L03.116 CELLULITIS OF LEFT HEEL: Status: ACTIVE | Noted: 2024-03-19

## 2024-03-20 ENCOUNTER — HOME CARE VISIT (OUTPATIENT)
Dept: SCHEDULING | Facility: HOME HEALTH | Age: 79
End: 2024-03-20
Payer: MEDICARE

## 2024-03-20 VITALS
SYSTOLIC BLOOD PRESSURE: 124 MMHG | TEMPERATURE: 98.1 F | DIASTOLIC BLOOD PRESSURE: 68 MMHG | HEART RATE: 76 BPM | RESPIRATION RATE: 17 BRPM

## 2024-03-20 VITALS
RESPIRATION RATE: 16 BRPM | SYSTOLIC BLOOD PRESSURE: 126 MMHG | TEMPERATURE: 97.8 F | DIASTOLIC BLOOD PRESSURE: 80 MMHG | OXYGEN SATURATION: 97 % | HEART RATE: 68 BPM

## 2024-03-20 PROCEDURE — G0299 HHS/HOSPICE OF RN EA 15 MIN: HCPCS

## 2024-03-20 PROCEDURE — G0156 HHCP-SVS OF AIDE,EA 15 MIN: HCPCS

## 2024-03-20 PROCEDURE — G0158 HHC OT ASSISTANT EA 15: HCPCS

## 2024-03-21 VITALS
HEART RATE: 76 BPM | DIASTOLIC BLOOD PRESSURE: 68 MMHG | OXYGEN SATURATION: 97 % | SYSTOLIC BLOOD PRESSURE: 123 MMHG | WEIGHT: 157 LBS | RESPIRATION RATE: 18 BRPM | BODY MASS INDEX: 28.72 KG/M2 | TEMPERATURE: 97.9 F

## 2024-03-22 ENCOUNTER — HOME CARE VISIT (OUTPATIENT)
Dept: SCHEDULING | Facility: HOME HEALTH | Age: 79
End: 2024-03-22
Payer: MEDICARE

## 2024-03-22 PROCEDURE — G0299 HHS/HOSPICE OF RN EA 15 MIN: HCPCS

## 2024-03-22 NOTE — TELEPHONE ENCOUNTER
Forms have been completed. Faxed, copied and will be scanned into chart. LM for patients daughter that they are ready for .

## 2024-03-23 VITALS
OXYGEN SATURATION: 96 % | RESPIRATION RATE: 18 BRPM | SYSTOLIC BLOOD PRESSURE: 121 MMHG | TEMPERATURE: 97.9 F | DIASTOLIC BLOOD PRESSURE: 74 MMHG | HEART RATE: 79 BPM

## 2024-03-25 ENCOUNTER — TELEPHONE (OUTPATIENT)
Dept: ENDOCRINOLOGY | Age: 79
End: 2024-03-25

## 2024-03-25 ENCOUNTER — HOME CARE VISIT (OUTPATIENT)
Dept: SCHEDULING | Facility: HOME HEALTH | Age: 79
End: 2024-03-25
Payer: MEDICARE

## 2024-03-25 VITALS
TEMPERATURE: 98 F | DIASTOLIC BLOOD PRESSURE: 67 MMHG | OXYGEN SATURATION: 100 % | HEART RATE: 78 BPM | SYSTOLIC BLOOD PRESSURE: 126 MMHG | RESPIRATION RATE: 18 BRPM

## 2024-03-25 PROCEDURE — G0299 HHS/HOSPICE OF RN EA 15 MIN: HCPCS

## 2024-03-27 ENCOUNTER — HOME CARE VISIT (OUTPATIENT)
Dept: SCHEDULING | Facility: HOME HEALTH | Age: 79
End: 2024-03-27
Payer: MEDICARE

## 2024-03-27 VITALS
HEART RATE: 87 BPM | SYSTOLIC BLOOD PRESSURE: 123 MMHG | OXYGEN SATURATION: 98 % | TEMPERATURE: 97.9 F | RESPIRATION RATE: 18 BRPM | DIASTOLIC BLOOD PRESSURE: 65 MMHG

## 2024-03-27 VITALS
DIASTOLIC BLOOD PRESSURE: 64 MMHG | OXYGEN SATURATION: 98 % | RESPIRATION RATE: 16 BRPM | TEMPERATURE: 97.8 F | SYSTOLIC BLOOD PRESSURE: 122 MMHG | HEART RATE: 78 BPM

## 2024-03-27 PROCEDURE — G0299 HHS/HOSPICE OF RN EA 15 MIN: HCPCS

## 2024-03-27 PROCEDURE — G0152 HHCP-SERV OF OT,EA 15 MIN: HCPCS

## 2024-03-29 ENCOUNTER — HOME CARE VISIT (OUTPATIENT)
Dept: SCHEDULING | Facility: HOME HEALTH | Age: 79
End: 2024-03-29
Payer: MEDICARE

## 2024-03-29 VITALS
SYSTOLIC BLOOD PRESSURE: 166 MMHG | HEART RATE: 74 BPM | DIASTOLIC BLOOD PRESSURE: 80 MMHG | TEMPERATURE: 97.9 F | RESPIRATION RATE: 17 BRPM

## 2024-03-29 PROCEDURE — G0156 HHCP-SVS OF AIDE,EA 15 MIN: HCPCS

## 2024-03-29 PROCEDURE — G0299 HHS/HOSPICE OF RN EA 15 MIN: HCPCS

## 2024-03-30 VITALS
DIASTOLIC BLOOD PRESSURE: 60 MMHG | RESPIRATION RATE: 18 BRPM | SYSTOLIC BLOOD PRESSURE: 140 MMHG | TEMPERATURE: 97.8 F | HEART RATE: 78 BPM | OXYGEN SATURATION: 98 %

## 2024-04-01 ENCOUNTER — HOME CARE VISIT (OUTPATIENT)
Dept: SCHEDULING | Facility: HOME HEALTH | Age: 79
End: 2024-04-01
Payer: MEDICARE

## 2024-04-01 VITALS
HEART RATE: 70 BPM | DIASTOLIC BLOOD PRESSURE: 68 MMHG | SYSTOLIC BLOOD PRESSURE: 108 MMHG | TEMPERATURE: 97.9 F | RESPIRATION RATE: 15 BRPM | OXYGEN SATURATION: 100 %

## 2024-04-01 PROCEDURE — G0299 HHS/HOSPICE OF RN EA 15 MIN: HCPCS

## 2024-04-02 ENCOUNTER — HOSPITAL ENCOUNTER (OUTPATIENT)
Dept: WOUND CARE | Age: 79
Discharge: HOME | End: 2024-04-02
Payer: MEDICARE

## 2024-04-02 VITALS
DIASTOLIC BLOOD PRESSURE: 74 MMHG | HEIGHT: 62 IN | RESPIRATION RATE: 16 BRPM | BODY MASS INDEX: 26.31 KG/M2 | SYSTOLIC BLOOD PRESSURE: 148 MMHG | HEART RATE: 81 BPM | TEMPERATURE: 97.8 F | WEIGHT: 143 LBS

## 2024-04-02 PROCEDURE — 99213 OFFICE O/P EST LOW 20 MIN: CPT

## 2024-04-02 NOTE — FLOWSHEET NOTE
04/02/24 1513   Wound 01/22/24 Heel Left;Plantar #1   Date First Assessed/Time First Assessed: 01/22/24 1336   Present on Original Admission: Yes  Wound Approximate Age at First Assessment (Weeks): 8 weeks  Primary Wound Type: Pressure Injury  Location: Heel  Wound Location Orientation: Left;Plantar  Wou...   Wound Image    Wound Etiology Diabetic Doan 2   Wound Cleansed Vashe   Dressing/Treatment Xeroform;Foam   Offloading for Diabetic Foot Ulcers Offloading ordered  (wheelchair)   Wound Length (cm) 1.8 cm   Wound Width (cm) 2.7 cm   Wound Depth (cm) 0.1 cm   Wound Surface Area (cm^2) 4.86 cm^2   Change in Wound Size % (l*w) -386   Wound Volume (cm^3) 0.486 cm^3   Wound Healing % -386   Wound Assessment Slough;Pink/red   Drainage Amount Moderate (25-50%)   Drainage Description Serosanguinous   Odor None   Abby-wound Assessment Fragile   Wound Thickness Description not for Pressure Injury Full thickness     Patient is on asa daily

## 2024-04-02 NOTE — WOUND CARE
Discharge Instructions for  [facility]  [address] [place] [address]  [address]  [place] [address]  Phone [phone]   Fax [phone]      NAME:  Alayna Fall  YOB: 1945  MEDICAL RECORD NUMBER:  [id number]  DATE:  4/2/2024    Return Appointment:  1 week with Todd Rizzo DO      Instructions:     Left heel:  Cleanse with normal saline  Vashe post debridement.  Hydrofera Ready: Cut to wound size, place in wound bed, shiny side out.  Secure with cloth tape or border foam.  Change 3x weekly     Santyl ordered at today's visit. Apply nickel thick layer to wound and cover with gauze or abd and roll gauze. Change daily.      Apply bordered foam to dorsal foot as needed           Offloading:  Use wheelchair to minimize ambulation.  Patient to offload wound with pillow under calf while in bed or chair.   Use cast cover for showering.  Wound healing is greatly slowed when blood glucose levels are greater than 200. Monitor glucose levels daily to ensure tight glucose control.  Increase protein intake to promote wound healing. Protein supplements such as Evelio and Ensure are great options.   Protein goal is 60 - 80 grams per day. Recommend Ensure Max or Glucerna.        Should you experience increased redness, swelling, pain, foul odor, size of wound(s), or have a temperature over 101 degrees please contact the Wound Healing Center at [phone] or if after hours contact your primary care physician or go to the hospital emergency department.    PLEASE NOTE: IF YOU ARE UNABLE TO OBTAIN WOUND SUPPLIES, CONTINUE TO USE THE SUPPLIES YOU HAVE AVAILABLE UNTIL YOU ARE ABLE TO REACH US. IT IS MOST IMPORTANT TO KEEP THE WOUND COVERED AT ALL TIMES.    Electronically signed Luana Mueller RN, WCC on 4/2/2024 at 4:00 PM

## 2024-04-03 ENCOUNTER — HOME CARE VISIT (OUTPATIENT)
Dept: SCHEDULING | Facility: HOME HEALTH | Age: 79
End: 2024-04-03
Payer: MEDICARE

## 2024-04-03 VITALS — WEIGHT: 144 LBS | BODY MASS INDEX: 26.34 KG/M2

## 2024-04-03 PROCEDURE — G0299 HHS/HOSPICE OF RN EA 15 MIN: HCPCS

## 2024-04-05 ENCOUNTER — HOME CARE VISIT (OUTPATIENT)
Dept: SCHEDULING | Facility: HOME HEALTH | Age: 79
End: 2024-04-05
Payer: MEDICARE

## 2024-04-05 VITALS
DIASTOLIC BLOOD PRESSURE: 78 MMHG | RESPIRATION RATE: 18 BRPM | SYSTOLIC BLOOD PRESSURE: 116 MMHG | TEMPERATURE: 97.5 F | HEART RATE: 84 BPM | OXYGEN SATURATION: 99 %

## 2024-04-05 VITALS
TEMPERATURE: 97.5 F | DIASTOLIC BLOOD PRESSURE: 78 MMHG | SYSTOLIC BLOOD PRESSURE: 116 MMHG | HEART RATE: 84 BPM | RESPIRATION RATE: 17 BRPM

## 2024-04-05 PROCEDURE — G0156 HHCP-SVS OF AIDE,EA 15 MIN: HCPCS

## 2024-04-05 PROCEDURE — G0299 HHS/HOSPICE OF RN EA 15 MIN: HCPCS

## 2024-04-08 ENCOUNTER — HOME CARE VISIT (OUTPATIENT)
Dept: SCHEDULING | Facility: HOME HEALTH | Age: 79
End: 2024-04-08
Payer: MEDICARE

## 2024-04-08 VITALS
SYSTOLIC BLOOD PRESSURE: 114 MMHG | OXYGEN SATURATION: 98 % | TEMPERATURE: 98 F | DIASTOLIC BLOOD PRESSURE: 60 MMHG | RESPIRATION RATE: 18 BRPM | HEART RATE: 73 BPM

## 2024-04-08 PROCEDURE — G0299 HHS/HOSPICE OF RN EA 15 MIN: HCPCS

## 2024-04-10 ENCOUNTER — HOSPITAL ENCOUNTER (OUTPATIENT)
Dept: WOUND CARE | Age: 79
Discharge: HOME | End: 2024-04-10
Payer: MEDICARE

## 2024-04-10 ENCOUNTER — HOME CARE VISIT (OUTPATIENT)
Dept: SCHEDULING | Facility: HOME HEALTH | Age: 79
End: 2024-04-10
Payer: MEDICARE

## 2024-04-10 VITALS
TEMPERATURE: 98.1 F | WEIGHT: 143 LBS | HEART RATE: 85 BPM | SYSTOLIC BLOOD PRESSURE: 123 MMHG | RESPIRATION RATE: 18 BRPM | BODY MASS INDEX: 26.31 KG/M2 | DIASTOLIC BLOOD PRESSURE: 64 MMHG | HEIGHT: 62 IN

## 2024-04-10 DIAGNOSIS — L03.116 CELLULITIS OF LEFT HEEL: ICD-10-CM

## 2024-04-10 DIAGNOSIS — L89.623 PRESSURE ULCER OF LEFT HEEL, STAGE 3 (HCC): Primary | ICD-10-CM

## 2024-04-10 PROCEDURE — 11042 DBRDMT SUBQ TIS 1ST 20SQCM/<: CPT

## 2024-04-10 RX ORDER — LIDOCAINE 50 MG/G
OINTMENT TOPICAL ONCE
OUTPATIENT
Start: 2024-04-10 | End: 2024-04-10

## 2024-04-10 RX ORDER — SODIUM CHLOR/HYPOCHLOROUS ACID 0.033 %
SOLUTION, IRRIGATION IRRIGATION ONCE
OUTPATIENT
Start: 2024-04-10 | End: 2024-04-10

## 2024-04-10 RX ORDER — TRIAMCINOLONE ACETONIDE 1 MG/G
OINTMENT TOPICAL ONCE
OUTPATIENT
Start: 2024-04-10 | End: 2024-04-10

## 2024-04-10 RX ORDER — LIDOCAINE HYDROCHLORIDE 20 MG/ML
JELLY TOPICAL ONCE
OUTPATIENT
Start: 2024-04-10 | End: 2024-04-10

## 2024-04-10 RX ORDER — GINSENG 100 MG
CAPSULE ORAL ONCE
OUTPATIENT
Start: 2024-04-10 | End: 2024-04-10

## 2024-04-10 RX ORDER — BETAMETHASONE DIPROPIONATE 0.5 MG/G
CREAM TOPICAL ONCE
OUTPATIENT
Start: 2024-04-10 | End: 2024-04-10

## 2024-04-10 RX ORDER — CLOBETASOL PROPIONATE 0.5 MG/G
OINTMENT TOPICAL ONCE
OUTPATIENT
Start: 2024-04-10 | End: 2024-04-10

## 2024-04-10 RX ORDER — LIDOCAINE HYDROCHLORIDE 20 MG/ML
JELLY TOPICAL ONCE
Status: DISCONTINUED | OUTPATIENT
Start: 2024-04-10 | End: 2024-04-11 | Stop reason: HOSPADM

## 2024-04-10 RX ORDER — GENTAMICIN SULFATE 1 MG/G
OINTMENT TOPICAL ONCE
OUTPATIENT
Start: 2024-04-10 | End: 2024-04-10

## 2024-04-10 RX ORDER — LIDOCAINE HYDROCHLORIDE 40 MG/ML
SOLUTION TOPICAL ONCE
OUTPATIENT
Start: 2024-04-10 | End: 2024-04-10

## 2024-04-10 RX ORDER — BACITRACIN ZINC AND POLYMYXIN B SULFATE 500; 1000 [USP'U]/G; [USP'U]/G
OINTMENT TOPICAL ONCE
OUTPATIENT
Start: 2024-04-10 | End: 2024-04-10

## 2024-04-10 RX ORDER — IBUPROFEN 200 MG
TABLET ORAL ONCE
OUTPATIENT
Start: 2024-04-10 | End: 2024-04-10

## 2024-04-10 RX ORDER — LIDOCAINE 40 MG/G
CREAM TOPICAL ONCE
OUTPATIENT
Start: 2024-04-10 | End: 2024-04-10

## 2024-04-10 NOTE — FLOWSHEET NOTE
04/10/24 1528   Wound 04/01/24 Foot Dorsal Sustained from tight shoes when bulky roll gauze dressing was in place on the foot for her heel wound.    Date First Assessed/Time First Assessed: 04/01/24 1500   Wound Approximate Age at First Assessment (Weeks): 1 weeks  Primary Wound Type: Pressure Injury  Location: Foot  Wound Location Orientation: Dorsal  Wound Description (Comments): Sustained from ...   Wound Etiology Traumatic   Dressing Status Intact   Wound Cleansed Cleansed with saline   Dressing/Treatment Foam   Wound Length (cm) 0 cm   Wound Width (cm) 0 cm   Wound Depth (cm) 0 cm   Wound Surface Area (cm^2) 0 cm^2   Change in Wound Size % (l*w) 100   Wound Volume (cm^3) 0 cm^3   Wound Healing % 100   Wound Assessment Epithelialization   Drainage Amount None (dry)   Odor None   Abby-wound Assessment Intact   Wound 01/22/24 Heel Left;Plantar #1   Date First Assessed/Time First Assessed: 01/22/24 1336   Present on Original Admission: Yes  Wound Approximate Age at First Assessment (Weeks): 8 weeks  Primary Wound Type: Pressure Injury  Location: Heel  Wound Location Orientation: Left;Plantar  Wou...   Wound Etiology Diabetic Doan 2   Dressing Status Intact   Dressing/Treatment Hydrofera blue;Foam   Offloading for Diabetic Foot Ulcers Offloading ordered   Wound Length (cm) 1.9 cm   Wound Width (cm) 2.5 cm   Wound Depth (cm) 0.1 cm   Wound Surface Area (cm^2) 4.75 cm^2   Change in Wound Size % (l*w) -375   Wound Volume (cm^3) 0.475 cm^3   Wound Healing % -375   Post-Procedure Length (cm) 2 cm   Post-Procedure Width (cm) 2.6 cm   Post-Procedure Depth (cm) 0.2 cm   Post-Procedure Surface Area (cm^2) 5.2 cm^2   Post-Procedure Volume (cm^3) 1.04 cm^3   Wound Assessment Slough   Drainage Amount Moderate (25-50%)   Drainage Description Serosanguinous   Odor None   Abby-wound Assessment Intact   Wound Thickness Description not for Pressure Injury Full thickness   Pain Assessment   Pain Assessment None - Denies Pain

## 2024-04-12 ENCOUNTER — HOME CARE VISIT (OUTPATIENT)
Dept: SCHEDULING | Facility: HOME HEALTH | Age: 79
End: 2024-04-12
Payer: MEDICARE

## 2024-04-12 VITALS
DIASTOLIC BLOOD PRESSURE: 66 MMHG | TEMPERATURE: 97.7 F | SYSTOLIC BLOOD PRESSURE: 116 MMHG | OXYGEN SATURATION: 98 % | RESPIRATION RATE: 18 BRPM | HEART RATE: 82 BPM

## 2024-04-12 PROCEDURE — G0299 HHS/HOSPICE OF RN EA 15 MIN: HCPCS

## 2024-04-13 ENCOUNTER — HOME CARE VISIT (OUTPATIENT)
Dept: SCHEDULING | Facility: HOME HEALTH | Age: 79
End: 2024-04-13
Payer: MEDICARE

## 2024-04-15 ENCOUNTER — HOME CARE VISIT (OUTPATIENT)
Dept: SCHEDULING | Facility: HOME HEALTH | Age: 79
End: 2024-04-15
Payer: MEDICARE

## 2024-04-15 VITALS
TEMPERATURE: 97.4 F | SYSTOLIC BLOOD PRESSURE: 138 MMHG | OXYGEN SATURATION: 99 % | DIASTOLIC BLOOD PRESSURE: 67 MMHG | HEART RATE: 78 BPM | RESPIRATION RATE: 18 BRPM

## 2024-04-15 PROCEDURE — G0299 HHS/HOSPICE OF RN EA 15 MIN: HCPCS

## 2024-04-15 ASSESSMENT — ENCOUNTER SYMPTOMS: STOOL DESCRIPTION: SOFT FORMED

## 2024-04-16 ENCOUNTER — HOSPITAL ENCOUNTER (OUTPATIENT)
Dept: INFUSION THERAPY | Age: 79
Setting detail: INFUSION SERIES
Discharge: HOME | End: 2024-04-16
Payer: MEDICARE

## 2024-04-16 ENCOUNTER — HOSPITAL ENCOUNTER (OUTPATIENT)
Dept: LAB | Age: 79
Discharge: HOME | End: 2024-04-19
Payer: MEDICARE

## 2024-04-16 VITALS
DIASTOLIC BLOOD PRESSURE: 92 MMHG | OXYGEN SATURATION: 98 % | HEART RATE: 82 BPM | WEIGHT: 152.8 LBS | BODY MASS INDEX: 27.95 KG/M2 | SYSTOLIC BLOOD PRESSURE: 167 MMHG | TEMPERATURE: 98.7 F | RESPIRATION RATE: 16 BRPM

## 2024-04-16 DIAGNOSIS — Z94.0 RENAL TRANSPLANT, STATUS POST: Primary | ICD-10-CM

## 2024-04-16 DIAGNOSIS — Z94.0 RENAL TRANSPLANT, STATUS POST: ICD-10-CM

## 2024-04-16 LAB
ALBUMIN SERPL-MCNC: 3.5 G/DL (ref 3.2–4.6)
ALBUMIN/GLOB SERPL: 0.9 (ref 0.4–1.6)
ALP SERPL-CCNC: 68 U/L (ref 50–136)
ALT SERPL-CCNC: 17 U/L (ref 12–65)
ANION GAP SERPL CALC-SCNC: 4 MMOL/L (ref 2–11)
AST SERPL-CCNC: 15 U/L (ref 15–37)
BASOPHILS # BLD: 0.1 K/UL (ref 0–0.2)
BASOPHILS NFR BLD: 1 % (ref 0–2)
BILIRUB SERPL-MCNC: 0.5 MG/DL (ref 0.2–1.1)
BUN SERPL-MCNC: 10 MG/DL (ref 8–23)
CALCIUM SERPL-MCNC: 9.9 MG/DL (ref 8.3–10.4)
CHLORIDE SERPL-SCNC: 108 MMOL/L (ref 103–113)
CO2 SERPL-SCNC: 28 MMOL/L (ref 21–32)
CREAT SERPL-MCNC: 0.7 MG/DL (ref 0.6–1)
DIFFERENTIAL METHOD BLD: ABNORMAL
EOSINOPHIL # BLD: 0.2 K/UL (ref 0–0.8)
EOSINOPHIL NFR BLD: 4 % (ref 0.5–7.8)
ERYTHROCYTE [DISTWIDTH] IN BLOOD BY AUTOMATED COUNT: 13.7 % (ref 11.9–14.6)
GLOBULIN SER CALC-MCNC: 4 G/DL (ref 2.8–4.5)
GLUCOSE SERPL-MCNC: 165 MG/DL (ref 65–100)
HCT VFR BLD AUTO: 36.9 % (ref 35.8–46.3)
HGB BLD-MCNC: 10.9 G/DL (ref 11.7–15.4)
IMM GRANULOCYTES # BLD AUTO: 0 K/UL (ref 0–0.5)
IMM GRANULOCYTES NFR BLD AUTO: 0 % (ref 0–5)
LYMPHOCYTES # BLD: 1.2 K/UL (ref 0.5–4.6)
LYMPHOCYTES NFR BLD: 25 % (ref 13–44)
MCH RBC QN AUTO: 28.8 PG (ref 26.1–32.9)
MCHC RBC AUTO-ENTMCNC: 29.5 G/DL (ref 31.4–35)
MCV RBC AUTO: 97.6 FL (ref 82–102)
MONOCYTES # BLD: 0.3 K/UL (ref 0.1–1.3)
MONOCYTES NFR BLD: 7 % (ref 4–12)
NEUTS SEG # BLD: 3 K/UL (ref 1.7–8.2)
NEUTS SEG NFR BLD: 63 % (ref 43–78)
NRBC # BLD: 0 K/UL (ref 0–0.2)
PLATELET # BLD AUTO: 178 K/UL (ref 150–450)
PMV BLD AUTO: 8.9 FL (ref 9.4–12.3)
POTASSIUM SERPL-SCNC: 3.8 MMOL/L (ref 3.5–5.1)
PROT SERPL-MCNC: 7.5 G/DL (ref 6.3–8.2)
RBC # BLD AUTO: 3.78 M/UL (ref 4.05–5.2)
SODIUM SERPL-SCNC: 140 MMOL/L (ref 136–146)
WBC # BLD AUTO: 4.8 K/UL (ref 4.3–11.1)

## 2024-04-16 PROCEDURE — 6360000002 HC RX W HCPCS: Performed by: INTERNAL MEDICINE

## 2024-04-16 PROCEDURE — 2580000003 HC RX 258: Performed by: INTERNAL MEDICINE

## 2024-04-16 PROCEDURE — 85025 COMPLETE CBC W/AUTO DIFF WBC: CPT

## 2024-04-16 PROCEDURE — 36415 COLL VENOUS BLD VENIPUNCTURE: CPT

## 2024-04-16 PROCEDURE — 80053 COMPREHEN METABOLIC PANEL: CPT

## 2024-04-16 PROCEDURE — 96365 THER/PROPH/DIAG IV INF INIT: CPT

## 2024-04-16 RX ORDER — ALBUTEROL SULFATE 90 UG/1
4 AEROSOL, METERED RESPIRATORY (INHALATION) PRN
OUTPATIENT
Start: 2024-05-07

## 2024-04-16 RX ORDER — ALBUTEROL SULFATE 90 UG/1
4 AEROSOL, METERED RESPIRATORY (INHALATION) PRN
Status: DISCONTINUED | OUTPATIENT
Start: 2024-04-16 | End: 2024-04-17 | Stop reason: HOSPADM

## 2024-04-16 RX ORDER — ACETAMINOPHEN 325 MG/1
650 TABLET ORAL
OUTPATIENT
Start: 2024-05-07

## 2024-04-16 RX ORDER — SODIUM CHLORIDE 9 MG/ML
INJECTION, SOLUTION INTRAVENOUS CONTINUOUS
OUTPATIENT
Start: 2024-05-07

## 2024-04-16 RX ORDER — SODIUM CHLORIDE 0.9 % (FLUSH) 0.9 %
5-40 SYRINGE (ML) INJECTION PRN
OUTPATIENT
Start: 2024-05-07

## 2024-04-16 RX ORDER — ACETAMINOPHEN 325 MG/1
650 TABLET ORAL
Status: DISCONTINUED | OUTPATIENT
Start: 2024-04-16 | End: 2024-04-17 | Stop reason: HOSPADM

## 2024-04-16 RX ORDER — ONDANSETRON 2 MG/ML
8 INJECTION INTRAMUSCULAR; INTRAVENOUS
Status: DISCONTINUED | OUTPATIENT
Start: 2024-04-16 | End: 2024-04-17 | Stop reason: HOSPADM

## 2024-04-16 RX ORDER — SODIUM CHLORIDE 0.9 % (FLUSH) 0.9 %
5-40 SYRINGE (ML) INJECTION PRN
Status: DISCONTINUED | OUTPATIENT
Start: 2024-04-16 | End: 2024-04-17 | Stop reason: HOSPADM

## 2024-04-16 RX ORDER — SODIUM CHLORIDE 9 MG/ML
5-250 INJECTION, SOLUTION INTRAVENOUS PRN
Status: DISCONTINUED | OUTPATIENT
Start: 2024-04-16 | End: 2024-04-17 | Stop reason: HOSPADM

## 2024-04-16 RX ORDER — ONDANSETRON 2 MG/ML
8 INJECTION INTRAMUSCULAR; INTRAVENOUS
OUTPATIENT
Start: 2024-05-07

## 2024-04-16 RX ORDER — SODIUM CHLORIDE 9 MG/ML
5-250 INJECTION, SOLUTION INTRAVENOUS PRN
OUTPATIENT
Start: 2024-05-07

## 2024-04-16 RX ORDER — DIPHENHYDRAMINE HYDROCHLORIDE 50 MG/ML
50 INJECTION INTRAMUSCULAR; INTRAVENOUS
Status: DISCONTINUED | OUTPATIENT
Start: 2024-04-16 | End: 2024-04-17 | Stop reason: HOSPADM

## 2024-04-16 RX ORDER — HEPARIN 100 UNIT/ML
500 SYRINGE INTRAVENOUS PRN
OUTPATIENT
Start: 2024-05-07

## 2024-04-16 RX ORDER — DIPHENHYDRAMINE HYDROCHLORIDE 50 MG/ML
50 INJECTION INTRAMUSCULAR; INTRAVENOUS
OUTPATIENT
Start: 2024-05-07

## 2024-04-16 RX ORDER — EPINEPHRINE 1 MG/ML
0.3 INJECTION, SOLUTION, CONCENTRATE INTRAVENOUS PRN
Status: DISCONTINUED | OUTPATIENT
Start: 2024-04-16 | End: 2024-04-17 | Stop reason: HOSPADM

## 2024-04-16 RX ORDER — EPINEPHRINE 1 MG/ML
0.3 INJECTION, SOLUTION, CONCENTRATE INTRAVENOUS PRN
OUTPATIENT
Start: 2024-05-07

## 2024-04-16 RX ADMIN — SODIUM CHLORIDE 500 MG: 9 INJECTION, SOLUTION INTRAVENOUS at 15:22

## 2024-04-16 RX ADMIN — SODIUM CHLORIDE, PRESERVATIVE FREE 10 ML: 5 INJECTION INTRAVENOUS at 14:56

## 2024-04-16 NOTE — PROGRESS NOTES
Patient arrived to infusion. Nulojix infusion completed. Patient tolerated well. Discharged via w/c. Appt made for 5/14 for next infusion.

## 2024-04-17 ENCOUNTER — HOME CARE VISIT (OUTPATIENT)
Dept: SCHEDULING | Facility: HOME HEALTH | Age: 79
End: 2024-04-17
Payer: MEDICARE

## 2024-04-17 VITALS
DIASTOLIC BLOOD PRESSURE: 74 MMHG | SYSTOLIC BLOOD PRESSURE: 132 MMHG | RESPIRATION RATE: 18 BRPM | OXYGEN SATURATION: 99 % | WEIGHT: 152.8 LBS | HEART RATE: 73 BPM | TEMPERATURE: 97.6 F | BODY MASS INDEX: 27.95 KG/M2

## 2024-04-17 PROCEDURE — G0299 HHS/HOSPICE OF RN EA 15 MIN: HCPCS

## 2024-04-19 ENCOUNTER — HOME CARE VISIT (OUTPATIENT)
Dept: SCHEDULING | Facility: HOME HEALTH | Age: 79
End: 2024-04-19

## 2024-04-19 ENCOUNTER — HOME CARE VISIT (OUTPATIENT)
Dept: SCHEDULING | Facility: HOME HEALTH | Age: 79
End: 2024-04-19
Payer: MEDICARE

## 2024-04-19 VITALS
SYSTOLIC BLOOD PRESSURE: 118 MMHG | OXYGEN SATURATION: 97 % | DIASTOLIC BLOOD PRESSURE: 67 MMHG | HEART RATE: 78 BPM | RESPIRATION RATE: 18 BRPM | TEMPERATURE: 98.2 F

## 2024-04-19 PROCEDURE — G0299 HHS/HOSPICE OF RN EA 15 MIN: HCPCS

## 2024-04-19 ASSESSMENT — ENCOUNTER SYMPTOMS: DYSPNEA ACTIVITY LEVEL: AFTER AMBULATING 10 - 20 FT

## 2024-04-22 ENCOUNTER — HOME CARE VISIT (OUTPATIENT)
Dept: SCHEDULING | Facility: HOME HEALTH | Age: 79
End: 2024-04-22
Payer: MEDICARE

## 2024-04-22 ENCOUNTER — HOME CARE VISIT (OUTPATIENT)
Dept: HOME HEALTH SERVICES | Facility: HOME HEALTH | Age: 79
End: 2024-04-22
Payer: MEDICARE

## 2024-04-22 VITALS
HEART RATE: 70 BPM | TEMPERATURE: 98 F | SYSTOLIC BLOOD PRESSURE: 118 MMHG | OXYGEN SATURATION: 98 % | DIASTOLIC BLOOD PRESSURE: 63 MMHG | RESPIRATION RATE: 20 BRPM

## 2024-04-22 PROCEDURE — G0299 HHS/HOSPICE OF RN EA 15 MIN: HCPCS

## 2024-04-22 ASSESSMENT — ENCOUNTER SYMPTOMS: STOOL DESCRIPTION: FORMED

## 2024-04-24 ENCOUNTER — HOME CARE VISIT (OUTPATIENT)
Dept: SCHEDULING | Facility: HOME HEALTH | Age: 79
End: 2024-04-24

## 2024-04-24 ENCOUNTER — HOSPITAL ENCOUNTER (OUTPATIENT)
Dept: WOUND CARE | Age: 79
Discharge: HOME | End: 2024-04-24
Payer: MEDICARE

## 2024-04-24 VITALS
OXYGEN SATURATION: 97 % | RESPIRATION RATE: 18 BRPM | TEMPERATURE: 97.7 F | DIASTOLIC BLOOD PRESSURE: 73 MMHG | HEART RATE: 67 BPM | SYSTOLIC BLOOD PRESSURE: 137 MMHG

## 2024-04-24 DIAGNOSIS — L03.116 CELLULITIS OF LEFT HEEL: ICD-10-CM

## 2024-04-24 DIAGNOSIS — L89.623 PRESSURE ULCER OF LEFT HEEL, STAGE 3 (HCC): Primary | ICD-10-CM

## 2024-04-24 PROCEDURE — 11042 DBRDMT SUBQ TIS 1ST 20SQCM/<: CPT

## 2024-04-24 RX ORDER — GINSENG 100 MG
CAPSULE ORAL ONCE
OUTPATIENT
Start: 2024-04-24 | End: 2024-04-24

## 2024-04-24 RX ORDER — LIDOCAINE HYDROCHLORIDE 20 MG/ML
JELLY TOPICAL ONCE
OUTPATIENT
Start: 2024-04-24 | End: 2024-04-24

## 2024-04-24 RX ORDER — LIDOCAINE 40 MG/G
CREAM TOPICAL ONCE
OUTPATIENT
Start: 2024-04-24 | End: 2024-04-24

## 2024-04-24 RX ORDER — BACITRACIN ZINC AND POLYMYXIN B SULFATE 500; 1000 [USP'U]/G; [USP'U]/G
OINTMENT TOPICAL ONCE
OUTPATIENT
Start: 2024-04-24 | End: 2024-04-24

## 2024-04-24 RX ORDER — BETAMETHASONE DIPROPIONATE 0.5 MG/G
CREAM TOPICAL ONCE
OUTPATIENT
Start: 2024-04-24 | End: 2024-04-24

## 2024-04-24 RX ORDER — IBUPROFEN 200 MG
TABLET ORAL ONCE
OUTPATIENT
Start: 2024-04-24 | End: 2024-04-24

## 2024-04-24 RX ORDER — GENTAMICIN SULFATE 1 MG/G
OINTMENT TOPICAL ONCE
OUTPATIENT
Start: 2024-04-24 | End: 2024-04-24

## 2024-04-24 RX ORDER — LIDOCAINE HYDROCHLORIDE 40 MG/ML
SOLUTION TOPICAL ONCE
OUTPATIENT
Start: 2024-04-24 | End: 2024-04-24

## 2024-04-24 RX ORDER — TRIAMCINOLONE ACETONIDE 1 MG/G
OINTMENT TOPICAL ONCE
OUTPATIENT
Start: 2024-04-24 | End: 2024-04-24

## 2024-04-24 RX ORDER — SODIUM CHLOR/HYPOCHLOROUS ACID 0.033 %
SOLUTION, IRRIGATION IRRIGATION ONCE
OUTPATIENT
Start: 2024-04-24 | End: 2024-04-24

## 2024-04-24 RX ORDER — CLOBETASOL PROPIONATE 0.5 MG/G
OINTMENT TOPICAL ONCE
OUTPATIENT
Start: 2024-04-24 | End: 2024-04-24

## 2024-04-24 RX ORDER — LIDOCAINE HYDROCHLORIDE 20 MG/ML
JELLY TOPICAL ONCE
Status: COMPLETED | OUTPATIENT
Start: 2024-04-24 | End: 2024-04-24

## 2024-04-24 RX ORDER — LIDOCAINE 50 MG/G
OINTMENT TOPICAL ONCE
OUTPATIENT
Start: 2024-04-24 | End: 2024-04-24

## 2024-04-24 RX ADMIN — LIDOCAINE HYDROCHLORIDE: 20 JELLY TOPICAL at 14:39

## 2024-04-24 NOTE — WOUND CARE
Discharge Instructions for  [facility]  [address] [place] [address]  [address] [place] [address]  Phone [phone]   Fax [phone]      NAME:  Alayna Fall  YOB: 1945  MEDICAL RECORD NUMBER:  [id number]  DATE:  4/24/2024    Return Appointment:  1 week with Todd Rizzo DO      Instructions:     Left heel:  Cleanse with normal saline  Vashe post debridement.  Santyl to wound - nickel thickness  Cover with gauze or ABD pad. Secure with roll gauze   Change daily      Apply bordered foam to dorsal foot as needed    [facility] to see patient for wound care      Offloading:  Use wheelchair to minimize ambulation.  Patient to offload wound with pillow under calf while in bed or chair.   Use cast cover for showering.  Wound healing is greatly slowed when blood glucose levels are greater than 200. Monitor glucose levels daily to ensure tight glucose control.  Increase protein intake to promote wound healing. Protein supplements such as Evelio and Ensure are great options.   Protein goal is 60 - 80 grams per day. Recommend Ensure Max or Glucerna.        Should you experience increased redness, swelling, pain, foul odor, size of wound(s), or have a temperature over 101 degrees please contact the Wound Healing Center at [phone] or if after hours contact your primary care physician or go to the hospital emergency department.    PLEASE NOTE: IF YOU ARE UNABLE TO OBTAIN WOUND SUPPLIES, CONTINUE TO USE THE SUPPLIES YOU HAVE AVAILABLE UNTIL YOU ARE ABLE TO REACH US. IT IS MOST IMPORTANT TO KEEP THE WOUND COVERED AT ALL TIMES.    Electronically signed TABITHA RODRIGUEZ RN, [facility] on 4/24/2024 at 2:20 PM

## 2024-04-24 NOTE — DISCHARGE INSTRUCTIONS
Return Appointment:  1 week with Todd Rizzo DO        Instructions:      Left heel:  Cleanse with normal saline  Vashe post debridement.  Santyl to wound - nickel thickness  Cover with gauze or ABD pad. Secure with roll gauze   Change daily      Apply bordered foam to dorsal foot as needed     [facility] - [facility] to see patient for wound care        Offloading:  Use wheelchair to minimize ambulation.  Patient to offload wound with pillow under calf while in bed or chair.   Use cast cover for showering.  Wound healing is greatly slowed when blood glucose levels are greater than 200. Monitor glucose levels daily to ensure tight glucose control.  Increase protein intake to promote wound healing. Protein supplements such as Evelio and Ensure are great options.   Protein goal is 60 - 80 grams per day. Recommend Ensure Max or Glucerna.           Should you experience increased redness, swelling, pain, foul odor, size of wound(s), or have a temperature over 101 degrees please contact the Wound Healing Center at [phone] or if after hours contact your primary care physician or go to the hospital emergency department.     PLEASE NOTE: IF YOU ARE UNABLE TO OBTAIN WOUND SUPPLIES, CONTINUE TO USE THE SUPPLIES YOU HAVE AVAILABLE UNTIL YOU ARE ABLE TO REACH US. IT IS MOST IMPORTANT TO KEEP THE WOUND COVERED AT ALL TIMES.

## 2024-04-24 NOTE — FLOWSHEET NOTE
04/24/24 1345   Wound 01/22/24 Heel Left;Plantar #1   Date First Assessed/Time First Assessed: 01/22/24 1336   Present on Original Admission: Yes  Wound Approximate Age at First Assessment (Weeks): 8 weeks  Primary Wound Type: Pressure Injury  Location: Heel  Wound Location Orientation: Left;Plantar  Wou...   Wound Image     Wound Etiology Diabetic Doan 2   Dressing Status Old drainage noted   Wound Cleansed Cleansed with saline   Dressing/Treatment ABD;Foam;Gauze dressing/dressing sponge;Roll gauze;Tape/Soft cloth adhesive tape;Other (comment)  (santyl)   Wound Length (cm) 1.5 cm   Wound Width (cm) 2.8 cm   Wound Depth (cm) 0.1 cm   Wound Surface Area (cm^2) 4.2 cm^2   Change in Wound Size % (l*w) -320   Wound Volume (cm^3) 0.42 cm^3   Wound Healing % -320   Post-Procedure Length (cm) 1.6 cm   Post-Procedure Width (cm) 2.8 cm   Post-Procedure Depth (cm) 0.1 cm   Post-Procedure Surface Area (cm^2) 4.48 cm^2   Post-Procedure Volume (cm^3) 0.448 cm^3   Wound Assessment Slough;Pink/red   Drainage Amount Small (< 25%)   Drainage Description Serosanguinous   Odor None   Abby-wound Assessment Intact   Wound Thickness Description not for Pressure Injury Full thickness   Wound 04/01/24 Foot Dorsal Sustained from tight shoes when bulky roll gauze dressing was in place on the foot for her heel wound.    Date First Assessed/Time First Assessed: 04/01/24 1500   Wound Approximate Age at First Assessment (Weeks): 1 weeks  Primary Wound Type: Pressure Injury  Location: Foot  Wound Location Orientation: Dorsal  Wound Description (Comments): Sustained from ...   Wound Image    Dressing Status Intact   Wound Cleansed Cleansed with saline   Dressing/Treatment Foam   Wound Length (cm) 0 cm   Wound Width (cm) 0 cm   Wound Depth (cm) 0 cm   Wound Surface Area (cm^2) 0 cm^2   Change in Wound Size % (l*w) 100   Wound Volume (cm^3) 0 cm^3   Wound Healing % 100   Wound Assessment Pink/red   Drainage Amount None (dry)   Abby-wound

## 2024-04-25 ENCOUNTER — TELEMEDICINE (OUTPATIENT)
Dept: ENDOCRINOLOGY | Age: 79
End: 2024-04-25
Payer: MEDICARE

## 2024-04-25 DIAGNOSIS — Z79.4 TYPE 2 DIABETES MELLITUS WITH HYPERGLYCEMIA, WITH LONG-TERM CURRENT USE OF INSULIN (HCC): Primary | ICD-10-CM

## 2024-04-25 DIAGNOSIS — E78.00 HYPERCHOLESTEROLEMIA: ICD-10-CM

## 2024-04-25 DIAGNOSIS — E04.2 MULTINODULAR GOITER: ICD-10-CM

## 2024-04-25 DIAGNOSIS — Z79.4 TYPE 2 DIABETES MELLITUS WITH BOTH EYES AFFECTED BY MILD NONPROLIFERATIVE RETINOPATHY AND MACULAR EDEMA, WITH LONG-TERM CURRENT USE OF INSULIN (HCC): ICD-10-CM

## 2024-04-25 DIAGNOSIS — I10 ESSENTIAL (PRIMARY) HYPERTENSION: ICD-10-CM

## 2024-04-25 DIAGNOSIS — E11.65 TYPE 2 DIABETES MELLITUS WITH HYPERGLYCEMIA, WITH LONG-TERM CURRENT USE OF INSULIN (HCC): Primary | ICD-10-CM

## 2024-04-25 DIAGNOSIS — E11.3213 TYPE 2 DIABETES MELLITUS WITH BOTH EYES AFFECTED BY MILD NONPROLIFERATIVE RETINOPATHY AND MACULAR EDEMA, WITH LONG-TERM CURRENT USE OF INSULIN (HCC): ICD-10-CM

## 2024-04-25 PROCEDURE — G8400 PT W/DXA NO RESULTS DOC: HCPCS | Performed by: PHYSICIAN ASSISTANT

## 2024-04-25 PROCEDURE — G8417 CALC BMI ABV UP PARAM F/U: HCPCS | Performed by: PHYSICIAN ASSISTANT

## 2024-04-25 PROCEDURE — 1090F PRES/ABSN URINE INCON ASSESS: CPT | Performed by: PHYSICIAN ASSISTANT

## 2024-04-25 PROCEDURE — 1036F TOBACCO NON-USER: CPT | Performed by: PHYSICIAN ASSISTANT

## 2024-04-25 PROCEDURE — 1123F ACP DISCUSS/DSCN MKR DOCD: CPT | Performed by: PHYSICIAN ASSISTANT

## 2024-04-25 PROCEDURE — 99215 OFFICE O/P EST HI 40 MIN: CPT | Performed by: PHYSICIAN ASSISTANT

## 2024-04-25 PROCEDURE — 95251 CONT GLUC MNTR ANALYSIS I&R: CPT | Performed by: PHYSICIAN ASSISTANT

## 2024-04-25 PROCEDURE — G8427 DOCREV CUR MEDS BY ELIG CLIN: HCPCS | Performed by: PHYSICIAN ASSISTANT

## 2024-04-25 RX ORDER — INSULIN LISPRO 100 [IU]/ML
INJECTION, SOLUTION INTRAVENOUS; SUBCUTANEOUS
Qty: 30 ML | Refills: 3 | Status: SHIPPED | OUTPATIENT
Start: 2024-04-25

## 2024-04-25 RX ORDER — INSULIN HUMAN 100 [IU]/ML
16 INJECTION, SUSPENSION SUBCUTANEOUS EVERY MORNING
Qty: 54 ML | Refills: 3 | Status: SHIPPED | OUTPATIENT
Start: 2024-04-25

## 2024-04-25 RX ORDER — DULAGLUTIDE 3 MG/.5ML
3 INJECTION, SOLUTION SUBCUTANEOUS WEEKLY
Qty: 12 ADJUSTABLE DOSE PRE-FILLED PEN SYRINGE | Refills: 3 | Status: SHIPPED | OUTPATIENT
Start: 2024-04-25

## 2024-04-25 NOTE — PROGRESS NOTES
scoring is based on the 2017 ACR white paper  for Thyroid Imaging Reporting and Data System (TI-RADS) providing guidance on  fine-needle aspiration (FNA) and follow-up.     FINDINGS:  Right lobe size: 6.1 x 2.0 x 2.3 cm  Left lobe size: 6.2 x 1.5 x 2.2 cm  Isthmus size: 0.7 cm      Thyroid Echotexture: Heterogeneous with multiple nodules.       Nodules (most suspicious):  -1.2 x 1.0 x 0.9 cm nodule in the right upper thyroid lobe with predominantly  solid composition, hypoechoic echogenicity, wider than tall orientation, smooth  margins, and no internal echogenic foci. (TR-4)     -1.3 x 0.9 x 0.8 cm nodule in the right lower thyroid lobe with solid  composition, hypoechoic echogenicity, wider than tall orientation, ill-defined  margins, and punctate internal echogenic foci (TR-5)     -1.0 x 0.9 x 0.7 cm nodule in the left upper/mid thyroid lobe with solid  composition, hypoechoic echogenicity, wider than tall orientation, smooth  margins, no internal echogenic foci (TR-4)     -1.9 x 1.2 x 1.4 cm nodule in the left lower thyroid lobe with solid  composition, hypoechoic echogenicity, wider than tall orientation, ill-defined  margins, no internal echogenic foci (TR-4)     -A few additional subcentimeter nodules and cysts are seen.         IMPRESSION:  -Enlarged, multinodular thyroid.     -1.9 cm TR-4 nodule in the inferior left thyroid lobe and 1.3 cm TR-5 nodule in  the inferior right thyroid lobe. Per TIRADS guidelines, recommend further  characterization of these nodules with FNA if this has not already been done at  an outside institution.     -A few additional smaller TR-4 nodules. Per TIRADS guidelines, recommend  follow-up ultrasound for these nodules in one year.                 TSH               02/15/2018      1.270               12/16/2019      1.210               02/17/2021      0.760               10/20/2021      1.640                                Lab Results   Component Value Date/Time    TSH 1.640

## 2024-04-26 ENCOUNTER — HOME CARE VISIT (OUTPATIENT)
Dept: SCHEDULING | Facility: HOME HEALTH | Age: 79
End: 2024-04-26

## 2024-04-26 VITALS
TEMPERATURE: 97.4 F | SYSTOLIC BLOOD PRESSURE: 122 MMHG | RESPIRATION RATE: 18 BRPM | HEART RATE: 73 BPM | OXYGEN SATURATION: 97 % | DIASTOLIC BLOOD PRESSURE: 67 MMHG

## 2024-04-26 VITALS
TEMPERATURE: 98.3 F | SYSTOLIC BLOOD PRESSURE: 156 MMHG | HEART RATE: 81 BPM | DIASTOLIC BLOOD PRESSURE: 66 MMHG | RESPIRATION RATE: 17 BRPM

## 2024-04-26 DIAGNOSIS — E55.9 VITAMIN D DEFICIENCY: ICD-10-CM

## 2024-04-26 DIAGNOSIS — E11.65 UNCONTROLLED TYPE 2 DIABETES MELLITUS WITH HYPERGLYCEMIA (HCC): Primary | ICD-10-CM

## 2024-04-26 PROCEDURE — G0299 HHS/HOSPICE OF RN EA 15 MIN: HCPCS

## 2024-04-26 PROCEDURE — G0156 HHCP-SVS OF AIDE,EA 15 MIN: HCPCS

## 2024-04-29 ENCOUNTER — HOME CARE VISIT (OUTPATIENT)
Dept: SCHEDULING | Facility: HOME HEALTH | Age: 79
End: 2024-04-29

## 2024-04-29 VITALS
TEMPERATURE: 97.5 F | OXYGEN SATURATION: 99 % | HEART RATE: 78 BPM | DIASTOLIC BLOOD PRESSURE: 78 MMHG | SYSTOLIC BLOOD PRESSURE: 122 MMHG | RESPIRATION RATE: 18 BRPM

## 2024-04-29 PROCEDURE — G0299 HHS/HOSPICE OF RN EA 15 MIN: HCPCS

## 2024-04-29 ASSESSMENT — ENCOUNTER SYMPTOMS
STOOL DESCRIPTION: FORMED
HEMOPTYSIS: 0

## 2024-05-01 ENCOUNTER — HOME CARE VISIT (OUTPATIENT)
Dept: SCHEDULING | Facility: HOME HEALTH | Age: 79
End: 2024-05-01

## 2024-05-01 VITALS
RESPIRATION RATE: 18 BRPM | OXYGEN SATURATION: 98 % | SYSTOLIC BLOOD PRESSURE: 127 MMHG | DIASTOLIC BLOOD PRESSURE: 67 MMHG | HEART RATE: 75 BPM | TEMPERATURE: 98.1 F

## 2024-05-01 PROCEDURE — G0299 HHS/HOSPICE OF RN EA 15 MIN: HCPCS

## 2024-05-02 ENCOUNTER — HOME CARE VISIT (OUTPATIENT)
Dept: SCHEDULING | Facility: HOME HEALTH | Age: 79
End: 2024-05-02

## 2024-05-02 VITALS
RESPIRATION RATE: 17 BRPM | TEMPERATURE: 97.9 F | DIASTOLIC BLOOD PRESSURE: 68 MMHG | HEART RATE: 76 BPM | SYSTOLIC BLOOD PRESSURE: 148 MMHG

## 2024-05-02 PROCEDURE — G0156 HHCP-SVS OF AIDE,EA 15 MIN: HCPCS

## 2024-05-03 ENCOUNTER — HOME CARE VISIT (OUTPATIENT)
Dept: SCHEDULING | Facility: HOME HEALTH | Age: 79
End: 2024-05-03

## 2024-05-03 ENCOUNTER — HOSPITAL ENCOUNTER (OUTPATIENT)
Dept: WOUND CARE | Age: 79
Discharge: HOME | End: 2024-05-03
Payer: MEDICARE

## 2024-05-03 VITALS
DIASTOLIC BLOOD PRESSURE: 78 MMHG | HEART RATE: 74 BPM | RESPIRATION RATE: 18 BRPM | SYSTOLIC BLOOD PRESSURE: 161 MMHG | TEMPERATURE: 97.8 F

## 2024-05-03 DIAGNOSIS — L03.116 CELLULITIS OF LEFT HEEL: ICD-10-CM

## 2024-05-03 DIAGNOSIS — L89.623 PRESSURE ULCER OF LEFT HEEL, STAGE 3 (HCC): Primary | ICD-10-CM

## 2024-05-03 PROCEDURE — 11042 DBRDMT SUBQ TIS 1ST 20SQCM/<: CPT

## 2024-05-03 RX ORDER — LIDOCAINE HYDROCHLORIDE 20 MG/ML
JELLY TOPICAL ONCE
Status: COMPLETED | OUTPATIENT
Start: 2024-05-03 | End: 2024-05-03

## 2024-05-03 RX ORDER — CLOBETASOL PROPIONATE 0.5 MG/G
OINTMENT TOPICAL ONCE
OUTPATIENT
Start: 2024-05-03 | End: 2024-05-03

## 2024-05-03 RX ORDER — BETAMETHASONE DIPROPIONATE 0.5 MG/G
CREAM TOPICAL ONCE
OUTPATIENT
Start: 2024-05-03 | End: 2024-05-03

## 2024-05-03 RX ORDER — LIDOCAINE HYDROCHLORIDE 40 MG/ML
SOLUTION TOPICAL ONCE
OUTPATIENT
Start: 2024-05-03 | End: 2024-05-03

## 2024-05-03 RX ORDER — BACITRACIN ZINC AND POLYMYXIN B SULFATE 500; 1000 [USP'U]/G; [USP'U]/G
OINTMENT TOPICAL ONCE
OUTPATIENT
Start: 2024-05-03 | End: 2024-05-03

## 2024-05-03 RX ORDER — LIDOCAINE HYDROCHLORIDE 20 MG/ML
JELLY TOPICAL ONCE
OUTPATIENT
Start: 2024-05-03 | End: 2024-05-03

## 2024-05-03 RX ORDER — TRIAMCINOLONE ACETONIDE 1 MG/G
OINTMENT TOPICAL ONCE
OUTPATIENT
Start: 2024-05-03 | End: 2024-05-03

## 2024-05-03 RX ORDER — GENTAMICIN SULFATE 1 MG/G
OINTMENT TOPICAL ONCE
OUTPATIENT
Start: 2024-05-03 | End: 2024-05-03

## 2024-05-03 RX ORDER — IBUPROFEN 200 MG
TABLET ORAL ONCE
OUTPATIENT
Start: 2024-05-03 | End: 2024-05-03

## 2024-05-03 RX ORDER — SODIUM CHLOR/HYPOCHLOROUS ACID 0.033 %
SOLUTION, IRRIGATION IRRIGATION ONCE
OUTPATIENT
Start: 2024-05-03 | End: 2024-05-03

## 2024-05-03 RX ORDER — GINSENG 100 MG
CAPSULE ORAL ONCE
OUTPATIENT
Start: 2024-05-03 | End: 2024-05-03

## 2024-05-03 RX ORDER — LIDOCAINE 50 MG/G
OINTMENT TOPICAL ONCE
OUTPATIENT
Start: 2024-05-03 | End: 2024-05-03

## 2024-05-03 RX ORDER — LIDOCAINE 40 MG/G
CREAM TOPICAL ONCE
OUTPATIENT
Start: 2024-05-03 | End: 2024-05-03

## 2024-05-03 RX ADMIN — LIDOCAINE HYDROCHLORIDE: 20 JELLY TOPICAL at 14:53

## 2024-05-03 NOTE — FLOWSHEET NOTE
05/03/24 1423   Wound 01/22/24 Heel Left;Plantar #1   Date First Assessed/Time First Assessed: 01/22/24 1336   Present on Original Admission: Yes  Wound Approximate Age at First Assessment (Weeks): 8 weeks  Primary Wound Type: Pressure Injury  Location: Heel  Wound Location Orientation: Left;Plantar  Wou...   Wound Image     Wound Etiology Diabetic Doan 2   Dressing Status Old drainage noted   Wound Cleansed Vashe   Dressing/Treatment Other (comment)  (santyl)   Wound Length (cm) 1.5 cm   Wound Width (cm) 2.5 cm   Wound Depth (cm) 0.1 cm   Wound Surface Area (cm^2) 3.75 cm^2   Change in Wound Size % (l*w) -275   Wound Volume (cm^3) 0.375 cm^3   Wound Healing % -275   Post-Procedure Length (cm) 1.5 cm   Post-Procedure Width (cm) 2.5 cm   Post-Procedure Depth (cm) 0.2 cm   Post-Procedure Surface Area (cm^2) 3.75 cm^2   Post-Procedure Volume (cm^3) 0.75 cm^3   Wound Assessment Eschar moist   Drainage Amount Moderate (25-50%)   Drainage Description Serosanguinous   Abby-wound Assessment Intact   Wound Thickness Description not for Pressure Injury Full thickness     Patient is on asa daily

## 2024-05-03 NOTE — WOUND CARE
Discharge Instructions for  [facility]  [address] [place] [address]  [address] [place] [address]  Phone [phone]   Fax [phone]      NAME:  Alayna Fall  YOB: 1945  MEDICAL RECORD NUMBER:  [id number]  DATE:  5/3/2024    Return Appointment:  2 weeks with Todd Rizzo DO      Instructions:     Left heel:  Cleanse with normal saline  Vashe post debridement.  Santyl to wound - nickel thickness  Cover with gauze or ABD pad. Secure with roll gauze   Change daily      Apply bordered foam to dorsal foot as needed    [facility] to see patient for wound care      Offloading:  Use wheelchair to minimize ambulation.  Patient to offload wound with pillow under calf while in bed or chair.   Use cast cover for showering.  Wound healing is greatly slowed when blood glucose levels are greater than 200. Monitor glucose levels daily to ensure tight glucose control.  Increase protein intake to promote wound healing. Protein supplements such as Evelio and Ensure are great options.   Protein goal is 60 - 80 grams per day. Recommend Ensure Max or Glucerna.        Should you experience increased redness, swelling, pain, foul odor, size of wound(s), or have a temperature over 101 degrees please contact the [facility]aling Center at [phone] or if after hours contact your primary care physician or go to the hospital emergency department.    PLEASE NOTE: IF YOU ARE UNABLE TO OBTAIN WOUND SUPPLIES, CONTINUE TO USE THE SUPPLIES YOU HAVE AVAILABLE UNTIL YOU ARE ABLE TO REACH US. IT IS MOST IMPORTANT TO KEEP THE WOUND COVERED AT ALL TIMES.    Electronically signed Luana Mueller RN, [facility] on 5/3/2024 at 2:39 PM

## 2024-05-06 ENCOUNTER — HOME CARE VISIT (OUTPATIENT)
Dept: SCHEDULING | Facility: HOME HEALTH | Age: 79
End: 2024-05-06
Payer: MEDICARE

## 2024-05-06 VITALS
DIASTOLIC BLOOD PRESSURE: 74 MMHG | TEMPERATURE: 97.2 F | SYSTOLIC BLOOD PRESSURE: 126 MMHG | RESPIRATION RATE: 18 BRPM | HEART RATE: 87 BPM | OXYGEN SATURATION: 95 %

## 2024-05-06 PROCEDURE — G0299 HHS/HOSPICE OF RN EA 15 MIN: HCPCS

## 2024-05-06 ASSESSMENT — ENCOUNTER SYMPTOMS: STOOL DESCRIPTION: SOFT FORMED

## 2024-05-08 ENCOUNTER — HOME CARE VISIT (OUTPATIENT)
Dept: SCHEDULING | Facility: HOME HEALTH | Age: 79
End: 2024-05-08
Payer: MEDICARE

## 2024-05-08 PROCEDURE — G0299 HHS/HOSPICE OF RN EA 15 MIN: HCPCS

## 2024-05-09 ENCOUNTER — HOME CARE VISIT (OUTPATIENT)
Dept: SCHEDULING | Facility: HOME HEALTH | Age: 79
End: 2024-05-09
Payer: MEDICARE

## 2024-05-09 VITALS
SYSTOLIC BLOOD PRESSURE: 136 MMHG | RESPIRATION RATE: 18 BRPM | TEMPERATURE: 97.7 F | OXYGEN SATURATION: 96 % | DIASTOLIC BLOOD PRESSURE: 71 MMHG | HEART RATE: 79 BPM

## 2024-05-09 VITALS
HEART RATE: 76 BPM | DIASTOLIC BLOOD PRESSURE: 76 MMHG | TEMPERATURE: 98.1 F | RESPIRATION RATE: 17 BRPM | SYSTOLIC BLOOD PRESSURE: 138 MMHG

## 2024-05-09 PROCEDURE — G0156 HHCP-SVS OF AIDE,EA 15 MIN: HCPCS

## 2024-05-10 ENCOUNTER — HOME CARE VISIT (OUTPATIENT)
Dept: SCHEDULING | Facility: HOME HEALTH | Age: 79
End: 2024-05-10
Payer: MEDICARE

## 2024-05-10 VITALS
RESPIRATION RATE: 18 BRPM | TEMPERATURE: 97.6 F | DIASTOLIC BLOOD PRESSURE: 75 MMHG | OXYGEN SATURATION: 99 % | HEART RATE: 77 BPM | SYSTOLIC BLOOD PRESSURE: 128 MMHG

## 2024-05-10 PROCEDURE — G0299 HHS/HOSPICE OF RN EA 15 MIN: HCPCS

## 2024-05-13 ENCOUNTER — HOME CARE VISIT (OUTPATIENT)
Dept: SCHEDULING | Facility: HOME HEALTH | Age: 79
End: 2024-05-13
Payer: MEDICARE

## 2024-05-13 VITALS
SYSTOLIC BLOOD PRESSURE: 144 MMHG | RESPIRATION RATE: 15 BRPM | TEMPERATURE: 97.9 F | HEART RATE: 78 BPM | DIASTOLIC BLOOD PRESSURE: 70 MMHG | OXYGEN SATURATION: 98 %

## 2024-05-13 PROCEDURE — G0299 HHS/HOSPICE OF RN EA 15 MIN: HCPCS

## 2024-05-13 RX ORDER — CARVEDILOL 12.5 MG/1
TABLET ORAL
Qty: 180 TABLET | Refills: 1 | Status: SHIPPED | OUTPATIENT
Start: 2024-05-13

## 2024-05-13 ASSESSMENT — ENCOUNTER SYMPTOMS
STOOL DESCRIPTION: FIRM
COUGH CHARACTERISTICS: DRY
COUGH: 1

## 2024-05-14 ENCOUNTER — HOSPITAL ENCOUNTER (OUTPATIENT)
Dept: INFUSION THERAPY | Age: 79
Setting detail: INFUSION SERIES
Discharge: HOME | End: 2024-05-14
Payer: MEDICARE

## 2024-05-14 VITALS
DIASTOLIC BLOOD PRESSURE: 64 MMHG | TEMPERATURE: 99.3 F | OXYGEN SATURATION: 97 % | RESPIRATION RATE: 16 BRPM | HEART RATE: 89 BPM | SYSTOLIC BLOOD PRESSURE: 107 MMHG

## 2024-05-14 DIAGNOSIS — E55.9 VITAMIN D DEFICIENCY: ICD-10-CM

## 2024-05-14 DIAGNOSIS — E11.65 UNCONTROLLED TYPE 2 DIABETES MELLITUS WITH HYPERGLYCEMIA (HCC): ICD-10-CM

## 2024-05-14 DIAGNOSIS — Z94.0 RENAL TRANSPLANT, STATUS POST: Primary | ICD-10-CM

## 2024-05-14 LAB
25(OH)D3 SERPL-MCNC: 23.2 NG/ML (ref 30–100)
ALBUMIN SERPL-MCNC: 3.6 G/DL (ref 3.2–4.6)
ALBUMIN/GLOB SERPL: 1.1 (ref 1–1.9)
ALP SERPL-CCNC: 69 U/L (ref 35–104)
ALT SERPL-CCNC: 18 U/L (ref 12–65)
ANION GAP SERPL CALC-SCNC: 9 MMOL/L (ref 9–18)
AST SERPL-CCNC: 29 U/L (ref 15–37)
BASOPHILS # BLD: 0 K/UL (ref 0–0.2)
BASOPHILS NFR BLD: 1 % (ref 0–2)
BILIRUB SERPL-MCNC: 0.4 MG/DL (ref 0–1.2)
BUN SERPL-MCNC: 10 MG/DL (ref 8–23)
CALCIUM SERPL-MCNC: 9.7 MG/DL (ref 8.8–10.2)
CHLORIDE SERPL-SCNC: 105 MMOL/L (ref 98–107)
CHOLEST SERPL-MCNC: 146 MG/DL (ref 0–200)
CO2 SERPL-SCNC: 29 MMOL/L (ref 20–28)
CREAT SERPL-MCNC: 0.56 MG/DL (ref 0.6–1.1)
DIFFERENTIAL METHOD BLD: ABNORMAL
EOSINOPHIL # BLD: 0.3 K/UL (ref 0–0.8)
EOSINOPHIL NFR BLD: 4 % (ref 0.5–7.8)
ERYTHROCYTE [DISTWIDTH] IN BLOOD BY AUTOMATED COUNT: 13.6 % (ref 11.9–14.6)
EST. AVERAGE GLUCOSE BLD GHB EST-MCNC: 159 MG/DL
GLOBULIN SER CALC-MCNC: 3.4 G/DL (ref 2.3–3.5)
GLUCOSE SERPL-MCNC: 224 MG/DL (ref 70–99)
HBA1C MFR BLD: 7.2 % (ref 0–5.6)
HCT VFR BLD AUTO: 36.4 % (ref 35.8–46.3)
HDLC SERPL-MCNC: 66 MG/DL (ref 40–60)
HDLC SERPL: 2.2 (ref 0–5)
HGB BLD-MCNC: 10.9 G/DL (ref 11.7–15.4)
IMM GRANULOCYTES # BLD AUTO: 0 K/UL (ref 0–0.5)
IMM GRANULOCYTES NFR BLD AUTO: 0 % (ref 0–5)
LDLC SERPL CALC-MCNC: 46 MG/DL (ref 0–100)
LYMPHOCYTES # BLD: 0.9 K/UL (ref 0.5–4.6)
LYMPHOCYTES NFR BLD: 15 % (ref 13–44)
MCH RBC QN AUTO: 29.3 PG (ref 26.1–32.9)
MCHC RBC AUTO-ENTMCNC: 29.9 G/DL (ref 31.4–35)
MCV RBC AUTO: 97.8 FL (ref 82–102)
MONOCYTES # BLD: 0.4 K/UL (ref 0.1–1.3)
MONOCYTES NFR BLD: 6 % (ref 4–12)
NEUTS SEG # BLD: 4.4 K/UL (ref 1.7–8.2)
NEUTS SEG NFR BLD: 74 % (ref 43–78)
NRBC # BLD: 0 K/UL (ref 0–0.2)
PLATELET # BLD AUTO: 183 K/UL (ref 150–450)
PMV BLD AUTO: 9.4 FL (ref 9.4–12.3)
POTASSIUM SERPL-SCNC: 4.4 MMOL/L (ref 3.5–5.1)
PROT SERPL-MCNC: 6.9 G/DL (ref 6.3–8.2)
RBC # BLD AUTO: 3.72 M/UL (ref 4.05–5.2)
SODIUM SERPL-SCNC: 143 MMOL/L (ref 136–145)
TRIGL SERPL-MCNC: 171 MG/DL (ref 0–150)
TSH W FREE THYROID IF ABNORMAL: 1.14 UIU/ML (ref 0.27–4.2)
VLDLC SERPL CALC-MCNC: 34 MG/DL (ref 6–23)
WBC # BLD AUTO: 5.9 K/UL (ref 4.3–11.1)

## 2024-05-14 PROCEDURE — 84443 ASSAY THYROID STIM HORMONE: CPT

## 2024-05-14 PROCEDURE — 2580000003 HC RX 258: Performed by: INTERNAL MEDICINE

## 2024-05-14 PROCEDURE — 6360000002 HC RX W HCPCS: Performed by: INTERNAL MEDICINE

## 2024-05-14 PROCEDURE — 80053 COMPREHEN METABOLIC PANEL: CPT

## 2024-05-14 PROCEDURE — 83036 HEMOGLOBIN GLYCOSYLATED A1C: CPT

## 2024-05-14 PROCEDURE — 80061 LIPID PANEL: CPT

## 2024-05-14 PROCEDURE — 82306 VITAMIN D 25 HYDROXY: CPT

## 2024-05-14 PROCEDURE — 96365 THER/PROPH/DIAG IV INF INIT: CPT

## 2024-05-14 PROCEDURE — 85025 COMPLETE CBC W/AUTO DIFF WBC: CPT

## 2024-05-14 RX ORDER — SODIUM CHLORIDE 0.9 % (FLUSH) 0.9 %
5-40 SYRINGE (ML) INJECTION PRN
Status: DISCONTINUED | OUTPATIENT
Start: 2024-05-14 | End: 2024-05-15 | Stop reason: HOSPADM

## 2024-05-14 RX ORDER — DIPHENHYDRAMINE HYDROCHLORIDE 50 MG/ML
50 INJECTION INTRAMUSCULAR; INTRAVENOUS
OUTPATIENT
Start: 2024-06-11

## 2024-05-14 RX ORDER — ONDANSETRON 2 MG/ML
8 INJECTION INTRAMUSCULAR; INTRAVENOUS
OUTPATIENT
Start: 2024-06-11

## 2024-05-14 RX ORDER — ALBUTEROL SULFATE 90 UG/1
4 AEROSOL, METERED RESPIRATORY (INHALATION) PRN
OUTPATIENT
Start: 2024-06-11

## 2024-05-14 RX ORDER — SODIUM CHLORIDE 0.9 % (FLUSH) 0.9 %
5-40 SYRINGE (ML) INJECTION PRN
OUTPATIENT
Start: 2024-06-11

## 2024-05-14 RX ORDER — SODIUM CHLORIDE 9 MG/ML
5-250 INJECTION, SOLUTION INTRAVENOUS PRN
Status: DISCONTINUED | OUTPATIENT
Start: 2024-05-14 | End: 2024-05-15 | Stop reason: HOSPADM

## 2024-05-14 RX ORDER — ACETAMINOPHEN 325 MG/1
650 TABLET ORAL
OUTPATIENT
Start: 2024-06-11

## 2024-05-14 RX ORDER — SODIUM CHLORIDE 9 MG/ML
5-250 INJECTION, SOLUTION INTRAVENOUS PRN
OUTPATIENT
Start: 2024-06-11

## 2024-05-14 RX ORDER — HEPARIN 100 UNIT/ML
500 SYRINGE INTRAVENOUS PRN
OUTPATIENT
Start: 2024-06-11

## 2024-05-14 RX ORDER — SODIUM CHLORIDE 9 MG/ML
INJECTION, SOLUTION INTRAVENOUS CONTINUOUS
OUTPATIENT
Start: 2024-06-11

## 2024-05-14 RX ORDER — EPINEPHRINE 1 MG/ML
0.3 INJECTION, SOLUTION, CONCENTRATE INTRAVENOUS PRN
OUTPATIENT
Start: 2024-06-11

## 2024-05-14 RX ADMIN — SODIUM CHLORIDE 25 ML/HR: 9 INJECTION, SOLUTION INTRAVENOUS at 15:08

## 2024-05-14 RX ADMIN — SODIUM CHLORIDE 500 MG: 9 INJECTION, SOLUTION INTRAVENOUS at 15:30

## 2024-05-14 RX ADMIN — SODIUM CHLORIDE, PRESERVATIVE FREE 10 ML: 5 INJECTION INTRAVENOUS at 15:08

## 2024-05-14 NOTE — PROGRESS NOTES
Patient arrived to infusion. Labs drawn peripherally. Nulojix infusion completed. Patient tolerated well. PIV removed. Discharged via w/c. Patient aware of next infusion on 6/11 at 3pm.

## 2024-05-15 ENCOUNTER — HOME CARE VISIT (OUTPATIENT)
Dept: SCHEDULING | Facility: HOME HEALTH | Age: 79
End: 2024-05-15
Payer: MEDICARE

## 2024-05-15 VITALS
HEART RATE: 85 BPM | DIASTOLIC BLOOD PRESSURE: 86 MMHG | OXYGEN SATURATION: 98 % | RESPIRATION RATE: 18 BRPM | SYSTOLIC BLOOD PRESSURE: 138 MMHG | TEMPERATURE: 97.7 F

## 2024-05-15 PROCEDURE — G0299 HHS/HOSPICE OF RN EA 15 MIN: HCPCS

## 2024-05-16 ENCOUNTER — HOME CARE VISIT (OUTPATIENT)
Dept: SCHEDULING | Facility: HOME HEALTH | Age: 79
End: 2024-05-16
Payer: MEDICARE

## 2024-05-16 VITALS
DIASTOLIC BLOOD PRESSURE: 76 MMHG | TEMPERATURE: 97.9 F | SYSTOLIC BLOOD PRESSURE: 142 MMHG | HEART RATE: 83 BPM | RESPIRATION RATE: 19 BRPM

## 2024-05-16 PROCEDURE — G0156 HHCP-SVS OF AIDE,EA 15 MIN: HCPCS

## 2024-05-17 ENCOUNTER — HOSPITAL ENCOUNTER (OUTPATIENT)
Dept: WOUND CARE | Age: 79
Discharge: HOME | End: 2024-05-17
Payer: MEDICARE

## 2024-05-17 ENCOUNTER — HOME CARE VISIT (OUTPATIENT)
Dept: SCHEDULING | Facility: HOME HEALTH | Age: 79
End: 2024-05-17
Payer: MEDICARE

## 2024-05-17 VITALS
HEART RATE: 83 BPM | TEMPERATURE: 98 F | SYSTOLIC BLOOD PRESSURE: 142 MMHG | BODY MASS INDEX: 27.44 KG/M2 | WEIGHT: 150 LBS | RESPIRATION RATE: 16 BRPM | DIASTOLIC BLOOD PRESSURE: 63 MMHG

## 2024-05-17 DIAGNOSIS — L03.116 CELLULITIS OF LEFT HEEL: ICD-10-CM

## 2024-05-17 DIAGNOSIS — L89.623 PRESSURE ULCER OF LEFT HEEL, STAGE 3 (HCC): Primary | ICD-10-CM

## 2024-05-17 PROCEDURE — 6370000000 HC RX 637 (ALT 250 FOR IP): Performed by: FAMILY MEDICINE

## 2024-05-17 PROCEDURE — 11042 DBRDMT SUBQ TIS 1ST 20SQCM/<: CPT

## 2024-05-17 RX ORDER — LIDOCAINE HYDROCHLORIDE 20 MG/ML
JELLY TOPICAL ONCE
OUTPATIENT
Start: 2024-05-17 | End: 2024-05-17

## 2024-05-17 RX ORDER — GINSENG 100 MG
CAPSULE ORAL ONCE
OUTPATIENT
Start: 2024-05-17 | End: 2024-05-17

## 2024-05-17 RX ORDER — LIDOCAINE 40 MG/G
CREAM TOPICAL ONCE
OUTPATIENT
Start: 2024-05-17 | End: 2024-05-17

## 2024-05-17 RX ORDER — LIDOCAINE 50 MG/G
OINTMENT TOPICAL ONCE
OUTPATIENT
Start: 2024-05-17 | End: 2024-05-17

## 2024-05-17 RX ORDER — IBUPROFEN 200 MG
TABLET ORAL ONCE
OUTPATIENT
Start: 2024-05-17 | End: 2024-05-17

## 2024-05-17 RX ORDER — TRIAMCINOLONE ACETONIDE 1 MG/G
OINTMENT TOPICAL ONCE
OUTPATIENT
Start: 2024-05-17 | End: 2024-05-17

## 2024-05-17 RX ORDER — BACITRACIN ZINC AND POLYMYXIN B SULFATE 500; 1000 [USP'U]/G; [USP'U]/G
OINTMENT TOPICAL ONCE
OUTPATIENT
Start: 2024-05-17 | End: 2024-05-17

## 2024-05-17 RX ORDER — SODIUM CHLOR/HYPOCHLOROUS ACID 0.033 %
SOLUTION, IRRIGATION IRRIGATION ONCE
OUTPATIENT
Start: 2024-05-17 | End: 2024-05-17

## 2024-05-17 RX ORDER — GENTAMICIN SULFATE 1 MG/G
OINTMENT TOPICAL ONCE
OUTPATIENT
Start: 2024-05-17 | End: 2024-05-17

## 2024-05-17 RX ORDER — BETAMETHASONE DIPROPIONATE 0.5 MG/G
CREAM TOPICAL ONCE
OUTPATIENT
Start: 2024-05-17 | End: 2024-05-17

## 2024-05-17 RX ORDER — LIDOCAINE HYDROCHLORIDE 40 MG/ML
SOLUTION TOPICAL ONCE
OUTPATIENT
Start: 2024-05-17 | End: 2024-05-17

## 2024-05-17 RX ORDER — LIDOCAINE 50 MG/G
OINTMENT TOPICAL ONCE
Status: COMPLETED | OUTPATIENT
Start: 2024-05-17 | End: 2024-05-17

## 2024-05-17 RX ORDER — CLOBETASOL PROPIONATE 0.5 MG/G
OINTMENT TOPICAL ONCE
OUTPATIENT
Start: 2024-05-17 | End: 2024-05-17

## 2024-05-17 RX ADMIN — LIDOCAINE 1 APPLICATION: 50 OINTMENT TOPICAL at 14:38

## 2024-05-17 ASSESSMENT — PAIN SCALES - GENERAL: PAINLEVEL_OUTOF10: 2

## 2024-05-17 NOTE — WOUND CARE
Discharge Instructions for  [facility]  [address] [place] [address]  [address]  [place] [address]  Phone [phone]   Fax [phone]      NAME:  Alayna Fall  YOB: 1945  MEDICAL RECORD NUMBER:  [id number]  DATE:  5/17/2024    Return Appointment:  2 weeks with Todd Rizzo DO      Instructions:   Left heel:  Cleanse with normal saline  Vashe post debridement.  Santyl to wound - nickel thickness  Cover with gauze or ABD pad. Secure with roll gauze   Change daily      Apply bordered foam to dorsal foot as needed    [facility] nursing for dressing changes 3 times weekly.    Call [facility] about brace [phone]     Offloading:  Use wheelchair to minimize ambulation.  Patient to offload wound with pillow under calf while in bed or chair.   Use cast cover for showering.  Wound healing is greatly slowed when blood glucose levels are greater than 200. Monitor glucose levels daily to ensure tight glucose control.  Increase protein intake to promote wound healing. Protein supplements such as Evelio and Ensure are great options.   Protein goal is 60 - 80 grams per day. Recommend Ensure Max or Glucerna.        Should you experience increased redness, swelling, pain, foul odor, size of wound(s), or have a temperature over 101 degrees please contact the [facility]aling Center at [phone] or if after hours contact your primary care physician or go to the hospital emergency department.    PLEASE NOTE: IF YOU ARE UNABLE TO OBTAIN WOUND SUPPLIES, CONTINUE TO USE THE SUPPLIES YOU HAVE AVAILABLE UNTIL YOU ARE ABLE TO REACH US. IT IS MOST IMPORTANT TO KEEP THE WOUND COVERED AT ALL TIMES.    Electronically signed Jennifer Acevedo RN, [facility] on 5/17/2024 at 2:28 PM

## 2024-05-20 ENCOUNTER — TELEPHONE (OUTPATIENT)
Dept: NEUROLOGY | Age: 79
End: 2024-05-20

## 2024-05-20 ENCOUNTER — HOME CARE VISIT (OUTPATIENT)
Dept: SCHEDULING | Facility: HOME HEALTH | Age: 79
End: 2024-05-20
Payer: MEDICARE

## 2024-05-20 PROCEDURE — G0299 HHS/HOSPICE OF RN EA 15 MIN: HCPCS

## 2024-05-20 NOTE — TELEPHONE ENCOUNTER
Adriana Morocho with [facility] called requesting permission to increase home visits to twice a week.    I gave verbal permission since provider is out of office.

## 2024-05-21 ENCOUNTER — OFFICE VISIT (OUTPATIENT)
Dept: FAMILY MEDICINE CLINIC | Facility: CLINIC | Age: 79
End: 2024-05-21
Payer: MEDICARE

## 2024-05-21 VITALS
OXYGEN SATURATION: 98 % | RESPIRATION RATE: 18 BRPM | TEMPERATURE: 97.4 F | HEART RATE: 80 BPM | SYSTOLIC BLOOD PRESSURE: 110 MMHG | DIASTOLIC BLOOD PRESSURE: 62 MMHG

## 2024-05-21 VITALS
SYSTOLIC BLOOD PRESSURE: 118 MMHG | WEIGHT: 150 LBS | BODY MASS INDEX: 27.6 KG/M2 | HEIGHT: 62 IN | DIASTOLIC BLOOD PRESSURE: 70 MMHG

## 2024-05-21 DIAGNOSIS — R82.90 MALODOROUS URINE: ICD-10-CM

## 2024-05-21 DIAGNOSIS — Z79.4 TYPE 2 DIABETES MELLITUS WITH HYPERGLYCEMIA, WITH LONG-TERM CURRENT USE OF INSULIN (HCC): ICD-10-CM

## 2024-05-21 DIAGNOSIS — E55.9 VITAMIN D DEFICIENCY: ICD-10-CM

## 2024-05-21 DIAGNOSIS — G47.09 OTHER INSOMNIA: ICD-10-CM

## 2024-05-21 DIAGNOSIS — F01.B18 MODERATE VASCULAR DEMENTIA WITH OTHER BEHAVIORAL DISTURBANCE (HCC): ICD-10-CM

## 2024-05-21 DIAGNOSIS — E78.1 PURE HYPERGLYCERIDEMIA: ICD-10-CM

## 2024-05-21 DIAGNOSIS — E11.65 TYPE 2 DIABETES MELLITUS WITH HYPERGLYCEMIA, WITH LONG-TERM CURRENT USE OF INSULIN (HCC): ICD-10-CM

## 2024-05-21 DIAGNOSIS — E03.4 HYPOTHYROIDISM DUE TO ACQUIRED ATROPHY OF THYROID: ICD-10-CM

## 2024-05-21 DIAGNOSIS — E78.00 PURE HYPERCHOLESTEROLEMIA: ICD-10-CM

## 2024-05-21 DIAGNOSIS — I10 PRIMARY HYPERTENSION: Primary | ICD-10-CM

## 2024-05-21 PROCEDURE — G2211 COMPLEX E/M VISIT ADD ON: HCPCS | Performed by: FAMILY MEDICINE

## 2024-05-21 PROCEDURE — 3074F SYST BP LT 130 MM HG: CPT | Performed by: FAMILY MEDICINE

## 2024-05-21 PROCEDURE — 1090F PRES/ABSN URINE INCON ASSESS: CPT | Performed by: FAMILY MEDICINE

## 2024-05-21 PROCEDURE — 1036F TOBACCO NON-USER: CPT | Performed by: FAMILY MEDICINE

## 2024-05-21 PROCEDURE — G8417 CALC BMI ABV UP PARAM F/U: HCPCS | Performed by: FAMILY MEDICINE

## 2024-05-21 PROCEDURE — 3078F DIAST BP <80 MM HG: CPT | Performed by: FAMILY MEDICINE

## 2024-05-21 PROCEDURE — G8400 PT W/DXA NO RESULTS DOC: HCPCS | Performed by: FAMILY MEDICINE

## 2024-05-21 PROCEDURE — 99214 OFFICE O/P EST MOD 30 MIN: CPT | Performed by: FAMILY MEDICINE

## 2024-05-21 PROCEDURE — G8427 DOCREV CUR MEDS BY ELIG CLIN: HCPCS | Performed by: FAMILY MEDICINE

## 2024-05-21 PROCEDURE — 1123F ACP DISCUSS/DSCN MKR DOCD: CPT | Performed by: FAMILY MEDICINE

## 2024-05-21 PROCEDURE — 3051F HG A1C>EQUAL 7.0%<8.0%: CPT | Performed by: FAMILY MEDICINE

## 2024-05-21 RX ORDER — ERGOCALCIFEROL 1.25 MG/1
50000 CAPSULE ORAL WEEKLY
Qty: 12 CAPSULE | Refills: 1 | Status: SHIPPED | OUTPATIENT
Start: 2024-05-21

## 2024-05-21 ASSESSMENT — PATIENT HEALTH QUESTIONNAIRE - PHQ9
SUM OF ALL RESPONSES TO PHQ9 QUESTIONS 1 & 2: 2
1. LITTLE INTEREST OR PLEASURE IN DOING THINGS: SEVERAL DAYS
2. FEELING DOWN, DEPRESSED OR HOPELESS: SEVERAL DAYS
SUM OF ALL RESPONSES TO PHQ QUESTIONS 1-9: 2

## 2024-05-21 NOTE — PROGRESS NOTES
SUBJECTIVE:   Alayna Fall is a 78 y.o. female who has a complicated past medical history significant for renal failure status post transplant x2 followed by nephrology, diabetes followed by endocrinology, hypothyroidism, hypertension, high cholesterol, high triglycerides, degenerative arthritis, dementia followed by neurology, pressure ulcer of left heel followed by home health history of stroke, acute GI bleed with blood loss anemia and obesity.  Patient presents today accompanied by 2 of her daughters and her .  Family expresses concerns about the patient's insomnia.  Apparently she has difficulty maintaining sleep.  Patient is able to fall asleep but does not stay asleep.  In addition there is some concern about her poor appetite and lack of fluid intake.  She has indeed lost 17 pounds since November.  She denies melena or hematochezia, constipation or diarrhea, urinary symptoms.  There has been some worsening of her cognitive functioning according to the  and daughters with some auditory hallucinations.  Patient denies these complaints.  She is currently on Aricept but no mood stabilizers.  There does seem to be some mood swings and irritability as well according to the family.    HPI  See above    Past Medical History, Past Surgical History, Family history, Social History, and Medications were all reviewed with the patient today and updated as necessary.       Current Outpatient Medications   Medication Sig Dispense Refill    vitamin D (ERGOCALCIFEROL) 1.25 MG (82594 UT) CAPS capsule Take 1 capsule by mouth once a week 12 capsule 1    carvedilol (COREG) 12.5 MG tablet TAKE 1 TABLET BY MOUTH TWICE DAILY 180 tablet 1    insulin NPH (HUMULIN N KWIKPEN) 100 UNIT/ML injection pen Inject 16 Units into the skin every morning 54 mL 3    HUMALOG KWIKPEN 100 UNIT/ML SOPN 4 units AC brunch, 2 units AC supper plus correction 1/50>200, max daily dose 30 units 30 mL 3    TRULICITY 3 MG/0.5ML SOPN Inject 3 mg

## 2024-05-22 ENCOUNTER — HOME CARE VISIT (OUTPATIENT)
Dept: SCHEDULING | Facility: HOME HEALTH | Age: 79
End: 2024-05-22
Payer: MEDICARE

## 2024-05-22 VITALS
HEART RATE: 72 BPM | TEMPERATURE: 98 F | SYSTOLIC BLOOD PRESSURE: 134 MMHG | DIASTOLIC BLOOD PRESSURE: 68 MMHG | RESPIRATION RATE: 17 BRPM

## 2024-05-22 VITALS
OXYGEN SATURATION: 97 % | HEART RATE: 77 BPM | SYSTOLIC BLOOD PRESSURE: 118 MMHG | RESPIRATION RATE: 18 BRPM | DIASTOLIC BLOOD PRESSURE: 67 MMHG | TEMPERATURE: 98.3 F

## 2024-05-22 PROCEDURE — G0156 HHCP-SVS OF AIDE,EA 15 MIN: HCPCS

## 2024-05-22 PROCEDURE — G0299 HHS/HOSPICE OF RN EA 15 MIN: HCPCS

## 2024-05-24 ENCOUNTER — HOME CARE VISIT (OUTPATIENT)
Dept: SCHEDULING | Facility: HOME HEALTH | Age: 79
End: 2024-05-24
Payer: MEDICARE

## 2024-05-24 VITALS
SYSTOLIC BLOOD PRESSURE: 133 MMHG | TEMPERATURE: 97.7 F | RESPIRATION RATE: 18 BRPM | HEART RATE: 83 BPM | DIASTOLIC BLOOD PRESSURE: 75 MMHG | OXYGEN SATURATION: 98 %

## 2024-05-24 PROCEDURE — G0299 HHS/HOSPICE OF RN EA 15 MIN: HCPCS

## 2024-05-27 ENCOUNTER — HOME CARE VISIT (OUTPATIENT)
Dept: SCHEDULING | Facility: HOME HEALTH | Age: 79
End: 2024-05-27
Payer: MEDICARE

## 2024-05-27 VITALS
TEMPERATURE: 97.7 F | OXYGEN SATURATION: 98 % | HEART RATE: 71 BPM | DIASTOLIC BLOOD PRESSURE: 72 MMHG | RESPIRATION RATE: 18 BRPM | SYSTOLIC BLOOD PRESSURE: 135 MMHG

## 2024-05-27 PROCEDURE — G0299 HHS/HOSPICE OF RN EA 15 MIN: HCPCS

## 2024-05-28 DIAGNOSIS — R82.90 MALODOROUS URINE: ICD-10-CM

## 2024-05-28 PROCEDURE — 81003 URINALYSIS AUTO W/O SCOPE: CPT | Performed by: FAMILY MEDICINE

## 2024-05-29 ENCOUNTER — TELEPHONE (OUTPATIENT)
Dept: NEUROLOGY | Age: 79
End: 2024-05-29

## 2024-05-29 ENCOUNTER — HOME CARE VISIT (OUTPATIENT)
Dept: SCHEDULING | Facility: HOME HEALTH | Age: 79
End: 2024-05-29
Payer: MEDICARE

## 2024-05-29 LAB
BILIRUBIN, URINE, POC: ABNORMAL
BLOOD URINE, POC: NEGATIVE
GLUCOSE URINE, POC: NEGATIVE
KETONES, URINE, POC: ABNORMAL
LEUKOCYTE ESTERASE, URINE, POC: ABNORMAL
NITRITE, URINE, POC: NEGATIVE
PH, URINE, POC: 6 (ref 4.6–8)
PROTEIN,URINE, POC: ABNORMAL
SPECIFIC GRAVITY, URINE, POC: 1.02 (ref 1–1.03)
URINALYSIS CLARITY, POC: CLEAR
URINALYSIS COLOR, POC: ABNORMAL
UROBILINOGEN, POC: NORMAL

## 2024-05-29 PROCEDURE — G0299 HHS/HOSPICE OF RN EA 15 MIN: HCPCS

## 2024-05-29 NOTE — TELEPHONE ENCOUNTER
Patient eating very little ,only sipping water , very week , had therapy not getting anymore , very confused , and see thing .  About a month she had a UA done and it was negative . Daughter noticed urine dark  in last few days  feeling she is dehydrated . Confusion has increased . Was seen at PCP and he stated to be seen at Neurology . Awaiting second UA  results done yesterday.   Did let her  daughter know she might need to take patient to ED. Her daughter  Lori Gallegosflorentin [phone]

## 2024-05-30 ENCOUNTER — TELEPHONE (OUTPATIENT)
Dept: ENDOCRINOLOGY | Age: 79
End: 2024-05-30

## 2024-05-30 ENCOUNTER — HOME CARE VISIT (OUTPATIENT)
Dept: SCHEDULING | Facility: HOME HEALTH | Age: 79
End: 2024-05-30
Payer: MEDICARE

## 2024-05-30 VITALS
SYSTOLIC BLOOD PRESSURE: 134 MMHG | TEMPERATURE: 97.9 F | RESPIRATION RATE: 18 BRPM | OXYGEN SATURATION: 95 % | HEART RATE: 82 BPM | DIASTOLIC BLOOD PRESSURE: 89 MMHG

## 2024-05-30 LAB
BACTERIA SPEC CULT: NORMAL
BACTERIA SPEC CULT: NORMAL
SERVICE CMNT-IMP: NORMAL

## 2024-05-31 ENCOUNTER — HOME CARE VISIT (OUTPATIENT)
Dept: SCHEDULING | Facility: HOME HEALTH | Age: 79
End: 2024-05-31
Payer: MEDICARE

## 2024-05-31 ENCOUNTER — HOSPITAL ENCOUNTER (OUTPATIENT)
Dept: WOUND CARE | Age: 79
Discharge: HOME | End: 2024-05-31
Payer: MEDICARE

## 2024-05-31 VITALS
RESPIRATION RATE: 18 BRPM | SYSTOLIC BLOOD PRESSURE: 162 MMHG | BODY MASS INDEX: 27.6 KG/M2 | TEMPERATURE: 98 F | WEIGHT: 150 LBS | DIASTOLIC BLOOD PRESSURE: 85 MMHG | HEIGHT: 62 IN | HEART RATE: 83 BPM

## 2024-05-31 DIAGNOSIS — L03.116 CELLULITIS OF LEFT HEEL: ICD-10-CM

## 2024-05-31 DIAGNOSIS — L89.623 PRESSURE ULCER OF LEFT HEEL, STAGE 3 (HCC): Primary | ICD-10-CM

## 2024-05-31 PROCEDURE — 6370000000 HC RX 637 (ALT 250 FOR IP): Performed by: FAMILY MEDICINE

## 2024-05-31 PROCEDURE — 11042 DBRDMT SUBQ TIS 1ST 20SQCM/<: CPT

## 2024-05-31 RX ORDER — LIDOCAINE HYDROCHLORIDE 40 MG/ML
SOLUTION TOPICAL ONCE
OUTPATIENT
Start: 2024-05-31 | End: 2024-05-31

## 2024-05-31 RX ORDER — BACITRACIN ZINC AND POLYMYXIN B SULFATE 500; 1000 [USP'U]/G; [USP'U]/G
OINTMENT TOPICAL ONCE
OUTPATIENT
Start: 2024-05-31 | End: 2024-05-31

## 2024-05-31 RX ORDER — GINSENG 100 MG
CAPSULE ORAL ONCE
OUTPATIENT
Start: 2024-05-31 | End: 2024-05-31

## 2024-05-31 RX ORDER — LIDOCAINE 50 MG/G
OINTMENT TOPICAL ONCE
OUTPATIENT
Start: 2024-05-31 | End: 2024-05-31

## 2024-05-31 RX ORDER — IBUPROFEN 200 MG
TABLET ORAL ONCE
OUTPATIENT
Start: 2024-05-31 | End: 2024-05-31

## 2024-05-31 RX ORDER — BETAMETHASONE DIPROPIONATE 0.5 MG/G
CREAM TOPICAL ONCE
OUTPATIENT
Start: 2024-05-31 | End: 2024-05-31

## 2024-05-31 RX ORDER — CLOBETASOL PROPIONATE 0.5 MG/G
OINTMENT TOPICAL ONCE
OUTPATIENT
Start: 2024-05-31 | End: 2024-05-31

## 2024-05-31 RX ORDER — TRIAMCINOLONE ACETONIDE 1 MG/G
OINTMENT TOPICAL ONCE
OUTPATIENT
Start: 2024-05-31 | End: 2024-05-31

## 2024-05-31 RX ORDER — SODIUM CHLOR/HYPOCHLOROUS ACID 0.033 %
SOLUTION, IRRIGATION IRRIGATION ONCE
Status: COMPLETED | OUTPATIENT
Start: 2024-05-31 | End: 2024-05-31

## 2024-05-31 RX ORDER — LIDOCAINE 50 MG/G
OINTMENT TOPICAL ONCE
Status: COMPLETED | OUTPATIENT
Start: 2024-05-31 | End: 2024-05-31

## 2024-05-31 RX ORDER — LIDOCAINE HYDROCHLORIDE 20 MG/ML
JELLY TOPICAL ONCE
OUTPATIENT
Start: 2024-05-31 | End: 2024-05-31

## 2024-05-31 RX ORDER — GENTAMICIN SULFATE 1 MG/G
OINTMENT TOPICAL ONCE
OUTPATIENT
Start: 2024-05-31 | End: 2024-05-31

## 2024-05-31 RX ORDER — LIDOCAINE 40 MG/G
CREAM TOPICAL ONCE
OUTPATIENT
Start: 2024-05-31 | End: 2024-05-31

## 2024-05-31 RX ORDER — SODIUM CHLOR/HYPOCHLOROUS ACID 0.033 %
SOLUTION, IRRIGATION IRRIGATION ONCE
OUTPATIENT
Start: 2024-05-31 | End: 2024-05-31

## 2024-05-31 RX ADMIN — Medication: at 15:47

## 2024-05-31 RX ADMIN — LIDOCAINE: 50 OINTMENT TOPICAL at 15:47

## 2024-05-31 NOTE — WOUND CARE
Discharge Instructions for  [facility]  [address] [place] [address]  [address]  [place] [address]  Phone [phone]   Fax [phone]      NAME:  Alayna Fall  YOB: 1945  MEDICAL RECORD NUMBER:  [id number]  DATE:  5/31/2024    Return Appointment:  2 weeks with Todd Rizzo DO      Instructions:   Left heel:  Cleanse with normal saline  Vashe post debridement.  Apply Hydrofera Ready to wound bed covering edges   Cover with gauze or ABD pad. Secure with roll gauze   Change 3 x week      Apply bordered foam to dorsal foot as needed    [facility] nursing for dressing changes 3 times weekly.      Offloading:  Use wheelchair to minimize ambulation.  Patient to offload wound with pillow under calf while in bed or chair.   Use cast cover for showering.  Wound healing is greatly slowed when blood glucose levels are greater than 200. Monitor glucose levels daily to ensure tight glucose control.  Increase protein intake to promote wound healing. Protein supplements such as Evelio and Ensure are great options.   Protein goal is 60 - 80 grams per day. Recommend Ensure Max or Glucerna.        Should you experience increased redness, swelling, pain, foul odor, size of wound(s), or have a temperature over 101 degrees please contact the [facility]aling Center at [phone] or if after hours contact your primary care physician or go to the hospital emergency department.    PLEASE NOTE: IF YOU ARE UNABLE TO OBTAIN WOUND SUPPLIES, CONTINUE TO USE THE SUPPLIES YOU HAVE AVAILABLE UNTIL YOU ARE ABLE TO REACH US. IT IS MOST IMPORTANT TO KEEP THE WOUND COVERED AT ALL TIMES.    Electronically signed TABITHA RODRIGUEZ RN, [facility] on 5/31/2024 at 3:20 PM

## 2024-05-31 NOTE — DISCHARGE INSTRUCTIONS
Return Appointment:  2 weeks with Todd Rizzo DO        Instructions:   Left heel:  Cleanse with normal saline  Vashe post debridement.  Apply Hydrofera Ready to wound bed covering edges   Cover with gauze or ABD pad. Secure with roll gauze   Change 3 x week      Apply bordered foam to dorsal foot as needed     [facility] nursing for dressing changes 3 times weekly.        Offloading:  Use wheelchair to minimize ambulation.  Patient to offload wound with pillow under calf while in bed or chair.   Use cast cover for showering.  Wound healing is greatly slowed when blood glucose levels are greater than 200. Monitor glucose levels daily to ensure tight glucose control.  Increase protein intake to promote wound healing. Protein supplements such as Evelio and Ensure are great options.   Protein goal is 60 - 80 grams per day. Recommend Ensure Max or Glucerna.           Should you experience increased redness, swelling, pain, foul odor, size of wound(s), or have a temperature over 101 degrees please contact the [facility]aling Center at [phone] or if after hours contact your primary care physician or go to the hospital emergency department.     PLEASE NOTE: IF YOU ARE UNABLE TO OBTAIN WOUND SUPPLIES, CONTINUE TO USE THE SUPPLIES YOU HAVE AVAILABLE UNTIL YOU ARE ABLE TO REACH US. IT IS MOST IMPORTANT TO KEEP THE WOUND COVERED AT ALL TIMES.

## 2024-05-31 NOTE — FLOWSHEET NOTE
05/31/24 1503   Wound 01/22/24 Heel Left;Plantar #1   Date First Assessed/Time First Assessed: 01/22/24 1336   Present on Original Admission: Yes  Wound Approximate Age at First Assessment (Weeks): 8 weeks  Primary Wound Type: Diabetic Ulcer  Location: Heel  Wound Location Orientation: Left;Plantar  Woun...   Wound Image     Wound Etiology Diabetic Doan 2   Dressing Status Old drainage noted   Wound Cleansed Cleansed with saline   Wound Length (cm) 1.5 cm   Wound Width (cm) 2.8 cm   Wound Depth (cm) 0.2 cm   Wound Surface Area (cm^2) 4.2 cm^2   Change in Wound Size % (l*w) -320   Wound Volume (cm^3) 0.84 cm^3   Wound Healing % -740   Post-Procedure Length (cm) 1.6 cm   Post-Procedure Width (cm) 2.8 cm   Post-Procedure Depth (cm) 0.3 cm   Post-Procedure Surface Area (cm^2) 4.48 cm^2   Post-Procedure Volume (cm^3) 1.344 cm^3   Wound Assessment [place]/red;Slough   Drainage Amount Small (< 25%)   Drainage Description Serosanguinous   Odor None   Abby-wound Assessment Intact   Wound Thickness Description not for Pressure Injury Full thickness   Pain Assessment   Pain Assessment None - Denies Pain

## 2024-06-03 ENCOUNTER — HOME CARE VISIT (OUTPATIENT)
Dept: SCHEDULING | Facility: HOME HEALTH | Age: 79
End: 2024-06-03
Payer: MEDICARE

## 2024-06-03 VITALS
HEART RATE: 78 BPM | DIASTOLIC BLOOD PRESSURE: 70 MMHG | RESPIRATION RATE: 16 BRPM | BODY MASS INDEX: 27.44 KG/M2 | WEIGHT: 150 LBS | SYSTOLIC BLOOD PRESSURE: 124 MMHG | OXYGEN SATURATION: 99 %

## 2024-06-03 PROCEDURE — G0299 HHS/HOSPICE OF RN EA 15 MIN: HCPCS

## 2024-06-04 ENCOUNTER — HOSPITAL ENCOUNTER (EMERGENCY)
Age: 79
Discharge: HOME | End: 2024-06-04
Payer: MEDICARE

## 2024-06-04 ENCOUNTER — APPOINTMENT (OUTPATIENT)
Dept: GENERAL RADIOLOGY | Age: 79
End: 2024-06-04
Payer: MEDICARE

## 2024-06-04 VITALS
HEART RATE: 67 BPM | OXYGEN SATURATION: 97 % | RESPIRATION RATE: 17 BRPM | HEIGHT: 62 IN | DIASTOLIC BLOOD PRESSURE: 72 MMHG | TEMPERATURE: 97.6 F | WEIGHT: 150 LBS | BODY MASS INDEX: 27.6 KG/M2 | SYSTOLIC BLOOD PRESSURE: 159 MMHG

## 2024-06-04 DIAGNOSIS — N30.00 ACUTE CYSTITIS WITHOUT HEMATURIA: Primary | ICD-10-CM

## 2024-06-04 DIAGNOSIS — E86.0 DEHYDRATION: ICD-10-CM

## 2024-06-04 LAB
ALBUMIN SERPL-MCNC: 3.3 G/DL (ref 3.2–4.6)
ALBUMIN/GLOB SERPL: 1 (ref 1–1.9)
ALP SERPL-CCNC: 71 U/L (ref 35–104)
ALT SERPL-CCNC: 14 U/L (ref 12–65)
ANION GAP SERPL CALC-SCNC: 8 MMOL/L (ref 9–18)
APPEARANCE UR: ABNORMAL
AST SERPL-CCNC: 19 U/L (ref 15–37)
BACTERIA URNS QL MICRO: ABNORMAL /HPF
BASOPHILS # BLD: 0 K/UL (ref 0–0.2)
BASOPHILS NFR BLD: 1 % (ref 0–2)
BILIRUB SERPL-MCNC: 0.2 MG/DL (ref 0–1.2)
BILIRUB UR QL: NEGATIVE
BUN SERPL-MCNC: 10 MG/DL (ref 8–23)
CALCIUM SERPL-MCNC: 10 MG/DL (ref 8.8–10.2)
CHLORIDE SERPL-SCNC: 104 MMOL/L (ref 98–107)
CO2 SERPL-SCNC: 27 MMOL/L (ref 20–28)
COLOR UR: ABNORMAL
CREAT SERPL-MCNC: 0.6 MG/DL (ref 0.6–1.1)
DIFFERENTIAL METHOD BLD: ABNORMAL
EOSINOPHIL # BLD: 0.2 K/UL (ref 0–0.8)
EOSINOPHIL NFR BLD: 5 % (ref 0.5–7.8)
EPI CELLS #/AREA URNS HPF: ABNORMAL /HPF
ERYTHROCYTE [DISTWIDTH] IN BLOOD BY AUTOMATED COUNT: 13.5 % (ref 11.9–14.6)
GLOBULIN SER CALC-MCNC: 3.4 G/DL (ref 2.3–3.5)
GLUCOSE SERPL-MCNC: 258 MG/DL (ref 70–99)
GLUCOSE UR STRIP.AUTO-MCNC: NEGATIVE MG/DL
HCT VFR BLD AUTO: 37.2 % (ref 35.8–46.3)
HGB BLD-MCNC: 11 G/DL (ref 11.7–15.4)
HGB UR QL STRIP: ABNORMAL
IMM GRANULOCYTES # BLD AUTO: 0 K/UL (ref 0–0.5)
IMM GRANULOCYTES NFR BLD AUTO: 0 % (ref 0–5)
KETONES UR QL STRIP.AUTO: NEGATIVE MG/DL
LEUKOCYTE ESTERASE UR QL STRIP.AUTO: ABNORMAL
LYMPHOCYTES # BLD: 1.2 K/UL (ref 0.5–4.6)
LYMPHOCYTES NFR BLD: 26 % (ref 13–44)
MAGNESIUM SERPL-MCNC: 2.3 MG/DL (ref 1.8–2.4)
MCH RBC QN AUTO: 29 PG (ref 26.1–32.9)
MCHC RBC AUTO-ENTMCNC: 29.6 G/DL (ref 31.4–35)
MCV RBC AUTO: 98.2 FL (ref 82–102)
MONOCYTES # BLD: 0.4 K/UL (ref 0.1–1.3)
MONOCYTES NFR BLD: 9 % (ref 4–12)
NEUTS SEG # BLD: 2.8 K/UL (ref 1.7–8.2)
NEUTS SEG NFR BLD: 60 % (ref 43–78)
NITRITE UR QL STRIP.AUTO: NEGATIVE
NRBC # BLD: 0 K/UL (ref 0–0.2)
PH UR STRIP: 6.5 (ref 5–9)
PLATELET # BLD AUTO: 181 K/UL (ref 150–450)
PMV BLD AUTO: 9.6 FL (ref 9.4–12.3)
POTASSIUM SERPL-SCNC: 4.8 MMOL/L (ref 3.5–5.1)
PROT SERPL-MCNC: 6.7 G/DL (ref 6.3–8.2)
PROT UR STRIP-MCNC: 30 MG/DL
RBC # BLD AUTO: 3.79 M/UL (ref 4.05–5.2)
RBC #/AREA URNS HPF: ABNORMAL /HPF
SARS-COV-2 RDRP RESP QL NAA+PROBE: NOT DETECTED
SODIUM SERPL-SCNC: 139 MMOL/L (ref 136–145)
SOURCE: NORMAL
SP GR UR REFRACTOMETRY: 1.01 (ref 1–1.02)
UROBILINOGEN UR QL STRIP.AUTO: 1 EU/DL (ref 0.2–1)
WBC # BLD AUTO: 4.7 K/UL (ref 4.3–11.1)
WBC URNS QL MICRO: >100 /HPF

## 2024-06-04 PROCEDURE — 87635 SARS-COV-2 COVID-19 AMP PRB: CPT

## 2024-06-04 PROCEDURE — 6360000002 HC RX W HCPCS

## 2024-06-04 PROCEDURE — 85025 COMPLETE CBC W/AUTO DIFF WBC: CPT

## 2024-06-04 PROCEDURE — 81001 URINALYSIS AUTO W/SCOPE: CPT

## 2024-06-04 PROCEDURE — 2580000003 HC RX 258

## 2024-06-04 PROCEDURE — 83735 ASSAY OF MAGNESIUM: CPT

## 2024-06-04 PROCEDURE — 71046 X-RAY EXAM CHEST 2 VIEWS: CPT

## 2024-06-04 PROCEDURE — 99284 EMERGENCY DEPT VISIT MOD MDM: CPT

## 2024-06-04 PROCEDURE — 87086 URINE CULTURE/COLONY COUNT: CPT

## 2024-06-04 PROCEDURE — 80053 COMPREHEN METABOLIC PANEL: CPT

## 2024-06-04 PROCEDURE — 96374 THER/PROPH/DIAG INJ IV PUSH: CPT

## 2024-06-04 PROCEDURE — 96361 HYDRATE IV INFUSION ADD-ON: CPT

## 2024-06-04 RX ORDER — CEFPODOXIME PROXETIL 200 MG/1
200 TABLET, FILM COATED ORAL 2 TIMES DAILY
Qty: 14 TABLET | Refills: 0 | Status: SHIPPED | OUTPATIENT
Start: 2024-06-04 | End: 2024-06-11

## 2024-06-04 RX ORDER — 0.9 % SODIUM CHLORIDE 0.9 %
1000 INTRAVENOUS SOLUTION INTRAVENOUS ONCE
Status: COMPLETED | OUTPATIENT
Start: 2024-06-04 | End: 2024-06-04

## 2024-06-04 RX ADMIN — WATER 1000 MG: 1 INJECTION INTRAMUSCULAR; INTRAVENOUS; SUBCUTANEOUS at 14:55

## 2024-06-04 RX ADMIN — SODIUM CHLORIDE 1000 ML: 9 INJECTION, SOLUTION INTRAVENOUS at 11:38

## 2024-06-04 ASSESSMENT — LIFESTYLE VARIABLES
HOW MANY STANDARD DRINKS CONTAINING ALCOHOL DO YOU HAVE ON A TYPICAL DAY: PATIENT DOES NOT DRINK
HOW OFTEN DO YOU HAVE A DRINK CONTAINING ALCOHOL: NEVER

## 2024-06-04 ASSESSMENT — PAIN - FUNCTIONAL ASSESSMENT: PAIN_FUNCTIONAL_ASSESSMENT: NONE - DENIES PAIN

## 2024-06-04 NOTE — DISCHARGE INSTRUCTIONS
Prescription for antibiotics sent to pharmacy for your urinary tract infection.  Make sure you are making more of an effort to drink fluids at home.    Follow-up with Dr. Yanez later this week.  Please return with any new or worsening symptoms in the interim.

## 2024-06-04 NOTE — ED PROVIDER NOTES
Emergency Department Provider Note       PCP: Marek Yanez MD   Age: 78 y.o.   Sex: female     DISPOSITION Discharge - Pending Orders Complete 06/04/2024 02:30:01 PM       ICD-10-CM    1. Acute cystitis without hematuria  N30.00       2. Dehydration  E86.0           Medical Decision Making     Elderly female with history of vascular dementia, CKD status post renal transplant presents with generalized weakness, persistent URI symptoms, and concern for dehydration.  Arrives mildly hypertensive but otherwise with stable vital signs in no apparent distress.  She is clinically dry on exam but does not appear acutely ill.  Will plan for screening labs here, IV fluids, UA, chest x-ray, viral swabs.    Patient is noted to be hyperglycemic essentially at baseline but otherwise without any significant abnormality on her labs.  Urinalysis demonstrates large pyuria with bacteria which may have some degree of contamination, however, secondary to patient age and renal history, will go ahead and cover empirically pending culture results.    Patient feels improved after fluid bolus therefore will discharge home with antibiotics and encourage increased fluid intake.  Patient to follow-up closely with her primary care provider.  We discussed return precautions.     1 acute complicated illness or injury.  Prescription drug management performed.  Patient was discharged risks and benefits of hospitalization were considered.    I independently ordered and reviewed each unique test.       I interpreted the X-rays no acute findings.      History     Patient is a 78-year-old female with history of ESRD status post renal transplant, diabetes type 2, vascular dementia presenting to the emergency department accompanied by  with concerns for dehydration.  History provided in conjunction with spouse and patient at bedside secondary to her history of dementia.  Spouse voices concern for dehydration as the patient has not had much

## 2024-06-04 NOTE — ED NOTES
Patient mobility status  with difficulty, uses a walker. Provider aware     I have reviewed discharge instructions with the patient and spouse.  The patient and spouse verbalized understanding.    Patient left ED via Discharge Method: wheelchair to Home with Spouse.    Opportunity for questions and clarification provided.     Patient given 1 scripts.

## 2024-06-05 ENCOUNTER — HOME CARE VISIT (OUTPATIENT)
Dept: SCHEDULING | Facility: HOME HEALTH | Age: 79
End: 2024-06-05
Payer: MEDICARE

## 2024-06-05 VITALS
RESPIRATION RATE: 18 BRPM | OXYGEN SATURATION: 97 % | DIASTOLIC BLOOD PRESSURE: 87 MMHG | HEART RATE: 83 BPM | TEMPERATURE: 97.5 F | SYSTOLIC BLOOD PRESSURE: 137 MMHG

## 2024-06-05 PROCEDURE — G0299 HHS/HOSPICE OF RN EA 15 MIN: HCPCS

## 2024-06-06 DIAGNOSIS — R35.0 FREQUENCY OF MICTURITION: Primary | ICD-10-CM

## 2024-06-06 LAB
BACTERIA SPEC CULT: NORMAL
BACTERIA SPEC CULT: NORMAL
SERVICE CMNT-IMP: NORMAL

## 2024-06-07 ENCOUNTER — NURSE ONLY (OUTPATIENT)
Dept: FAMILY MEDICINE CLINIC | Facility: CLINIC | Age: 79
End: 2024-06-07

## 2024-06-07 ENCOUNTER — HOME CARE VISIT (OUTPATIENT)
Dept: SCHEDULING | Facility: HOME HEALTH | Age: 79
End: 2024-06-07
Payer: MEDICARE

## 2024-06-07 VITALS
SYSTOLIC BLOOD PRESSURE: 127 MMHG | DIASTOLIC BLOOD PRESSURE: 78 MMHG | RESPIRATION RATE: 18 BRPM | OXYGEN SATURATION: 99 % | TEMPERATURE: 97.5 F | HEART RATE: 84 BPM

## 2024-06-07 DIAGNOSIS — N39.0 URINARY TRACT INFECTION WITH HEMATURIA, SITE UNSPECIFIED: Primary | ICD-10-CM

## 2024-06-07 DIAGNOSIS — R31.9 URINARY TRACT INFECTION WITH HEMATURIA, SITE UNSPECIFIED: Primary | ICD-10-CM

## 2024-06-07 PROCEDURE — G0299 HHS/HOSPICE OF RN EA 15 MIN: HCPCS

## 2024-06-09 LAB
BACTERIA SPEC CULT: NORMAL
BACTERIA SPEC CULT: NORMAL
SERVICE CMNT-IMP: NORMAL

## 2024-06-10 ENCOUNTER — HOME CARE VISIT (OUTPATIENT)
Dept: SCHEDULING | Facility: HOME HEALTH | Age: 79
End: 2024-06-10
Payer: MEDICARE

## 2024-06-10 VITALS
SYSTOLIC BLOOD PRESSURE: 127 MMHG | HEART RATE: 75 BPM | TEMPERATURE: 98 F | OXYGEN SATURATION: 97 % | DIASTOLIC BLOOD PRESSURE: 78 MMHG | RESPIRATION RATE: 18 BRPM

## 2024-06-10 LAB
BACTERIA SPEC CULT: NORMAL
BACTERIA SPEC CULT: NORMAL
SERVICE CMNT-IMP: NORMAL

## 2024-06-10 PROCEDURE — G0299 HHS/HOSPICE OF RN EA 15 MIN: HCPCS

## 2024-06-11 ENCOUNTER — HOSPITAL ENCOUNTER (OUTPATIENT)
Dept: LAB | Age: 79
Discharge: HOME | End: 2024-06-14
Payer: MEDICARE

## 2024-06-11 ENCOUNTER — HOSPITAL ENCOUNTER (OUTPATIENT)
Dept: INFUSION THERAPY | Age: 79
Setting detail: INFUSION SERIES
Discharge: HOME | End: 2024-06-11
Payer: MEDICARE

## 2024-06-11 VITALS
TEMPERATURE: 98.7 F | WEIGHT: 149 LBS | DIASTOLIC BLOOD PRESSURE: 76 MMHG | BODY MASS INDEX: 27.25 KG/M2 | RESPIRATION RATE: 16 BRPM | HEART RATE: 73 BPM | SYSTOLIC BLOOD PRESSURE: 126 MMHG | OXYGEN SATURATION: 98 %

## 2024-06-11 DIAGNOSIS — Z94.0 RENAL TRANSPLANT, STATUS POST: Primary | ICD-10-CM

## 2024-06-11 DIAGNOSIS — Z94.0 RENAL TRANSPLANT, STATUS POST: ICD-10-CM

## 2024-06-11 LAB
ALBUMIN SERPL-MCNC: 3.3 G/DL (ref 3.2–4.6)
ALBUMIN/GLOB SERPL: 0.9 (ref 1–1.9)
ALP SERPL-CCNC: 72 U/L (ref 35–104)
ALT SERPL-CCNC: 20 U/L (ref 12–65)
ANION GAP SERPL CALC-SCNC: 10 MMOL/L (ref 9–18)
AST SERPL-CCNC: 29 U/L (ref 15–37)
BASOPHILS # BLD: 0 K/UL (ref 0–0.2)
BASOPHILS NFR BLD: 1 % (ref 0–2)
BILIRUB SERPL-MCNC: 0.2 MG/DL (ref 0–1.2)
BUN SERPL-MCNC: 14 MG/DL (ref 8–23)
CALCIUM SERPL-MCNC: 9.6 MG/DL (ref 8.8–10.2)
CHLORIDE SERPL-SCNC: 103 MMOL/L (ref 98–107)
CO2 SERPL-SCNC: 25 MMOL/L (ref 20–28)
CREAT SERPL-MCNC: 0.52 MG/DL (ref 0.6–1.1)
DIFFERENTIAL METHOD BLD: ABNORMAL
EOSINOPHIL # BLD: 0.2 K/UL (ref 0–0.8)
EOSINOPHIL NFR BLD: 4 % (ref 0.5–7.8)
ERYTHROCYTE [DISTWIDTH] IN BLOOD BY AUTOMATED COUNT: 13.2 % (ref 11.9–14.6)
GLOBULIN SER CALC-MCNC: 3.7 G/DL (ref 2.3–3.5)
GLUCOSE SERPL-MCNC: 234 MG/DL (ref 70–99)
HCT VFR BLD AUTO: 35.2 % (ref 35.8–46.3)
HGB BLD-MCNC: 10.6 G/DL (ref 11.7–15.4)
IMM GRANULOCYTES # BLD AUTO: 0 K/UL (ref 0–0.5)
IMM GRANULOCYTES NFR BLD AUTO: 0 % (ref 0–5)
LYMPHOCYTES # BLD: 1.4 K/UL (ref 0.5–4.6)
LYMPHOCYTES NFR BLD: 22 % (ref 13–44)
MCH RBC QN AUTO: 29 PG (ref 26.1–32.9)
MCHC RBC AUTO-ENTMCNC: 30.1 G/DL (ref 31.4–35)
MCV RBC AUTO: 96.4 FL (ref 82–102)
MONOCYTES # BLD: 0.5 K/UL (ref 0.1–1.3)
MONOCYTES NFR BLD: 7 % (ref 4–12)
NEUTS SEG # BLD: 4.2 K/UL (ref 1.7–8.2)
NEUTS SEG NFR BLD: 66 % (ref 43–78)
NRBC # BLD: 0 K/UL (ref 0–0.2)
PLATELET # BLD AUTO: 177 K/UL (ref 150–450)
PMV BLD AUTO: 9.2 FL (ref 9.4–12.3)
POTASSIUM SERPL-SCNC: 4 MMOL/L (ref 3.5–5.1)
PROT SERPL-MCNC: 7.1 G/DL (ref 6.3–8.2)
RBC # BLD AUTO: 3.65 M/UL (ref 4.05–5.2)
SODIUM SERPL-SCNC: 138 MMOL/L (ref 136–145)
WBC # BLD AUTO: 6.4 K/UL (ref 4.3–11.1)

## 2024-06-11 PROCEDURE — 85025 COMPLETE CBC W/AUTO DIFF WBC: CPT

## 2024-06-11 PROCEDURE — 2580000003 HC RX 258: Performed by: INTERNAL MEDICINE

## 2024-06-11 PROCEDURE — 80053 COMPREHEN METABOLIC PANEL: CPT

## 2024-06-11 PROCEDURE — 96413 CHEMO IV INFUSION 1 HR: CPT

## 2024-06-11 PROCEDURE — 36415 COLL VENOUS BLD VENIPUNCTURE: CPT

## 2024-06-11 PROCEDURE — 6360000002 HC RX W HCPCS: Performed by: INTERNAL MEDICINE

## 2024-06-11 RX ORDER — SODIUM CHLORIDE 9 MG/ML
5-250 INJECTION, SOLUTION INTRAVENOUS PRN
Status: DISCONTINUED | OUTPATIENT
Start: 2024-06-11 | End: 2024-06-12 | Stop reason: HOSPADM

## 2024-06-11 RX ORDER — SODIUM CHLORIDE 9 MG/ML
5-250 INJECTION, SOLUTION INTRAVENOUS PRN
OUTPATIENT
Start: 2024-07-09

## 2024-06-11 RX ORDER — EPINEPHRINE 1 MG/ML
0.3 INJECTION, SOLUTION, CONCENTRATE INTRAVENOUS PRN
OUTPATIENT
Start: 2024-07-09

## 2024-06-11 RX ORDER — ACETAMINOPHEN 325 MG/1
650 TABLET ORAL
OUTPATIENT
Start: 2024-07-09

## 2024-06-11 RX ORDER — ONDANSETRON 2 MG/ML
8 INJECTION INTRAMUSCULAR; INTRAVENOUS
OUTPATIENT
Start: 2024-07-09

## 2024-06-11 RX ORDER — DIPHENHYDRAMINE HYDROCHLORIDE 50 MG/ML
50 INJECTION INTRAMUSCULAR; INTRAVENOUS
OUTPATIENT
Start: 2024-07-09

## 2024-06-11 RX ORDER — HEPARIN 100 UNIT/ML
500 SYRINGE INTRAVENOUS PRN
OUTPATIENT
Start: 2024-07-09

## 2024-06-11 RX ORDER — ALBUTEROL SULFATE 90 UG/1
4 AEROSOL, METERED RESPIRATORY (INHALATION) PRN
OUTPATIENT
Start: 2024-07-09

## 2024-06-11 RX ORDER — SODIUM CHLORIDE 0.9 % (FLUSH) 0.9 %
5-40 SYRINGE (ML) INJECTION PRN
OUTPATIENT
Start: 2024-07-09

## 2024-06-11 RX ORDER — SODIUM CHLORIDE 9 MG/ML
INJECTION, SOLUTION INTRAVENOUS CONTINUOUS
OUTPATIENT
Start: 2024-07-09

## 2024-06-11 RX ORDER — SODIUM CHLORIDE 0.9 % (FLUSH) 0.9 %
5-40 SYRINGE (ML) INJECTION PRN
Status: DISCONTINUED | OUTPATIENT
Start: 2024-06-11 | End: 2024-06-12 | Stop reason: HOSPADM

## 2024-06-11 RX ADMIN — SODIUM CHLORIDE 500 MG: 9 INJECTION, SOLUTION INTRAVENOUS at 14:38

## 2024-06-11 RX ADMIN — SODIUM CHLORIDE, PRESERVATIVE FREE 10 ML: 5 INJECTION INTRAVENOUS at 14:30

## 2024-06-11 RX ADMIN — SODIUM CHLORIDE 50 ML/HR: 9 INJECTION, SOLUTION INTRAVENOUS at 14:37

## 2024-06-11 NOTE — PROGRESS NOTES
Arrived to the Infusion Center. Nulojix infusion completed. Patient tolerated well.   Any issues or concerns during appointment: no  No appointments scheduled, message sent to .   Patient instructed to call provider with temperature of 100.4 or greater or nausea/vomiting/ diarrhea or pain not controlled by medications  Discharged via wheelchair w/family.

## 2024-06-12 ENCOUNTER — HOME CARE VISIT (OUTPATIENT)
Dept: SCHEDULING | Facility: HOME HEALTH | Age: 79
End: 2024-06-12
Payer: MEDICARE

## 2024-06-12 VITALS
HEART RATE: 75 BPM | TEMPERATURE: 98 F | DIASTOLIC BLOOD PRESSURE: 64 MMHG | SYSTOLIC BLOOD PRESSURE: 112 MMHG | RESPIRATION RATE: 18 BRPM | OXYGEN SATURATION: 97 %

## 2024-06-12 PROCEDURE — G0299 HHS/HOSPICE OF RN EA 15 MIN: HCPCS

## 2024-06-14 ENCOUNTER — HOSPITAL ENCOUNTER (OUTPATIENT)
Dept: WOUND CARE | Age: 79
Discharge: HOME | End: 2024-06-14
Payer: MEDICARE

## 2024-06-14 ENCOUNTER — HOME CARE VISIT (OUTPATIENT)
Dept: HOME HEALTH SERVICES | Facility: HOME HEALTH | Age: 79
End: 2024-06-14
Payer: MEDICARE

## 2024-06-14 VITALS
HEART RATE: 83 BPM | TEMPERATURE: 97.6 F | WEIGHT: 150 LBS | SYSTOLIC BLOOD PRESSURE: 146 MMHG | DIASTOLIC BLOOD PRESSURE: 80 MMHG | HEIGHT: 62 IN | RESPIRATION RATE: 16 BRPM | BODY MASS INDEX: 27.6 KG/M2

## 2024-06-14 DIAGNOSIS — L03.116 CELLULITIS OF LEFT HEEL: ICD-10-CM

## 2024-06-14 DIAGNOSIS — L89.623 PRESSURE ULCER OF LEFT HEEL, STAGE 3 (HCC): Primary | ICD-10-CM

## 2024-06-14 PROCEDURE — 11042 DBRDMT SUBQ TIS 1ST 20SQCM/<: CPT

## 2024-06-14 PROCEDURE — 6370000000 HC RX 637 (ALT 250 FOR IP): Performed by: FAMILY MEDICINE

## 2024-06-14 RX ORDER — IBUPROFEN 200 MG
TABLET ORAL ONCE
OUTPATIENT
Start: 2024-06-14 | End: 2024-06-14

## 2024-06-14 RX ORDER — GINSENG 100 MG
CAPSULE ORAL ONCE
OUTPATIENT
Start: 2024-06-14 | End: 2024-06-14

## 2024-06-14 RX ORDER — LIDOCAINE HYDROCHLORIDE 20 MG/ML
JELLY TOPICAL ONCE
OUTPATIENT
Start: 2024-06-14 | End: 2024-06-14

## 2024-06-14 RX ORDER — BACITRACIN ZINC AND POLYMYXIN B SULFATE 500; 1000 [USP'U]/G; [USP'U]/G
OINTMENT TOPICAL ONCE
OUTPATIENT
Start: 2024-06-14 | End: 2024-06-14

## 2024-06-14 RX ORDER — GENTAMICIN SULFATE 1 MG/G
OINTMENT TOPICAL ONCE
OUTPATIENT
Start: 2024-06-14 | End: 2024-06-14

## 2024-06-14 RX ORDER — LIDOCAINE 50 MG/G
OINTMENT TOPICAL ONCE
OUTPATIENT
Start: 2024-06-14 | End: 2024-06-14

## 2024-06-14 RX ORDER — LIDOCAINE 50 MG/G
OINTMENT TOPICAL ONCE
Status: COMPLETED | OUTPATIENT
Start: 2024-06-14 | End: 2024-06-14

## 2024-06-14 RX ORDER — LIDOCAINE HYDROCHLORIDE 40 MG/ML
SOLUTION TOPICAL ONCE
OUTPATIENT
Start: 2024-06-14 | End: 2024-06-14

## 2024-06-14 RX ORDER — TRIAMCINOLONE ACETONIDE 1 MG/G
OINTMENT TOPICAL ONCE
OUTPATIENT
Start: 2024-06-14 | End: 2024-06-14

## 2024-06-14 RX ORDER — SODIUM CHLOR/HYPOCHLOROUS ACID 0.033 %
SOLUTION, IRRIGATION IRRIGATION ONCE
OUTPATIENT
Start: 2024-06-14 | End: 2024-06-14

## 2024-06-14 RX ORDER — CLOBETASOL PROPIONATE 0.5 MG/G
OINTMENT TOPICAL ONCE
OUTPATIENT
Start: 2024-06-14 | End: 2024-06-14

## 2024-06-14 RX ORDER — LIDOCAINE 40 MG/G
CREAM TOPICAL ONCE
OUTPATIENT
Start: 2024-06-14 | End: 2024-06-14

## 2024-06-14 RX ORDER — BETAMETHASONE DIPROPIONATE 0.5 MG/G
CREAM TOPICAL ONCE
OUTPATIENT
Start: 2024-06-14 | End: 2024-06-14

## 2024-06-14 RX ADMIN — LIDOCAINE: 50 OINTMENT TOPICAL at 14:16

## 2024-06-14 NOTE — FLOWSHEET NOTE
06/14/24 1351   Wound 01/22/24 Heel Left;Plantar #1   Date First Assessed/Time First Assessed: 01/22/24 1336   Present on Original Admission: Yes  Wound Approximate Age at First Assessment (Weeks): 8 weeks  Primary Wound Type: Diabetic Ulcer  Location: Heel  Wound Location Orientation: Left;Plantar  Woun...   Wound Image     Wound Etiology Diabetic Doan 2   Dressing Status Intact   Wound Cleansed Vashe   Dressing/Treatment Hydrofera blue   Wound Length (cm) 1.5 cm   Wound Width (cm) 2.8 cm   Wound Depth (cm) 0.1 cm   Wound Surface Area (cm^2) 4.2 cm^2   Change in Wound Size % (l*w) -320   Wound Volume (cm^3) 0.42 cm^3   Wound Healing % -320   Post-Procedure Length (cm) 1.6 cm   Post-Procedure Width (cm) 2.9 cm   Post-Procedure Depth (cm) 0.1 cm   Post-Procedure Surface Area (cm^2) 4.64 cm^2   Post-Procedure Volume (cm^3) 0.464 cm^3   Wound Assessment Pink/red   Drainage Amount Small (< 25%)   Drainage Description Serosanguinous   Odor None   Abby-wound Assessment Intact   Wound Thickness Description not for Pressure Injury Full thickness   Pain Assessment   Pain Assessment None - Denies Pain

## 2024-06-14 NOTE — WOUND CARE
Discharge Instructions for  [facility]  [address] [place] [address]  [address] [place] [address]  Phone [phone]   Fax [phone]      NAME:  Alayna Fall  YOB: 1945  MEDICAL RECORD NUMBER:  [id number]  DATE:  6/14/2024    Return Appointment:  2 weeks with Todd Rizzo DO      Instructions:   Left heel:  Cleanse with normal saline  Vashe post debridement.  Apply Hydrofera Ready to wound bed covering edges   Cover with gauze or ABD pad. Secure with roll gauze   Change 3 x week      Apply bordered foam to dorsal foot as needed    [facility] nursing for dressing changes 3 times weekly.    Offloading:  Use wheelchair to minimize ambulation.  Patient to offload wound with pillow under calf while in bed or chair.   Use cast cover for showering.  Wound healing is greatly slowed when blood glucose levels are greater than 200. Monitor glucose levels daily to ensure tight glucose control.  Increase protein intake to promote wound healing. Protein supplements such as Evelio and Ensure are great options.   Protein goal is 60 - 80 grams per day. Recommend Ensure Max or Glucerna.    Should you experience increased redness, swelling, pain, foul odor, size of wound(s), or have a temperature over 101 degrees please contact the Wound Healing Center at [phone] or if after hours contact your primary care physician or go to the hospital emergency department.    PLEASE NOTE: IF YOU ARE UNABLE TO OBTAIN WOUND SUPPLIES, CONTINUE TO USE THE SUPPLIES YOU HAVE AVAILABLE UNTIL YOU ARE ABLE TO REACH US. IT IS MOST IMPORTANT TO KEEP THE WOUND COVERED AT ALL TIMES.    Electronically signed Jennifer Acevedo RN, [facility] on 6/14/2024 at 2:17 PM

## 2024-06-17 ENCOUNTER — HOME CARE VISIT (OUTPATIENT)
Dept: SCHEDULING | Facility: HOME HEALTH | Age: 79
End: 2024-06-17
Payer: MEDICARE

## 2024-06-17 VITALS
RESPIRATION RATE: 18 BRPM | SYSTOLIC BLOOD PRESSURE: 128 MMHG | OXYGEN SATURATION: 98 % | DIASTOLIC BLOOD PRESSURE: 82 MMHG | TEMPERATURE: 97.3 F | HEART RATE: 72 BPM

## 2024-06-17 PROCEDURE — G0299 HHS/HOSPICE OF RN EA 15 MIN: HCPCS

## 2024-06-17 ASSESSMENT — ENCOUNTER SYMPTOMS: STOOL DESCRIPTION: SOFT FORMED

## 2024-06-19 ENCOUNTER — HOME CARE VISIT (OUTPATIENT)
Dept: SCHEDULING | Facility: HOME HEALTH | Age: 79
End: 2024-06-19
Payer: MEDICARE

## 2024-06-19 VITALS
DIASTOLIC BLOOD PRESSURE: 68 MMHG | TEMPERATURE: 97.9 F | RESPIRATION RATE: 18 BRPM | OXYGEN SATURATION: 99 % | SYSTOLIC BLOOD PRESSURE: 122 MMHG | HEART RATE: 74 BPM

## 2024-06-19 PROCEDURE — G0299 HHS/HOSPICE OF RN EA 15 MIN: HCPCS

## 2024-06-21 ENCOUNTER — HOME CARE VISIT (OUTPATIENT)
Dept: SCHEDULING | Facility: HOME HEALTH | Age: 79
End: 2024-06-21

## 2024-06-21 PROCEDURE — G0299 HHS/HOSPICE OF RN EA 15 MIN: HCPCS

## 2024-06-22 VITALS
OXYGEN SATURATION: 99 % | SYSTOLIC BLOOD PRESSURE: 136 MMHG | TEMPERATURE: 100.1 F | RESPIRATION RATE: 16 BRPM | DIASTOLIC BLOOD PRESSURE: 80 MMHG | HEART RATE: 74 BPM

## 2024-06-22 ASSESSMENT — ENCOUNTER SYMPTOMS: STOOL DESCRIPTION: FORMED

## 2024-06-24 ENCOUNTER — HOME CARE VISIT (OUTPATIENT)
Dept: SCHEDULING | Facility: HOME HEALTH | Age: 79
End: 2024-06-24
Payer: MEDICARE

## 2024-06-24 VITALS
DIASTOLIC BLOOD PRESSURE: 74 MMHG | HEART RATE: 75 BPM | SYSTOLIC BLOOD PRESSURE: 128 MMHG | TEMPERATURE: 98.9 F | OXYGEN SATURATION: 99 % | RESPIRATION RATE: 16 BRPM

## 2024-06-24 PROCEDURE — G0299 HHS/HOSPICE OF RN EA 15 MIN: HCPCS

## 2024-06-24 ASSESSMENT — ENCOUNTER SYMPTOMS
STOOL DESCRIPTION: FORMED
DYSPNEA ACTIVITY LEVEL: AFTER AMBULATING MORE THAN 20 FT

## 2024-06-26 ENCOUNTER — HOME CARE VISIT (OUTPATIENT)
Dept: SCHEDULING | Facility: HOME HEALTH | Age: 79
End: 2024-06-26
Payer: MEDICARE

## 2024-06-26 VITALS
SYSTOLIC BLOOD PRESSURE: 137 MMHG | RESPIRATION RATE: 18 BRPM | TEMPERATURE: 97.9 F | HEART RATE: 78 BPM | OXYGEN SATURATION: 95 % | DIASTOLIC BLOOD PRESSURE: 69 MMHG

## 2024-06-26 PROCEDURE — G0299 HHS/HOSPICE OF RN EA 15 MIN: HCPCS

## 2024-06-28 ENCOUNTER — HOSPITAL ENCOUNTER (OUTPATIENT)
Dept: WOUND CARE | Age: 79
Discharge: HOME | End: 2024-06-28
Payer: MEDICARE

## 2024-06-28 ENCOUNTER — HOME CARE VISIT (OUTPATIENT)
Dept: SCHEDULING | Facility: HOME HEALTH | Age: 79
End: 2024-06-28
Payer: MEDICARE

## 2024-06-28 VITALS
SYSTOLIC BLOOD PRESSURE: 132 MMHG | TEMPERATURE: 98.4 F | DIASTOLIC BLOOD PRESSURE: 63 MMHG | RESPIRATION RATE: 16 BRPM | HEART RATE: 71 BPM

## 2024-06-28 DIAGNOSIS — L89.623 PRESSURE ULCER OF LEFT HEEL, STAGE 3 (HCC): Primary | ICD-10-CM

## 2024-06-28 DIAGNOSIS — L03.116 CELLULITIS OF LEFT HEEL: ICD-10-CM

## 2024-06-28 PROCEDURE — 11042 DBRDMT SUBQ TIS 1ST 20SQCM/<: CPT

## 2024-06-28 PROCEDURE — 6370000000 HC RX 637 (ALT 250 FOR IP): Performed by: FAMILY MEDICINE

## 2024-06-28 RX ORDER — LIDOCAINE 40 MG/G
CREAM TOPICAL ONCE
OUTPATIENT
Start: 2024-06-28 | End: 2024-06-28

## 2024-06-28 RX ORDER — LIDOCAINE 50 MG/G
OINTMENT TOPICAL ONCE
Status: COMPLETED | OUTPATIENT
Start: 2024-06-28 | End: 2024-06-28

## 2024-06-28 RX ORDER — TRIAMCINOLONE ACETONIDE 1 MG/G
OINTMENT TOPICAL ONCE
OUTPATIENT
Start: 2024-06-28 | End: 2024-06-28

## 2024-06-28 RX ORDER — BETAMETHASONE DIPROPIONATE 0.5 MG/G
CREAM TOPICAL ONCE
OUTPATIENT
Start: 2024-06-28 | End: 2024-06-28

## 2024-06-28 RX ORDER — GENTAMICIN SULFATE 1 MG/G
OINTMENT TOPICAL ONCE
OUTPATIENT
Start: 2024-06-28 | End: 2024-06-28

## 2024-06-28 RX ORDER — CLOBETASOL PROPIONATE 0.5 MG/G
OINTMENT TOPICAL ONCE
OUTPATIENT
Start: 2024-06-28 | End: 2024-06-28

## 2024-06-28 RX ORDER — GINSENG 100 MG
CAPSULE ORAL ONCE
OUTPATIENT
Start: 2024-06-28 | End: 2024-06-28

## 2024-06-28 RX ORDER — LIDOCAINE HYDROCHLORIDE 40 MG/ML
SOLUTION TOPICAL ONCE
OUTPATIENT
Start: 2024-06-28 | End: 2024-06-28

## 2024-06-28 RX ORDER — LIDOCAINE HYDROCHLORIDE 20 MG/ML
JELLY TOPICAL ONCE
OUTPATIENT
Start: 2024-06-28 | End: 2024-06-28

## 2024-06-28 RX ORDER — IBUPROFEN 200 MG
TABLET ORAL ONCE
OUTPATIENT
Start: 2024-06-28 | End: 2024-06-28

## 2024-06-28 RX ORDER — SODIUM CHLOR/HYPOCHLOROUS ACID 0.033 %
SOLUTION, IRRIGATION IRRIGATION ONCE
OUTPATIENT
Start: 2024-06-28 | End: 2024-06-28

## 2024-06-28 RX ORDER — BACITRACIN ZINC AND POLYMYXIN B SULFATE 500; 1000 [USP'U]/G; [USP'U]/G
OINTMENT TOPICAL ONCE
OUTPATIENT
Start: 2024-06-28 | End: 2024-06-28

## 2024-06-28 RX ORDER — LIDOCAINE 50 MG/G
OINTMENT TOPICAL ONCE
OUTPATIENT
Start: 2024-06-28 | End: 2024-06-28

## 2024-06-28 RX ADMIN — LIDOCAINE: 50 OINTMENT TOPICAL at 14:13

## 2024-06-28 NOTE — WOUND CARE
Discharge Instructions for  [facility]  [address] [place] [address]  [address]  [place] [address]  Phone [phone]   Fax [phone]      NAME:  Alayna Fall  YOB: 1945  MEDICAL RECORD NUMBER:  [id number]  DATE:  6/28/2024    Return Appointment:  2 weeks with Todd Rizzo DO      Instructions:   Left heel:  Cleanse with normal saline  Vashe post debridement.  Apply collagen. Cut product to approximately size and apply to wound bed.  Apply Hydrofera Ready to wound bed covering edges   Cover with gauze or ABD pad. Secure with roll gauze   Change 3 x week      Apply bordered foam to dorsal foot as needed    [facility] nursing for dressing changes 3 times weekly.    Offloading:  Use wheelchair to minimize ambulation.  Patient to offload wound with pillow under calf while in bed or chair.   Use cast cover for showering.  Wound healing is greatly slowed when blood glucose levels are greater than 200. Monitor glucose levels daily to ensure tight glucose control.  Increase protein intake to promote wound healing. Protein supplements such as Evelio and Ensure are great options.   Protein goal is 60 - 80 grams per day. Recommend Ensure Max or Glucerna.    Should you experience increased redness, swelling, pain, foul odor, size of wound(s), or have a temperature over 101 degrees please contact the Wound Healing Center at [phone] or if after hours contact your primary care physician or go to the hospital emergency department.    PLEASE NOTE: IF YOU ARE UNABLE TO OBTAIN WOUND SUPPLIES, CONTINUE TO USE THE SUPPLIES YOU HAVE AVAILABLE UNTIL YOU ARE ABLE TO REACH US. IT IS MOST IMPORTANT TO KEEP THE WOUND COVERED AT ALL TIMES.    Electronically signed Jennifer Acevedo RN, [facility] on 6/28/2024 at 2:04 PM

## 2024-06-28 NOTE — FLOWSHEET NOTE
06/28/24 1355   Wound 01/22/24 Heel Left;Plantar #1   Date First Assessed/Time First Assessed: 01/22/24 1336   Present on Original Admission: Yes  Wound Approximate Age at First Assessment (Weeks): 8 weeks  Primary Wound Type: Diabetic Ulcer  Location: Heel  Wound Location Orientation: Left;Plantar  Woun...   Wound Image     Wound Etiology Diabetic Doan 2   Dressing Status Intact   Wound Cleansed Cleansed with saline   Dressing/Treatment Hydrofera blue   Wound Length (cm) 1.3 cm   Wound Width (cm) 2.5 cm   Wound Depth (cm) 0.1 cm   Wound Surface Area (cm^2) 3.25 cm^2   Change in Wound Size % (l*w) -225   Wound Volume (cm^3) 0.325 cm^3   Wound Healing % -225   Post-Procedure Length (cm) 1.4 cm   Post-Procedure Width (cm) 2.6 cm   Post-Procedure Depth (cm) 0.1 cm   Post-Procedure Surface Area (cm^2) 3.64 cm^2   Post-Procedure Volume (cm^3) 0.364 cm^3   Wound Assessment Granulation tissue   Drainage Amount Small (< 25%)   Drainage Description Serosanguinous   Odor None   Abby-wound Assessment Intact   Wound Thickness Description not for Pressure Injury Full thickness   Pain Assessment   Pain Assessment None - Denies Pain

## 2024-07-01 ENCOUNTER — HOME CARE VISIT (OUTPATIENT)
Dept: SCHEDULING | Facility: HOME HEALTH | Age: 79
End: 2024-07-01
Payer: MEDICARE

## 2024-07-01 PROCEDURE — G0299 HHS/HOSPICE OF RN EA 15 MIN: HCPCS

## 2024-07-02 VITALS
TEMPERATURE: 98 F | SYSTOLIC BLOOD PRESSURE: 130 MMHG | OXYGEN SATURATION: 99 % | HEART RATE: 65 BPM | RESPIRATION RATE: 16 BRPM | DIASTOLIC BLOOD PRESSURE: 80 MMHG

## 2024-07-02 ASSESSMENT — ENCOUNTER SYMPTOMS: STOOL DESCRIPTION: FORMED

## 2024-07-03 ENCOUNTER — HOME CARE VISIT (OUTPATIENT)
Dept: SCHEDULING | Facility: HOME HEALTH | Age: 79
End: 2024-07-03
Payer: MEDICARE

## 2024-07-03 PROCEDURE — G0299 HHS/HOSPICE OF RN EA 15 MIN: HCPCS

## 2024-07-04 VITALS
TEMPERATURE: 98 F | OXYGEN SATURATION: 98 % | SYSTOLIC BLOOD PRESSURE: 122 MMHG | HEART RATE: 73 BPM | DIASTOLIC BLOOD PRESSURE: 63 MMHG | RESPIRATION RATE: 18 BRPM

## 2024-07-05 ENCOUNTER — HOME CARE VISIT (OUTPATIENT)
Dept: SCHEDULING | Facility: HOME HEALTH | Age: 79
End: 2024-07-05
Payer: MEDICARE

## 2024-07-05 VITALS
HEART RATE: 80 BPM | RESPIRATION RATE: 18 BRPM | DIASTOLIC BLOOD PRESSURE: 62 MMHG | SYSTOLIC BLOOD PRESSURE: 124 MMHG | TEMPERATURE: 98.2 F | OXYGEN SATURATION: 99 %

## 2024-07-05 PROCEDURE — G0299 HHS/HOSPICE OF RN EA 15 MIN: HCPCS

## 2024-07-08 ENCOUNTER — HOME CARE VISIT (OUTPATIENT)
Dept: SCHEDULING | Facility: HOME HEALTH | Age: 79
End: 2024-07-08
Payer: MEDICARE

## 2024-07-08 VITALS
TEMPERATURE: 97.7 F | DIASTOLIC BLOOD PRESSURE: 67 MMHG | SYSTOLIC BLOOD PRESSURE: 114 MMHG | HEART RATE: 82 BPM | RESPIRATION RATE: 18 BRPM | OXYGEN SATURATION: 99 %

## 2024-07-08 PROCEDURE — G0299 HHS/HOSPICE OF RN EA 15 MIN: HCPCS

## 2024-07-10 ENCOUNTER — HOME CARE VISIT (OUTPATIENT)
Dept: SCHEDULING | Facility: HOME HEALTH | Age: 79
End: 2024-07-10
Payer: MEDICARE

## 2024-07-10 VITALS
BODY MASS INDEX: 26.89 KG/M2 | SYSTOLIC BLOOD PRESSURE: 118 MMHG | RESPIRATION RATE: 15 BRPM | DIASTOLIC BLOOD PRESSURE: 65 MMHG | HEART RATE: 82 BPM | OXYGEN SATURATION: 98 % | TEMPERATURE: 97.1 F | WEIGHT: 147 LBS

## 2024-07-10 PROCEDURE — G0299 HHS/HOSPICE OF RN EA 15 MIN: HCPCS

## 2024-07-10 ASSESSMENT — ENCOUNTER SYMPTOMS: STOOL DESCRIPTION: FORMED

## 2024-07-12 ENCOUNTER — HOSPITAL ENCOUNTER (OUTPATIENT)
Dept: WOUND CARE | Age: 79
Discharge: HOME | End: 2024-07-12
Payer: MEDICARE

## 2024-07-12 ENCOUNTER — HOME CARE VISIT (OUTPATIENT)
Dept: SCHEDULING | Facility: HOME HEALTH | Age: 79
End: 2024-07-12
Payer: MEDICARE

## 2024-07-12 VITALS
DIASTOLIC BLOOD PRESSURE: 73 MMHG | RESPIRATION RATE: 12 BRPM | OXYGEN SATURATION: 100 % | WEIGHT: 143 LBS | HEART RATE: 70 BPM | HEIGHT: 62 IN | BODY MASS INDEX: 26.31 KG/M2 | SYSTOLIC BLOOD PRESSURE: 126 MMHG | TEMPERATURE: 98.8 F

## 2024-07-12 DIAGNOSIS — L89.623 PRESSURE ULCER OF LEFT HEEL, STAGE 3 (HCC): Primary | ICD-10-CM

## 2024-07-12 DIAGNOSIS — L03.116 CELLULITIS OF LEFT HEEL: ICD-10-CM

## 2024-07-12 PROCEDURE — 11042 DBRDMT SUBQ TIS 1ST 20SQCM/<: CPT

## 2024-07-12 RX ORDER — GINSENG 100 MG
CAPSULE ORAL ONCE
OUTPATIENT
Start: 2024-07-12 | End: 2024-07-12

## 2024-07-12 RX ORDER — GENTAMICIN SULFATE 1 MG/G
OINTMENT TOPICAL ONCE
OUTPATIENT
Start: 2024-07-12 | End: 2024-07-12

## 2024-07-12 RX ORDER — LIDOCAINE 50 MG/G
OINTMENT TOPICAL ONCE
OUTPATIENT
Start: 2024-07-12 | End: 2024-07-12

## 2024-07-12 RX ORDER — LIDOCAINE 40 MG/G
CREAM TOPICAL ONCE
OUTPATIENT
Start: 2024-07-12 | End: 2024-07-12

## 2024-07-12 RX ORDER — BACITRACIN ZINC AND POLYMYXIN B SULFATE 500; 1000 [USP'U]/G; [USP'U]/G
OINTMENT TOPICAL ONCE
OUTPATIENT
Start: 2024-07-12 | End: 2024-07-12

## 2024-07-12 RX ORDER — LIDOCAINE HYDROCHLORIDE 20 MG/ML
JELLY TOPICAL ONCE
OUTPATIENT
Start: 2024-07-12 | End: 2024-07-12

## 2024-07-12 RX ORDER — SODIUM CHLOR/HYPOCHLOROUS ACID 0.033 %
SOLUTION, IRRIGATION IRRIGATION ONCE
OUTPATIENT
Start: 2024-07-12 | End: 2024-07-12

## 2024-07-12 RX ORDER — TRIAMCINOLONE ACETONIDE 1 MG/G
OINTMENT TOPICAL ONCE
OUTPATIENT
Start: 2024-07-12 | End: 2024-07-12

## 2024-07-12 RX ORDER — LIDOCAINE HYDROCHLORIDE 20 MG/ML
JELLY TOPICAL ONCE
Status: COMPLETED | OUTPATIENT
Start: 2024-07-12 | End: 2024-07-12

## 2024-07-12 RX ORDER — SODIUM CHLOR/HYPOCHLOROUS ACID 0.033 %
SOLUTION, IRRIGATION IRRIGATION ONCE
Status: COMPLETED | OUTPATIENT
Start: 2024-07-12 | End: 2024-07-12

## 2024-07-12 RX ORDER — IBUPROFEN 200 MG
TABLET ORAL ONCE
OUTPATIENT
Start: 2024-07-12 | End: 2024-07-12

## 2024-07-12 RX ORDER — LIDOCAINE HYDROCHLORIDE 40 MG/ML
SOLUTION TOPICAL ONCE
OUTPATIENT
Start: 2024-07-12 | End: 2024-07-12

## 2024-07-12 RX ORDER — CLOBETASOL PROPIONATE 0.5 MG/G
OINTMENT TOPICAL ONCE
OUTPATIENT
Start: 2024-07-12 | End: 2024-07-12

## 2024-07-12 RX ORDER — BETAMETHASONE DIPROPIONATE 0.5 MG/G
CREAM TOPICAL ONCE
OUTPATIENT
Start: 2024-07-12 | End: 2024-07-12

## 2024-07-12 RX ADMIN — Medication: at 14:50

## 2024-07-12 RX ADMIN — LIDOCAINE HYDROCHLORIDE: 20 JELLY TOPICAL at 14:46

## 2024-07-12 NOTE — DISCHARGE INSTRUCTIONS
Left Heel:  Cleanse with normal saline  Vashe post debridement.  Apply collagen. Cut product to approximately size and apply to wound bed.  Apply Hydrofera Ready to wound bed covering edges   Cover with gauze or ABD pad. Secure with roll gauze   Change 3 x week      Apply bordered foam to dorsal foot with each dressing change     [facility] nursing for dressing changes 3 times weekly.     Offloading:  Use wheelchair to minimize ambulation.  Patient to offload wound with pillow under calf while in bed or chair.   Use cast cover for showering.  Wound healing is greatly slowed when blood glucose levels are greater than 200. Monitor glucose levels daily to ensure tight glucose control.  Increase protein intake to promote wound healing. Protein supplements such as Evelio and Ensure are great options.   Protein goal is 60 - 80 grams per day. Recommend Ensure Max or Glucerna.

## 2024-07-12 NOTE — FLOWSHEET NOTE
Pre and Post Debridement Notes:   07/12/24 1450   Wound 01/22/24 Heel Left;Posterior #1   Date First Assessed/Time First Assessed: 01/22/24 1336   Present on Original Admission: Yes  Wound Approximate Age at First Assessment (Weeks): 8 weeks  Primary Wound Type: Diabetic Ulcer  Location: Heel  Wound Location Orientation: Left;Posterior  Wo...   Wound Image     Wound Etiology Diabetic Doan 2   Dressing Status New dressing applied;Old drainage noted   Wound Cleansed Cleansed with saline;Vashe   Dressing/Treatment Collagen;Hydrofera blue   Wound Length (cm) 1.4 cm   Wound Width (cm) 1.6 cm   Wound Depth (cm) 0.1 cm   Wound Surface Area (cm^2) 2.24 cm^2   Change in Wound Size % (l*w) -124   Wound Volume (cm^3) 0.224 cm^3   Wound Healing % -124   Post-Procedure Length (cm) 1.5 cm   Post-Procedure Width (cm) 1.7 cm   Post-Procedure Depth (cm) 0.1 cm   Post-Procedure Surface Area (cm^2) 2.55 cm^2   Post-Procedure Volume (cm^3) 0.255 cm^3   Wound Assessment [place];Pink/red;Granulation tissue   Drainage Amount Small (< 25%)   Drainage Description Serosanguinous   Odor None   Abby-wound Assessment Hyperkeratosis (callous)   Wound Thickness Description not for Pressure Injury Full thickness     Patient takes ASA daily.

## 2024-07-12 NOTE — WOUND CARE
Discharge Instructions for  [facility]  [address] [place] [address]  [address]  [place] [address]  Phone [phone]   Fax [phone]      NAME:  Alayna Fall  YOB: 1945  MEDICAL RECORD NUMBER:  [id number]  DATE:  7/12/2024    Return Appointment:   3 weeks with Todd Rizzo DO    Instructions:   Left Heel:  Cleanse with normal saline  Vashe post debridement.  Apply collagen. Cut product to approximately size and apply to wound bed.  Apply Hydrofera Ready to wound bed covering edges   Cover with gauze or ABD pad. Secure with roll gauze   Change 3 x week      Apply bordered foam to dorsal foot with each dressing change     [facility] nursing for dressing changes 3 times weekly.     Offloading:  Use wheelchair to minimize ambulation.  Patient to offload wound with pillow under calf while in bed or chair.   Use cast cover for showering.  Wound healing is greatly slowed when blood glucose levels are greater than 200. Monitor glucose levels daily to ensure tight glucose control.  Increase protein intake to promote wound healing. Protein supplements such as Evelio and Ensure are great options.   Protein goal is 60 - 80 grams per day. Recommend Ensure Max or Glucerna.     Should you experience increased redness, swelling, pain, foul odor, size of wound(s), or have a temperature over 101 degrees please contact the Wound Healing Center at [phone] or if after hours contact your primary care physician or go to the hospital emergency department.    PLEASE NOTE: IF YOU ARE UNABLE TO OBTAIN WOUND SUPPLIES, CONTINUE TO USE THE SUPPLIES YOU HAVE AVAILABLE UNTIL YOU ARE ABLE TO REACH US. IT IS MOST IMPORTANT TO KEEP THE WOUND COVERED AT ALL TIMES.    Electronically signed Michelle Cornejo RN on 7/12/2024 at 2:41 PM

## 2024-07-15 ENCOUNTER — HOME CARE VISIT (OUTPATIENT)
Dept: SCHEDULING | Facility: HOME HEALTH | Age: 79
End: 2024-07-15
Payer: MEDICARE

## 2024-07-15 VITALS
OXYGEN SATURATION: 99 % | RESPIRATION RATE: 16 BRPM | HEART RATE: 77 BPM | SYSTOLIC BLOOD PRESSURE: 134 MMHG | TEMPERATURE: 97.8 F | DIASTOLIC BLOOD PRESSURE: 84 MMHG

## 2024-07-15 PROCEDURE — G0299 HHS/HOSPICE OF RN EA 15 MIN: HCPCS

## 2024-07-15 ASSESSMENT — ENCOUNTER SYMPTOMS: STOOL DESCRIPTION: FORMED

## 2024-07-16 ENCOUNTER — HOSPITAL ENCOUNTER (OUTPATIENT)
Dept: INFUSION THERAPY | Age: 79
Setting detail: INFUSION SERIES
Discharge: HOME | End: 2024-07-16
Payer: MEDICARE

## 2024-07-16 ENCOUNTER — HOSPITAL ENCOUNTER (OUTPATIENT)
Dept: LAB | Age: 79
Discharge: HOME | End: 2024-07-19
Payer: MEDICARE

## 2024-07-16 VITALS
DIASTOLIC BLOOD PRESSURE: 53 MMHG | SYSTOLIC BLOOD PRESSURE: 111 MMHG | TEMPERATURE: 97.9 F | BODY MASS INDEX: 27.29 KG/M2 | RESPIRATION RATE: 16 BRPM | WEIGHT: 149.2 LBS | HEART RATE: 81 BPM | OXYGEN SATURATION: 99 %

## 2024-07-16 DIAGNOSIS — Z94.0 RENAL TRANSPLANT, STATUS POST: Primary | ICD-10-CM

## 2024-07-16 DIAGNOSIS — Z94.0 RENAL TRANSPLANT, STATUS POST: ICD-10-CM

## 2024-07-16 LAB
ALBUMIN SERPL-MCNC: 3.7 G/DL (ref 3.2–4.6)
ALBUMIN/GLOB SERPL: 1 (ref 1–1.9)
ALP SERPL-CCNC: 72 U/L (ref 35–104)
ALT SERPL-CCNC: 16 U/L (ref 12–65)
ANION GAP SERPL CALC-SCNC: 10 MMOL/L (ref 9–18)
AST SERPL-CCNC: 24 U/L (ref 15–37)
BASOPHILS # BLD: 0 K/UL (ref 0–0.2)
BASOPHILS NFR BLD: 0 % (ref 0–2)
BILIRUB SERPL-MCNC: 0.3 MG/DL (ref 0–1.2)
BUN SERPL-MCNC: 11 MG/DL (ref 8–23)
CALCIUM SERPL-MCNC: 9.8 MG/DL (ref 8.8–10.2)
CHLORIDE SERPL-SCNC: 106 MMOL/L (ref 98–107)
CO2 SERPL-SCNC: 25 MMOL/L (ref 20–28)
CREAT SERPL-MCNC: 0.52 MG/DL (ref 0.6–1.1)
DIFFERENTIAL METHOD BLD: ABNORMAL
EOSINOPHIL # BLD: 0.2 K/UL (ref 0–0.8)
EOSINOPHIL NFR BLD: 2 % (ref 0.5–7.8)
ERYTHROCYTE [DISTWIDTH] IN BLOOD BY AUTOMATED COUNT: 13.5 % (ref 11.9–14.6)
GLOBULIN SER CALC-MCNC: 3.8 G/DL (ref 2.3–3.5)
GLUCOSE SERPL-MCNC: 161 MG/DL (ref 70–99)
HCT VFR BLD AUTO: 36.6 % (ref 35.8–46.3)
HGB BLD-MCNC: 10.9 G/DL (ref 11.7–15.4)
IMM GRANULOCYTES # BLD AUTO: 0 K/UL (ref 0–0.5)
IMM GRANULOCYTES NFR BLD AUTO: 0 % (ref 0–5)
LYMPHOCYTES # BLD: 1.3 K/UL (ref 0.5–4.6)
LYMPHOCYTES NFR BLD: 18 % (ref 13–44)
MCH RBC QN AUTO: 28.8 PG (ref 26.1–32.9)
MCHC RBC AUTO-ENTMCNC: 29.8 G/DL (ref 31.4–35)
MCV RBC AUTO: 96.6 FL (ref 82–102)
MONOCYTES # BLD: 0.4 K/UL (ref 0.1–1.3)
MONOCYTES NFR BLD: 5 % (ref 4–12)
NEUTS SEG # BLD: 5.3 K/UL (ref 1.7–8.2)
NEUTS SEG NFR BLD: 75 % (ref 43–78)
NRBC # BLD: 0 K/UL (ref 0–0.2)
PLATELET # BLD AUTO: 170 K/UL (ref 150–450)
PMV BLD AUTO: 9.3 FL (ref 9.4–12.3)
POTASSIUM SERPL-SCNC: 4.3 MMOL/L (ref 3.5–5.1)
PROT SERPL-MCNC: 7.5 G/DL (ref 6.3–8.2)
RBC # BLD AUTO: 3.79 M/UL (ref 4.05–5.2)
SODIUM SERPL-SCNC: 141 MMOL/L (ref 136–145)
WBC # BLD AUTO: 7 K/UL (ref 4.3–11.1)

## 2024-07-16 PROCEDURE — 36415 COLL VENOUS BLD VENIPUNCTURE: CPT

## 2024-07-16 PROCEDURE — 85025 COMPLETE CBC W/AUTO DIFF WBC: CPT

## 2024-07-16 PROCEDURE — 2580000003 HC RX 258: Performed by: INTERNAL MEDICINE

## 2024-07-16 PROCEDURE — 80053 COMPREHEN METABOLIC PANEL: CPT

## 2024-07-16 PROCEDURE — 96413 CHEMO IV INFUSION 1 HR: CPT

## 2024-07-16 PROCEDURE — 6360000002 HC RX W HCPCS: Performed by: INTERNAL MEDICINE

## 2024-07-16 RX ORDER — ONDANSETRON 2 MG/ML
8 INJECTION INTRAMUSCULAR; INTRAVENOUS
OUTPATIENT
Start: 2024-08-13

## 2024-07-16 RX ORDER — HEPARIN 100 UNIT/ML
500 SYRINGE INTRAVENOUS PRN
Status: DISCONTINUED | OUTPATIENT
Start: 2024-07-16 | End: 2024-07-17 | Stop reason: HOSPADM

## 2024-07-16 RX ORDER — SODIUM CHLORIDE 0.9 % (FLUSH) 0.9 %
5-40 SYRINGE (ML) INJECTION PRN
Status: DISCONTINUED | OUTPATIENT
Start: 2024-07-16 | End: 2024-07-17 | Stop reason: HOSPADM

## 2024-07-16 RX ORDER — ONDANSETRON 2 MG/ML
8 INJECTION INTRAMUSCULAR; INTRAVENOUS
Status: DISCONTINUED | OUTPATIENT
Start: 2024-07-16 | End: 2024-07-17 | Stop reason: HOSPADM

## 2024-07-16 RX ORDER — DIPHENHYDRAMINE HYDROCHLORIDE 50 MG/ML
50 INJECTION INTRAMUSCULAR; INTRAVENOUS
OUTPATIENT
Start: 2024-08-13

## 2024-07-16 RX ORDER — SODIUM CHLORIDE 9 MG/ML
INJECTION, SOLUTION INTRAVENOUS CONTINUOUS
OUTPATIENT
Start: 2024-08-13

## 2024-07-16 RX ORDER — SODIUM CHLORIDE 9 MG/ML
INJECTION, SOLUTION INTRAVENOUS CONTINUOUS
OUTPATIENT
Start: 2024-08-04

## 2024-07-16 RX ORDER — SODIUM CHLORIDE 9 MG/ML
5-250 INJECTION, SOLUTION INTRAVENOUS PRN
OUTPATIENT
Start: 2024-08-04

## 2024-07-16 RX ORDER — SODIUM CHLORIDE 0.9 % (FLUSH) 0.9 %
5-40 SYRINGE (ML) INJECTION PRN
OUTPATIENT
Start: 2024-08-13

## 2024-07-16 RX ORDER — HEPARIN 100 UNIT/ML
500 SYRINGE INTRAVENOUS PRN
OUTPATIENT
Start: 2024-08-04

## 2024-07-16 RX ORDER — SODIUM CHLORIDE 9 MG/ML
5-250 INJECTION, SOLUTION INTRAVENOUS PRN
Status: DISCONTINUED | OUTPATIENT
Start: 2024-07-16 | End: 2024-07-17 | Stop reason: HOSPADM

## 2024-07-16 RX ORDER — EPINEPHRINE 1 MG/ML
0.3 INJECTION, SOLUTION, CONCENTRATE INTRAVENOUS PRN
OUTPATIENT
Start: 2024-08-04

## 2024-07-16 RX ORDER — ACETAMINOPHEN 325 MG/1
650 TABLET ORAL
Status: DISCONTINUED | OUTPATIENT
Start: 2024-07-16 | End: 2024-07-17 | Stop reason: HOSPADM

## 2024-07-16 RX ORDER — ACETAMINOPHEN 325 MG/1
650 TABLET ORAL
OUTPATIENT
Start: 2024-08-13

## 2024-07-16 RX ORDER — DIPHENHYDRAMINE HYDROCHLORIDE 50 MG/ML
50 INJECTION INTRAMUSCULAR; INTRAVENOUS
Status: DISCONTINUED | OUTPATIENT
Start: 2024-07-16 | End: 2024-07-17 | Stop reason: HOSPADM

## 2024-07-16 RX ORDER — ALBUTEROL SULFATE 90 UG/1
4 AEROSOL, METERED RESPIRATORY (INHALATION) PRN
Status: DISCONTINUED | OUTPATIENT
Start: 2024-07-16 | End: 2024-07-17 | Stop reason: HOSPADM

## 2024-07-16 RX ORDER — DIPHENHYDRAMINE HYDROCHLORIDE 50 MG/ML
50 INJECTION INTRAMUSCULAR; INTRAVENOUS
OUTPATIENT
Start: 2024-08-04

## 2024-07-16 RX ORDER — SODIUM CHLORIDE 9 MG/ML
INJECTION, SOLUTION INTRAVENOUS CONTINUOUS
Status: DISCONTINUED | OUTPATIENT
Start: 2024-07-16 | End: 2024-07-17 | Stop reason: HOSPADM

## 2024-07-16 RX ORDER — SODIUM CHLORIDE 9 MG/ML
5-250 INJECTION, SOLUTION INTRAVENOUS PRN
OUTPATIENT
Start: 2024-08-13

## 2024-07-16 RX ORDER — EPINEPHRINE 1 MG/ML
0.3 INJECTION, SOLUTION, CONCENTRATE INTRAVENOUS PRN
OUTPATIENT
Start: 2024-08-13

## 2024-07-16 RX ORDER — SODIUM CHLORIDE 0.9 % (FLUSH) 0.9 %
5-40 SYRINGE (ML) INJECTION PRN
OUTPATIENT
Start: 2024-08-04

## 2024-07-16 RX ORDER — EPINEPHRINE 1 MG/ML
0.3 INJECTION, SOLUTION, CONCENTRATE INTRAVENOUS PRN
Status: DISCONTINUED | OUTPATIENT
Start: 2024-07-16 | End: 2024-07-17 | Stop reason: HOSPADM

## 2024-07-16 RX ORDER — ACETAMINOPHEN 325 MG/1
650 TABLET ORAL
OUTPATIENT
Start: 2024-08-04

## 2024-07-16 RX ORDER — ONDANSETRON 2 MG/ML
8 INJECTION INTRAMUSCULAR; INTRAVENOUS
OUTPATIENT
Start: 2024-08-04

## 2024-07-16 RX ORDER — ALBUTEROL SULFATE 90 UG/1
4 AEROSOL, METERED RESPIRATORY (INHALATION) PRN
OUTPATIENT
Start: 2024-08-13

## 2024-07-16 RX ORDER — ALBUTEROL SULFATE 90 UG/1
4 AEROSOL, METERED RESPIRATORY (INHALATION) PRN
OUTPATIENT
Start: 2024-08-04

## 2024-07-16 RX ORDER — HEPARIN 100 UNIT/ML
500 SYRINGE INTRAVENOUS PRN
OUTPATIENT
Start: 2024-08-13

## 2024-07-16 RX ADMIN — SODIUM CHLORIDE 100 ML/HR: 9 INJECTION, SOLUTION INTRAVENOUS at 14:45

## 2024-07-16 RX ADMIN — SODIUM CHLORIDE 500 MG: 9 INJECTION, SOLUTION INTRAVENOUS at 15:29

## 2024-07-16 NOTE — PROGRESS NOTES
Arrived to the Infusion Center.  Belatacept completed. Patient tolerated without difficulty.   Any issues or concerns during appointment: None.  Patient aware of next infusion appointment on 08/13 (date) at 1430 (time).  Patient instructed to call provider with temperature of 100.4 or greater or nausea/vomiting/ diarrhea or pain not controlled by medications  Discharged ambulatory.

## 2024-07-17 ENCOUNTER — HOME CARE VISIT (OUTPATIENT)
Dept: SCHEDULING | Facility: HOME HEALTH | Age: 79
End: 2024-07-17
Payer: MEDICARE

## 2024-07-17 VITALS
HEART RATE: 79 BPM | OXYGEN SATURATION: 99 % | TEMPERATURE: 97.9 F | RESPIRATION RATE: 18 BRPM | SYSTOLIC BLOOD PRESSURE: 126 MMHG | DIASTOLIC BLOOD PRESSURE: 74 MMHG

## 2024-07-17 PROCEDURE — G0299 HHS/HOSPICE OF RN EA 15 MIN: HCPCS

## 2024-07-19 ENCOUNTER — HOME CARE VISIT (OUTPATIENT)
Dept: SCHEDULING | Facility: HOME HEALTH | Age: 79
End: 2024-07-19
Payer: MEDICARE

## 2024-07-19 PROCEDURE — G0299 HHS/HOSPICE OF RN EA 15 MIN: HCPCS

## 2024-07-20 VITALS
HEART RATE: 77 BPM | SYSTOLIC BLOOD PRESSURE: 118 MMHG | TEMPERATURE: 97.6 F | DIASTOLIC BLOOD PRESSURE: 63 MMHG | OXYGEN SATURATION: 99 % | RESPIRATION RATE: 18 BRPM

## 2024-07-22 ENCOUNTER — HOME CARE VISIT (OUTPATIENT)
Dept: SCHEDULING | Facility: HOME HEALTH | Age: 79
End: 2024-07-22
Payer: MEDICARE

## 2024-07-22 VITALS
SYSTOLIC BLOOD PRESSURE: 132 MMHG | HEART RATE: 87 BPM | RESPIRATION RATE: 16 BRPM | OXYGEN SATURATION: 99 % | TEMPERATURE: 98.6 F | DIASTOLIC BLOOD PRESSURE: 78 MMHG

## 2024-07-22 PROCEDURE — G0299 HHS/HOSPICE OF RN EA 15 MIN: HCPCS

## 2024-07-22 RX ORDER — CARVEDILOL 12.5 MG/1
12.5 TABLET ORAL 2 TIMES DAILY
Qty: 180 TABLET | Refills: 1 | Status: SHIPPED | OUTPATIENT
Start: 2024-07-22

## 2024-07-22 ASSESSMENT — ENCOUNTER SYMPTOMS: STOOL DESCRIPTION: FORMED

## 2024-07-24 ENCOUNTER — HOME CARE VISIT (OUTPATIENT)
Dept: SCHEDULING | Facility: HOME HEALTH | Age: 79
End: 2024-07-24
Payer: MEDICARE

## 2024-07-24 VITALS
TEMPERATURE: 97.8 F | DIASTOLIC BLOOD PRESSURE: 62 MMHG | HEART RATE: 18 BPM | RESPIRATION RATE: 18 BRPM | OXYGEN SATURATION: 98 % | SYSTOLIC BLOOD PRESSURE: 111 MMHG

## 2024-07-24 PROCEDURE — G0299 HHS/HOSPICE OF RN EA 15 MIN: HCPCS

## 2024-07-25 ENCOUNTER — OFFICE VISIT (OUTPATIENT)
Dept: NEUROLOGY | Age: 79
End: 2024-07-25
Payer: MEDICARE

## 2024-07-25 VITALS — OXYGEN SATURATION: 98 % | SYSTOLIC BLOOD PRESSURE: 152 MMHG | HEART RATE: 72 BPM | DIASTOLIC BLOOD PRESSURE: 80 MMHG

## 2024-07-25 DIAGNOSIS — F02.C2 SEVERE ALZHEIMER'S DEMENTIA OF OTHER ONSET WITH PSYCHOTIC DISTURBANCE (HCC): Primary | ICD-10-CM

## 2024-07-25 DIAGNOSIS — F01.518 VASCULAR DEMENTIA WITH BEHAVIORAL DISTURBANCE (HCC): ICD-10-CM

## 2024-07-25 DIAGNOSIS — G30.8 SEVERE ALZHEIMER'S DEMENTIA OF OTHER ONSET WITH PSYCHOTIC DISTURBANCE (HCC): Primary | ICD-10-CM

## 2024-07-25 PROCEDURE — 1090F PRES/ABSN URINE INCON ASSESS: CPT | Performed by: PSYCHIATRY & NEUROLOGY

## 2024-07-25 PROCEDURE — 1123F ACP DISCUSS/DSCN MKR DOCD: CPT | Performed by: PSYCHIATRY & NEUROLOGY

## 2024-07-25 PROCEDURE — G8427 DOCREV CUR MEDS BY ELIG CLIN: HCPCS | Performed by: PSYCHIATRY & NEUROLOGY

## 2024-07-25 PROCEDURE — G8417 CALC BMI ABV UP PARAM F/U: HCPCS | Performed by: PSYCHIATRY & NEUROLOGY

## 2024-07-25 PROCEDURE — 99214 OFFICE O/P EST MOD 30 MIN: CPT | Performed by: PSYCHIATRY & NEUROLOGY

## 2024-07-25 PROCEDURE — G8400 PT W/DXA NO RESULTS DOC: HCPCS | Performed by: PSYCHIATRY & NEUROLOGY

## 2024-07-25 PROCEDURE — 1036F TOBACCO NON-USER: CPT | Performed by: PSYCHIATRY & NEUROLOGY

## 2024-07-25 RX ORDER — RISPERIDONE 0.5 MG/1
0.5 TABLET ORAL EVERY EVENING
Qty: 30 TABLET | Refills: 9 | Status: SHIPPED | OUTPATIENT
Start: 2024-07-25

## 2024-07-25 RX ORDER — MEMANTINE HYDROCHLORIDE 10 MG/1
TABLET ORAL
Qty: 180 TABLET | Refills: 3 | Status: SHIPPED | OUTPATIENT
Start: 2024-07-25

## 2024-07-25 ASSESSMENT — ENCOUNTER SYMPTOMS
EYES NEGATIVE: 1
RESPIRATORY NEGATIVE: 1
GASTROINTESTINAL NEGATIVE: 1
ALLERGIC/IMMUNOLOGIC NEGATIVE: 1

## 2024-07-25 NOTE — PROGRESS NOTES
intravenously      vitamin D 25 MCG (1000 UT) CAPS Take 1 capsule by mouth daily      cyanocobalamin 250 MCG tablet Take 1 tablet by mouth daily      mycophenolate (CELLCEPT) 250 MG capsule Take 3 capsules by mouth 2 times daily.      predniSONE (DELTASONE) 5 MG tablet Take 1 tablet by mouth daily       No current facility-administered medications on file prior to visit.       Allergies   Allergen Reactions    Lisinopril Other (See Comments)     cough       Review of Systems:  Review of Systems   Eyes: Negative.    Respiratory: Negative.     Cardiovascular: Negative.    Gastrointestinal: Negative.    Endocrine: Negative.    Genitourinary: Negative.    Musculoskeletal: Negative.    Skin: Negative.    Allergic/Immunologic: Negative.    Neurological: Negative.    Hematological: Negative.    Psychiatric/Behavioral:  Positive for hallucinations.       Failed to redirect to the Timeline version of the [employer] SmartLink.  Failed to redirect to the Timeline version of the Flow[employer] SmartLink.       Vitals:    07/25/24 1508   BP: (!) 152/80   Site: Right Upper Arm   Position: Sitting   Pulse: 72   SpO2: 98%        Physical Exam  Constitutional:       Appearance: Normal appearance.   HENT:      Head: Normocephalic and atraumatic.   Eyes:      Extraocular Movements: Extraocular movements intact and EOM normal.      Pupils: Pupils are equal, round, and reactive to light.   Cardiovascular:      Rate and Rhythm: Normal rate.      Pulses: Normal pulses.   Pulmonary:      Effort: Pulmonary effort is normal.   Abdominal:      Palpations: Abdomen is soft.   Neurological:      Mental Status: She is alert.      Deep Tendon Reflexes:      Reflex Scores:       Tricep reflexes are 1+ on the right side and 1+ on the left side.       Bicep reflexes are 1+ on the right side and 1+ on the left side.       Brachioradialis reflexes are 1+ on the right side and 1+ on the left side.       Patellar reflexes are 1+ on the right side and 1+ on the left

## 2024-07-26 ENCOUNTER — HOME CARE VISIT (OUTPATIENT)
Dept: SCHEDULING | Facility: HOME HEALTH | Age: 79
End: 2024-07-26
Payer: MEDICARE

## 2024-07-26 VITALS
OXYGEN SATURATION: 99 % | SYSTOLIC BLOOD PRESSURE: 130 MMHG | RESPIRATION RATE: 18 BRPM | TEMPERATURE: 97.8 F | HEART RATE: 80 BPM | DIASTOLIC BLOOD PRESSURE: 70 MMHG

## 2024-07-26 PROCEDURE — G0299 HHS/HOSPICE OF RN EA 15 MIN: HCPCS

## 2024-07-26 ASSESSMENT — ENCOUNTER SYMPTOMS
STOOL DESCRIPTION: SOFT FORMED
DYSPNEA ACTIVITY LEVEL: AFTER AMBULATING MORE THAN 20 FT

## 2024-07-29 ENCOUNTER — HOME CARE VISIT (OUTPATIENT)
Dept: SCHEDULING | Facility: HOME HEALTH | Age: 79
End: 2024-07-29
Payer: MEDICARE

## 2024-07-29 VITALS
DIASTOLIC BLOOD PRESSURE: 84 MMHG | OXYGEN SATURATION: 99 % | SYSTOLIC BLOOD PRESSURE: 118 MMHG | HEART RATE: 67 BPM | RESPIRATION RATE: 16 BRPM | TEMPERATURE: 98.4 F

## 2024-07-29 PROCEDURE — G0299 HHS/HOSPICE OF RN EA 15 MIN: HCPCS

## 2024-07-29 ASSESSMENT — ENCOUNTER SYMPTOMS: STOOL DESCRIPTION: FORMED

## 2024-07-31 ENCOUNTER — HOME CARE VISIT (OUTPATIENT)
Dept: SCHEDULING | Facility: HOME HEALTH | Age: 79
End: 2024-07-31
Payer: MEDICARE

## 2024-07-31 VITALS
RESPIRATION RATE: 18 BRPM | OXYGEN SATURATION: 99 % | SYSTOLIC BLOOD PRESSURE: 118 MMHG | DIASTOLIC BLOOD PRESSURE: 61 MMHG | TEMPERATURE: 97.9 F | HEART RATE: 73 BPM

## 2024-07-31 PROCEDURE — G0299 HHS/HOSPICE OF RN EA 15 MIN: HCPCS

## 2024-08-02 ENCOUNTER — HOSPITAL ENCOUNTER (OUTPATIENT)
Dept: WOUND CARE | Age: 79
Discharge: HOME | End: 2024-08-02
Payer: MEDICARE

## 2024-08-02 ENCOUNTER — HOME CARE VISIT (OUTPATIENT)
Dept: SCHEDULING | Facility: HOME HEALTH | Age: 79
End: 2024-08-02
Payer: MEDICARE

## 2024-08-02 VITALS
SYSTOLIC BLOOD PRESSURE: 142 MMHG | HEART RATE: 73 BPM | RESPIRATION RATE: 16 BRPM | HEIGHT: 62 IN | OXYGEN SATURATION: 98 % | BODY MASS INDEX: 27.42 KG/M2 | DIASTOLIC BLOOD PRESSURE: 57 MMHG | TEMPERATURE: 98.8 F | WEIGHT: 149 LBS

## 2024-08-02 DIAGNOSIS — L03.116 CELLULITIS OF LEFT HEEL: ICD-10-CM

## 2024-08-02 DIAGNOSIS — L89.623 PRESSURE ULCER OF LEFT HEEL, STAGE 3 (HCC): Primary | ICD-10-CM

## 2024-08-02 PROCEDURE — 11042 DBRDMT SUBQ TIS 1ST 20SQCM/<: CPT

## 2024-08-02 RX ORDER — LIDOCAINE HYDROCHLORIDE 40 MG/ML
SOLUTION TOPICAL ONCE
OUTPATIENT
Start: 2024-08-02 | End: 2024-08-02

## 2024-08-02 RX ORDER — GINSENG 100 MG
CAPSULE ORAL ONCE
OUTPATIENT
Start: 2024-08-02 | End: 2024-08-02

## 2024-08-02 RX ORDER — LIDOCAINE HYDROCHLORIDE 20 MG/ML
JELLY TOPICAL ONCE
OUTPATIENT
Start: 2024-08-02 | End: 2024-08-02

## 2024-08-02 RX ORDER — LIDOCAINE 40 MG/G
CREAM TOPICAL ONCE
OUTPATIENT
Start: 2024-08-02 | End: 2024-08-02

## 2024-08-02 RX ORDER — TRIAMCINOLONE ACETONIDE 1 MG/G
OINTMENT TOPICAL ONCE
OUTPATIENT
Start: 2024-08-02 | End: 2024-08-02

## 2024-08-02 RX ORDER — LIDOCAINE 50 MG/G
OINTMENT TOPICAL ONCE
OUTPATIENT
Start: 2024-08-02 | End: 2024-08-02

## 2024-08-02 RX ORDER — BACITRACIN ZINC AND POLYMYXIN B SULFATE 500; 1000 [USP'U]/G; [USP'U]/G
OINTMENT TOPICAL ONCE
OUTPATIENT
Start: 2024-08-02 | End: 2024-08-02

## 2024-08-02 RX ORDER — GENTAMICIN SULFATE 1 MG/G
OINTMENT TOPICAL ONCE
OUTPATIENT
Start: 2024-08-02 | End: 2024-08-02

## 2024-08-02 RX ORDER — LIDOCAINE HYDROCHLORIDE 20 MG/ML
JELLY TOPICAL ONCE
Status: COMPLETED | OUTPATIENT
Start: 2024-08-02 | End: 2024-08-02

## 2024-08-02 RX ORDER — CLOBETASOL PROPIONATE 0.5 MG/G
OINTMENT TOPICAL ONCE
OUTPATIENT
Start: 2024-08-02 | End: 2024-08-02

## 2024-08-02 RX ORDER — BETAMETHASONE DIPROPIONATE 0.5 MG/G
CREAM TOPICAL ONCE
OUTPATIENT
Start: 2024-08-02 | End: 2024-08-02

## 2024-08-02 RX ORDER — IBUPROFEN 200 MG
TABLET ORAL ONCE
OUTPATIENT
Start: 2024-08-02 | End: 2024-08-02

## 2024-08-02 RX ORDER — SODIUM CHLOR/HYPOCHLOROUS ACID 0.033 %
SOLUTION, IRRIGATION IRRIGATION ONCE
OUTPATIENT
Start: 2024-08-02 | End: 2024-08-02

## 2024-08-02 RX ADMIN — LIDOCAINE HYDROCHLORIDE: 20 JELLY TOPICAL at 13:28

## 2024-08-02 NOTE — FLOWSHEET NOTE
08/02/24 1315   Wound 01/22/24 Heel Left;Posterior #1   Date First Assessed/Time First Assessed: 01/22/24 1336   Present on Original Admission: Yes  Wound Approximate Age at First Assessment (Weeks): 8 weeks  Primary Wound Type: Diabetic Ulcer  Location: Heel  Wound Location Orientation: Left;Posterior  Wo...   Wound Image     Wound Etiology Diabetic Doan 2   Dressing Status Old drainage noted   Wound Cleansed Cleansed with saline   Dressing/Treatment Hydrofera blue   Wound Length (cm) 0.9 cm   Wound Width (cm) 1.8 cm   Wound Depth (cm) 0.1 cm   Wound Surface Area (cm^2) 1.62 cm^2   Change in Wound Size % (l*w) -62   Wound Volume (cm^3) 0.162 cm^3   Wound Healing % -62   Post-Procedure Length (cm) 1.5 cm   Post-Procedure Width (cm) 2 cm   Post-Procedure Depth (cm) 0.1 cm   Post-Procedure Surface Area (cm^2) 3 cm^2   Post-Procedure Volume (cm^3) 0.3 cm^3   Wound Assessment [place]/red;Slough   Drainage Amount Moderate (25-50%)   Drainage Description Serosanguinous   Odor None   Abby-wound Assessment Intact   Margins Defined edges   Wound Thickness Description not for Pressure Injury Full thickness   Pain Assessment   Pain Assessment None - Denies Pain

## 2024-08-02 NOTE — WOUND CARE
Discharge Instructions for  [facility]  [address] [place] [address]  [address]  [place] [address]  Phone [phone]   Fax [phone]      NAME:  Alayna Fall  YOB: 1945  MEDICAL RECORD NUMBER:  [id number]  DATE:  8/2/2024    Return Appointment:   2 weeks with Todd Rizzo DO    Instructions:   Left Heel:  Cleanse with normal saline  Vashe post debridement.  Apply collagen. Cut product to approximately size and apply to wound bed.  Apply Hydrofera Ready to wound bed covering edges   Cover with gauze or ABD pad. Secure with roll gauze   Change 3 x week      Apply bordered foam to dorsal foot with each dressing change     [facility] nursing for dressing changes 3 times weekly.     Offloading:  Use wheelchair to minimize ambulation.  Patient to offload wound with pillow under calf while in bed or chair.   Use cast cover for showering.  Wound healing is greatly slowed when blood glucose levels are greater than 200. Monitor glucose levels daily to ensure tight glucose control.  Increase protein intake to promote wound healing. Protein supplements such as Evelio and Ensure are great options.   Protein goal is 60 - 80 grams per day. Recommend Ensure Max or Glucerna.     Should you experience increased redness, swelling, pain, foul odor, size of wound(s), or have a temperature over 101 degrees please contact the Wound Healing Center at [phone] or if after hours contact your primary care physician or go to the hospital emergency department.    PLEASE NOTE: IF YOU ARE UNABLE TO OBTAIN WOUND SUPPLIES, CONTINUE TO USE THE SUPPLIES YOU HAVE AVAILABLE UNTIL YOU ARE ABLE TO REACH US. IT IS MOST IMPORTANT TO KEEP THE WOUND COVERED AT ALL TIMES.    Electronically signed FERN TRONCOSO RN on 8/2/2024 at 1:30 PM

## 2024-08-05 ENCOUNTER — HOME CARE VISIT (OUTPATIENT)
Dept: SCHEDULING | Facility: HOME HEALTH | Age: 79
End: 2024-08-05
Payer: MEDICARE

## 2024-08-05 VITALS
OXYGEN SATURATION: 98 % | RESPIRATION RATE: 16 BRPM | DIASTOLIC BLOOD PRESSURE: 82 MMHG | HEART RATE: 64 BPM | SYSTOLIC BLOOD PRESSURE: 136 MMHG | TEMPERATURE: 97.7 F

## 2024-08-05 PROCEDURE — G0299 HHS/HOSPICE OF RN EA 15 MIN: HCPCS

## 2024-08-05 ASSESSMENT — ENCOUNTER SYMPTOMS: STOOL DESCRIPTION: FORMED

## 2024-08-07 ENCOUNTER — HOME CARE VISIT (OUTPATIENT)
Dept: SCHEDULING | Facility: HOME HEALTH | Age: 79
End: 2024-08-07
Payer: MEDICARE

## 2024-08-07 VITALS
TEMPERATURE: 98.3 F | RESPIRATION RATE: 18 BRPM | HEART RATE: 73 BPM | SYSTOLIC BLOOD PRESSURE: 127 MMHG | DIASTOLIC BLOOD PRESSURE: 73 MMHG | OXYGEN SATURATION: 97 %

## 2024-08-07 PROCEDURE — G0299 HHS/HOSPICE OF RN EA 15 MIN: HCPCS

## 2024-08-09 ENCOUNTER — HOME CARE VISIT (OUTPATIENT)
Dept: SCHEDULING | Facility: HOME HEALTH | Age: 79
End: 2024-08-09
Payer: MEDICARE

## 2024-08-09 VITALS
SYSTOLIC BLOOD PRESSURE: 131 MMHG | RESPIRATION RATE: 18 BRPM | OXYGEN SATURATION: 96 % | TEMPERATURE: 98.2 F | HEART RATE: 85 BPM | DIASTOLIC BLOOD PRESSURE: 78 MMHG

## 2024-08-09 PROCEDURE — G0299 HHS/HOSPICE OF RN EA 15 MIN: HCPCS

## 2024-08-12 ENCOUNTER — HOME CARE VISIT (OUTPATIENT)
Dept: SCHEDULING | Facility: HOME HEALTH | Age: 79
End: 2024-08-12
Payer: MEDICARE

## 2024-08-12 VITALS
HEART RATE: 86 BPM | TEMPERATURE: 98 F | SYSTOLIC BLOOD PRESSURE: 132 MMHG | RESPIRATION RATE: 20 BRPM | DIASTOLIC BLOOD PRESSURE: 72 MMHG | OXYGEN SATURATION: 98 %

## 2024-08-12 PROCEDURE — G0299 HHS/HOSPICE OF RN EA 15 MIN: HCPCS

## 2024-08-14 ENCOUNTER — HOME CARE VISIT (OUTPATIENT)
Dept: SCHEDULING | Facility: HOME HEALTH | Age: 79
End: 2024-08-14
Payer: MEDICARE

## 2024-08-14 VITALS
SYSTOLIC BLOOD PRESSURE: 117 MMHG | OXYGEN SATURATION: 99 % | DIASTOLIC BLOOD PRESSURE: 64 MMHG | HEART RATE: 70 BPM | RESPIRATION RATE: 18 BRPM | TEMPERATURE: 98.4 F

## 2024-08-14 PROCEDURE — G0299 HHS/HOSPICE OF RN EA 15 MIN: HCPCS

## 2024-08-16 ENCOUNTER — HOME CARE VISIT (OUTPATIENT)
Dept: SCHEDULING | Facility: HOME HEALTH | Age: 79
End: 2024-08-16
Payer: MEDICARE

## 2024-08-16 ENCOUNTER — HOSPITAL ENCOUNTER (OUTPATIENT)
Dept: WOUND CARE | Age: 79
Discharge: HOME | End: 2024-08-16
Payer: MEDICARE

## 2024-08-16 VITALS
HEIGHT: 62 IN | SYSTOLIC BLOOD PRESSURE: 127 MMHG | WEIGHT: 149 LBS | DIASTOLIC BLOOD PRESSURE: 61 MMHG | BODY MASS INDEX: 27.42 KG/M2 | HEART RATE: 76 BPM

## 2024-08-16 DIAGNOSIS — L03.116 CELLULITIS OF LEFT HEEL: ICD-10-CM

## 2024-08-16 DIAGNOSIS — L89.623 PRESSURE ULCER OF LEFT HEEL, STAGE 3 (HCC): Primary | ICD-10-CM

## 2024-08-16 PROCEDURE — 97597 DBRDMT OPN WND 1ST 20 CM/<: CPT

## 2024-08-16 RX ORDER — LIDOCAINE 40 MG/G
CREAM TOPICAL ONCE
OUTPATIENT
Start: 2024-08-16 | End: 2024-08-16

## 2024-08-16 RX ORDER — LIDOCAINE 50 MG/G
OINTMENT TOPICAL ONCE
OUTPATIENT
Start: 2024-08-16 | End: 2024-08-16

## 2024-08-16 RX ORDER — GENTAMICIN SULFATE 1 MG/G
OINTMENT TOPICAL ONCE
OUTPATIENT
Start: 2024-08-16 | End: 2024-08-16

## 2024-08-16 RX ORDER — BACITRACIN ZINC AND POLYMYXIN B SULFATE 500; 1000 [USP'U]/G; [USP'U]/G
OINTMENT TOPICAL ONCE
OUTPATIENT
Start: 2024-08-16 | End: 2024-08-16

## 2024-08-16 RX ORDER — BETAMETHASONE DIPROPIONATE 0.5 MG/G
CREAM TOPICAL ONCE
OUTPATIENT
Start: 2024-08-16 | End: 2024-08-16

## 2024-08-16 RX ORDER — CLOBETASOL PROPIONATE 0.5 MG/G
OINTMENT TOPICAL ONCE
OUTPATIENT
Start: 2024-08-16 | End: 2024-08-16

## 2024-08-16 RX ORDER — LIDOCAINE HYDROCHLORIDE 40 MG/ML
SOLUTION TOPICAL ONCE
OUTPATIENT
Start: 2024-08-16 | End: 2024-08-16

## 2024-08-16 RX ORDER — GINSENG 100 MG
CAPSULE ORAL ONCE
OUTPATIENT
Start: 2024-08-16 | End: 2024-08-16

## 2024-08-16 RX ORDER — LIDOCAINE HYDROCHLORIDE 20 MG/ML
JELLY TOPICAL ONCE
Status: COMPLETED | OUTPATIENT
Start: 2024-08-16 | End: 2024-08-16

## 2024-08-16 RX ORDER — TRIAMCINOLONE ACETONIDE 1 MG/G
OINTMENT TOPICAL ONCE
OUTPATIENT
Start: 2024-08-16 | End: 2024-08-16

## 2024-08-16 RX ORDER — IBUPROFEN 200 MG
TABLET ORAL ONCE
OUTPATIENT
Start: 2024-08-16 | End: 2024-08-16

## 2024-08-16 RX ORDER — LIDOCAINE HYDROCHLORIDE 20 MG/ML
JELLY TOPICAL ONCE
OUTPATIENT
Start: 2024-08-16 | End: 2024-08-16

## 2024-08-16 RX ORDER — SODIUM CHLOR/HYPOCHLOROUS ACID 0.033 %
SOLUTION, IRRIGATION IRRIGATION ONCE
OUTPATIENT
Start: 2024-08-16 | End: 2024-08-16

## 2024-08-16 RX ADMIN — LIDOCAINE HYDROCHLORIDE: 20 JELLY TOPICAL at 13:58

## 2024-08-16 NOTE — FLOWSHEET NOTE
08/16/24 1324   Wound 01/22/24 Heel Left;Posterior #1   Date First Assessed/Time First Assessed: 01/22/24 1336   Present on Original Admission: Yes  Wound Approximate Age at First Assessment (Weeks): 8 weeks  Primary Wound Type: Diabetic Ulcer  Location: Heel  Wound Location Orientation: Left;Posterior  Wo...   Wound Image     Wound Etiology Diabetic Doan 2   Dressing Status New dressing applied;Intact;Dry;Clean   Wound Cleansed Cleansed with saline   Dressing/Treatment ABD;Hydrofera blue;Roll gauze   Wound Length (cm) 0.9 cm   Wound Width (cm) 0.5 cm   Wound Depth (cm) 0.1 cm   Wound Surface Area (cm^2) 0.45 cm^2   Change in Wound Size % (l*w) 55   Wound Volume (cm^3) 0.045 cm^3   Wound Healing % 55   Post-Procedure Length (cm) 0.9 cm   Post-Procedure Width (cm) 0.5 cm   Post-Procedure Depth (cm) 0.1 cm   Post-Procedure Surface Area (cm^2) 0.45 cm^2   Post-Procedure Volume (cm^3) 0.045 cm^3   Wound Assessment Pink/red;Epithelialization   Drainage Amount Small (< 25%)   Drainage Description Serosanguinous   Odor None   Abby-wound Assessment Intact   Wound Thickness Description not for Pressure Injury Full thickness     Post debridement

## 2024-08-16 NOTE — FLOWSHEET NOTE
08/16/24 1324   Wound 01/22/24 Heel Left;Posterior #1   Date First Assessed/Time First Assessed: 01/22/24 1336   Present on Original Admission: Yes  Wound Approximate Age at First Assessment (Weeks): 8 weeks  Primary Wound Type: Diabetic Ulcer  Location: Heel  Wound Location Orientation: Left;Posterior  Wo...   Wound Image    Wound Etiology Diabetic Doan 2   Dressing Status New dressing applied;Intact;Dry;Clean   Wound Cleansed Cleansed with saline   Dressing/Treatment ABD;Hydrofera blue;Roll gauze   Wound Length (cm) 0.9 cm   Wound Width (cm) 0.5 cm   Wound Depth (cm) 0.1 cm   Wound Surface Area (cm^2) 0.45 cm^2   Change in Wound Size % (l*w) 55   Wound Volume (cm^3) 0.045 cm^3   Wound Healing % 55   Wound Assessment Pink/red;Epithelialization   Drainage Amount Small (< 25%)   Drainage Description Serosanguinous   Odor None   Abby-wound Assessment Intact   Wound Thickness Description not for Pressure Injury Full thickness

## 2024-08-16 NOTE — WOUND CARE
Discharge Instructions for  [facility]  [address] [place] [address]  [address]  [place] [address]  Phone [phone]   Fax [phone]      NAME:  Alayna Fall  YOB: 1945  MEDICAL RECORD NUMBER:  [id number]  DATE:  8/16/2024    Return Appointment:   3 weeks with Todd Rizzo DO    Instructions:   Left Heel:  Cleanse with normal saline  Vashe post debridement.  Apply collagen. Cut product to approximately size and apply to wound bed.  Apply Hydrofera Ready to wound bed covering edges   Cover with gauze or ABD pad. Secure with roll gauze   Change 3 x week      Apply bordered foam to dorsal foot with each dressing change     [facility] nursing for dressing changes 3 times weekly.     Offloading:  Use wheelchair to minimize ambulation.  Patient to offload wound with pillow under calf while in bed or chair.   Use cast cover for showering.  Wound healing is greatly slowed when blood glucose levels are greater than 200. Monitor glucose levels daily to ensure tight glucose control.  Increase protein intake to promote wound healing. Protein supplements such as Evelio and Ensure are great options.   Protein goal is 60 - 80 grams per day. Recommend Ensure Max or Glucerna.     Should you experience increased redness, swelling, pain, foul odor, size of wound(s), or have a temperature over 101 degrees please contact the Wound Healing Center at [phone] or if after hours contact your primary care physician or go to the hospital emergency department.    PLEASE NOTE: IF YOU ARE UNABLE TO OBTAIN WOUND SUPPLIES, CONTINUE TO USE THE SUPPLIES YOU HAVE AVAILABLE UNTIL YOU ARE ABLE TO REACH US. IT IS MOST IMPORTANT TO KEEP THE WOUND COVERED AT ALL TIMES.    Electronically signed Precious Barksdale RN on 8/16/2024 at 1:58 PM

## 2024-08-16 NOTE — DISCHARGE INSTRUCTIONS
Discharge Instructions for  [facility]  [address] [place] [address]  [address] [place] [address]  Phone [phone]   Fax [phone]      NAME:  Alayna Fall  YOB: 1945  MEDICAL RECORD NUMBER:  [id number]  DATE:  @ED@    Return Appointment:   3 weeks with Todd Rizzo DO      Instructions: ***        Should you experience increased redness, swelling, pain, foul odor, size of wound(s), or have a temperature over 101 degrees please contact the Wound Healing Center at [phone] or if after hours contact your primary care physician or go to the hospital emergency department.    PLEASE NOTE: IF YOU ARE UNABLE TO OBTAIN WOUND SUPPLIES, CONTINUE TO USE THE SUPPLIES YOU HAVE AVAILABLE UNTIL YOU ARE ABLE TO REACH US. IT IS MOST IMPORTANT TO KEEP THE WOUND COVERED AT ALL TIMES.    Electronically signed Precious Barksdale RN on 8/16/2024 at 2:08 PM

## 2024-08-19 ENCOUNTER — HOME CARE VISIT (OUTPATIENT)
Dept: HOME HEALTH SERVICES | Facility: HOME HEALTH | Age: 79
End: 2024-08-19
Payer: MEDICARE

## 2024-09-06 ENCOUNTER — HOSPITAL ENCOUNTER (OUTPATIENT)
Dept: WOUND CARE | Age: 79
Discharge: HOME | End: 2024-09-06
Payer: MEDICARE

## 2024-09-06 VITALS
DIASTOLIC BLOOD PRESSURE: 53 MMHG | SYSTOLIC BLOOD PRESSURE: 116 MMHG | TEMPERATURE: 98 F | HEART RATE: 75 BPM | RESPIRATION RATE: 16 BRPM

## 2024-09-06 PROCEDURE — 97597 DBRDMT OPN WND 1ST 20 CM/<: CPT

## 2024-09-06 NOTE — FLOWSHEET NOTE
09/06/24 1314   Wound 01/22/24 Heel Left;Posterior #1   Date First Assessed/Time First Assessed: 01/22/24 1336   Present on Original Admission: Yes  Wound Approximate Age at First Assessment (Weeks): 8 weeks  Primary Wound Type: Diabetic Ulcer  Location: Heel  Wound Location Orientation: Left;Posterior  Wo...   Wound Image     Wound Etiology Diabetic Doan 2   Dressing Status Intact   Wound Cleansed Cleansed with saline   Dressing/Treatment Hydrofera blue   Offloading for Diabetic Foot Ulcers Offloading ordered   Wound Length (cm) 0.5 cm   Wound Width (cm) 0.5 cm   Wound Depth (cm) 0.1 cm   Wound Surface Area (cm^2) 0.25 cm^2   Change in Wound Size % (l*w) 75   Wound Volume (cm^3) 0.025 cm^3   Wound Healing % 75   Post-Procedure Length (cm) 0.2 cm   Post-Procedure Width (cm) 0.2 cm   Post-Procedure Depth (cm) 0.1 cm   Post-Procedure Surface Area (cm^2) 0.04 cm^2   Post-Procedure Volume (cm^3) 0.004 cm^3   Wound Assessment Eschar dry   Drainage Amount None (dry)   Odor None   Abby-wound Assessment Dry/flaky   Wound Thickness Description not for Pressure Injury Full thickness   Pain Assessment   Pain Assessment None - Denies Pain

## 2024-09-06 NOTE — WOUND CARE
Discharge Instructions for  [facility]  [address] [place] [address]  [address]  [place] [address]  Phone [phone]   Fax [phone]      NAME:  Alayna Fall  YOB: 1945  MEDICAL RECORD NUMBER:  [id number]  DATE:  9/6/2024    Return Appointment:   3 weeks with Todd Rizzo DO    Instructions:   Left Heel:  Cleanse with normal saline  Vashe post debridement.  Xeroform- apply to wound bed.  Cover with gauze or ABD pad. Secure with roll gauze   Change 3 x week      Apply bordered foam to dorsal foot with each dressing change     [facility] nursing for dressing changes 3 times weekly.     Offloading:  Use wheelchair to minimize ambulation.  Patient to offload wound with pillow under calf while in bed or chair.   Use cast cover for showering.  Wound healing is greatly slowed when blood glucose levels are greater than 200. Monitor glucose levels daily to ensure tight glucose control.  Increase protein intake to promote wound healing. Protein supplements such as Evelio and Ensure are great options.   Protein goal is 60 - 80 grams per day. Recommend Ensure Max or Glucerna.     Should you experience increased redness, swelling, pain, foul odor, size of wound(s), or have a temperature over 101 degrees please contact the Wound Healing Center at [phone] or if after hours contact your primary care physician or go to the hospital emergency department.    PLEASE NOTE: IF YOU ARE UNABLE TO OBTAIN WOUND SUPPLIES, CONTINUE TO USE THE SUPPLIES YOU HAVE AVAILABLE UNTIL YOU ARE ABLE TO REACH US. IT IS MOST IMPORTANT TO KEEP THE WOUND COVERED AT ALL TIMES.    Electronically signed Jennifer Acevedo RN on 9/6/2024 at 1:15 PM

## 2024-09-16 ENCOUNTER — TELEPHONE (OUTPATIENT)
Dept: FAMILY MEDICINE CLINIC | Facility: CLINIC | Age: 79
End: 2024-09-16

## 2024-09-16 DIAGNOSIS — L60.0 INGROWN TOENAIL: Primary | ICD-10-CM

## 2024-09-16 NOTE — TELEPHONE ENCOUNTER
Home health calling to see if you would be willing to order a referral for Mrs Fall to see a podiatrist. She states her toenails are ingrown and she is a diabetic.

## 2024-09-26 ENCOUNTER — HOSPITAL ENCOUNTER (OUTPATIENT)
Dept: INFUSION THERAPY | Age: 79
Setting detail: INFUSION SERIES
Discharge: HOME | End: 2024-09-26
Payer: MEDICARE

## 2024-09-26 ENCOUNTER — HOSPITAL ENCOUNTER (OUTPATIENT)
Dept: LAB | Age: 79
Setting detail: INFUSION SERIES
Discharge: HOME | End: 2024-09-29
Payer: MEDICARE

## 2024-09-26 VITALS
DIASTOLIC BLOOD PRESSURE: 78 MMHG | OXYGEN SATURATION: 98 % | HEART RATE: 65 BPM | BODY MASS INDEX: 27.98 KG/M2 | SYSTOLIC BLOOD PRESSURE: 153 MMHG | RESPIRATION RATE: 16 BRPM | TEMPERATURE: 97.9 F | WEIGHT: 153 LBS

## 2024-09-26 DIAGNOSIS — Z94.0 RENAL TRANSPLANT, STATUS POST: Primary | ICD-10-CM

## 2024-09-26 DIAGNOSIS — Z94.0 RENAL TRANSPLANT, STATUS POST: ICD-10-CM

## 2024-09-26 LAB
ALBUMIN SERPL-MCNC: 3.2 G/DL (ref 3.2–4.6)
ALBUMIN/GLOB SERPL: 0.8 (ref 1–1.9)
ALP SERPL-CCNC: 69 U/L (ref 35–104)
ALT SERPL-CCNC: 14 U/L (ref 8–45)
ANION GAP SERPL CALC-SCNC: 10 MMOL/L (ref 9–18)
AST SERPL-CCNC: 23 U/L (ref 15–37)
BASOPHILS # BLD: 0 K/UL (ref 0–0.2)
BASOPHILS NFR BLD: 1 % (ref 0–2)
BILIRUB SERPL-MCNC: 0.2 MG/DL (ref 0–1.2)
BUN SERPL-MCNC: 13 MG/DL (ref 8–23)
CALCIUM SERPL-MCNC: 9.8 MG/DL (ref 8.8–10.2)
CHLORIDE SERPL-SCNC: 105 MMOL/L (ref 98–107)
CO2 SERPL-SCNC: 23 MMOL/L (ref 20–28)
CREAT SERPL-MCNC: 0.6 MG/DL (ref 0.6–1.1)
DIFFERENTIAL METHOD BLD: ABNORMAL
EOSINOPHIL # BLD: 0.1 K/UL (ref 0–0.8)
EOSINOPHIL NFR BLD: 2 % (ref 0.5–7.8)
ERYTHROCYTE [DISTWIDTH] IN BLOOD BY AUTOMATED COUNT: 13.8 % (ref 11.9–14.6)
GLOBULIN SER CALC-MCNC: 4 G/DL (ref 2.3–3.5)
GLUCOSE SERPL-MCNC: 229 MG/DL (ref 70–99)
HCT VFR BLD AUTO: 33.4 % (ref 35.8–46.3)
HGB BLD-MCNC: 10.1 G/DL (ref 11.7–15.4)
IMM GRANULOCYTES # BLD AUTO: 0.1 K/UL (ref 0–0.5)
IMM GRANULOCYTES NFR BLD AUTO: 2 % (ref 0–5)
LYMPHOCYTES # BLD: 1 K/UL (ref 0.5–4.6)
LYMPHOCYTES NFR BLD: 20 % (ref 13–44)
MCH RBC QN AUTO: 29 PG (ref 26.1–32.9)
MCHC RBC AUTO-ENTMCNC: 30.2 G/DL (ref 31.4–35)
MCV RBC AUTO: 96 FL (ref 82–102)
MONOCYTES # BLD: 0.3 K/UL (ref 0.1–1.3)
MONOCYTES NFR BLD: 5 % (ref 4–12)
NEUTS SEG # BLD: 3.6 K/UL (ref 1.7–8.2)
NEUTS SEG NFR BLD: 70 % (ref 43–78)
NRBC # BLD: 0 K/UL (ref 0–0.2)
PLATELET # BLD AUTO: 137 K/UL (ref 150–450)
PMV BLD AUTO: 9.2 FL (ref 9.4–12.3)
POTASSIUM SERPL-SCNC: 4.5 MMOL/L (ref 3.5–5.1)
PROT SERPL-MCNC: 7.2 G/DL (ref 6.3–8.2)
RBC # BLD AUTO: 3.48 M/UL (ref 4.05–5.2)
SODIUM SERPL-SCNC: 138 MMOL/L (ref 136–145)
WBC # BLD AUTO: 5.1 K/UL (ref 4.3–11.1)

## 2024-09-26 PROCEDURE — 6360000002 HC RX W HCPCS: Performed by: INTERNAL MEDICINE

## 2024-09-26 PROCEDURE — 85025 COMPLETE CBC W/AUTO DIFF WBC: CPT

## 2024-09-26 PROCEDURE — 2580000003 HC RX 258: Performed by: INTERNAL MEDICINE

## 2024-09-26 PROCEDURE — 80053 COMPREHEN METABOLIC PANEL: CPT

## 2024-09-26 PROCEDURE — 96365 THER/PROPH/DIAG IV INF INIT: CPT

## 2024-09-26 PROCEDURE — 36415 COLL VENOUS BLD VENIPUNCTURE: CPT

## 2024-09-26 RX ORDER — EPINEPHRINE 1 MG/ML
0.3 INJECTION, SOLUTION, CONCENTRATE INTRAVENOUS PRN
Status: CANCELLED | OUTPATIENT
Start: 2024-10-06

## 2024-09-26 RX ORDER — DIPHENHYDRAMINE HYDROCHLORIDE 50 MG/ML
50 INJECTION INTRAMUSCULAR; INTRAVENOUS
Status: DISCONTINUED | OUTPATIENT
Start: 2024-09-26 | End: 2024-09-27 | Stop reason: HOSPADM

## 2024-09-26 RX ORDER — HEPARIN 100 UNIT/ML
500 SYRINGE INTRAVENOUS PRN
Status: CANCELLED | OUTPATIENT
Start: 2024-10-06

## 2024-09-26 RX ORDER — EPINEPHRINE 1 MG/ML
0.3 INJECTION, SOLUTION, CONCENTRATE INTRAVENOUS PRN
Status: DISCONTINUED | OUTPATIENT
Start: 2024-09-26 | End: 2024-09-27 | Stop reason: HOSPADM

## 2024-09-26 RX ORDER — ALBUTEROL SULFATE 90 UG/1
4 INHALANT RESPIRATORY (INHALATION) PRN
Status: CANCELLED | OUTPATIENT
Start: 2024-10-06

## 2024-09-26 RX ORDER — ALBUTEROL SULFATE 90 UG/1
4 INHALANT RESPIRATORY (INHALATION) PRN
OUTPATIENT
Start: 2024-10-24

## 2024-09-26 RX ORDER — EPINEPHRINE 1 MG/ML
0.3 INJECTION, SOLUTION, CONCENTRATE INTRAVENOUS PRN
OUTPATIENT
Start: 2024-10-24

## 2024-09-26 RX ORDER — DIPHENHYDRAMINE HYDROCHLORIDE 50 MG/ML
50 INJECTION INTRAMUSCULAR; INTRAVENOUS
OUTPATIENT
Start: 2024-10-10

## 2024-09-26 RX ORDER — ONDANSETRON 2 MG/ML
8 INJECTION INTRAMUSCULAR; INTRAVENOUS
OUTPATIENT
Start: 2024-10-10

## 2024-09-26 RX ORDER — SODIUM CHLORIDE 9 MG/ML
5-250 INJECTION, SOLUTION INTRAVENOUS PRN
Status: CANCELLED | OUTPATIENT
Start: 2024-10-06

## 2024-09-26 RX ORDER — ONDANSETRON 2 MG/ML
8 INJECTION INTRAMUSCULAR; INTRAVENOUS
Status: DISCONTINUED | OUTPATIENT
Start: 2024-09-26 | End: 2024-09-27 | Stop reason: HOSPADM

## 2024-09-26 RX ORDER — SODIUM CHLORIDE 9 MG/ML
INJECTION, SOLUTION INTRAVENOUS CONTINUOUS
Status: CANCELLED | OUTPATIENT
Start: 2024-10-06

## 2024-09-26 RX ORDER — SODIUM CHLORIDE 9 MG/ML
5-250 INJECTION, SOLUTION INTRAVENOUS PRN
OUTPATIENT
Start: 2024-10-10

## 2024-09-26 RX ORDER — SODIUM CHLORIDE 9 MG/ML
5-250 INJECTION, SOLUTION INTRAVENOUS PRN
OUTPATIENT
Start: 2024-10-24

## 2024-09-26 RX ORDER — EPINEPHRINE 1 MG/ML
0.3 INJECTION, SOLUTION, CONCENTRATE INTRAVENOUS PRN
OUTPATIENT
Start: 2024-10-10

## 2024-09-26 RX ORDER — ONDANSETRON 2 MG/ML
8 INJECTION INTRAMUSCULAR; INTRAVENOUS
Status: CANCELLED | OUTPATIENT
Start: 2024-10-06

## 2024-09-26 RX ORDER — HEPARIN 100 UNIT/ML
500 SYRINGE INTRAVENOUS PRN
OUTPATIENT
Start: 2024-10-10

## 2024-09-26 RX ORDER — SODIUM CHLORIDE 9 MG/ML
INJECTION, SOLUTION INTRAVENOUS CONTINUOUS
OUTPATIENT
Start: 2024-10-10

## 2024-09-26 RX ORDER — ONDANSETRON 2 MG/ML
8 INJECTION INTRAMUSCULAR; INTRAVENOUS
OUTPATIENT
Start: 2024-10-24

## 2024-09-26 RX ORDER — SODIUM CHLORIDE 0.9 % (FLUSH) 0.9 %
5-40 SYRINGE (ML) INJECTION PRN
OUTPATIENT
Start: 2024-10-10

## 2024-09-26 RX ORDER — ACETAMINOPHEN 325 MG/1
650 TABLET ORAL
Status: DISCONTINUED | OUTPATIENT
Start: 2024-09-26 | End: 2024-09-27 | Stop reason: HOSPADM

## 2024-09-26 RX ORDER — ACETAMINOPHEN 325 MG/1
650 TABLET ORAL
OUTPATIENT
Start: 2024-10-24

## 2024-09-26 RX ORDER — ALBUTEROL SULFATE 90 UG/1
4 INHALANT RESPIRATORY (INHALATION) PRN
OUTPATIENT
Start: 2024-10-10

## 2024-09-26 RX ORDER — ACETAMINOPHEN 325 MG/1
650 TABLET ORAL
OUTPATIENT
Start: 2024-10-10

## 2024-09-26 RX ORDER — ALBUTEROL SULFATE 90 UG/1
4 INHALANT RESPIRATORY (INHALATION) PRN
Status: DISCONTINUED | OUTPATIENT
Start: 2024-09-26 | End: 2024-09-27 | Stop reason: HOSPADM

## 2024-09-26 RX ORDER — ACETAMINOPHEN 325 MG/1
650 TABLET ORAL
Status: CANCELLED | OUTPATIENT
Start: 2024-10-06

## 2024-09-26 RX ORDER — SODIUM CHLORIDE 9 MG/ML
5-250 INJECTION, SOLUTION INTRAVENOUS PRN
Status: DISCONTINUED | OUTPATIENT
Start: 2024-09-26 | End: 2024-09-27 | Stop reason: HOSPADM

## 2024-09-26 RX ORDER — SODIUM CHLORIDE 0.9 % (FLUSH) 0.9 %
5-40 SYRINGE (ML) INJECTION PRN
OUTPATIENT
Start: 2024-10-24

## 2024-09-26 RX ORDER — SODIUM CHLORIDE 9 MG/ML
INJECTION, SOLUTION INTRAVENOUS CONTINUOUS
OUTPATIENT
Start: 2024-10-24

## 2024-09-26 RX ORDER — DIPHENHYDRAMINE HYDROCHLORIDE 50 MG/ML
50 INJECTION INTRAMUSCULAR; INTRAVENOUS
OUTPATIENT
Start: 2024-10-24

## 2024-09-26 RX ORDER — SODIUM CHLORIDE 0.9 % (FLUSH) 0.9 %
5-40 SYRINGE (ML) INJECTION PRN
Status: CANCELLED | OUTPATIENT
Start: 2024-10-06

## 2024-09-26 RX ORDER — HEPARIN 100 UNIT/ML
500 SYRINGE INTRAVENOUS PRN
OUTPATIENT
Start: 2024-10-24

## 2024-09-26 RX ORDER — DIPHENHYDRAMINE HYDROCHLORIDE 50 MG/ML
50 INJECTION INTRAMUSCULAR; INTRAVENOUS
Status: CANCELLED | OUTPATIENT
Start: 2024-10-06

## 2024-09-26 RX ORDER — SODIUM CHLORIDE 0.9 % (FLUSH) 0.9 %
5-40 SYRINGE (ML) INJECTION PRN
Status: DISCONTINUED | OUTPATIENT
Start: 2024-09-26 | End: 2024-09-27 | Stop reason: HOSPADM

## 2024-09-26 RX ADMIN — SODIUM CHLORIDE 500 MG: 9 INJECTION, SOLUTION INTRAVENOUS at 15:41

## 2024-09-26 RX ADMIN — SODIUM CHLORIDE, PRESERVATIVE FREE 10 ML: 5 INJECTION INTRAVENOUS at 15:12

## 2024-09-26 RX ADMIN — SODIUM CHLORIDE 50 ML/HR: 9 INJECTION, SOLUTION INTRAVENOUS at 15:15

## 2024-09-26 NOTE — PROGRESS NOTES
Arrived to the Infusion Center.  Nulojix completed. Patient tolerated well.   Any issues or concerns during appointment: none.  Patient aware of next Neuro appointment on 11/12/24 at 12:30 PM.  Patient instructed to call provider with temperature of 100.4 or greater or nausea/vomiting/diarrhea or pain not controlled by medications  Discharged ambulatory.

## 2024-10-04 ENCOUNTER — HOSPITAL ENCOUNTER (OUTPATIENT)
Dept: WOUND CARE | Age: 79
Discharge: HOME | End: 2024-10-04
Attending: FAMILY MEDICINE
Payer: MEDICARE

## 2024-10-04 VITALS
RESPIRATION RATE: 18 BRPM | DIASTOLIC BLOOD PRESSURE: 74 MMHG | SYSTOLIC BLOOD PRESSURE: 155 MMHG | OXYGEN SATURATION: 98 % | TEMPERATURE: 98 F | HEART RATE: 59 BPM

## 2024-10-04 DIAGNOSIS — L89.623 PRESSURE ULCER OF LEFT HEEL, STAGE 3 (HCC): Primary | Chronic | ICD-10-CM

## 2024-10-04 DIAGNOSIS — L03.116 CELLULITIS OF LEFT HEEL: ICD-10-CM

## 2024-10-04 PROCEDURE — 99212 OFFICE O/P EST SF 10 MIN: CPT

## 2024-10-04 NOTE — WOUND CARE
Discharge Instructions for  [facility] [address]  [address] [place], [place] [address]  Phone [phone]   Fax [phone]      NAME:  Alayna Fall  YOB: 1945  MEDICAL RECORD NUMBER:  [id number]  DATE:  10/4/2024    Return Appointment:   3 weeks with Todd Rizzo DO    Instructions:   Left Heel:  Cleanse with normal saline or Vashe.  Apply Xeroform over healed area  Secure with bordered foam  Change dressing 3 times per week     [facility] nursing for dressing changes 3 times weekly.     Offloading:  Use wheelchair to minimize ambulation.  Patient to offload wound with pillow under calf while in bed or chair.   Use cast cover for showering.  Wound healing is greatly slowed when blood glucose levels are greater than 200. Monitor glucose levels daily to ensure tight glucose control.  Increase protein intake to promote wound healing. Protein supplements such as Evelio and Ensure are great options.   Protein goal is 60 - 80 grams per day. Recommend Ensure Max or Glucerna.     Should you experience increased redness, swelling, pain, foul odor, size of wound(s), or have a temperature over 101 degrees please contact the Wound Healing Center at [phone] or if after hours contact your primary care physician or go to the hospital emergency department.    PLEASE NOTE: IF YOU ARE UNABLE TO OBTAIN WOUND SUPPLIES, CONTINUE TO USE THE SUPPLIES YOU HAVE AVAILABLE UNTIL YOU ARE ABLE TO REACH US. IT IS MOST IMPORTANT TO KEEP THE WOUND COVERED AT ALL TIMES.    Electronically signed Alejandra Chong PT, [facility] on 10/4/2024 at 9:14 AM

## 2024-10-04 NOTE — FLOWSHEET NOTE
10/04/24 0857   Wound 01/22/24 Heel Left;Posterior #1   Date First Assessed/Time First Assessed: 01/22/24 1336   Present on Original Admission: Yes  Wound Approximate Age at First Assessment (Weeks): 8 weeks  Primary Wound Type: Diabetic Ulcer  Location: Heel  Wound Location Orientation: Left;Posterior  Wo...   Wound Image    Wound Etiology Diabetic Doan 2   Dressing Status Intact   Wound Cleansed Cleansed with saline   Dressing/Treatment Xeroform   Offloading for Diabetic Foot Ulcers Offloading ordered   Wound Length (cm) 0.1 cm   Wound Width (cm) 0.1 cm   Wound Depth (cm) 0.1 cm   Wound Surface Area (cm^2) 0.01 cm^2   Change in Wound Size % (l*w) 99   Wound Volume (cm^3) 0.001 cm^3   Wound Healing % 99   Wound Assessment Epithelialization   Drainage Amount None (dry)   Odor None   Abby-wound Assessment Dry/flaky   Wound Thickness Description not for Pressure Injury Partial thickness   Pain Assessment   Pain Assessment None - Denies Pain     Patient is currently taking ASA 81

## 2024-10-04 NOTE — DISCHARGE INSTRUCTIONS
Return Appointment:   3 weeks with Todd Rizzo DO     Instructions:   Left Heel:  Cleanse with normal saline or Vashe.  Apply Xeroform over healed area  Secure with bordered foam  Change dressing 3 times per week     [facility] nursing for dressing changes 3 times weekly.     Offloading:  Use wheelchair to minimize ambulation.  Patient to offload wound with pillow under calf while in bed or chair.   Use cast cover for showering.  Wound healing is greatly slowed when blood glucose levels are greater than 200. Monitor glucose levels daily to ensure tight glucose control.  Increase protein intake to promote wound healing. Protein supplements such as Evelio and Ensure are great options.   Protein goal is 60 - 80 grams per day. Recommend Ensure Max or Glucerna.

## 2024-10-08 ENCOUNTER — APPOINTMENT (OUTPATIENT)
Dept: INFUSION THERAPY | Age: 79
End: 2024-10-08
Payer: MEDICARE

## 2024-10-08 ENCOUNTER — APPOINTMENT (OUTPATIENT)
Dept: LAB | Age: 79
End: 2024-10-08
Payer: MEDICARE

## 2024-10-14 RX ORDER — ROSUVASTATIN CALCIUM 10 MG/1
TABLET, COATED ORAL
Qty: 90 TABLET | Refills: 1 | Status: SHIPPED | OUTPATIENT
Start: 2024-10-14

## 2024-10-18 ENCOUNTER — OFFICE VISIT (OUTPATIENT)
Dept: FAMILY MEDICINE CLINIC | Facility: CLINIC | Age: 79
End: 2024-10-18

## 2024-10-18 VITALS
RESPIRATION RATE: 14 BRPM | TEMPERATURE: 98.3 F | WEIGHT: 149.8 LBS | OXYGEN SATURATION: 98 % | HEART RATE: 64 BPM | BODY MASS INDEX: 27.57 KG/M2 | HEIGHT: 62 IN | DIASTOLIC BLOOD PRESSURE: 76 MMHG | SYSTOLIC BLOOD PRESSURE: 144 MMHG

## 2024-10-18 DIAGNOSIS — J40 BRONCHITIS: Primary | ICD-10-CM

## 2024-10-18 DIAGNOSIS — E11.65 TYPE 2 DIABETES MELLITUS WITH HYPERGLYCEMIA, WITH LONG-TERM CURRENT USE OF INSULIN (HCC): ICD-10-CM

## 2024-10-18 DIAGNOSIS — I77.9 CAROTID ARTERY DISEASE, UNSPECIFIED LATERALITY, UNSPECIFIED TYPE (HCC): ICD-10-CM

## 2024-10-18 DIAGNOSIS — Z79.4 TYPE 2 DIABETES MELLITUS WITH HYPERGLYCEMIA, WITH LONG-TERM CURRENT USE OF INSULIN (HCC): ICD-10-CM

## 2024-10-18 DIAGNOSIS — R05.1 ACUTE COUGH: ICD-10-CM

## 2024-10-18 LAB
EXP DATE SOLUTION: NORMAL
EXP DATE SWAB: NORMAL
EXPIRATION DATE: NORMAL
INFLUENZA A ANTIGEN, POC: NEGATIVE
INFLUENZA B ANTIGEN, POC: NEGATIVE
LOT NUMBER POC: NORMAL
LOT NUMBER SOLUTION: NORMAL
LOT NUMBER SWAB: NORMAL
SARS-COV-2 RNA, POC: NEGATIVE
VALID INTERNAL CONTROL, POC: NORMAL

## 2024-10-18 RX ORDER — DEXTROMETHORPHAN HYDROBROMIDE AND PROMETHAZINE HYDROCHLORIDE 15; 6.25 MG/5ML; MG/5ML
SYRUP ORAL
Qty: 180 ML | Refills: 0 | Status: SHIPPED | OUTPATIENT
Start: 2024-10-18

## 2024-10-18 RX ORDER — DOXYCYCLINE 100 MG/1
100 TABLET ORAL 2 TIMES DAILY
Qty: 14 TABLET | Refills: 0 | Status: SHIPPED | OUTPATIENT
Start: 2024-10-18 | End: 2024-10-25

## 2024-10-18 SDOH — ECONOMIC STABILITY: FOOD INSECURITY: WITHIN THE PAST 12 MONTHS, THE FOOD YOU BOUGHT JUST DIDN'T LAST AND YOU DIDN'T HAVE MONEY TO GET MORE.: NEVER TRUE

## 2024-10-18 SDOH — ECONOMIC STABILITY: FOOD INSECURITY: WITHIN THE PAST 12 MONTHS, YOU WORRIED THAT YOUR FOOD WOULD RUN OUT BEFORE YOU GOT MONEY TO BUY MORE.: NEVER TRUE

## 2024-10-18 SDOH — ECONOMIC STABILITY: INCOME INSECURITY: HOW HARD IS IT FOR YOU TO PAY FOR THE VERY BASICS LIKE FOOD, HOUSING, MEDICAL CARE, AND HEATING?: NOT HARD AT ALL

## 2024-10-18 ASSESSMENT — ENCOUNTER SYMPTOMS
ANAL BLEEDING: 0
EYE ITCHING: 0
EYE REDNESS: 0
BLOOD IN STOOL: 0
VOICE CHANGE: 0
EYES NEGATIVE: 1
NAUSEA: 0
APNEA: 0
BACK PAIN: 0
TROUBLE SWALLOWING: 0
SINUS PAIN: 0
GASTROINTESTINAL NEGATIVE: 1
ABDOMINAL PAIN: 0
EYE PAIN: 0
SHORTNESS OF BREATH: 0
COUGH: 1
COLOR CHANGE: 0
SINUS PRESSURE: 0
RECTAL PAIN: 0
CHOKING: 0
PHOTOPHOBIA: 0
VOMITING: 0
FACIAL SWELLING: 0
CONSTIPATION: 0
STRIDOR: 0
WHEEZING: 0
SORE THROAT: 0
ABDOMINAL DISTENTION: 0
ALLERGIC/IMMUNOLOGIC NEGATIVE: 1
RHINORRHEA: 0
DIARRHEA: 0
CHEST TIGHTNESS: 0
EYE DISCHARGE: 0

## 2024-10-18 ASSESSMENT — PATIENT HEALTH QUESTIONNAIRE - PHQ9
SUM OF ALL RESPONSES TO PHQ9 QUESTIONS 1 & 2: 0
2. FEELING DOWN, DEPRESSED OR HOPELESS: NOT AT ALL
1. LITTLE INTEREST OR PLEASURE IN DOING THINGS: NOT AT ALL
SUM OF ALL RESPONSES TO PHQ QUESTIONS 1-9: 0

## 2024-10-18 NOTE — PROGRESS NOTES
PROGRESS NOTE    Chief Complaint   Patient presents with    Cough     Patient has had a cough for 2 weeks and with mucus.       SUBJECTIVE:     Alayna Fall is a very sweet 79 y.o. female with hx of renal failure s/p transplant x2-currently followed by nephrology, diabetes-followed by endocrinology, hypothyroidism, hypertension, hyperlipidemia, degenerative arthritis, dementia-followed by neurology, pressure ulcer of left heel followed by home health, prior CVA, anemia and obesity , seen today in office accompanied by her  with complaints of having coughing with productive sputum that is thick.  Patient has been taking OTC medication including Queenie-[place] plus and Robitussin cold cough flu with minimal relief.  Patient and her  reports of having coughing and sputum production mainly at bedtime during middle of the night.  He reports that he would have to get a trash can to allow her to spit into the trash can due to the thickness and copious amount of sputum.  He has not noted that she has had any fever or chills.  She reports no shortness of breath and no wheezing.  Patient's  provide the history and symptom presentation as patient does have a history of dementia and she wishes for him to provide the symptom on her behalf today.        Past Medical History, Past Surgical History, Family history, Social History, and Medications were all reviewed with the patient today and updated as necessary.       Current Outpatient Medications   Medication Sig Dispense Refill    promethazine-dextromethorphan (PROMETHAZINE-DM) 6.25-15 MG/5ML syrup Take 2.5 ml to 5 ml orally every 6 hours as needed for cough. May cause drowsiness or sleepiness 180 mL 0    doxycycline monohydrate (ADOXA) 100 MG tablet Take 1 tablet by mouth 2 times daily for 7 days With food (antibiotic) 14 tablet 0    rosuvastatin (CRESTOR) 10 MG tablet TAKE 1 TABLET BY MOUTH EVERY NIGHT 90 tablet 1    risperiDONE (RISPERDAL) 0.5 MG

## 2024-10-24 ENCOUNTER — HOSPITAL ENCOUNTER (OUTPATIENT)
Dept: LAB | Age: 79
Discharge: HOME | End: 2024-10-24
Payer: MEDICARE

## 2024-10-24 ENCOUNTER — HOSPITAL ENCOUNTER (OUTPATIENT)
Dept: INFUSION THERAPY | Age: 79
Setting detail: INFUSION SERIES
Discharge: HOME | End: 2024-10-24
Payer: MEDICARE

## 2024-10-24 VITALS
WEIGHT: 162 LBS | OXYGEN SATURATION: 92 % | SYSTOLIC BLOOD PRESSURE: 146 MMHG | DIASTOLIC BLOOD PRESSURE: 74 MMHG | TEMPERATURE: 98.1 F | RESPIRATION RATE: 16 BRPM | BODY MASS INDEX: 29.63 KG/M2 | HEART RATE: 61 BPM

## 2024-10-24 DIAGNOSIS — Z94.0 RENAL TRANSPLANT, STATUS POST: ICD-10-CM

## 2024-10-24 DIAGNOSIS — Z94.0 RENAL TRANSPLANT, STATUS POST: Primary | ICD-10-CM

## 2024-10-24 LAB
ALBUMIN SERPL-MCNC: 3.2 G/DL (ref 3.2–4.6)
ALBUMIN/GLOB SERPL: 0.8 (ref 1–1.9)
ALP SERPL-CCNC: 64 U/L (ref 35–104)
ALT SERPL-CCNC: 14 U/L (ref 8–45)
ANION GAP SERPL CALC-SCNC: 8 MMOL/L (ref 9–18)
AST SERPL-CCNC: 19 U/L (ref 15–37)
BASOPHILS # BLD: 0 K/UL (ref 0–0.2)
BASOPHILS NFR BLD: 1 % (ref 0–2)
BILIRUB SERPL-MCNC: 0.3 MG/DL (ref 0–1.2)
BUN SERPL-MCNC: 17 MG/DL (ref 8–23)
CALCIUM SERPL-MCNC: 9.7 MG/DL (ref 8.8–10.2)
CHLORIDE SERPL-SCNC: 107 MMOL/L (ref 98–107)
CO2 SERPL-SCNC: 26 MMOL/L (ref 20–28)
CREAT SERPL-MCNC: 0.56 MG/DL (ref 0.6–1.1)
DIFFERENTIAL METHOD BLD: ABNORMAL
EOSINOPHIL # BLD: 0.2 K/UL (ref 0–0.8)
EOSINOPHIL NFR BLD: 4 % (ref 0.5–7.8)
ERYTHROCYTE [DISTWIDTH] IN BLOOD BY AUTOMATED COUNT: 14 % (ref 11.9–14.6)
GLOBULIN SER CALC-MCNC: 3.8 G/DL (ref 2.3–3.5)
GLUCOSE SERPL-MCNC: 242 MG/DL (ref 70–99)
HCT VFR BLD AUTO: 32.6 % (ref 35.8–46.3)
HGB BLD-MCNC: 9.8 G/DL (ref 11.7–15.4)
IMM GRANULOCYTES # BLD AUTO: 0 K/UL (ref 0–0.5)
IMM GRANULOCYTES NFR BLD AUTO: 1 % (ref 0–5)
LYMPHOCYTES # BLD: 1.1 K/UL (ref 0.5–4.6)
LYMPHOCYTES NFR BLD: 16 % (ref 13–44)
MCH RBC QN AUTO: 29.3 PG (ref 26.1–32.9)
MCHC RBC AUTO-ENTMCNC: 30.1 G/DL (ref 31.4–35)
MCV RBC AUTO: 97.6 FL (ref 82–102)
MONOCYTES # BLD: 0.4 K/UL (ref 0.1–1.3)
MONOCYTES NFR BLD: 6 % (ref 4–12)
NEUTS SEG # BLD: 5 K/UL (ref 1.7–8.2)
NEUTS SEG NFR BLD: 74 % (ref 43–78)
NRBC # BLD: 0.02 K/UL (ref 0–0.2)
PLATELET # BLD AUTO: 144 K/UL (ref 150–450)
PMV BLD AUTO: 9.5 FL (ref 9.4–12.3)
POTASSIUM SERPL-SCNC: 4.1 MMOL/L (ref 3.5–5.1)
PROT SERPL-MCNC: 6.9 G/DL (ref 6.3–8.2)
RBC # BLD AUTO: 3.34 M/UL (ref 4.05–5.2)
SODIUM SERPL-SCNC: 141 MMOL/L (ref 136–145)
WBC # BLD AUTO: 6.8 K/UL (ref 4.3–11.1)

## 2024-10-24 PROCEDURE — 85025 COMPLETE CBC W/AUTO DIFF WBC: CPT

## 2024-10-24 PROCEDURE — 6360000002 HC RX W HCPCS: Performed by: INTERNAL MEDICINE

## 2024-10-24 PROCEDURE — 96365 THER/PROPH/DIAG IV INF INIT: CPT

## 2024-10-24 PROCEDURE — 80053 COMPREHEN METABOLIC PANEL: CPT

## 2024-10-24 PROCEDURE — 2580000003 HC RX 258: Performed by: INTERNAL MEDICINE

## 2024-10-24 PROCEDURE — 36415 COLL VENOUS BLD VENIPUNCTURE: CPT

## 2024-10-24 PROCEDURE — 96413 CHEMO IV INFUSION 1 HR: CPT

## 2024-10-24 RX ORDER — ALBUTEROL SULFATE 90 UG/1
4 INHALANT RESPIRATORY (INHALATION) PRN
OUTPATIENT
Start: 2024-11-07

## 2024-10-24 RX ORDER — SODIUM CHLORIDE 9 MG/ML
5-250 INJECTION, SOLUTION INTRAVENOUS PRN
OUTPATIENT
Start: 2024-11-07

## 2024-10-24 RX ORDER — SODIUM CHLORIDE 0.9 % (FLUSH) 0.9 %
5-40 SYRINGE (ML) INJECTION PRN
OUTPATIENT
Start: 2024-11-07

## 2024-10-24 RX ORDER — ACETAMINOPHEN 325 MG/1
650 TABLET ORAL
Status: DISCONTINUED | OUTPATIENT
Start: 2024-10-24 | End: 2024-10-25 | Stop reason: HOSPADM

## 2024-10-24 RX ORDER — ONDANSETRON 2 MG/ML
8 INJECTION INTRAMUSCULAR; INTRAVENOUS
Status: DISCONTINUED | OUTPATIENT
Start: 2024-10-24 | End: 2024-10-25 | Stop reason: HOSPADM

## 2024-10-24 RX ORDER — ACETAMINOPHEN 325 MG/1
650 TABLET ORAL
OUTPATIENT
Start: 2024-11-07

## 2024-10-24 RX ORDER — DIPHENHYDRAMINE HYDROCHLORIDE 50 MG/ML
50 INJECTION INTRAMUSCULAR; INTRAVENOUS
OUTPATIENT
Start: 2024-11-07

## 2024-10-24 RX ORDER — EPINEPHRINE 1 MG/ML
0.3 INJECTION, SOLUTION, CONCENTRATE INTRAVENOUS PRN
OUTPATIENT
Start: 2024-11-07

## 2024-10-24 RX ORDER — EPINEPHRINE 1 MG/ML
0.3 INJECTION, SOLUTION, CONCENTRATE INTRAVENOUS PRN
Status: DISCONTINUED | OUTPATIENT
Start: 2024-10-24 | End: 2024-10-25 | Stop reason: HOSPADM

## 2024-10-24 RX ORDER — ALBUTEROL SULFATE 90 UG/1
4 INHALANT RESPIRATORY (INHALATION) PRN
Status: DISCONTINUED | OUTPATIENT
Start: 2024-10-24 | End: 2024-10-25 | Stop reason: HOSPADM

## 2024-10-24 RX ORDER — SODIUM CHLORIDE 9 MG/ML
INJECTION, SOLUTION INTRAVENOUS CONTINUOUS
OUTPATIENT
Start: 2024-11-07

## 2024-10-24 RX ORDER — SODIUM CHLORIDE 0.9 % (FLUSH) 0.9 %
5-40 SYRINGE (ML) INJECTION PRN
Status: DISCONTINUED | OUTPATIENT
Start: 2024-10-24 | End: 2024-10-25 | Stop reason: HOSPADM

## 2024-10-24 RX ORDER — HEPARIN 100 UNIT/ML
500 SYRINGE INTRAVENOUS PRN
OUTPATIENT
Start: 2024-11-07

## 2024-10-24 RX ORDER — SODIUM CHLORIDE 9 MG/ML
5-250 INJECTION, SOLUTION INTRAVENOUS PRN
Status: DISCONTINUED | OUTPATIENT
Start: 2024-10-24 | End: 2024-10-25 | Stop reason: HOSPADM

## 2024-10-24 RX ORDER — ONDANSETRON 2 MG/ML
8 INJECTION INTRAMUSCULAR; INTRAVENOUS
OUTPATIENT
Start: 2024-11-07

## 2024-10-24 RX ORDER — DIPHENHYDRAMINE HYDROCHLORIDE 50 MG/ML
50 INJECTION INTRAMUSCULAR; INTRAVENOUS
Status: DISCONTINUED | OUTPATIENT
Start: 2024-10-24 | End: 2024-10-25 | Stop reason: HOSPADM

## 2024-10-24 RX ADMIN — SODIUM CHLORIDE, PRESERVATIVE FREE 10 ML: 5 INJECTION INTRAVENOUS at 15:05

## 2024-10-24 RX ADMIN — SODIUM CHLORIDE 500 MG: 9 INJECTION, SOLUTION INTRAVENOUS at 14:32

## 2024-10-24 NOTE — PROGRESS NOTES
Arrived to the Infusion Center.  Nulojix infusion completed. Patient tolerated well.   Any issues or concerns during appointment: none .  Next infusion appointment not yet scheduled.   Patient instructed to call provider with temperature of 100.4 or greater or nausea/vomiting/ diarrhea or pain not controlled by medications  Discharged via wheelchair with spouse .

## 2024-10-25 ENCOUNTER — HOSPITAL ENCOUNTER (OUTPATIENT)
Dept: WOUND CARE | Age: 79
Discharge: HOME | End: 2024-10-25
Attending: FAMILY MEDICINE
Payer: MEDICARE

## 2024-10-25 VITALS
OXYGEN SATURATION: 100 % | TEMPERATURE: 98.4 F | RESPIRATION RATE: 18 BRPM | DIASTOLIC BLOOD PRESSURE: 87 MMHG | HEART RATE: 66 BPM | HEIGHT: 62 IN | SYSTOLIC BLOOD PRESSURE: 156 MMHG | BODY MASS INDEX: 27.42 KG/M2 | WEIGHT: 149 LBS

## 2024-10-25 DIAGNOSIS — L03.116 CELLULITIS OF LEFT HEEL: ICD-10-CM

## 2024-10-25 DIAGNOSIS — L89.623 PRESSURE ULCER OF LEFT HEEL, STAGE 3 (HCC): Primary | Chronic | ICD-10-CM

## 2024-10-25 PROCEDURE — 99213 OFFICE O/P EST LOW 20 MIN: CPT

## 2024-10-25 RX ORDER — LIDOCAINE HYDROCHLORIDE 20 MG/ML
JELLY TOPICAL ONCE
Status: COMPLETED | OUTPATIENT
Start: 2024-10-25 | End: 2024-10-25

## 2024-10-25 RX ORDER — SODIUM CHLOR/HYPOCHLOROUS ACID 0.033 %
SOLUTION, IRRIGATION IRRIGATION ONCE
Status: CANCELLED | OUTPATIENT
Start: 2024-10-25 | End: 2024-10-25

## 2024-10-25 RX ORDER — NEOMYCIN/BACITRACIN/POLYMYXINB 3.5-400-5K
OINTMENT (GRAM) TOPICAL ONCE
Status: CANCELLED | OUTPATIENT
Start: 2024-10-25 | End: 2024-10-25

## 2024-10-25 RX ORDER — LIDOCAINE 40 MG/G
CREAM TOPICAL ONCE
Status: CANCELLED | OUTPATIENT
Start: 2024-10-25 | End: 2024-10-25

## 2024-10-25 RX ORDER — MUPIROCIN 20 MG/G
OINTMENT TOPICAL ONCE
Status: CANCELLED | OUTPATIENT
Start: 2024-10-25 | End: 2024-10-25

## 2024-10-25 RX ORDER — GINSENG 100 MG
CAPSULE ORAL ONCE
Status: CANCELLED | OUTPATIENT
Start: 2024-10-25 | End: 2024-10-25

## 2024-10-25 RX ORDER — BETAMETHASONE DIPROPIONATE 0.5 MG/G
CREAM TOPICAL ONCE
Status: CANCELLED | OUTPATIENT
Start: 2024-10-25 | End: 2024-10-25

## 2024-10-25 RX ORDER — LIDOCAINE HYDROCHLORIDE 20 MG/ML
JELLY TOPICAL ONCE
Status: CANCELLED | OUTPATIENT
Start: 2024-10-25 | End: 2024-10-25

## 2024-10-25 RX ORDER — GENTAMICIN SULFATE 1 MG/G
OINTMENT TOPICAL ONCE
Status: CANCELLED | OUTPATIENT
Start: 2024-10-25 | End: 2024-10-25

## 2024-10-25 RX ORDER — SILVER SULFADIAZINE 10 MG/G
CREAM TOPICAL ONCE
Status: CANCELLED | OUTPATIENT
Start: 2024-10-25 | End: 2024-10-25

## 2024-10-25 RX ORDER — CLOBETASOL PROPIONATE 0.5 MG/G
OINTMENT TOPICAL ONCE
Status: CANCELLED | OUTPATIENT
Start: 2024-10-25 | End: 2024-10-25

## 2024-10-25 RX ORDER — TRIAMCINOLONE ACETONIDE 1 MG/G
OINTMENT TOPICAL ONCE
Status: CANCELLED | OUTPATIENT
Start: 2024-10-25 | End: 2024-10-25

## 2024-10-25 RX ORDER — BACITRACIN ZINC AND POLYMYXIN B SULFATE 500; 1000 [USP'U]/G; [USP'U]/G
OINTMENT TOPICAL ONCE
Status: CANCELLED | OUTPATIENT
Start: 2024-10-25 | End: 2024-10-25

## 2024-10-25 RX ORDER — LIDOCAINE 50 MG/G
OINTMENT TOPICAL ONCE
Status: CANCELLED | OUTPATIENT
Start: 2024-10-25 | End: 2024-10-25

## 2024-10-25 RX ORDER — LIDOCAINE HYDROCHLORIDE 40 MG/ML
SOLUTION TOPICAL ONCE
Status: CANCELLED | OUTPATIENT
Start: 2024-10-25 | End: 2024-10-25

## 2024-10-25 RX ADMIN — LIDOCAINE HYDROCHLORIDE: 20 JELLY TOPICAL at 13:23

## 2024-10-25 NOTE — WOUND CARE
Discharge Instructions for  [facility] [address]  [address] [place] [address]  Phone [phone]   Fax [phone]      NAME:  Alayna Fall  YOB: 1945  MEDICAL RECORD NUMBER:  [id number]  DATE:  10/25/2024    Return Appointment:   Discharge with Todd Rizzo DO    Instructions:   Left Heel:  Wound has healed.  Cleanse with soap and water.  Apply Vaseline to heel daily.     [facility] nursing may discharge at this time.     Offloading:  Use wheelchair to minimize ambulation.  Patient to offload wound with pillow under calf while in bed or chair.   Use cast cover for showering.  Wound healing is greatly slowed when blood glucose levels are greater than 200. Monitor glucose levels daily to ensure tight glucose control.  Increase protein intake to promote wound healing. Protein supplements such as Evelio and Ensure are great options.   Protein goal is 60 - 80 grams per day. Recommend Ensure Max or Glucerna.     Should you experience increased redness, swelling, pain, foul odor, size of wound(s), or have a temperature over 101 degrees please contact the Wound Healing Center at [phone] or if after hours contact your primary care physician or go to the hospital emergency department.    PLEASE NOTE: IF YOU ARE UNABLE TO OBTAIN WOUND SUPPLIES, CONTINUE TO USE THE SUPPLIES YOU HAVE AVAILABLE UNTIL YOU ARE ABLE TO REACH US. IT IS MOST IMPORTANT TO KEEP THE WOUND COVERED AT ALL TIMES.    Electronically signed Justin Escobedo RN, [facility] on 10/25/2024 at 1:38 PM

## 2024-10-25 NOTE — DISCHARGE INSTRUCTIONS
Instructions:   Left Heel:  Wound has healed.  Cleanse with soap and water.  Apply Vaseline to heel daily.     [facility] nursing may discharge at this time.

## 2024-10-25 NOTE — FLOWSHEET NOTE
10/25/24 1314   Wound 01/22/24 Heel Left;Posterior #1   Date First Assessed/Time First Assessed: 01/22/24 1336   Present on Original Admission: Yes  Wound Approximate Age at First Assessment (Weeks): 8 weeks  Primary Wound Type: Diabetic Ulcer  Location: Heel  Wound Location Orientation: Left;Posterior  Wo...   Wound Image    Wound Etiology Diabetic Doan 2   Dressing Status Intact   Wound Cleansed Cleansed with saline   Dressing/Treatment Other (comment)  (vaseline)   Offloading for Diabetic Foot Ulcers Offloading ordered   Wound Length (cm) 0 cm   Wound Width (cm) 0 cm   Wound Depth (cm) 0 cm   Wound Surface Area (cm^2) 0 cm^2   Change in Wound Size % (l*w) 100   Wound Volume (cm^3) 0 cm^3   Wound Healing % 100   Wound Assessment Dry   Drainage Amount None (dry)   Odor None   Abby-wound Assessment Dry/flaky   Wound Thickness Description not for Pressure Injury Partial thickness   Pain Assessment   Pain Assessment None - Denies Pain     Taking ASA

## 2024-11-04 RX ORDER — ERGOCALCIFEROL 1.25 MG/1
50000 CAPSULE, LIQUID FILLED ORAL WEEKLY
Qty: 12 CAPSULE | Refills: 1 | Status: SHIPPED | OUTPATIENT
Start: 2024-11-04

## 2024-11-07 ENCOUNTER — OFFICE VISIT (OUTPATIENT)
Dept: FAMILY MEDICINE CLINIC | Facility: CLINIC | Age: 79
End: 2024-11-07

## 2024-11-07 VITALS
HEART RATE: 75 BPM | BODY MASS INDEX: 28.6 KG/M2 | OXYGEN SATURATION: 98 % | SYSTOLIC BLOOD PRESSURE: 120 MMHG | HEIGHT: 62 IN | WEIGHT: 155.4 LBS | DIASTOLIC BLOOD PRESSURE: 78 MMHG

## 2024-11-07 DIAGNOSIS — E78.00 PURE HYPERCHOLESTEROLEMIA: ICD-10-CM

## 2024-11-07 DIAGNOSIS — I10 PRIMARY HYPERTENSION: ICD-10-CM

## 2024-11-07 DIAGNOSIS — G89.29 CHRONIC PAIN OF RIGHT KNEE: ICD-10-CM

## 2024-11-07 DIAGNOSIS — N18.9 CHRONIC KIDNEY DISEASE, UNSPECIFIED CKD STAGE: ICD-10-CM

## 2024-11-07 DIAGNOSIS — F01.B18 MODERATE VASCULAR DEMENTIA WITH OTHER BEHAVIORAL DISTURBANCE (HCC): ICD-10-CM

## 2024-11-07 DIAGNOSIS — E11.65 TYPE 2 DIABETES MELLITUS WITH HYPERGLYCEMIA, WITH LONG-TERM CURRENT USE OF INSULIN (HCC): ICD-10-CM

## 2024-11-07 DIAGNOSIS — R60.0 BILATERAL LOWER EXTREMITY EDEMA: ICD-10-CM

## 2024-11-07 DIAGNOSIS — Z00.00 MEDICARE ANNUAL WELLNESS VISIT, SUBSEQUENT: Primary | ICD-10-CM

## 2024-11-07 DIAGNOSIS — E03.4 HYPOTHYROIDISM DUE TO ACQUIRED ATROPHY OF THYROID: ICD-10-CM

## 2024-11-07 DIAGNOSIS — Z79.4 TYPE 2 DIABETES MELLITUS WITH HYPERGLYCEMIA, WITH LONG-TERM CURRENT USE OF INSULIN (HCC): ICD-10-CM

## 2024-11-07 DIAGNOSIS — M25.561 CHRONIC PAIN OF RIGHT KNEE: ICD-10-CM

## 2024-11-07 LAB
ALBUMIN SERPL-MCNC: 3.4 G/DL (ref 3.2–4.6)
ALBUMIN/GLOB SERPL: 0.9 (ref 1–1.9)
ALP SERPL-CCNC: 63 U/L (ref 35–104)
ALT SERPL-CCNC: 16 U/L (ref 8–45)
ANION GAP SERPL CALC-SCNC: 8 MMOL/L (ref 7–16)
AST SERPL-CCNC: 23 U/L (ref 15–37)
BASOPHILS # BLD: 0 K/UL (ref 0–0.2)
BASOPHILS NFR BLD: 1 % (ref 0–2)
BILIRUB SERPL-MCNC: 0.2 MG/DL (ref 0–1.2)
BUN SERPL-MCNC: 15 MG/DL (ref 8–23)
CALCIUM SERPL-MCNC: 10.2 MG/DL (ref 8.8–10.2)
CHLORIDE SERPL-SCNC: 105 MMOL/L (ref 98–107)
CHOLEST SERPL-MCNC: 169 MG/DL (ref 0–200)
CO2 SERPL-SCNC: 28 MMOL/L (ref 20–29)
CREAT SERPL-MCNC: 0.6 MG/DL (ref 0.6–1.1)
DIFFERENTIAL METHOD BLD: ABNORMAL
EOSINOPHIL # BLD: 0.2 K/UL (ref 0–0.8)
EOSINOPHIL NFR BLD: 3 % (ref 0.5–7.8)
ERYTHROCYTE [DISTWIDTH] IN BLOOD BY AUTOMATED COUNT: 13.7 % (ref 11.9–14.6)
GLOBULIN SER CALC-MCNC: 3.6 G/DL (ref 2.3–3.5)
GLUCOSE SERPL-MCNC: 213 MG/DL (ref 70–99)
HCT VFR BLD AUTO: 35.6 % (ref 35.8–46.3)
HDLC SERPL-MCNC: 76 MG/DL (ref 40–60)
HDLC SERPL: 2.2 (ref 0–5)
HGB BLD-MCNC: 10.5 G/DL (ref 11.7–15.4)
IMM GRANULOCYTES # BLD AUTO: 0 K/UL (ref 0–0.5)
IMM GRANULOCYTES NFR BLD AUTO: 0 % (ref 0–5)
LDLC SERPL CALC-MCNC: 70 MG/DL (ref 0–100)
LYMPHOCYTES # BLD: 0.9 K/UL (ref 0.5–4.6)
LYMPHOCYTES NFR BLD: 20 % (ref 13–44)
MCH RBC QN AUTO: 29.2 PG (ref 26.1–32.9)
MCHC RBC AUTO-ENTMCNC: 29.5 G/DL (ref 31.4–35)
MCV RBC AUTO: 99.2 FL (ref 82–102)
MONOCYTES # BLD: 0.4 K/UL (ref 0.1–1.3)
MONOCYTES NFR BLD: 9 % (ref 4–12)
NEUTS SEG # BLD: 3.1 K/UL (ref 1.7–8.2)
NEUTS SEG NFR BLD: 67 % (ref 43–78)
NRBC # BLD: 0 K/UL (ref 0–0.2)
PLATELET # BLD AUTO: 168 K/UL (ref 150–450)
PMV BLD AUTO: 9.5 FL (ref 9.4–12.3)
POTASSIUM SERPL-SCNC: 4.3 MMOL/L (ref 3.5–5.1)
PROT SERPL-MCNC: 7 G/DL (ref 6.3–8.2)
RBC # BLD AUTO: 3.59 M/UL (ref 4.05–5.2)
SODIUM SERPL-SCNC: 141 MMOL/L (ref 136–145)
TRIGL SERPL-MCNC: 118 MG/DL (ref 0–150)
TSH, 3RD GENERATION: 1.26 UIU/ML (ref 0.27–4.2)
VLDLC SERPL CALC-MCNC: 24 MG/DL (ref 6–23)
WBC # BLD AUTO: 4.6 K/UL (ref 4.3–11.1)

## 2024-11-07 ASSESSMENT — PATIENT HEALTH QUESTIONNAIRE - PHQ9
SUM OF ALL RESPONSES TO PHQ9 QUESTIONS 1 & 2: 0
1. LITTLE INTEREST OR PLEASURE IN DOING THINGS: NOT AT ALL
SUM OF ALL RESPONSES TO PHQ QUESTIONS 1-9: 0
2. FEELING DOWN, DEPRESSED OR HOPELESS: NOT AT ALL

## 2024-11-07 ASSESSMENT — LIFESTYLE VARIABLES
HOW OFTEN DO YOU HAVE A DRINK CONTAINING ALCOHOL: NEVER
HOW MANY STANDARD DRINKS CONTAINING ALCOHOL DO YOU HAVE ON A TYPICAL DAY: PATIENT DOES NOT DRINK

## 2024-11-07 NOTE — PROGRESS NOTES
Occupational Therapy  Facility/Department: [facility] MED [facility]  Occupational Therapy Re Assessment    Name: Graham Landaverde  : 1954  MRN: [id number]  Date of Service: 2024    RN reports patient is medically stable for therapy treatment this date.    Chart reviewed prior to treatment and patient is agreeable for therapy.  All lines intact and patient positioned comfortably at end of treatment.  All patient needs addressed prior to ending therapy session.      Discharge Recommendations:  Patient would benefit from continued therapy after discharge  Pt presenting with new musculoskeletal dysfunction and would benefit from additional therapy at time of discharge.  Please refer to the AM-PAC score for current functional status.     OT Equipment Recommendations  Equipment Needed: Yes  Mobility Devices: ADL Assistive Devices  ADL Assistive Devices: Emergency Alert System;Long-handled Shoe Horn;Long-handled Sponge;Reacher;Sock-Aid Hard       Patient Diagnosis(es): There were no encounter diagnoses.  Past Medical History:  has a past medical history of Arthritis, CAD (coronary artery disease), Enlarged prostate, History of MI (myocardial infarction), Hyperlipidemia LDL goal < 100, Hypertension, benign, Obesity, Class II, BMI 35.0-39.9, with comorbidity (see actual BMI), and Under care of team.  Past Surgical History:  has a past surgical history that includes Coronary angioplasty with stent (); joint replacement (Bilateral); hernia repair; Heel spur surgery (Bilateral); Wrist surgery (Right); Total hip arthroplasty (Left, 10/20/2015); and Colonoscopy (N/A, 2020).     R GISSELLE 2024 -       Assessment   Performance deficits / Impairments: Decreased functional mobility ;Decreased ADL status;Decreased balance;Decreased posture  Assessment: Pt tolerated second OT session well. Pt completed all becca same day discharge mobility and verbalized good understanding of all education provided. Pt would 
Orthopedic Coordinator Note    Patient s/p right total Hip replacement on 07/24/2024 WITH DR. MERLOS.    The following appointments are currently scheduled:    Post-op with surgeon 08/08/2024 AT 1315.    Physical Therapy NONE    PT HAS A FWW.    DVT Prophylaxis: 10MG XARELTO DAILY X 21 DAYS POSTOP. PT TO START XARELTO POD1 07/25/2024.    PT HAS XARELTO, GABAPENTIN, AND NORCO ECRIBED TO [facility].    PT CAME TO JOINT CLASS 07/11/2024.    PT IS A SDD.    NO SHU D/T TAPE ALLERGY.     Any questions please contact Maykel Rivera RN, MSN  [phone]      Electronically signed by: MAYKEL RIVERA RN on 7/24/2024 at 9:07 AM     
Physical Therapy  Facility/Department: [facility]  Physical Therapy Initial Assessment-day of surgery (2 sessions)    Name: Graham Landaverde  : 1954  MRN: [id number]  Date of Service: 2024    Discharge Recommendations:  Pt presenting with new musculoskeletal dysfunction and would benefit from additional therapy at time of discharge.  Please refer to the AM-PAC score for current functional status.     Rt. Ant. GISSELLE by Dr. Beltran, 24      PT Equipment Recommendations  Other: Pt presenting with new musculoskeletal dysfunction and would benefit from additional therapy at time of discharge.  Please refer to the AM-PAC score for current functional status.      Patient Diagnosis(es): There were no encounter diagnoses.  Past Medical History:  has a past medical history of Arthritis, CAD (coronary artery disease), Enlarged prostate, History of MI (myocardial infarction), Hyperlipidemia LDL goal < 100, Hypertension, benign, Obesity, Class II, BMI 35.0-39.9, with comorbidity (see actual BMI), and Under care of team.  Past Surgical History:  has a past surgical history that includes Coronary angioplasty with stent (); joint replacement (Bilateral); hernia repair; Heel spur surgery (Bilateral); Wrist surgery (Right); Total hip arthroplasty (Left, 10/20/2015); and Colonoscopy (N/A, 2020).    Assessment   Body Structures, Functions, Activity Limitations Requiring Skilled Therapeutic Intervention: Decreased functional mobility   Assessment: Pt. prepared for d/c home day of surgery. 2 sessions needed due to grogginess and nausea/vomiting/ dizziness in first session.  All PT goals met- see GOALS section. See EX. and Ambulation sections for further edu.  Therapy Prognosis: Excellent  Decision Making: Medium Complexity  Requires PT Follow-Up: Yes  Activity Tolerance  Activity Tolerance: Patient tolerated evaluation without incident (Second session)     Plan   Physical Therapy Plan  General Plan: 
Pt admitted to room 2111 per bed in stable condition from PACU  Oriented to room and surroundings  Bed in lowest position, wheels locked, 2/4 side rails up  Call light in reach, room free of clutter, adequate lighting provided  Denies any further questions at this time  Instructed to call out with any questions/concerns/new onset of pain and/or n/v   White board updated  Continue to monitor with hourly rounding  STAY WITH ME protocol initiated   Bed alarm on/Fall Risk signs in place/Fall risk sticker to wrist band  Non-skid socks on/at bedside      
Total joint patient has met the below criteria for a safe same day discharge.    Patient has voided before discharge.  PT has cleared pt for safety before discharge.  Pain controlled with PO pain meds.  Pt able to eat without nausea and vomiting.  DME delivered to bedside before discharge (if needed please specify which DME).  RXs filled and delivered to bedside by [facility].  Incentive spirometer/C&DB used correctly and sent home with pt.  VSS/MAP WNL.   
\"Have you been to the ER, urgent care clinic since your last visit?  Hospitalized since your last visit?\"    NO    “Have you seen or consulted any other health care providers outside our system since your last visit?”    NO            
Extremity Weight Bearing: Weight Bearing As Tolerated  Position Activity Restriction  Other position/activity restrictions: L UE IV, B knee high stockings    Subjective   General  Chart Reviewed: Yes  Patient assessed for rehabilitation services?: Yes  Family / Caregiver Present: Yes  Subjective  Subjective: pt c/o L hip pain (non-surgical side)- assisted with positioning with pillows  General Comment  Comments: RN reports patient is medically stable for therapy treatment this date.    Chart reviewed prior to treatment and patient is agreeable for therapy.  All lines intact and patient positioned comfortably at end of treatment.  All patient needs addressed prior to ending therapy session.     Social/Functional History  Social/Functional History  Lives With: Spouse  Type of Home: House  Home Layout: One level  Home Access: Stairs to enter without rails  Entrance Stairs - Number of Steps: 2  Bathroom Shower/Tub: Walk-in shower, Tub/Shower unit  Bathroom Equipment: Tub transfer bench, Shower chair, Hand-held shower  Home Equipment: Alert button, Sock aid, Walker - Rolling, Leg , Reacher, Rollator, Wheelchair - Manual, Cane  Has the patient had two or more falls in the past year or any fall with injury in the past year?: No  ADL Assistance: Independent  Homemaking Assistance: Independent  Ambulation Assistance: Independent  Transfer Assistance: Independent  Active : Yes  Occupation: Retired  Leisure & Hobbies: TV, tablet; Lives in [place] 9 months out of the year       Objective           Observation/Palpation  Posture: Good  Observation: Pt very groggy first session.  Safety Devices  Type of Devices: All fall risk precautions in place;Bed alarm in place;Call light within reach;Nurse notified;Gait belt;Left in bed        AROM: Within functional limits  Strength: Within functional limits (B UEs 5/5)  Coordination: Within functional limits  Tone: Normal  Sensation: Impaired (dimished sensation to medial calf 
Blood Gluc Sensor (FREESTYLE MARYBETH 2 SENSOR) MISC Change every 2 weeks as directed.  Dx E11.9 Yes [place] APRN - CNP   B Complex Vitamins (VITAMIN B COMPLEX PO) Take 1 tablet by mouth daily Yes Automatic Reconciliation, Ar   ascorbic acid (VITAMIN C) 500 MG tablet Take 1 tablet by mouth daily Yes Automatic Reconciliation, Ar   aspirin 81 MG EC tablet Take 1 tablet by mouth daily Yes Automatic Reconciliation, Ar   belatacept (NULOJIX) 250 MG SOLR Infuse 500 mg intravenously Yes Automatic Reconciliation, Ar   vitamin D 25 MCG (1000 UT) CAPS Take 1 capsule by mouth daily Yes Automatic Reconciliation, Ar   cyanocobalamin 250 MCG tablet Take 1 tablet by mouth daily Yes Automatic Reconciliation, Ar   mycophenolate (CELLCEPT) 250 MG capsule Take 3 capsules by mouth 2 times daily. Yes Automatic Reconciliation, Ar   predniSONE (DELTASONE) 5 MG tablet Take 1 tablet by mouth daily Yes Automatic Reconciliation, Ar       CareTeam (Including outside providers/suppliers regularly involved in providing care):   Patient Care Team:  Marek Yanez MD as PCP - General (Family Medicine)  Marek Yanez MD as PCP - Empaneled Provider      Reviewed and updated this visit:  Tobacco  Allergies  Meds  Med Hx  Surg Hx  Soc Hx  Fam Hx

## 2024-11-07 NOTE — PATIENT INSTRUCTIONS
fruits and vegetables are especially good for you. Examples include spinach, carrots, peaches, and berries.     Foods high in fiber can reduce your cholesterol and provide important vitamins and minerals. High-fiber foods include whole-grain cereals and breads, oatmeal, beans, brown rice, citrus fruits, and apples.     Eat lean proteins. Heart-healthy proteins include seafood, lean meats and poultry, eggs, beans, peas, nuts, seeds, and soy products.     Limit drinks and foods with added sugar. These include candy, desserts, and soda pop.   Heart-healthy lifestyle    If your doctor recommends it, get more exercise. For many people, walking is a good choice. Or you may want to swim, bike, or do other activities. Bit by bit, increase the time you're active every day. Try for at least 30 minutes on most days of the week.     Try to quit or cut back on using tobacco and other nicotine products. This includes smoking and vaping. If you need help quitting, talk to your doctor about stop-smoking programs and medicines. These can increase your chances of quitting for good. Quitting is one of the most important things you can do to protect your heart. It is never too late to quit. Try to avoid secondhand smoke too.     Stay at a weight that's healthy for you. Talk to your doctor if you need help losing weight.     Try to get 7 to 9 hours of sleep each night.     Limit alcohol to 2 drinks a day for men and 1 drink a day for women. Too much alcohol can cause health problems.     Manage other health problems such as diabetes, high blood pressure, and high cholesterol. If you think you may have a problem with alcohol or drug use, talk to your doctor.   Medicines    Take your medicines exactly as prescribed. Call your doctor if you think you are having a problem with your medicine.     If your doctor recommends aspirin, take the amount directed each day. Make sure you take aspirin and not another kind of pain reliever, such as

## 2024-11-15 ENCOUNTER — OFFICE VISIT (OUTPATIENT)
Dept: NEUROLOGY | Age: 79
End: 2024-11-15

## 2024-11-15 VITALS
WEIGHT: 150 LBS | HEART RATE: 74 BPM | HEIGHT: 62 IN | BODY MASS INDEX: 27.6 KG/M2 | DIASTOLIC BLOOD PRESSURE: 71 MMHG | SYSTOLIC BLOOD PRESSURE: 171 MMHG | OXYGEN SATURATION: 96 %

## 2024-11-15 DIAGNOSIS — F02.C18 SEVERE ALZHEIMER'S DEMENTIA OF OTHER ONSET WITH OTHER BEHAVIORAL DISTURBANCE (HCC): Primary | ICD-10-CM

## 2024-11-15 DIAGNOSIS — G30.8 SEVERE ALZHEIMER'S DEMENTIA OF OTHER ONSET WITH OTHER BEHAVIORAL DISTURBANCE (HCC): Primary | ICD-10-CM

## 2024-11-15 DIAGNOSIS — G30.8 SEVERE ALZHEIMER'S DEMENTIA OF OTHER ONSET WITH OTHER BEHAVIORAL DISTURBANCE (HCC): ICD-10-CM

## 2024-11-15 DIAGNOSIS — F02.C18 SEVERE ALZHEIMER'S DEMENTIA OF OTHER ONSET WITH OTHER BEHAVIORAL DISTURBANCE (HCC): ICD-10-CM

## 2024-11-15 RX ORDER — MEMANTINE HYDROCHLORIDE 10 MG/1
TABLET ORAL
Qty: 180 TABLET | Refills: 3 | Status: SHIPPED | OUTPATIENT
Start: 2024-11-15

## 2024-11-15 RX ORDER — RISPERIDONE 0.5 MG/1
0.5 TABLET ORAL EVERY EVENING
Qty: 30 TABLET | Refills: 9 | Status: SHIPPED | OUTPATIENT
Start: 2024-11-15

## 2024-11-15 ASSESSMENT — ENCOUNTER SYMPTOMS
GASTROINTESTINAL NEGATIVE: 1
EYES NEGATIVE: 1
ALLERGIC/IMMUNOLOGIC NEGATIVE: 1
RESPIRATORY NEGATIVE: 1

## 2024-11-15 NOTE — PROGRESS NOTES
soft.   Neurological:      Mental Status: She is alert.          Neurologic Exam     Mental Status   Level of consciousness: alert  Knowledge: poor.   Less than 5 out of the     Cranial Nerves     CN II   Visual fields full to confrontation.     CN III, IV, VI   Pupils are equal, round, and reactive to light.  Extraocular motions are normal.     CN VII   Facial expression full, symmetric.     Motor Exam   Right arm tone: decreased  Left arm tone: decreased  Right leg tone: decreased  Left leg tone: decreased    Gait, Coordination, and Reflexes     Tremor   Resting tremor: absent  Intention tremor: absent  Action tremor: absentWheelchair for distance           Assessment   Assessment / Plan:    Alayna was seen today for follow-up.    Diagnoses and all orders for this visit:    Severe Alzheimer's dementia of other onset with other behavioral disturbance (HCC) the patient's MoCA test is less than 10 out of 30 she does fall in the severe range she is sundowning and Risperdal 0.5 mg does help her she is on Namenda 10 twice a day but could not tolerate Aricept and organ avoid Exelon.  She is still delusional but stable with risperidone.  -     Phosphorylated Tau 217 (pTau-217), Plasma; Future  -     Phosphorylated Tau 181 (pTau-181), Plasma; Future  -     memantine (NAMENDA) 10 MG tablet; Take half tablet twice a day for 1 month and then continue with 1 tablet twice a day    Other orders  -     risperiDONE (RISPERDAL) 0.5 MG tablet; Take 1 tablet by mouth every evening        The Diagnosis and differential diagnostic considerations, and Rx Tx were reviewed with the patient at length.             Orders Placed This Encounter   Procedures    Phosphorylated Tau 217 (pTau-217), Plasma     Standing Status:   Future     Standing Expiration Date:   11/15/2025    Phosphorylated Tau 181 (pTau-181), Plasma     Standing Status:   Future     Standing Expiration Date:   11/15/2025          I have spent greater than 50% of visit

## 2024-11-18 LAB — P-TAU181: 2.15 PG/ML (ref 0–0.97)

## 2024-11-19 ENCOUNTER — CLINICAL DOCUMENTATION (OUTPATIENT)
Dept: NEUROLOGY | Age: 79
End: 2024-11-19

## 2024-11-20 ENCOUNTER — CLINICAL DOCUMENTATION (OUTPATIENT)
Dept: NEUROLOGY | Age: 79
End: 2024-11-20

## 2024-11-20 LAB — IMMUNOLOGIST REVIEW: NORMAL

## 2024-11-22 ENCOUNTER — HOSPITAL ENCOUNTER (OUTPATIENT)
Dept: WOUND CARE | Age: 79
Discharge: HOME | End: 2024-11-22

## 2024-11-22 VITALS
HEART RATE: 69 BPM | DIASTOLIC BLOOD PRESSURE: 77 MMHG | BODY MASS INDEX: 27.6 KG/M2 | WEIGHT: 150 LBS | RESPIRATION RATE: 16 BRPM | HEIGHT: 62 IN | SYSTOLIC BLOOD PRESSURE: 164 MMHG | TEMPERATURE: 97.9 F

## 2024-11-22 ASSESSMENT — PAIN SCALES - GENERAL: PAINLEVEL_OUTOF10: 2

## 2024-11-22 ASSESSMENT — PAIN DESCRIPTION - ORIENTATION: ORIENTATION: LEFT

## 2024-11-22 ASSESSMENT — PAIN DESCRIPTION - LOCATION: LOCATION: FOOT

## 2024-11-22 NOTE — FLOWSHEET NOTE
11/22/24 1316   Wound 01/22/24 Heel Left;Posterior #1   Date First Assessed/Time First Assessed: 01/22/24 1336   Present on Original Admission: Yes  Wound Approximate Age at First Assessment (Weeks): 8 weeks  Primary Wound Type: Diabetic Ulcer  Location: Heel  Wound Location Orientation: Left;Posterior  Wo...   Wound Image     Wound Etiology Diabetic Doan 2   Dressing Status Intact   Wound Cleansed Cleansed with saline   Offloading for Diabetic Foot Ulcers Offloading ordered   Wound Length (cm) 1.1 cm   Wound Width (cm) 1.4 cm   Wound Depth (cm) 0.1 cm   Wound Surface Area (cm^2) 1.54 cm^2   Change in Wound Size % (l*w) -54   Wound Volume (cm^3) 0.154 cm^3   Wound Healing % -54   Post-Procedure Length (cm) 1.2 cm   Post-Procedure Width (cm) 1.4 cm   Post-Procedure Depth (cm) 0.1 cm   Post-Procedure Surface Area (cm^2) 1.68 cm^2   Post-Procedure Volume (cm^3) 0.168 cm^3   Wound Assessment Pink/red   Drainage Amount Moderate (25-50%)   Drainage Description Serosanguinous   Odor None   Abby-wound Assessment Fragile;Dry/flaky   Pain Assessment   Pain Assessment 0-10   Pain Level 2   Pain Location Foot   Pain Orientation Left

## 2024-11-22 NOTE — WOUND CARE
Discharge Instructions for  [facility]  [address] [place] [address]  [address] [place] [address]  Phone [phone]   Fax [phone]      NAME:  Alayna Fall  YOB: 1945  MEDICAL RECORD NUMBER:  [id number]  DATE:  11/22/2024    Return Appointment:   3 weeks with Todd Rizzo DO    Instructions:   Left Heel:  Cleanse with normal saline  Vashe post debridement.  Hydorfera Blue Ready  apply to wound bed.  Cover with gauze or ABD pad. Secure with roll gauze   Change 3 x week      Offloading  DEFENDER BOOT at all times   Use wheelchair to minimize ambulation.  Use cast cover for showering.  Wound healing is greatly slowed when blood glucose levels are greater than 200. Monitor glucose levels daily to ensure tight glucose control.  Increase protein intake to promote wound healing. Protein supplements such as Evelio and Ensure are great options.   Protein goal is 60 - 80 grams per day. Recommend Ensure Max or Glucerna.     Should you experience increased redness, swelling, pain, foul odor, size of wound(s), or have a temperature over 101 degrees please contact the Wound Healing Center at [phone] or if after hours contact your primary care physician or go to the hospital emergency department.    PLEASE NOTE: IF YOU ARE UNABLE TO OBTAIN WOUND SUPPLIES, CONTINUE TO USE THE SUPPLIES YOU HAVE AVAILABLE UNTIL YOU ARE ABLE TO REACH US. IT IS MOST IMPORTANT TO KEEP THE WOUND COVERED AT ALL TIMES.    Electronically signed TABITHA RODRIGUEZ RN on 11/22/2024 at 1:46 PM

## 2024-12-03 ENCOUNTER — HOSPITAL ENCOUNTER (OUTPATIENT)
Dept: INFUSION THERAPY | Age: 79
Setting detail: INFUSION SERIES
Discharge: HOME | End: 2024-12-03
Payer: MEDICARE

## 2024-12-03 VITALS
DIASTOLIC BLOOD PRESSURE: 73 MMHG | TEMPERATURE: 99.1 F | HEART RATE: 72 BPM | RESPIRATION RATE: 16 BRPM | OXYGEN SATURATION: 98 % | WEIGHT: 153.6 LBS | SYSTOLIC BLOOD PRESSURE: 150 MMHG | BODY MASS INDEX: 28.09 KG/M2

## 2024-12-03 DIAGNOSIS — Z94.0 RENAL TRANSPLANT, STATUS POST: Primary | ICD-10-CM

## 2024-12-03 LAB
ALBUMIN SERPL-MCNC: 3.4 G/DL (ref 3.2–4.6)
ALBUMIN/GLOB SERPL: 0.8 (ref 1–1.9)
ALP SERPL-CCNC: 67 U/L (ref 35–104)
ALT SERPL-CCNC: 20 U/L (ref 8–45)
ANION GAP SERPL CALC-SCNC: 9 MMOL/L (ref 7–16)
AST SERPL-CCNC: 23 U/L (ref 15–37)
BASOPHILS # BLD: 0 K/UL (ref 0–0.2)
BASOPHILS NFR BLD: 0 % (ref 0–2)
BILIRUB SERPL-MCNC: 0.3 MG/DL (ref 0–1.2)
BUN SERPL-MCNC: 17 MG/DL (ref 8–23)
CALCIUM SERPL-MCNC: 10.1 MG/DL (ref 8.8–10.2)
CHLORIDE SERPL-SCNC: 105 MMOL/L (ref 98–107)
CO2 SERPL-SCNC: 25 MMOL/L (ref 20–29)
CREAT SERPL-MCNC: 0.57 MG/DL (ref 0.6–1.1)
DIFFERENTIAL METHOD BLD: ABNORMAL
EOSINOPHIL # BLD: 0.2 K/UL (ref 0–0.8)
EOSINOPHIL NFR BLD: 2 % (ref 0.5–7.8)
ERYTHROCYTE [DISTWIDTH] IN BLOOD BY AUTOMATED COUNT: 13.4 % (ref 11.9–14.6)
GLOBULIN SER CALC-MCNC: 4.3 G/DL (ref 2.3–3.5)
GLUCOSE SERPL-MCNC: 182 MG/DL (ref 70–99)
HCT VFR BLD AUTO: 34.8 % (ref 35.8–46.3)
HGB BLD-MCNC: 10.6 G/DL (ref 11.7–15.4)
IMM GRANULOCYTES # BLD AUTO: 0 K/UL (ref 0–0.5)
IMM GRANULOCYTES NFR BLD AUTO: 0 % (ref 0–5)
LYMPHOCYTES # BLD: 1.1 K/UL (ref 0.5–4.6)
LYMPHOCYTES NFR BLD: 17 % (ref 13–44)
MCH RBC QN AUTO: 29.2 PG (ref 26.1–32.9)
MCHC RBC AUTO-ENTMCNC: 30.5 G/DL (ref 31.4–35)
MCV RBC AUTO: 95.9 FL (ref 82–102)
MONOCYTES # BLD: 0.4 K/UL (ref 0.1–1.3)
MONOCYTES NFR BLD: 6 % (ref 4–12)
NEUTS SEG # BLD: 4.9 K/UL (ref 1.7–8.2)
NEUTS SEG NFR BLD: 75 % (ref 43–78)
NRBC # BLD: 0 K/UL (ref 0–0.2)
PLATELET # BLD AUTO: 159 K/UL (ref 150–450)
PMV BLD AUTO: 9.3 FL (ref 9.4–12.3)
POTASSIUM SERPL-SCNC: 4.3 MMOL/L (ref 3.5–5.1)
PROT SERPL-MCNC: 7.6 G/DL (ref 6.3–8.2)
RBC # BLD AUTO: 3.63 M/UL (ref 4.05–5.2)
SODIUM SERPL-SCNC: 139 MMOL/L (ref 136–145)
WBC # BLD AUTO: 6.5 K/UL (ref 4.3–11.1)

## 2024-12-03 PROCEDURE — 96365 THER/PROPH/DIAG IV INF INIT: CPT

## 2024-12-03 PROCEDURE — 6360000002 HC RX W HCPCS: Performed by: INTERNAL MEDICINE

## 2024-12-03 PROCEDURE — 85025 COMPLETE CBC W/AUTO DIFF WBC: CPT

## 2024-12-03 PROCEDURE — 2580000003 HC RX 258: Performed by: INTERNAL MEDICINE

## 2024-12-03 PROCEDURE — 80053 COMPREHEN METABOLIC PANEL: CPT

## 2024-12-03 RX ORDER — SODIUM CHLORIDE 9 MG/ML
5-250 INJECTION, SOLUTION INTRAVENOUS PRN
Status: DISCONTINUED | OUTPATIENT
Start: 2024-12-03 | End: 2024-12-04 | Stop reason: HOSPADM

## 2024-12-03 RX ORDER — SODIUM CHLORIDE 9 MG/ML
INJECTION, SOLUTION INTRAVENOUS CONTINUOUS
OUTPATIENT
Start: 2024-12-17

## 2024-12-03 RX ORDER — HYDROCORTISONE SODIUM SUCCINATE 100 MG/2ML
100 INJECTION INTRAMUSCULAR; INTRAVENOUS
OUTPATIENT
Start: 2024-12-17

## 2024-12-03 RX ORDER — SODIUM CHLORIDE 0.9 % (FLUSH) 0.9 %
5-40 SYRINGE (ML) INJECTION PRN
OUTPATIENT
Start: 2024-12-17

## 2024-12-03 RX ORDER — SODIUM CHLORIDE 9 MG/ML
5-250 INJECTION, SOLUTION INTRAVENOUS PRN
OUTPATIENT
Start: 2024-12-17

## 2024-12-03 RX ORDER — EPINEPHRINE 1 MG/ML
0.3 INJECTION, SOLUTION, CONCENTRATE INTRAVENOUS PRN
OUTPATIENT
Start: 2024-12-17

## 2024-12-03 RX ORDER — SODIUM CHLORIDE 0.9 % (FLUSH) 0.9 %
5-40 SYRINGE (ML) INJECTION PRN
Status: DISCONTINUED | OUTPATIENT
Start: 2024-12-03 | End: 2024-12-04 | Stop reason: HOSPADM

## 2024-12-03 RX ORDER — ALBUTEROL SULFATE 90 UG/1
4 INHALANT RESPIRATORY (INHALATION) PRN
OUTPATIENT
Start: 2024-12-17

## 2024-12-03 RX ORDER — HEPARIN 100 UNIT/ML
500 SYRINGE INTRAVENOUS PRN
OUTPATIENT
Start: 2024-12-17

## 2024-12-03 RX ORDER — ACETAMINOPHEN 325 MG/1
650 TABLET ORAL
OUTPATIENT
Start: 2024-12-17

## 2024-12-03 RX ORDER — ONDANSETRON 2 MG/ML
8 INJECTION INTRAMUSCULAR; INTRAVENOUS
OUTPATIENT
Start: 2024-12-17

## 2024-12-03 RX ORDER — DIPHENHYDRAMINE HYDROCHLORIDE 50 MG/ML
50 INJECTION INTRAMUSCULAR; INTRAVENOUS
OUTPATIENT
Start: 2024-12-17

## 2024-12-03 RX ADMIN — SODIUM CHLORIDE, PRESERVATIVE FREE 10 ML: 5 INJECTION INTRAVENOUS at 16:06

## 2024-12-03 RX ADMIN — SODIUM CHLORIDE 500 MG: 9 INJECTION, SOLUTION INTRAVENOUS at 16:15

## 2024-12-03 NOTE — PROGRESS NOTES
Arrived to the Infusion Center by wheelchair with .  Nulojix infusion and labs completed. Patient tolerated well.   Any issues or concerns during appointment: none.  Patient instructed to call provider with temperature of 100.4 or greater or nausea/vomiting/ diarrhea or pain not controlled by medications  Discharged home by wheelchair with .

## 2024-12-06 ENCOUNTER — HOSPITAL ENCOUNTER (OUTPATIENT)
Dept: WOUND CARE | Age: 79
Discharge: HOME | End: 2024-12-06
Payer: MEDICARE

## 2024-12-06 VITALS
RESPIRATION RATE: 18 BRPM | OXYGEN SATURATION: 99 % | BODY MASS INDEX: 28.16 KG/M2 | HEART RATE: 75 BPM | TEMPERATURE: 98.4 F | SYSTOLIC BLOOD PRESSURE: 136 MMHG | WEIGHT: 153 LBS | DIASTOLIC BLOOD PRESSURE: 63 MMHG | HEIGHT: 62 IN

## 2024-12-06 DIAGNOSIS — M25.375 INSTABILITY OF FOOT JOINT, LEFT: ICD-10-CM

## 2024-12-06 DIAGNOSIS — L89.623 PRESSURE ULCER OF LEFT HEEL, STAGE 3 (HCC): Primary | ICD-10-CM

## 2024-12-06 PROCEDURE — 11042 DBRDMT SUBQ TIS 1ST 20SQCM/<: CPT

## 2024-12-06 NOTE — DISCHARGE INSTRUCTIONS
Return Appointment:   2 weeks on Thursday with Todd Rizzo DO     Instructions:   Left Heel:  Cleanse with normal saline  Vashe post debridement.  Apply collagen with silver. Cut product to approximate wound size and apply to wound bed.   Cover with Hydrofera Blue Ready apply to wound bed.  Secure with Coversite.  Change 3 x week      Obtain multipodus boot to offload left heel. Prescription written today and given to patient and . Patient states that Defender boot is too heavy to walk in.    Use wheelchair to minimize ambulation.  Use cast cover for showering.  Wound healing is greatly slowed when blood glucose levels are greater than 200. Monitor glucose levels daily to ensure tight glucose control.  Increase protein intake to promote wound healing. Protein supplements such as Evelio and Ensure are great options.   Protein goal is 60 - 80 grams per day. Recommend Ensure Max or Glucerna.

## 2024-12-06 NOTE — WOUND CARE
Discharge Instructions for  [facility]  [address] [place] [address]  [address]  [place] [address]  Phone [phone]   Fax [phone]      NAME:  Alayna Fall  YOB: 1945  MEDICAL RECORD NUMBER:  [id number]  DATE:  12/6/2024    Return Appointment:   2 weeks on Thursday with Todd Rizzo DO    Instructions:   Left Heel:  Cleanse with normal saline  Vashe post debridement.  Apply collagen with silver. Cut product to approximate wound size and apply to wound bed.   Cover with Hydrofera Blue Ready apply to wound bed.  Secure with Coversite.  Change 3 x week      Obtain multipodus boot to offload left heel. Prescription written today and given to patient and . Patient states that Defender boot is too heavy to walk in.    Use wheelchair to minimize ambulation.  Use cast cover for showering.  Wound healing is greatly slowed when blood glucose levels are greater than 200. Monitor glucose levels daily to ensure tight glucose control.  Increase protein intake to promote wound healing. Protein supplements such as Evelio and Ensure are great options.   Protein goal is 60 - 80 grams per day. Recommend Ensure Max or Glucerna.       Should you experience increased redness, swelling, pain, foul odor, size of wound(s), or have a temperature over 101 degrees please contact the Wound Healing Center at [phone] or if after hours contact your primary care physician or go to the hospital emergency department.    PLEASE NOTE: IF YOU ARE UNABLE TO OBTAIN WOUND SUPPLIES, CONTINUE TO USE THE SUPPLIES YOU HAVE AVAILABLE UNTIL YOU ARE ABLE TO REACH US. IT IS MOST IMPORTANT TO KEEP THE WOUND COVERED AT ALL TIMES.    Electronically signed Alejandra Chong PT, WCC on 12/6/2024 at 3:10 PM

## 2024-12-06 NOTE — FLOWSHEET NOTE
12/06/24 1439   Wound 01/22/24 Heel Left;Posterior #1   Date First Assessed/Time First Assessed: 01/22/24 1336   Present on Original Admission: Yes  Wound Approximate Age at First Assessment (Weeks): 8 weeks  Primary Wound Type: Diabetic Ulcer  Location: Heel  Wound Location Orientation: Left;Posterior  Wo...   Wound Image     Wound Etiology Diabetic Doan 2   Dressing Status Intact   Wound Cleansed Cleansed with saline   Dressing/Treatment Hydrofera blue   Offloading for Diabetic Foot Ulcers Offloading ordered   Wound Length (cm) 0.6 cm   Wound Width (cm) 0.6 cm   Wound Depth (cm) 0.1 cm   Wound Surface Area (cm^2) 0.36 cm^2   Change in Wound Size % (l*w) 64   Wound Volume (cm^3) 0.036 cm^3   Wound Healing % 64   Post-Procedure Length (cm) 0.6 cm   Post-Procedure Width (cm) 0.6 cm   Post-Procedure Depth (cm) 0.1 cm   Post-Procedure Surface Area (cm^2) 0.36 cm^2   Post-Procedure Volume (cm^3) 0.036 cm^3   Wound Assessment Pink/red   Drainage Amount Small (< 25%)   Drainage Description Serosanguinous   Odor None   Abby-wound Assessment Dry/flaky   Wound Thickness Description not for Pressure Injury Full thickness   Pain Assessment   Pain Assessment None - Denies Pain     Patient is currently taking ASA 81. Pre- and post-debridement

## 2024-12-19 ENCOUNTER — HOSPITAL ENCOUNTER (OUTPATIENT)
Dept: WOUND CARE | Age: 79
Discharge: HOME | End: 2024-12-19
Payer: MEDICARE

## 2024-12-19 VITALS
TEMPERATURE: 97.7 F | WEIGHT: 153 LBS | HEIGHT: 62 IN | DIASTOLIC BLOOD PRESSURE: 71 MMHG | SYSTOLIC BLOOD PRESSURE: 154 MMHG | HEART RATE: 74 BPM | OXYGEN SATURATION: 99 % | BODY MASS INDEX: 28.16 KG/M2 | RESPIRATION RATE: 18 BRPM

## 2024-12-19 DIAGNOSIS — L89.623 PRESSURE ULCER OF LEFT HEEL, STAGE 3 (HCC): Primary | Chronic | ICD-10-CM

## 2024-12-19 PROCEDURE — 99213 OFFICE O/P EST LOW 20 MIN: CPT

## 2024-12-19 RX ORDER — NEOMYCIN/BACITRACIN/POLYMYXINB 3.5-400-5K
OINTMENT (GRAM) TOPICAL ONCE
OUTPATIENT
Start: 2024-12-19 | End: 2024-12-19

## 2024-12-19 RX ORDER — LIDOCAINE HYDROCHLORIDE 20 MG/ML
JELLY TOPICAL ONCE
OUTPATIENT
Start: 2024-12-19 | End: 2024-12-19

## 2024-12-19 RX ORDER — MUPIROCIN 20 MG/G
OINTMENT TOPICAL ONCE
OUTPATIENT
Start: 2024-12-19 | End: 2024-12-19

## 2024-12-19 RX ORDER — SILVER SULFADIAZINE 10 MG/G
CREAM TOPICAL ONCE
OUTPATIENT
Start: 2024-12-19 | End: 2024-12-19

## 2024-12-19 RX ORDER — LIDOCAINE HYDROCHLORIDE 40 MG/ML
SOLUTION TOPICAL ONCE
OUTPATIENT
Start: 2024-12-19 | End: 2024-12-19

## 2024-12-19 RX ORDER — LIDOCAINE 40 MG/G
CREAM TOPICAL ONCE
OUTPATIENT
Start: 2024-12-19 | End: 2024-12-19

## 2024-12-19 RX ORDER — TRIAMCINOLONE ACETONIDE 1 MG/G
OINTMENT TOPICAL ONCE
OUTPATIENT
Start: 2024-12-19 | End: 2024-12-19

## 2024-12-19 RX ORDER — BACITRACIN ZINC AND POLYMYXIN B SULFATE 500; 1000 [USP'U]/G; [USP'U]/G
OINTMENT TOPICAL ONCE
OUTPATIENT
Start: 2024-12-19 | End: 2024-12-19

## 2024-12-19 RX ORDER — SODIUM CHLOR/HYPOCHLOROUS ACID 0.033 %
SOLUTION, IRRIGATION IRRIGATION ONCE
OUTPATIENT
Start: 2024-12-19 | End: 2024-12-19

## 2024-12-19 RX ORDER — LIDOCAINE 50 MG/G
OINTMENT TOPICAL ONCE
OUTPATIENT
Start: 2024-12-19 | End: 2024-12-19

## 2024-12-19 RX ORDER — GINSENG 100 MG
CAPSULE ORAL ONCE
OUTPATIENT
Start: 2024-12-19 | End: 2024-12-19

## 2024-12-19 RX ORDER — LIDOCAINE HYDROCHLORIDE 20 MG/ML
JELLY TOPICAL ONCE
Status: COMPLETED | OUTPATIENT
Start: 2024-12-19 | End: 2024-12-19

## 2024-12-19 RX ORDER — BETAMETHASONE DIPROPIONATE 0.5 MG/G
CREAM TOPICAL ONCE
OUTPATIENT
Start: 2024-12-19 | End: 2024-12-19

## 2024-12-19 RX ORDER — GENTAMICIN SULFATE 1 MG/G
OINTMENT TOPICAL ONCE
OUTPATIENT
Start: 2024-12-19 | End: 2024-12-19

## 2024-12-19 RX ORDER — CLOBETASOL PROPIONATE 0.5 MG/G
OINTMENT TOPICAL ONCE
OUTPATIENT
Start: 2024-12-19 | End: 2024-12-19

## 2024-12-19 RX ADMIN — LIDOCAINE HYDROCHLORIDE: 20 JELLY TOPICAL at 14:58

## 2024-12-19 NOTE — WOUND CARE
Discharge Instructions for  [facility]  [address] [place] [address]  [address] [place] [address]  Phone [phone]   Fax [phone]      NAME:  Alayna Fall  YOB: 1945  MEDICAL RECORD NUMBER:  [id number]  DATE:  12/19/2024    Return Appointment:   3 weeks with Todd Rizzo DO    Instructions:   Left Heel:  Cleanse with normal saline  Vashe post debridement.  Apply xeroform to wound bed.  Secure with bordered foam.  Change 3 x week      Obtain multipodus boot to offload left heel.   Use wheelchair to minimize ambulation.  Use cast cover for showering.  Wound healing is greatly slowed when blood glucose levels are greater than 200. Monitor glucose levels daily to ensure tight glucose control.  Increase protein intake to promote wound healing. Protein supplements such as Evelio and Ensure are great options.   Protein goal is 60 - 80 grams per day. Recommend Ensure Max or Glucerna.    Should you experience increased redness, swelling, pain, foul odor, size of wound(s), or have a temperature over 101 degrees please contact the Wound Healing Center at [phone] or if after hours contact your primary care physician or go to the hospital emergency department.    PLEASE NOTE: IF YOU ARE UNABLE TO OBTAIN WOUND SUPPLIES, CONTINUE TO USE THE SUPPLIES YOU HAVE AVAILABLE UNTIL YOU ARE ABLE TO REACH US. IT IS MOST IMPORTANT TO KEEP THE WOUND COVERED AT ALL TIMES.    Electronically signed Justin Escobedo RN, [facility] on 12/19/2024 at 3:18 PM

## 2024-12-19 NOTE — DISCHARGE INSTRUCTIONS
Instructions:   Left Heel:  Cleanse with normal saline  Vashe post debridement.  Apply xeroform to wound bed.  Secure with bordered foam.  Change 3 x week      Obtain multipodus boot to offload left heel.   Use wheelchair to minimize ambulation.  Use cast cover for showering.  Wound healing is greatly slowed when blood glucose levels are greater than 200. Monitor glucose levels daily to ensure tight glucose control.  Increase protein intake to promote wound healing. Protein supplements such as Evelio and Ensure are great options.   Protein goal is 60 - 80 grams per day. Recommend Ensure Max or Glucerna.

## 2024-12-19 NOTE — FLOWSHEET NOTE
12/19/24 1449   Wound 01/22/24 Heel Left;Posterior #1   Date First Assessed/Time First Assessed: 01/22/24 1336   Present on Original Admission: Yes  Wound Approximate Age at First Assessment (Weeks): 8 weeks  Primary Wound Type: Diabetic Ulcer  Location: Heel  Wound Location Orientation: Left;Posterior  Wo...   Wound Image     Wound Etiology Diabetic Doan 2   Dressing Status Intact   Wound Cleansed Cleansed with saline   Dressing/Treatment Hydrofera blue   Offloading for Diabetic Foot Ulcers Offloading ordered   Wound Length (cm) 1 cm   Wound Width (cm) 1.5 cm   Wound Depth (cm) 0.1 cm   Wound Surface Area (cm^2) 1.5 cm^2   Change in Wound Size % (l*w) -50   Wound Volume (cm^3) 0.15 cm^3   Wound Healing % -50   Post-Procedure Length (cm) 0.5 cm   Post-Procedure Width (cm) 0.6 cm   Post-Procedure Depth (cm) 0.1 cm   Post-Procedure Surface Area (cm^2) 0.3 cm^2   Post-Procedure Volume (cm^3) 0.03 cm^3   Wound Assessment Eschar dry   Drainage Amount None (dry)   Odor None   Abby-wound Assessment Dry/flaky   Margins Undefined edges   Wound Thickness Description not for Pressure Injury Full thickness   Pain Assessment   Pain Assessment None - Denies Pain

## 2024-12-31 ENCOUNTER — TELEMEDICINE (OUTPATIENT)
Dept: FAMILY MEDICINE CLINIC | Facility: CLINIC | Age: 79
End: 2024-12-31
Payer: MEDICARE

## 2024-12-31 DIAGNOSIS — E03.4 HYPOTHYROIDISM DUE TO ACQUIRED ATROPHY OF THYROID: ICD-10-CM

## 2024-12-31 DIAGNOSIS — Z79.4 TYPE 2 DIABETES MELLITUS WITH HYPERGLYCEMIA, WITH LONG-TERM CURRENT USE OF INSULIN (HCC): Primary | ICD-10-CM

## 2024-12-31 DIAGNOSIS — N18.9 CHRONIC KIDNEY DISEASE, UNSPECIFIED CKD STAGE: ICD-10-CM

## 2024-12-31 DIAGNOSIS — I10 PRIMARY HYPERTENSION: ICD-10-CM

## 2024-12-31 DIAGNOSIS — E78.00 PURE HYPERCHOLESTEROLEMIA: ICD-10-CM

## 2024-12-31 DIAGNOSIS — E11.65 TYPE 2 DIABETES MELLITUS WITH HYPERGLYCEMIA, WITH LONG-TERM CURRENT USE OF INSULIN (HCC): Primary | ICD-10-CM

## 2024-12-31 DIAGNOSIS — F01.B18 MODERATE VASCULAR DEMENTIA WITH OTHER BEHAVIORAL DISTURBANCE (HCC): ICD-10-CM

## 2024-12-31 PROCEDURE — 1159F MED LIST DOCD IN RCRD: CPT | Performed by: FAMILY MEDICINE

## 2024-12-31 PROCEDURE — 99442 PR PHYS/QHP TELEPHONE EVALUATION 11-20 MIN: CPT | Performed by: FAMILY MEDICINE

## 2024-12-31 RX ORDER — CARVEDILOL 12.5 MG/1
12.5 TABLET ORAL 2 TIMES DAILY
Qty: 180 TABLET | Refills: 1 | Status: SHIPPED | OUTPATIENT
Start: 2024-12-31

## 2024-12-31 NOTE — PROGRESS NOTES
SUBJECTIVE:   Alayna Fall is a 79 y.o. female who has a complicated past medical history significant for renal failure status post transplant x2 followed by nephrology, diabetes followed by endocrinology, hypothyroidism, hypertension, high cholesterol, high triglycerides, degenerative arthritis, dementia followed by neurology, pressure ulcer of left heel followed by home health history of stroke, acute GI bleed with blood loss anemia and obesity.  Patient presents today for virtual visit via telephone encounter.  Patient is assisted by her .  Neither one of them have any active complaints.  Review of systems reveals no chest pain, shortness of breath, orthopnea or PND.    Patient's vital signs were not performed as encounter was performed virtually.  Location of virtual visit was patient's home.      HPI  See above    Past Medical History, Past Surgical History, Family history, Social History, and Medications were all reviewed with the patient today and updated as necessary.       Current Outpatient Medications   Medication Sig Dispense Refill    memantine (NAMENDA) 10 MG tablet Take half tablet twice a day for 1 month and then continue with 1 tablet twice a day 180 tablet 3    risperiDONE (RISPERDAL) 0.5 MG tablet Take 1 tablet by mouth every evening 30 tablet 9    vitamin D (ERGOCALCIFEROL) 1.25 MG (05010 UT) CAPS capsule TAKE 1 CAPSULE BY MOUTH 1 TIME A WEEK 12 capsule 1    rosuvastatin (CRESTOR) 10 MG tablet TAKE 1 TABLET BY MOUTH EVERY NIGHT 90 tablet 1    carvedilol (COREG) 12.5 MG tablet Take 1 tablet by mouth 2 times daily 180 tablet 1    insulin NPH (HUMULIN N KWIKPEN) 100 UNIT/ML injection pen Inject 16 Units into the skin every morning (Patient taking differently: Inject 18 Units into the skin every morning) 54 mL 3    HUMALOG KWIKPEN 100 UNIT/ML SOPN 4 units AC brunch, 2 units AC supper plus correction 1/50>200, max daily dose 30 units (Patient taking differently: 2 units subcutaneous before

## 2024-12-31 NOTE — PROGRESS NOTES
\"Have you been to the ER, urgent care clinic since your last visit?  Hospitalized since your last visit?\"    NO    “Have you seen or consulted any other health care providers outside our system since your last visit?”    NO          ing

## 2025-01-09 ENCOUNTER — HOSPITAL ENCOUNTER (OUTPATIENT)
Dept: WOUND CARE | Age: 80
Discharge: HOME | End: 2025-01-09
Payer: MEDICARE

## 2025-01-09 VITALS — DIASTOLIC BLOOD PRESSURE: 82 MMHG | HEART RATE: 85 BPM | SYSTOLIC BLOOD PRESSURE: 163 MMHG

## 2025-01-09 DIAGNOSIS — L89.623 PRESSURE ULCER OF LEFT HEEL, STAGE 3 (HCC): Primary | Chronic | ICD-10-CM

## 2025-01-09 PROCEDURE — 99212 OFFICE O/P EST SF 10 MIN: CPT

## 2025-01-09 NOTE — WOUND CARE
Discharge Instructions for  [facility]  [address] [place] [address]  Phone [phone]   Fax [phone]      NAME:  Alayna Fall  YOB: 1945  MEDICAL RECORD NUMBER:  [id number]  DATE:  1/9/2025    Return Appointment:   Discharge with Todd Rizzo DO      Instructions:  left heel:  Healed  Apply vaseline daily and leave open to air    Offload while sitting or laying down         Should you experience increased redness, swelling, pain, foul odor, size of wound(s), or have a temperature over 101 degrees please contact the [facility] Center at [phone] or if after hours contact your primary care physician or go to the hospital emergency department.    PLEASE NOTE: IF YOU ARE UNABLE TO OBTAIN WOUND SUPPLIES, CONTINUE TO USE THE SUPPLIES YOU HAVE AVAILABLE UNTIL YOU ARE ABLE TO REACH US. IT IS MOST IMPORTANT TO KEEP THE WOUND COVERED AT ALL TIMES.    Electronically signed Precious Barksadle RN on 1/9/2025 at 2:43 PM

## 2025-01-09 NOTE — DISCHARGE INSTRUCTIONS
Discharge Instructions for  [facility]  [address] [place] [address]  Phone [phone]   Fax [phone]      NAME:  Alayna Fall  YOB: 1945  MEDICAL RECORD NUMBER:  [id number]  DATE:  @ED@    Return Appointment:   Discharge with Todd Rizzo DO      Instructions: left heel:  Healed  Apply vaseline daily and leave open to air    Offload while sitting or laying down         Should you experience increased redness, swelling, pain, foul odor, size of wound(s), or have a temperature over 101 degrees please contact the [facility] Center at [phone] or if after hours contact your primary care physician or go to the hospital emergency department.    PLEASE NOTE: IF YOU ARE UNABLE TO OBTAIN WOUND SUPPLIES, CONTINUE TO USE THE SUPPLIES YOU HAVE AVAILABLE UNTIL YOU ARE ABLE TO REACH US. IT IS MOST IMPORTANT TO KEEP THE WOUND COVERED AT ALL TIMES.    Electronically signed Precious Barksdale RN on 1/9/2025 at 2:42 PM

## 2025-01-09 NOTE — FLOWSHEET NOTE
01/09/25 1424   Wound 01/22/24 Heel Left;Posterior #1   Date First Assessed/Time First Assessed: 01/22/24 1336   Present on Original Admission: Yes  Wound Approximate Age at First Assessment (Weeks): 8 weeks  Primary Wound Type: Diabetic Ulcer  Location: Heel  Wound Location Orientation: Left;Posterior  Wo...   Wound Image    Wound Etiology Diabetic Doan 2   Dressing Status Intact   Wound Cleansed Cleansed with saline   Dressing/Treatment Xeroform   Offloading for Diabetic Foot Ulcers Offloading ordered   Wound Length (cm) 0 cm   Wound Width (cm) 0 cm   Wound Depth (cm) 0 cm   Wound Surface Area (cm^2) 0 cm^2   Change in Wound Size % (l*w) 100   Wound Volume (cm^3) 0 cm^3   Wound Healing % 100   Wound Assessment Epithelialization   Drainage Amount None (dry)   Odor None   Abby-wound Assessment Dry/flaky

## 2025-01-13 PROBLEM — S31.109A OPEN ABDOMINAL WALL WOUND: Status: RESOLVED | Noted: 2022-09-20 | Resolved: 2025-01-13

## 2025-01-13 PROBLEM — L03.116 CELLULITIS OF LEFT HEEL: Status: RESOLVED | Noted: 2024-03-19 | Resolved: 2025-01-13

## 2025-01-17 ENCOUNTER — OFFICE VISIT (OUTPATIENT)
Dept: ORTHOPEDIC SURGERY | Age: 80
End: 2025-01-17

## 2025-01-17 DIAGNOSIS — M17.11 OSTEOARTHRITIS OF RIGHT KNEE, UNSPECIFIED OSTEOARTHRITIS TYPE: Primary | ICD-10-CM

## 2025-01-17 RX ORDER — TRIAMCINOLONE ACETONIDE 40 MG/ML
40 INJECTION, SUSPENSION INTRA-ARTICULAR; INTRAMUSCULAR ONCE
Status: COMPLETED | OUTPATIENT
Start: 2025-01-17 | End: 2025-01-17

## 2025-01-17 RX ADMIN — TRIAMCINOLONE ACETONIDE 40 MG: 40 INJECTION, SUSPENSION INTRA-ARTICULAR; INTRAMUSCULAR at 13:39

## 2025-01-17 NOTE — PROGRESS NOTES
Name: Alayna Fall  YOB: 1945  Gender: female  MRN: [id number]    CC:   Chief Complaint   Patient presents with    Joint Pain     Right knee pain will xray today          DIAGNOSIS:   Encounter Diagnosis   Name Primary?    Osteoarthritis of right knee, unspecified osteoarthritis type Yes        HPI:   The right knee pain has been present for several weeks and is becoming worse.  It hurts at night when sleeping.  The pain is located over the right knee.  It does hurt to walk and gets worse with increased distances.   The pain does not radiate down the leg.  Numbness and tingling are not noted.   Treatment so far has been IA cortisone injection in 2022 which provided long lasting relief.   She has become more debilitated with her dementia and is accompanied by her  who assist with history.       Current Outpatient Medications:     carvedilol (COREG) 12.5 MG tablet, Take 1 tablet by mouth 2 times daily, Disp: 180 tablet, Rfl: 1    memantine (NAMENDA) 10 MG tablet, Take half tablet twice a day for 1 month and then continue with 1 tablet twice a day, Disp: 180 tablet, Rfl: 3    risperiDONE (RISPERDAL) 0.5 MG tablet, Take 1 tablet by mouth every evening, Disp: 30 tablet, Rfl: 9    vitamin D (ERGOCALCIFEROL) 1.25 MG (02951 UT) CAPS capsule, TAKE 1 CAPSULE BY MOUTH 1 TIME A WEEK, Disp: 12 capsule, Rfl: 1    rosuvastatin (CRESTOR) 10 MG tablet, TAKE 1 TABLET BY MOUTH EVERY NIGHT, Disp: 90 tablet, Rfl: 1    insulin NPH (HUMULIN N KWIKPEN) 100 UNIT/ML injection pen, Inject 16 Units into the skin every morning (Patient taking differently: Inject 18 Units into the skin every morning), Disp: 54 mL, Rfl: 3    HUMALOG KWIKPEN 100 UNIT/ML SOPN, 4 units AC brunch, 2 units AC supper plus correction 1/50>200, max daily dose 30 units (Patient taking differently: 2 units subcutaneous before brunch, 2 units subcutaneous before supper plus correction 1/50>200, max daily dose 30 units), Disp: 30 mL, Rfl: 3

## 2025-01-31 ENCOUNTER — HOSPITAL ENCOUNTER (OUTPATIENT)
Dept: INFUSION THERAPY | Age: 80
Setting detail: INFUSION SERIES
Discharge: HOME | End: 2025-01-31

## 2025-01-31 VITALS
WEIGHT: 162.2 LBS | HEART RATE: 82 BPM | RESPIRATION RATE: 16 BRPM | DIASTOLIC BLOOD PRESSURE: 87 MMHG | SYSTOLIC BLOOD PRESSURE: 159 MMHG | OXYGEN SATURATION: 95 % | BODY MASS INDEX: 29.67 KG/M2 | TEMPERATURE: 98 F

## 2025-01-31 DIAGNOSIS — Z94.0 RENAL TRANSPLANT, STATUS POST: ICD-10-CM

## 2025-02-19 NOTE — PROGRESS NOTES
significantly between patients.     -     donepezil (ARICEPT) 10 MG tablet; Take 1 tablet by mouth nightly  -     [facility] Emergency Department    Auditory hallucination    Chronic pain of right knee    Pressure ulcer of left heel, stage 3 (HCC)    At high risk for falls  Discussed fall risk and prevention.    Will have HH evaluate given deconditioning.  -     [facility] Emergency Department        Follow-up at the [facility].     I spent  50% of the 60 total minutes of today's visit in coordination of care and patient/family education and counseling regarding the above patient concerns, reviewing the patient's medical record, my assessment and recommendations.          Lucia L Rice, APRN  [facility] [address], [address] [place] [address]  Phone:[phone]   No

## 2025-02-20 RX ORDER — SODIUM CHLORIDE 9 MG/ML
5-250 INJECTION, SOLUTION INTRAVENOUS PRN
Status: CANCELLED | OUTPATIENT
Start: 2025-02-20

## 2025-02-20 RX ORDER — ALBUTEROL SULFATE 0.83 MG/ML
2.5 SOLUTION RESPIRATORY (INHALATION)
Status: CANCELLED | OUTPATIENT
Start: 2025-02-20

## 2025-02-20 RX ORDER — ONDANSETRON 2 MG/ML
8 INJECTION INTRAMUSCULAR; INTRAVENOUS
Status: CANCELLED | OUTPATIENT
Start: 2025-02-20

## 2025-02-20 RX ORDER — HYDROCORTISONE SODIUM SUCCINATE 100 MG/2ML
100 INJECTION INTRAMUSCULAR; INTRAVENOUS
Status: CANCELLED | OUTPATIENT
Start: 2025-02-20

## 2025-02-20 RX ORDER — EPINEPHRINE 1 MG/ML
0.3 INJECTION, SOLUTION, CONCENTRATE INTRAVENOUS PRN
Status: CANCELLED | OUTPATIENT
Start: 2025-02-20

## 2025-02-20 RX ORDER — ALBUTEROL SULFATE 90 UG/1
4 INHALANT RESPIRATORY (INHALATION) PRN
Status: CANCELLED | OUTPATIENT
Start: 2025-02-20

## 2025-02-20 RX ORDER — DIPHENHYDRAMINE HYDROCHLORIDE 50 MG/ML
50 INJECTION INTRAMUSCULAR; INTRAVENOUS
Status: CANCELLED | OUTPATIENT
Start: 2025-02-20

## 2025-02-20 RX ORDER — SODIUM CHLORIDE 0.9 % (FLUSH) 0.9 %
5-40 SYRINGE (ML) INJECTION PRN
Status: CANCELLED | OUTPATIENT
Start: 2025-02-20

## 2025-02-20 RX ORDER — SODIUM CHLORIDE 9 MG/ML
INJECTION, SOLUTION INTRAVENOUS CONTINUOUS
Status: CANCELLED | OUTPATIENT
Start: 2025-02-20

## 2025-02-20 RX ORDER — ACETAMINOPHEN 325 MG/1
650 TABLET ORAL
Status: CANCELLED | OUTPATIENT
Start: 2025-02-20

## 2025-02-26 ENCOUNTER — HOSPITAL ENCOUNTER (OUTPATIENT)
Dept: INFUSION THERAPY | Age: 80
Setting detail: INFUSION SERIES
Discharge: HOME | End: 2025-02-26
Payer: MEDICARE

## 2025-02-26 VITALS
OXYGEN SATURATION: 96 % | HEART RATE: 79 BPM | WEIGHT: 165 LBS | BODY MASS INDEX: 30.18 KG/M2 | SYSTOLIC BLOOD PRESSURE: 146 MMHG | DIASTOLIC BLOOD PRESSURE: 93 MMHG | RESPIRATION RATE: 16 BRPM | TEMPERATURE: 98.1 F

## 2025-02-26 DIAGNOSIS — Z94.0 RENAL TRANSPLANT, STATUS POST: Primary | ICD-10-CM

## 2025-02-26 PROCEDURE — 96365 THER/PROPH/DIAG IV INF INIT: CPT

## 2025-02-26 PROCEDURE — 6360000002 HC RX W HCPCS: Performed by: INTERNAL MEDICINE

## 2025-02-26 PROCEDURE — 2580000003 HC RX 258: Performed by: INTERNAL MEDICINE

## 2025-02-26 RX ORDER — ALBUTEROL SULFATE 90 UG/1
4 INHALANT RESPIRATORY (INHALATION) PRN
Status: DISCONTINUED | OUTPATIENT
Start: 2025-02-26 | End: 2025-02-27 | Stop reason: HOSPADM

## 2025-02-26 RX ORDER — EPINEPHRINE 1 MG/ML
0.3 INJECTION, SOLUTION, CONCENTRATE INTRAVENOUS PRN
Status: DISCONTINUED | OUTPATIENT
Start: 2025-02-26 | End: 2025-02-27 | Stop reason: HOSPADM

## 2025-02-26 RX ORDER — ACETAMINOPHEN 325 MG/1
650 TABLET ORAL
OUTPATIENT
Start: 2025-03-26

## 2025-02-26 RX ORDER — SODIUM CHLORIDE 0.9 % (FLUSH) 0.9 %
5-40 SYRINGE (ML) INJECTION PRN
OUTPATIENT
Start: 2025-03-26

## 2025-02-26 RX ORDER — SODIUM CHLORIDE 9 MG/ML
INJECTION, SOLUTION INTRAVENOUS CONTINUOUS
OUTPATIENT
Start: 2025-03-26

## 2025-02-26 RX ORDER — ONDANSETRON 2 MG/ML
8 INJECTION INTRAMUSCULAR; INTRAVENOUS
Status: DISCONTINUED | OUTPATIENT
Start: 2025-02-26 | End: 2025-02-27 | Stop reason: HOSPADM

## 2025-02-26 RX ORDER — SODIUM CHLORIDE 9 MG/ML
5-250 INJECTION, SOLUTION INTRAVENOUS PRN
OUTPATIENT
Start: 2025-03-26

## 2025-02-26 RX ORDER — EPINEPHRINE 1 MG/ML
0.3 INJECTION, SOLUTION, CONCENTRATE INTRAVENOUS PRN
OUTPATIENT
Start: 2025-03-26

## 2025-02-26 RX ORDER — ONDANSETRON 2 MG/ML
8 INJECTION INTRAMUSCULAR; INTRAVENOUS
OUTPATIENT
Start: 2025-03-26

## 2025-02-26 RX ORDER — DIPHENHYDRAMINE HYDROCHLORIDE 50 MG/ML
50 INJECTION INTRAMUSCULAR; INTRAVENOUS
OUTPATIENT
Start: 2025-03-26

## 2025-02-26 RX ORDER — DIPHENHYDRAMINE HYDROCHLORIDE 50 MG/ML
50 INJECTION INTRAMUSCULAR; INTRAVENOUS
Status: DISCONTINUED | OUTPATIENT
Start: 2025-02-26 | End: 2025-02-27 | Stop reason: HOSPADM

## 2025-02-26 RX ORDER — ALBUTEROL SULFATE 0.83 MG/ML
2.5 SOLUTION RESPIRATORY (INHALATION)
OUTPATIENT
Start: 2025-03-26

## 2025-02-26 RX ORDER — HYDROCORTISONE SODIUM SUCCINATE 100 MG/2ML
100 INJECTION INTRAMUSCULAR; INTRAVENOUS
Status: DISCONTINUED | OUTPATIENT
Start: 2025-02-26 | End: 2025-02-27 | Stop reason: HOSPADM

## 2025-02-26 RX ORDER — ALBUTEROL SULFATE 0.83 MG/ML
2.5 SOLUTION RESPIRATORY (INHALATION)
Status: DISCONTINUED | OUTPATIENT
Start: 2025-02-26 | End: 2025-02-27 | Stop reason: HOSPADM

## 2025-02-26 RX ORDER — ALBUTEROL SULFATE 90 UG/1
4 INHALANT RESPIRATORY (INHALATION) PRN
OUTPATIENT
Start: 2025-03-26

## 2025-02-26 RX ORDER — ACETAMINOPHEN 325 MG/1
650 TABLET ORAL
Status: DISCONTINUED | OUTPATIENT
Start: 2025-02-26 | End: 2025-02-27 | Stop reason: HOSPADM

## 2025-02-26 RX ORDER — HYDROCORTISONE SODIUM SUCCINATE 100 MG/2ML
100 INJECTION INTRAMUSCULAR; INTRAVENOUS
OUTPATIENT
Start: 2025-03-26

## 2025-02-26 RX ADMIN — SODIUM CHLORIDE 500 MG: 9 INJECTION, SOLUTION INTRAVENOUS at 14:47

## 2025-02-26 NOTE — PROGRESS NOTES
Arrived to the Infusion Center.  Nulojix completed. Patient tolerated without problems  Any issues or concerns during appointment: no.  Patient aware of next infusion appointment on 3/27/25 (date) at 1515   Patient instructed to call provider with temperature of 100.4 or greater or nausea/vomiting/ diarrhea or pain not controlled by medications  Discharged via WC with family.

## 2025-03-11 ENCOUNTER — OFFICE VISIT (OUTPATIENT)
Dept: ENDOCRINOLOGY | Age: 80
End: 2025-03-11
Payer: MEDICARE

## 2025-03-11 VITALS
SYSTOLIC BLOOD PRESSURE: 122 MMHG | DIASTOLIC BLOOD PRESSURE: 82 MMHG | HEART RATE: 81 BPM | OXYGEN SATURATION: 97 % | HEIGHT: 64 IN | BODY MASS INDEX: 28.32 KG/M2

## 2025-03-11 DIAGNOSIS — Z79.4 TYPE 2 DIABETES MELLITUS WITH HYPERGLYCEMIA, WITH LONG-TERM CURRENT USE OF INSULIN (HCC): Primary | ICD-10-CM

## 2025-03-11 DIAGNOSIS — E11.65 TYPE 2 DIABETES MELLITUS WITH HYPERGLYCEMIA, WITH LONG-TERM CURRENT USE OF INSULIN (HCC): Primary | ICD-10-CM

## 2025-03-11 DIAGNOSIS — I10 PRIMARY HYPERTENSION: ICD-10-CM

## 2025-03-11 DIAGNOSIS — E78.00 HYPERCHOLESTEROLEMIA: ICD-10-CM

## 2025-03-11 DIAGNOSIS — E04.2 MULTINODULAR GOITER: ICD-10-CM

## 2025-03-11 DIAGNOSIS — E11.621 TYPE 2 DIABETES MELLITUS WITH FOOT ULCER (CODE) (HCC): ICD-10-CM

## 2025-03-11 DIAGNOSIS — Z79.4 TYPE 2 DIABETES MELLITUS WITH BOTH EYES AFFECTED BY MILD NONPROLIFERATIVE RETINOPATHY AND MACULAR EDEMA, WITH LONG-TERM CURRENT USE OF INSULIN (HCC): ICD-10-CM

## 2025-03-11 DIAGNOSIS — E11.3213 TYPE 2 DIABETES MELLITUS WITH BOTH EYES AFFECTED BY MILD NONPROLIFERATIVE RETINOPATHY AND MACULAR EDEMA, WITH LONG-TERM CURRENT USE OF INSULIN (HCC): ICD-10-CM

## 2025-03-11 LAB — HBA1C MFR BLD: 8.2 %

## 2025-03-11 PROCEDURE — 1160F RVW MEDS BY RX/DR IN RCRD: CPT | Performed by: PHYSICIAN ASSISTANT

## 2025-03-11 PROCEDURE — 99214 OFFICE O/P EST MOD 30 MIN: CPT | Performed by: PHYSICIAN ASSISTANT

## 2025-03-11 PROCEDURE — 1036F TOBACCO NON-USER: CPT | Performed by: PHYSICIAN ASSISTANT

## 2025-03-11 PROCEDURE — G8400 PT W/DXA NO RESULTS DOC: HCPCS | Performed by: PHYSICIAN ASSISTANT

## 2025-03-11 PROCEDURE — 95251 CONT GLUC MNTR ANALYSIS I&R: CPT | Performed by: PHYSICIAN ASSISTANT

## 2025-03-11 PROCEDURE — 1159F MED LIST DOCD IN RCRD: CPT | Performed by: PHYSICIAN ASSISTANT

## 2025-03-11 PROCEDURE — 1123F ACP DISCUSS/DSCN MKR DOCD: CPT | Performed by: PHYSICIAN ASSISTANT

## 2025-03-11 PROCEDURE — 3052F HG A1C>EQUAL 8.0%<EQUAL 9.0%: CPT | Performed by: PHYSICIAN ASSISTANT

## 2025-03-11 PROCEDURE — G8427 DOCREV CUR MEDS BY ELIG CLIN: HCPCS | Performed by: PHYSICIAN ASSISTANT

## 2025-03-11 PROCEDURE — 1090F PRES/ABSN URINE INCON ASSESS: CPT | Performed by: PHYSICIAN ASSISTANT

## 2025-03-11 PROCEDURE — 3079F DIAST BP 80-89 MM HG: CPT | Performed by: PHYSICIAN ASSISTANT

## 2025-03-11 PROCEDURE — G8417 CALC BMI ABV UP PARAM F/U: HCPCS | Performed by: PHYSICIAN ASSISTANT

## 2025-03-11 PROCEDURE — 83036 HEMOGLOBIN GLYCOSYLATED A1C: CPT | Performed by: PHYSICIAN ASSISTANT

## 2025-03-11 PROCEDURE — 3074F SYST BP LT 130 MM HG: CPT | Performed by: PHYSICIAN ASSISTANT

## 2025-03-11 RX ORDER — INSULIN LISPRO 100 [IU]/ML
INJECTION, SOLUTION INTRAVENOUS; SUBCUTANEOUS
Qty: 30 ML | Refills: 3 | Status: SHIPPED | OUTPATIENT
Start: 2025-03-11

## 2025-03-11 RX ORDER — DULAGLUTIDE 4.5 MG/.5ML
4.5 INJECTION, SOLUTION SUBCUTANEOUS
Qty: 6 ML | Refills: 3 | Status: SHIPPED | OUTPATIENT
Start: 2025-03-11

## 2025-03-11 NOTE — PROGRESS NOTES
05/14/2024    CHOL 151 01/11/2024     No components found for: \"LDLCHOLESTEROL\", \"LDLCALC\"     Lab Results   Component Value Date    VLDL 24 (H) 11/07/2024    VLDL 34 (H) 05/14/2024    VLDL 30.6 (H) 01/11/2024      Lab Results   Component Value Date    HDL 76 (H) 11/07/2024    HDL 66 (H) 05/14/2024    HDL 69 (H) 01/11/2024      Lab Results   Component Value Date    HDL 76 (H) 11/07/2024    HDL 66 (H) 05/14/2024    HDL 69 (H) 01/11/2024      Lab Results   Component Value Date    TRIG 118 11/07/2024    TRIG 171 (H) 05/14/2024    TRIG 153 (H) 01/11/2024     Lab Results   Component Value Date    CHOLHDLRATIO 2.2 11/07/2024    CHOLHDLRATIO 2.2 05/14/2024    CHOLHDLRATIO 2.2 01/11/2024         Hemoglobin A1c:       01/13/2023      7.3%  05/14/2024 7.2%  3/11/2025 8.2%      Hemoglobin A1C, POC   Date Value Ref Range Status   03/11/2025 8.2 % Final   01/13/2023 7.3 % Final   12/14/2021 7.8 % Final                   Thyroid:                Records reveal:       Thyroid function result : Euthyroid in 01/2020  Thyroid Ultrasound done in 01/2020 at [facility] consistent with multiple bilateral nodules without any significant change, stable compared to ultrasound done in 2018. (Schedule repeat in 8/2020)  Biopsy  benign on the right sided dominant nodule and done in 3/2015 and the left sided dominant nodule done in 5/2016 and benign biopsy of the right middle posterior nodule in 4/2018 by Dr. Gaitan  Co-morbid Conditions       US HEAD NECK SOFT TISSUE   12/30/2019 10:17  FINDINGS:  Comparison is made with the prior study of 10/08/2018.    There is redemonstration of a multinodular goiter.  The index right lobe nodule is stable in size measuring up to 10 mm in diameter.  The index  nodule in the left lobe is also stable in size at 9 mm.  No new nodules are seen.  The right lobe is enlarged at 6.2 x 2.4 x 2.2 cm.  The left lobe is enlarged at 6.1 x 2.4 x 1.8 cm.      3/23/2023  US HEAD NECK SOFT TISSUE THYROID    INDICATION: Nontoxic

## 2025-03-27 ENCOUNTER — HOSPITAL ENCOUNTER (OUTPATIENT)
Dept: INFUSION THERAPY | Age: 80
Setting detail: INFUSION SERIES
Discharge: HOME | End: 2025-03-27
Payer: MEDICARE

## 2025-03-27 VITALS
OXYGEN SATURATION: 94 % | WEIGHT: 164.6 LBS | BODY MASS INDEX: 28.25 KG/M2 | HEART RATE: 87 BPM | SYSTOLIC BLOOD PRESSURE: 129 MMHG | RESPIRATION RATE: 16 BRPM | TEMPERATURE: 98.3 F | DIASTOLIC BLOOD PRESSURE: 78 MMHG

## 2025-03-27 DIAGNOSIS — Z94.0 RENAL TRANSPLANT, STATUS POST: Primary | ICD-10-CM

## 2025-03-27 PROCEDURE — 2580000003 HC RX 258: Performed by: INTERNAL MEDICINE

## 2025-03-27 PROCEDURE — 6360000002 HC RX W HCPCS: Performed by: INTERNAL MEDICINE

## 2025-03-27 PROCEDURE — 96365 THER/PROPH/DIAG IV INF INIT: CPT

## 2025-03-27 RX ORDER — ACETAMINOPHEN 325 MG/1
650 TABLET ORAL
OUTPATIENT
Start: 2025-04-24

## 2025-03-27 RX ORDER — ALBUTEROL SULFATE 0.83 MG/ML
2.5 SOLUTION RESPIRATORY (INHALATION)
Status: DISCONTINUED | OUTPATIENT
Start: 2025-03-27 | End: 2025-03-28 | Stop reason: HOSPADM

## 2025-03-27 RX ORDER — ALBUTEROL SULFATE 90 UG/1
4 INHALANT RESPIRATORY (INHALATION) PRN
OUTPATIENT
Start: 2025-04-24

## 2025-03-27 RX ORDER — ALBUTEROL SULFATE 90 UG/1
4 INHALANT RESPIRATORY (INHALATION) PRN
Status: DISCONTINUED | OUTPATIENT
Start: 2025-03-27 | End: 2025-03-28 | Stop reason: HOSPADM

## 2025-03-27 RX ORDER — SODIUM CHLORIDE 0.9 % (FLUSH) 0.9 %
5-40 SYRINGE (ML) INJECTION PRN
OUTPATIENT
Start: 2025-04-24

## 2025-03-27 RX ORDER — HYDROCORTISONE SODIUM SUCCINATE 100 MG/2ML
100 INJECTION INTRAMUSCULAR; INTRAVENOUS
OUTPATIENT
Start: 2025-04-24

## 2025-03-27 RX ORDER — DIPHENHYDRAMINE HYDROCHLORIDE 50 MG/ML
50 INJECTION, SOLUTION INTRAMUSCULAR; INTRAVENOUS
Status: DISCONTINUED | OUTPATIENT
Start: 2025-03-27 | End: 2025-03-28 | Stop reason: HOSPADM

## 2025-03-27 RX ORDER — ONDANSETRON 2 MG/ML
8 INJECTION INTRAMUSCULAR; INTRAVENOUS
OUTPATIENT
Start: 2025-04-24

## 2025-03-27 RX ORDER — ALBUTEROL SULFATE 0.83 MG/ML
2.5 SOLUTION RESPIRATORY (INHALATION)
OUTPATIENT
Start: 2025-04-24

## 2025-03-27 RX ORDER — ONDANSETRON 2 MG/ML
8 INJECTION INTRAMUSCULAR; INTRAVENOUS
Status: DISCONTINUED | OUTPATIENT
Start: 2025-03-27 | End: 2025-03-28 | Stop reason: HOSPADM

## 2025-03-27 RX ORDER — SODIUM CHLORIDE 9 MG/ML
INJECTION, SOLUTION INTRAVENOUS CONTINUOUS
Status: DISCONTINUED | OUTPATIENT
Start: 2025-03-27 | End: 2025-03-28 | Stop reason: HOSPADM

## 2025-03-27 RX ORDER — SODIUM CHLORIDE 9 MG/ML
5-250 INJECTION, SOLUTION INTRAVENOUS PRN
OUTPATIENT
Start: 2025-04-24

## 2025-03-27 RX ORDER — DIPHENHYDRAMINE HYDROCHLORIDE 50 MG/ML
50 INJECTION, SOLUTION INTRAMUSCULAR; INTRAVENOUS
OUTPATIENT
Start: 2025-04-24

## 2025-03-27 RX ORDER — EPINEPHRINE 1 MG/ML
0.3 INJECTION, SOLUTION, CONCENTRATE INTRAVENOUS PRN
OUTPATIENT
Start: 2025-04-24

## 2025-03-27 RX ORDER — ACETAMINOPHEN 325 MG/1
650 TABLET ORAL
Status: DISCONTINUED | OUTPATIENT
Start: 2025-03-27 | End: 2025-03-28 | Stop reason: HOSPADM

## 2025-03-27 RX ORDER — HYDROCORTISONE SODIUM SUCCINATE 100 MG/2ML
100 INJECTION INTRAMUSCULAR; INTRAVENOUS
Status: DISCONTINUED | OUTPATIENT
Start: 2025-03-27 | End: 2025-03-28 | Stop reason: HOSPADM

## 2025-03-27 RX ORDER — SODIUM CHLORIDE 9 MG/ML
INJECTION, SOLUTION INTRAVENOUS CONTINUOUS
OUTPATIENT
Start: 2025-04-24

## 2025-03-27 RX ORDER — SODIUM CHLORIDE 9 MG/ML
5-250 INJECTION, SOLUTION INTRAVENOUS PRN
Status: DISCONTINUED | OUTPATIENT
Start: 2025-03-27 | End: 2025-03-28 | Stop reason: HOSPADM

## 2025-03-27 RX ORDER — EPINEPHRINE 1 MG/ML
0.3 INJECTION, SOLUTION, CONCENTRATE INTRAVENOUS PRN
Status: DISCONTINUED | OUTPATIENT
Start: 2025-03-27 | End: 2025-03-28 | Stop reason: HOSPADM

## 2025-03-27 RX ORDER — SODIUM CHLORIDE 0.9 % (FLUSH) 0.9 %
5-40 SYRINGE (ML) INJECTION PRN
Status: DISCONTINUED | OUTPATIENT
Start: 2025-03-27 | End: 2025-03-28 | Stop reason: HOSPADM

## 2025-03-27 RX ADMIN — SODIUM CHLORIDE 500 MG: 9 INJECTION, SOLUTION INTRAVENOUS at 16:29

## 2025-03-27 RX ADMIN — SODIUM CHLORIDE 20 ML/HR: 0.9 INJECTION, SOLUTION INTRAVENOUS at 16:29

## 2025-03-27 NOTE — PROGRESS NOTES
Arrived to the Infusion Center.  Assessment completed. Nulojix completed. Patient tolerated without problems. Any issues or concerns during appointment: None  Instructed to call provider with any side effects or concerns  Patient aware of next infusion appointment on 4/24/25(date) at 3 PM (time).  Discharged via W/C with family

## 2025-04-01 ENCOUNTER — HOSPITAL ENCOUNTER (OUTPATIENT)
Dept: ULTRASOUND IMAGING | Age: 80
Discharge: HOME | End: 2025-04-04
Payer: MEDICARE

## 2025-04-01 DIAGNOSIS — E04.2 MULTINODULAR GOITER: ICD-10-CM

## 2025-04-01 PROCEDURE — 76536 US EXAM OF HEAD AND NECK: CPT

## 2025-04-08 ENCOUNTER — RESULTS FOLLOW-UP (OUTPATIENT)
Dept: ENDOCRINOLOGY | Age: 80
End: 2025-04-08

## 2025-04-11 RX ORDER — ROSUVASTATIN CALCIUM 10 MG/1
10 TABLET, COATED ORAL NIGHTLY
Qty: 90 TABLET | Refills: 1 | Status: SHIPPED | OUTPATIENT
Start: 2025-04-11

## 2025-04-14 ENCOUNTER — OFFICE VISIT (OUTPATIENT)
Dept: FAMILY MEDICINE CLINIC | Facility: CLINIC | Age: 80
End: 2025-04-14
Payer: MEDICARE

## 2025-04-14 VITALS
BODY MASS INDEX: 27.52 KG/M2 | SYSTOLIC BLOOD PRESSURE: 124 MMHG | HEIGHT: 64 IN | HEART RATE: 68 BPM | WEIGHT: 161.2 LBS | DIASTOLIC BLOOD PRESSURE: 76 MMHG | OXYGEN SATURATION: 98 %

## 2025-04-14 DIAGNOSIS — G30.8 SEVERE ALZHEIMER'S DEMENTIA OF OTHER ONSET WITH OTHER BEHAVIORAL DISTURBANCE: Primary | ICD-10-CM

## 2025-04-14 DIAGNOSIS — G89.29 CHRONIC PAIN OF RIGHT KNEE: ICD-10-CM

## 2025-04-14 DIAGNOSIS — Z79.4 TYPE 2 DIABETES MELLITUS WITH HYPERGLYCEMIA, WITH LONG-TERM CURRENT USE OF INSULIN (HCC): ICD-10-CM

## 2025-04-14 DIAGNOSIS — I10 PRIMARY HYPERTENSION: ICD-10-CM

## 2025-04-14 DIAGNOSIS — E03.4 HYPOTHYROIDISM DUE TO ACQUIRED ATROPHY OF THYROID: ICD-10-CM

## 2025-04-14 DIAGNOSIS — N18.9 CHRONIC KIDNEY DISEASE, UNSPECIFIED CKD STAGE: ICD-10-CM

## 2025-04-14 DIAGNOSIS — F02.C18 SEVERE ALZHEIMER'S DEMENTIA OF OTHER ONSET WITH OTHER BEHAVIORAL DISTURBANCE: Primary | ICD-10-CM

## 2025-04-14 DIAGNOSIS — E11.65 TYPE 2 DIABETES MELLITUS WITH HYPERGLYCEMIA, WITH LONG-TERM CURRENT USE OF INSULIN (HCC): ICD-10-CM

## 2025-04-14 DIAGNOSIS — E78.00 PURE HYPERCHOLESTEROLEMIA: ICD-10-CM

## 2025-04-14 DIAGNOSIS — R54 AGE-RELATED PHYSICAL DEBILITY: ICD-10-CM

## 2025-04-14 DIAGNOSIS — M25.561 CHRONIC PAIN OF RIGHT KNEE: ICD-10-CM

## 2025-04-14 PROCEDURE — 3074F SYST BP LT 130 MM HG: CPT | Performed by: FAMILY MEDICINE

## 2025-04-14 PROCEDURE — G8417 CALC BMI ABV UP PARAM F/U: HCPCS | Performed by: FAMILY MEDICINE

## 2025-04-14 PROCEDURE — 99214 OFFICE O/P EST MOD 30 MIN: CPT | Performed by: FAMILY MEDICINE

## 2025-04-14 PROCEDURE — 3052F HG A1C>EQUAL 8.0%<EQUAL 9.0%: CPT | Performed by: FAMILY MEDICINE

## 2025-04-14 PROCEDURE — 3078F DIAST BP <80 MM HG: CPT | Performed by: FAMILY MEDICINE

## 2025-04-14 PROCEDURE — G8427 DOCREV CUR MEDS BY ELIG CLIN: HCPCS | Performed by: FAMILY MEDICINE

## 2025-04-14 PROCEDURE — 1159F MED LIST DOCD IN RCRD: CPT | Performed by: FAMILY MEDICINE

## 2025-04-14 PROCEDURE — 1123F ACP DISCUSS/DSCN MKR DOCD: CPT | Performed by: FAMILY MEDICINE

## 2025-04-14 PROCEDURE — 1036F TOBACCO NON-USER: CPT | Performed by: FAMILY MEDICINE

## 2025-04-14 PROCEDURE — 1090F PRES/ABSN URINE INCON ASSESS: CPT | Performed by: FAMILY MEDICINE

## 2025-04-14 PROCEDURE — G2211 COMPLEX E/M VISIT ADD ON: HCPCS | Performed by: FAMILY MEDICINE

## 2025-04-14 PROCEDURE — G8400 PT W/DXA NO RESULTS DOC: HCPCS | Performed by: FAMILY MEDICINE

## 2025-04-14 ASSESSMENT — PATIENT HEALTH QUESTIONNAIRE - PHQ9
SUM OF ALL RESPONSES TO PHQ QUESTIONS 1-9: 0
1. LITTLE INTEREST OR PLEASURE IN DOING THINGS: NOT AT ALL
SUM OF ALL RESPONSES TO PHQ QUESTIONS 1-9: 0
2. FEELING DOWN, DEPRESSED OR HOPELESS: NOT AT ALL
SUM OF ALL RESPONSES TO PHQ QUESTIONS 1-9: 0
SUM OF ALL RESPONSES TO PHQ QUESTIONS 1-9: 0

## 2025-04-14 NOTE — PROGRESS NOTES
SUBJECTIVE:   Alayna Fall is a 79 y.o. female who has a complicated past medical history significant for renal failure status post transplant x2 followed by nephrology, diabetes followed by endocrinology, hypothyroidism, hypertension, high cholesterol, high triglycerides, degenerative arthritis, Alzheimer's dementia followed by neurology, history of stroke, acute GI bleed with blood loss anemia and obesity.  Patient is assisted by her  and her daughter.   and daughter expressed some concerns about the patient's continued debility both physical and cognitive.  They are asking if there is any type of assistance that could be provided to help the patient and her  as they live independently.  Otherwise she seems to be doing well and has no complaints of chest pain, shortness of breath, orthopnea or PND.    Patient does report ongoing struggles with right knee pain.  She has known degenerative arthritis and has seen orthopedics.  She had an injection she states a couple months ago.    HPI  See above    Past Medical History, Past Surgical History, Family history, Social History, and Medications were all reviewed with the patient today and updated as necessary.       Current Outpatient Medications   Medication Sig Dispense Refill    rosuvastatin (CRESTOR) 10 MG tablet TAKE 1 TABLET BY MOUTH EVERY NIGHT 90 tablet 1    TRULICITY 4.5 MG/0.5ML SOAJ Inject 4.5 mg into the skin every 7 days 6 mL 3    HUMALOG KWIKPEN 100 UNIT/ML SOPN 4 units AC brunch, 2 units AC supper plus correction 1/50>200, max daily dose 30 units 30 mL 3    carvedilol (COREG) 12.5 MG tablet Take 1 tablet by mouth 2 times daily 180 tablet 1    memantine (NAMENDA) 10 MG tablet Take half tablet twice a day for 1 month and then continue with 1 tablet twice a day 180 tablet 3    risperiDONE (RISPERDAL) 0.5 MG tablet Take 1 tablet by mouth every evening 30 tablet 9    vitamin D (ERGOCALCIFEROL) 1.25 MG (28412 UT) CAPS capsule TAKE 1

## 2025-04-21 ENCOUNTER — TELEPHONE (OUTPATIENT)
Dept: FAMILY MEDICINE CLINIC | Facility: CLINIC | Age: 80
End: 2025-04-21

## 2025-04-21 NOTE — TELEPHONE ENCOUNTER
Farheen Navarro with [facility] home health says that Mrs Catalan's home health care is complete and no other authorizations are required. FYI

## 2025-04-24 ENCOUNTER — HOSPITAL ENCOUNTER (OUTPATIENT)
Dept: INFUSION THERAPY | Age: 80
Setting detail: INFUSION SERIES
Discharge: HOME | End: 2025-04-24
Payer: MEDICARE

## 2025-04-24 VITALS
OXYGEN SATURATION: 98 % | WEIGHT: 167 LBS | HEART RATE: 81 BPM | RESPIRATION RATE: 16 BRPM | DIASTOLIC BLOOD PRESSURE: 76 MMHG | SYSTOLIC BLOOD PRESSURE: 149 MMHG | BODY MASS INDEX: 28.67 KG/M2 | TEMPERATURE: 99.2 F

## 2025-04-24 DIAGNOSIS — Z94.0 RENAL TRANSPLANT, STATUS POST: Primary | ICD-10-CM

## 2025-04-24 PROCEDURE — 96365 THER/PROPH/DIAG IV INF INIT: CPT

## 2025-04-24 PROCEDURE — 2580000003 HC RX 258: Performed by: INTERNAL MEDICINE

## 2025-04-24 PROCEDURE — 2500000003 HC RX 250 WO HCPCS: Performed by: INTERNAL MEDICINE

## 2025-04-24 PROCEDURE — 6360000002 HC RX W HCPCS: Performed by: INTERNAL MEDICINE

## 2025-04-24 RX ORDER — SODIUM CHLORIDE 9 MG/ML
5-250 INJECTION, SOLUTION INTRAVENOUS PRN
Status: DISCONTINUED | OUTPATIENT
Start: 2025-04-24 | End: 2025-04-25 | Stop reason: HOSPADM

## 2025-04-24 RX ORDER — DIPHENHYDRAMINE HYDROCHLORIDE 50 MG/ML
50 INJECTION, SOLUTION INTRAMUSCULAR; INTRAVENOUS
OUTPATIENT
Start: 2025-05-22

## 2025-04-24 RX ORDER — ALBUTEROL SULFATE 0.83 MG/ML
2.5 SOLUTION RESPIRATORY (INHALATION)
OUTPATIENT
Start: 2025-05-22

## 2025-04-24 RX ORDER — SODIUM CHLORIDE 9 MG/ML
INJECTION, SOLUTION INTRAVENOUS CONTINUOUS
OUTPATIENT
Start: 2025-05-22

## 2025-04-24 RX ORDER — HYDROCORTISONE SODIUM SUCCINATE 100 MG/2ML
100 INJECTION INTRAMUSCULAR; INTRAVENOUS
Status: DISCONTINUED | OUTPATIENT
Start: 2025-04-24 | End: 2025-04-25 | Stop reason: HOSPADM

## 2025-04-24 RX ORDER — ONDANSETRON 2 MG/ML
8 INJECTION INTRAMUSCULAR; INTRAVENOUS
Status: DISCONTINUED | OUTPATIENT
Start: 2025-04-24 | End: 2025-04-25 | Stop reason: HOSPADM

## 2025-04-24 RX ORDER — SODIUM CHLORIDE 9 MG/ML
5-250 INJECTION, SOLUTION INTRAVENOUS PRN
OUTPATIENT
Start: 2025-05-22

## 2025-04-24 RX ORDER — ALBUTEROL SULFATE 0.83 MG/ML
2.5 SOLUTION RESPIRATORY (INHALATION)
Status: DISCONTINUED | OUTPATIENT
Start: 2025-04-24 | End: 2025-04-25 | Stop reason: HOSPADM

## 2025-04-24 RX ORDER — DIPHENHYDRAMINE HYDROCHLORIDE 50 MG/ML
50 INJECTION, SOLUTION INTRAMUSCULAR; INTRAVENOUS
Status: DISCONTINUED | OUTPATIENT
Start: 2025-04-24 | End: 2025-04-25 | Stop reason: HOSPADM

## 2025-04-24 RX ORDER — ACETAMINOPHEN 325 MG/1
650 TABLET ORAL
OUTPATIENT
Start: 2025-05-22

## 2025-04-24 RX ORDER — EPINEPHRINE 1 MG/ML
0.3 INJECTION, SOLUTION, CONCENTRATE INTRAVENOUS PRN
OUTPATIENT
Start: 2025-05-22

## 2025-04-24 RX ORDER — ALBUTEROL SULFATE 90 UG/1
4 INHALANT RESPIRATORY (INHALATION) PRN
OUTPATIENT
Start: 2025-05-22

## 2025-04-24 RX ORDER — SODIUM CHLORIDE 0.9 % (FLUSH) 0.9 %
5-40 SYRINGE (ML) INJECTION PRN
Status: DISCONTINUED | OUTPATIENT
Start: 2025-04-24 | End: 2025-04-25 | Stop reason: HOSPADM

## 2025-04-24 RX ORDER — SODIUM CHLORIDE 0.9 % (FLUSH) 0.9 %
5-40 SYRINGE (ML) INJECTION PRN
OUTPATIENT
Start: 2025-05-22

## 2025-04-24 RX ORDER — HYDROCORTISONE SODIUM SUCCINATE 100 MG/2ML
100 INJECTION INTRAMUSCULAR; INTRAVENOUS
OUTPATIENT
Start: 2025-05-22

## 2025-04-24 RX ORDER — EPINEPHRINE 1 MG/ML
0.3 INJECTION, SOLUTION, CONCENTRATE INTRAVENOUS PRN
Status: DISCONTINUED | OUTPATIENT
Start: 2025-04-24 | End: 2025-04-25 | Stop reason: HOSPADM

## 2025-04-24 RX ORDER — ALBUTEROL SULFATE 90 UG/1
4 INHALANT RESPIRATORY (INHALATION) PRN
Status: DISCONTINUED | OUTPATIENT
Start: 2025-04-24 | End: 2025-04-25 | Stop reason: HOSPADM

## 2025-04-24 RX ORDER — ACETAMINOPHEN 325 MG/1
650 TABLET ORAL
Status: DISCONTINUED | OUTPATIENT
Start: 2025-04-24 | End: 2025-04-25 | Stop reason: HOSPADM

## 2025-04-24 RX ORDER — ONDANSETRON 2 MG/ML
8 INJECTION INTRAMUSCULAR; INTRAVENOUS
OUTPATIENT
Start: 2025-05-22

## 2025-04-24 RX ADMIN — SODIUM CHLORIDE 75 ML/HR: 0.9 INJECTION, SOLUTION INTRAVENOUS at 15:37

## 2025-04-24 RX ADMIN — SODIUM CHLORIDE 500 MG: 9 INJECTION, SOLUTION INTRAVENOUS at 15:40

## 2025-04-24 RX ADMIN — SODIUM CHLORIDE, PRESERVATIVE FREE 10 ML: 5 INJECTION INTRAVENOUS at 15:10

## 2025-04-24 NOTE — FLOWSHEET NOTE
SPIRITUAL HEALTH  Note for Cancer Center                  Infusion   Name: Alayna Fall           Age: 79 y.o.    Gender: female          MRN: [id number]  Yazdanism: Episcopal       Preferred Language: English    Date: 04/24/25  Visit Time: Begin Time: 1530 End Time : 1540     Visit Summary: Patient was sitting in the chair upon my arrival with her spouse at her side. I introduced myself, but spouse remembered me from a prior visit. Patient/family shared about their family and heidi, requesting prayer at the close of the visit. I offered prayer as requested. It was a pleasure to talk to this loving couple. I thanked them for the visit and they thanked me as well.       Encounter Overview/Reason: Follow-up  Service Provided For: Patient and family together     Patient was available.    Heidi, Belief, Meaning:   Patient is connected with a heidi tradition or spiritual practice and has beliefs or practices that help with coping during difficult times  Family/Friends are connected with a heidi tradition or spiritual practice and have beliefs or practices that help with coping during difficult times  Rituals      Importance and Influence:  Patient has spiritual/personal beliefs that influence decisions regarding their health  Family/Friends has spiritual/personal beliefs that influence decisions regarding the patient's health    Community:  Patient   Support system includes spouse, children, and extended family   Family/Friends   Support System Includes: spouse, children, and extended family     Assessment and Plan of Care:   Emotions Expressed by Patient:   Assessment: Coping, Calm    Interventions by :   Intervention: Active listening, Discussed belief system/Episcopalian practices/heidi, Discussed relationship with God, Explored/Affirmed feelings, thoughts, concerns, Nurtured Hope, Prayer (assurance of)/[place]     Result/ Response by Patient:   Outcome: Encouraged, Coping, Expressed Gratitude, Receptive

## 2025-04-24 NOTE — PROGRESS NOTES
Arrived to the Infusion Center.  Nulojix completed. Patient tolerated well.   Any issues or concerns during appointment: none.  Patient aware of next infusion appointment on 5/22.   Patient instructed to call provider with temperature of 100.4 or greater or nausea/vomiting/ diarrhea or pain not controlled by medications  Discharged via WC with .

## 2025-05-05 RX ORDER — ERGOCALCIFEROL 1.25 MG/1
50000 CAPSULE, LIQUID FILLED ORAL WEEKLY
Qty: 12 CAPSULE | Refills: 1 | Status: SHIPPED | OUTPATIENT
Start: 2025-05-05

## 2025-05-20 ENCOUNTER — OFFICE VISIT (OUTPATIENT)
Dept: NEUROLOGY | Age: 80
End: 2025-05-20
Payer: MEDICARE

## 2025-05-20 VITALS — WEIGHT: 160 LBS | BODY MASS INDEX: 27.31 KG/M2 | HEIGHT: 64 IN

## 2025-05-20 DIAGNOSIS — G30.8 SEVERE ALZHEIMER'S DEMENTIA OF OTHER ONSET WITH OTHER BEHAVIORAL DISTURBANCE (HCC): ICD-10-CM

## 2025-05-20 DIAGNOSIS — F02.C18 SEVERE ALZHEIMER'S DEMENTIA OF OTHER ONSET WITH OTHER BEHAVIORAL DISTURBANCE (HCC): ICD-10-CM

## 2025-05-20 DIAGNOSIS — F01.C2 SEVERE VASCULAR DEMENTIA WITH PSYCHOTIC DISTURBANCE (HCC): Primary | ICD-10-CM

## 2025-05-20 PROCEDURE — G8417 CALC BMI ABV UP PARAM F/U: HCPCS | Performed by: PSYCHIATRY & NEUROLOGY

## 2025-05-20 PROCEDURE — 1160F RVW MEDS BY RX/DR IN RCRD: CPT | Performed by: PSYCHIATRY & NEUROLOGY

## 2025-05-20 PROCEDURE — G8427 DOCREV CUR MEDS BY ELIG CLIN: HCPCS | Performed by: PSYCHIATRY & NEUROLOGY

## 2025-05-20 PROCEDURE — G8400 PT W/DXA NO RESULTS DOC: HCPCS | Performed by: PSYCHIATRY & NEUROLOGY

## 2025-05-20 PROCEDURE — 99214 OFFICE O/P EST MOD 30 MIN: CPT | Performed by: PSYCHIATRY & NEUROLOGY

## 2025-05-20 PROCEDURE — 1123F ACP DISCUSS/DSCN MKR DOCD: CPT | Performed by: PSYCHIATRY & NEUROLOGY

## 2025-05-20 PROCEDURE — 1159F MED LIST DOCD IN RCRD: CPT | Performed by: PSYCHIATRY & NEUROLOGY

## 2025-05-20 PROCEDURE — 1036F TOBACCO NON-USER: CPT | Performed by: PSYCHIATRY & NEUROLOGY

## 2025-05-20 PROCEDURE — 1090F PRES/ABSN URINE INCON ASSESS: CPT | Performed by: PSYCHIATRY & NEUROLOGY

## 2025-05-20 RX ORDER — MEMANTINE HYDROCHLORIDE 10 MG/1
TABLET ORAL
Qty: 180 TABLET | Refills: 3 | Status: SHIPPED | OUTPATIENT
Start: 2025-05-20

## 2025-05-20 RX ORDER — RISPERIDONE 0.5 MG/1
0.5 TABLET ORAL EVERY EVENING
Qty: 30 TABLET | Refills: 9 | Status: SHIPPED | OUTPATIENT
Start: 2025-05-20

## 2025-05-20 ASSESSMENT — ENCOUNTER SYMPTOMS
ALLERGIC/IMMUNOLOGIC NEGATIVE: 1
EYES NEGATIVE: 1
RESPIRATORY NEGATIVE: 1

## 2025-05-20 NOTE — PROGRESS NOTES
[facility]  [address] [place] [address], [address]  [place] [address]  Phone: [phone] Fax [phone]  Dr. Boni Sigala      5/20/2025  Alayna Fall     Patient is referred by the following provider for consultation regarding as below:       I reviewed the available records and notes and have examined patient with the following findings:     Chief Complaint:  Chief Complaint   Patient presents with    Follow-up          HPI: This is a right handed 79 y.o.  female here with her  and one of her daughters.  The patient unfortunately has been diagnosed with vascular dementia.  And advanced dementia.  She is a full care patient and in 2021 she actually had a stroke causing left-sided weakness which accelerated her memory loss.  She repeats questions within minutes conversation within minutes.  She needs full assistance with activities of daily living both dressing bathing.  She does not cook she does not clean she cannot take care of her financial medical or legal affairs and needs assistance with just about everything.  Her  takes care of pretty much everything.  The hallucinations are significantly better with receiving Risperdal 0.5 mg at bedtime.  We Sunny did move forward with the biomarkers 217 and 181 which both came back significantly elevated.  Her MoCA test runs 10 out of 30 if not last    IMAGING REVIEW:  I REVIEWED PERTINENT  IMAGES AND REPORTS WITH THE PATIENT PERSONALLY, DIRECTLY AND FULLY.     Past Medical History:  Past Medical History:   Diagnosis Date    Carotid artery disease     Chronic kidney disease     dialysis patient mon wed and fri    Dyspepsia and other specified disorders of function of stomach     Gout     Hypercholesterolemia     Hypertension     Menopausal syndrome     Multinodular goiter     Neuropathy     Renal failure     s/p kidney transplant x2    Right shoulder pain     Type 2 diabetes mellitus (HCC)        Past Surgical History:  Past Surgical

## 2025-05-22 ENCOUNTER — HOSPITAL ENCOUNTER (OUTPATIENT)
Dept: INFUSION THERAPY | Age: 80
Setting detail: INFUSION SERIES
Discharge: HOME | End: 2025-05-22
Payer: MEDICARE

## 2025-05-22 VITALS
RESPIRATION RATE: 16 BRPM | TEMPERATURE: 97.7 F | SYSTOLIC BLOOD PRESSURE: 149 MMHG | HEART RATE: 82 BPM | DIASTOLIC BLOOD PRESSURE: 75 MMHG | OXYGEN SATURATION: 97 % | WEIGHT: 161.4 LBS | BODY MASS INDEX: 27.7 KG/M2

## 2025-05-22 DIAGNOSIS — Z94.0 RENAL TRANSPLANT, STATUS POST: Primary | ICD-10-CM

## 2025-05-22 PROCEDURE — 96365 THER/PROPH/DIAG IV INF INIT: CPT

## 2025-05-22 PROCEDURE — 2580000003 HC RX 258: Performed by: INTERNAL MEDICINE

## 2025-05-22 PROCEDURE — 6360000002 HC RX W HCPCS: Performed by: INTERNAL MEDICINE

## 2025-05-22 PROCEDURE — 2500000003 HC RX 250 WO HCPCS: Performed by: INTERNAL MEDICINE

## 2025-05-22 RX ORDER — SODIUM CHLORIDE 9 MG/ML
5-250 INJECTION, SOLUTION INTRAVENOUS PRN
Status: DISCONTINUED | OUTPATIENT
Start: 2025-05-22 | End: 2025-05-23 | Stop reason: HOSPADM

## 2025-05-22 RX ORDER — SODIUM CHLORIDE 9 MG/ML
INJECTION, SOLUTION INTRAVENOUS CONTINUOUS
OUTPATIENT
Start: 2025-06-19

## 2025-05-22 RX ORDER — SODIUM CHLORIDE 0.9 % (FLUSH) 0.9 %
5-40 SYRINGE (ML) INJECTION PRN
OUTPATIENT
Start: 2025-06-19

## 2025-05-22 RX ORDER — ACETAMINOPHEN 325 MG/1
650 TABLET ORAL
OUTPATIENT
Start: 2025-06-19

## 2025-05-22 RX ORDER — HYDROCORTISONE SODIUM SUCCINATE 100 MG/2ML
100 INJECTION INTRAMUSCULAR; INTRAVENOUS
Status: DISCONTINUED | OUTPATIENT
Start: 2025-05-22 | End: 2025-05-23 | Stop reason: HOSPADM

## 2025-05-22 RX ORDER — SODIUM CHLORIDE 9 MG/ML
5-250 INJECTION, SOLUTION INTRAVENOUS PRN
OUTPATIENT
Start: 2025-06-19

## 2025-05-22 RX ORDER — DIPHENHYDRAMINE HYDROCHLORIDE 50 MG/ML
50 INJECTION, SOLUTION INTRAMUSCULAR; INTRAVENOUS
OUTPATIENT
Start: 2025-06-19

## 2025-05-22 RX ORDER — ALBUTEROL SULFATE 90 UG/1
4 INHALANT RESPIRATORY (INHALATION) PRN
OUTPATIENT
Start: 2025-06-19

## 2025-05-22 RX ORDER — SODIUM CHLORIDE 0.9 % (FLUSH) 0.9 %
5-40 SYRINGE (ML) INJECTION PRN
Status: DISCONTINUED | OUTPATIENT
Start: 2025-05-22 | End: 2025-05-23 | Stop reason: HOSPADM

## 2025-05-22 RX ORDER — DIPHENHYDRAMINE HYDROCHLORIDE 50 MG/ML
50 INJECTION, SOLUTION INTRAMUSCULAR; INTRAVENOUS
Status: DISCONTINUED | OUTPATIENT
Start: 2025-05-22 | End: 2025-05-23 | Stop reason: HOSPADM

## 2025-05-22 RX ORDER — HYDROCORTISONE SODIUM SUCCINATE 100 MG/2ML
100 INJECTION INTRAMUSCULAR; INTRAVENOUS
OUTPATIENT
Start: 2025-06-19

## 2025-05-22 RX ORDER — EPINEPHRINE 1 MG/ML
0.3 INJECTION, SOLUTION, CONCENTRATE INTRAVENOUS PRN
OUTPATIENT
Start: 2025-06-19

## 2025-05-22 RX ORDER — ALBUTEROL SULFATE 0.83 MG/ML
2.5 SOLUTION RESPIRATORY (INHALATION)
OUTPATIENT
Start: 2025-06-19

## 2025-05-22 RX ORDER — ONDANSETRON 2 MG/ML
8 INJECTION INTRAMUSCULAR; INTRAVENOUS
OUTPATIENT
Start: 2025-06-19

## 2025-05-22 RX ADMIN — SODIUM CHLORIDE 500 MG: 9 INJECTION, SOLUTION INTRAVENOUS at 16:01

## 2025-05-22 RX ADMIN — SODIUM CHLORIDE, PRESERVATIVE FREE 10 ML: 5 INJECTION INTRAVENOUS at 14:50

## 2025-05-22 NOTE — PROGRESS NOTES
Arrived to the Infusion Center. Orders reviewed; Nulojox infusion completed. Patient tolerated well.   Any issues or concerns during appointment: none.  Patient instructed to call provider with temperature of 100.4 or greater or nausea/vomiting/ diarrhea or pain not controlled by medications  Discharged ambulatory.

## 2025-05-28 ENCOUNTER — APPOINTMENT (OUTPATIENT)
Dept: GENERAL RADIOLOGY | Age: 80
End: 2025-05-28
Payer: MEDICARE

## 2025-05-28 ENCOUNTER — HOSPITAL ENCOUNTER (EMERGENCY)
Age: 80
Discharge: HOME | End: 2025-05-28
Attending: EMERGENCY MEDICINE
Payer: MEDICARE

## 2025-05-28 VITALS
OXYGEN SATURATION: 100 % | RESPIRATION RATE: 15 BRPM | TEMPERATURE: 98.6 F | DIASTOLIC BLOOD PRESSURE: 55 MMHG | BODY MASS INDEX: 27.49 KG/M2 | WEIGHT: 161 LBS | HEIGHT: 64 IN | HEART RATE: 80 BPM | SYSTOLIC BLOOD PRESSURE: 115 MMHG

## 2025-05-28 DIAGNOSIS — N39.0 URINARY TRACT INFECTION IN FEMALE: Primary | ICD-10-CM

## 2025-05-28 LAB
ALBUMIN SERPL-MCNC: 2.7 G/DL (ref 3.2–4.6)
ALBUMIN/GLOB SERPL: 0.7 (ref 1–1.9)
ALP SERPL-CCNC: 60 U/L (ref 35–104)
ALT SERPL-CCNC: 6 U/L (ref 8–45)
ANION GAP SERPL CALC-SCNC: 9 MMOL/L (ref 7–16)
APPEARANCE UR: ABNORMAL
AST SERPL-CCNC: 28 U/L (ref 15–37)
BACTERIA URNS QL MICRO: ABNORMAL /HPF
BASOPHILS # BLD: 0.03 K/UL (ref 0–0.2)
BASOPHILS NFR BLD: 0.6 % (ref 0–2)
BILIRUB SERPL-MCNC: 0.4 MG/DL (ref 0–1.2)
BILIRUB UR QL: NEGATIVE
BUN SERPL-MCNC: 12 MG/DL (ref 8–23)
CALCIUM SERPL-MCNC: 9.3 MG/DL (ref 8.8–10.2)
CASTS URNS QL MICRO: 0 /LPF
CHLORIDE SERPL-SCNC: 108 MMOL/L (ref 98–107)
CO2 SERPL-SCNC: 25 MMOL/L (ref 20–29)
COLOR UR: ABNORMAL
CREAT SERPL-MCNC: 0.81 MG/DL (ref 0.6–1.1)
CRYSTALS URNS QL MICRO: 0 /LPF
DIFFERENTIAL METHOD BLD: ABNORMAL
EOSINOPHIL # BLD: 0.21 K/UL (ref 0–0.8)
EOSINOPHIL NFR BLD: 4.2 % (ref 0.5–7.8)
EPI CELLS #/AREA URNS HPF: ABNORMAL /HPF
ERYTHROCYTE [DISTWIDTH] IN BLOOD BY AUTOMATED COUNT: 13.4 % (ref 11.9–14.6)
GLOBULIN SER CALC-MCNC: 3.9 G/DL (ref 2.3–3.5)
GLUCOSE SERPL-MCNC: 120 MG/DL (ref 70–99)
GLUCOSE UR STRIP.AUTO-MCNC: NEGATIVE MG/DL
HCT VFR BLD AUTO: 32.7 % (ref 35.8–46.3)
HGB BLD-MCNC: 9.7 G/DL (ref 11.7–15.4)
HGB UR QL STRIP: ABNORMAL
IMM GRANULOCYTES # BLD AUTO: 0.02 K/UL (ref 0–0.5)
IMM GRANULOCYTES NFR BLD AUTO: 0.4 % (ref 0–5)
KETONES UR QL STRIP.AUTO: ABNORMAL MG/DL
LEUKOCYTE ESTERASE UR QL STRIP.AUTO: ABNORMAL
LYMPHOCYTES # BLD: 1.25 K/UL (ref 0.5–4.6)
LYMPHOCYTES NFR BLD: 24.9 % (ref 13–44)
MCH RBC QN AUTO: 28.9 PG (ref 26.1–32.9)
MCHC RBC AUTO-ENTMCNC: 29.7 G/DL (ref 31.4–35)
MCV RBC AUTO: 97.3 FL (ref 82–102)
MONOCYTES # BLD: 0.39 K/UL (ref 0.1–1.3)
MONOCYTES NFR BLD: 7.8 % (ref 4–12)
MUCOUS THREADS URNS QL MICRO: 0 /LPF
NEUTS SEG # BLD: 3.12 K/UL (ref 1.7–8.2)
NEUTS SEG NFR BLD: 62.1 % (ref 43–78)
NITRITE UR QL STRIP.AUTO: NEGATIVE
NRBC # BLD: 0 K/UL (ref 0–0.2)
OTHER OBSERVATIONS: ABNORMAL
PH UR STRIP: 5.5 (ref 5–9)
PLATELET # BLD AUTO: 186 K/UL (ref 150–450)
PMV BLD AUTO: 10.3 FL (ref 9.4–12.3)
POTASSIUM SERPL-SCNC: 4.5 MMOL/L (ref 3.5–5.1)
PROT SERPL-MCNC: 6.6 G/DL (ref 6.3–8.2)
PROT UR STRIP-MCNC: 100 MG/DL
RBC # BLD AUTO: 3.36 M/UL (ref 4.05–5.2)
RBC #/AREA URNS HPF: ABNORMAL /HPF
SODIUM SERPL-SCNC: 142 MMOL/L (ref 136–145)
SP GR UR REFRACTOMETRY: 1.02 (ref 1–1.02)
URINE CULTURE IF INDICATED: ABNORMAL
UROBILINOGEN UR QL STRIP.AUTO: 1 EU/DL (ref 0.2–1)
WBC # BLD AUTO: 5 K/UL (ref 4.3–11.1)
WBC URNS QL MICRO: ABNORMAL /HPF

## 2025-05-28 PROCEDURE — 6360000002 HC RX W HCPCS: Performed by: EMERGENCY MEDICINE

## 2025-05-28 PROCEDURE — 81001 URINALYSIS AUTO W/SCOPE: CPT

## 2025-05-28 PROCEDURE — 2500000003 HC RX 250 WO HCPCS: Performed by: EMERGENCY MEDICINE

## 2025-05-28 PROCEDURE — 80053 COMPREHEN METABOLIC PANEL: CPT

## 2025-05-28 PROCEDURE — 96374 THER/PROPH/DIAG INJ IV PUSH: CPT

## 2025-05-28 PROCEDURE — 99284 EMERGENCY DEPT VISIT MOD MDM: CPT

## 2025-05-28 PROCEDURE — 71045 X-RAY EXAM CHEST 1 VIEW: CPT

## 2025-05-28 PROCEDURE — 85025 COMPLETE CBC W/AUTO DIFF WBC: CPT

## 2025-05-28 PROCEDURE — 87086 URINE CULTURE/COLONY COUNT: CPT

## 2025-05-28 RX ORDER — LIDOCAINE AND PRILOCAINE 25; 25 MG/G; MG/G
CREAM TOPICAL
COMMUNITY
Start: 2025-03-31

## 2025-05-28 RX ORDER — CEPHALEXIN 500 MG/1
500 CAPSULE ORAL 2 TIMES DAILY
Qty: 14 CAPSULE | Refills: 0 | Status: SHIPPED | OUTPATIENT
Start: 2025-05-28 | End: 2025-06-04

## 2025-05-28 RX ADMIN — CEFTRIAXONE 1000 MG: 1 INJECTION, POWDER, FOR SOLUTION INTRAMUSCULAR; INTRAVENOUS at 23:22

## 2025-05-29 NOTE — ED PROVIDER NOTES
Emergency Department Provider Note       [facility] EMERGENCY DEPT   PCP: Marek Yanez MD   Age: 79 y.o.   Sex: female     DISPOSITION Discharge - Pending Orders Complete 05/28/2025 11:12:20 PM    ICD-10-CM    1. Urinary tract infection in female  N39.0           Medical Decision Making     Patient being evaluated for her fatigue and malaise.  Family is concerned of the possibility urinary tract infection and possible dehydration.  She did get some fluids from EMS which they felt helped \"perk her up\".  Plan for blood work to evaluate for any kidney dysfunction, metabolic or electrolyte derangements or signs of severe infection.  Urinalysis will be arm to look for UTI.  They note that she has had occasional intermittent cough so chest x-ray will also be performed to rule out indolent pneumonia.  ED Course as of 05/28/25 2312   Wed May 28, 2025   2249 Patient's blood work shows stable hemoglobin.  No leukocytosis or left shift.  No concerning metabolic derangement. [EUGENIO]   2249 XR CHEST PORTABLE  Per my independent interpretation chest x-ray shows no lobar consolidation, no pulmonary edema, no effusion. [EUGENIO]   2249 Per the reading radiologist chest x-ray concerning for the possibility of a viral bronchitis.  This could potentially explain the patient's generalized fatigue malaise and other symptoms. [EUGENIO]   2311 Urinalysis showed large leukocytes with  white blood cells.  And 4+ bacteria.  Center was not a clean-catch with  white cells.  With the symptoms I think it is reasonable to treat her for urinary tract infection while waiting for her urine culture. [EUGENIO]      ED Course User Index  [EUGENIO] Juventino Bonilla, DO     1 or more acute illnesses that pose a threat to life or bodily function.   Prescription drug management performed.  Shared medical decision making was utilized in creating the patients health plan today.  I independently ordered and reviewed each unique test.

## 2025-05-29 NOTE — ED TRIAGE NOTES
Pt arrives to the ER via ems from home. Ems called for \"ill person\". Family states new onset of weakness to ems upon arrival. Pt had 101 temp today and was given tylenol by family. Foul odor urine odor noted by ems. Pt has dialysis access Left arm.

## 2025-05-31 LAB
BACTERIA SPEC CULT: NORMAL
SERVICE CMNT-IMP: NORMAL

## 2025-06-10 ENCOUNTER — OFFICE VISIT (OUTPATIENT)
Dept: FAMILY MEDICINE CLINIC | Facility: CLINIC | Age: 80
End: 2025-06-10
Payer: MEDICARE

## 2025-06-10 VITALS
HEIGHT: 64 IN | BODY MASS INDEX: 27.49 KG/M2 | SYSTOLIC BLOOD PRESSURE: 120 MMHG | HEART RATE: 74 BPM | WEIGHT: 161 LBS | OXYGEN SATURATION: 98 % | DIASTOLIC BLOOD PRESSURE: 70 MMHG

## 2025-06-10 DIAGNOSIS — E78.1 PURE HYPERGLYCERIDEMIA: ICD-10-CM

## 2025-06-10 DIAGNOSIS — G30.8 SEVERE ALZHEIMER'S DEMENTIA OF OTHER ONSET WITH OTHER BEHAVIORAL DISTURBANCE (HCC): ICD-10-CM

## 2025-06-10 DIAGNOSIS — I10 PRIMARY HYPERTENSION: ICD-10-CM

## 2025-06-10 DIAGNOSIS — E78.00 PURE HYPERCHOLESTEROLEMIA: ICD-10-CM

## 2025-06-10 DIAGNOSIS — R21 RASH: Primary | ICD-10-CM

## 2025-06-10 DIAGNOSIS — F02.C18 SEVERE ALZHEIMER'S DEMENTIA OF OTHER ONSET WITH OTHER BEHAVIORAL DISTURBANCE (HCC): ICD-10-CM

## 2025-06-10 DIAGNOSIS — Z79.4 TYPE 2 DIABETES MELLITUS WITH HYPERGLYCEMIA, WITH LONG-TERM CURRENT USE OF INSULIN (HCC): ICD-10-CM

## 2025-06-10 DIAGNOSIS — E11.65 TYPE 2 DIABETES MELLITUS WITH HYPERGLYCEMIA, WITH LONG-TERM CURRENT USE OF INSULIN (HCC): ICD-10-CM

## 2025-06-10 DIAGNOSIS — Z87.448 HISTORY OF RENAL FAILURE: ICD-10-CM

## 2025-06-10 DIAGNOSIS — N39.0 URINARY TRACT INFECTION WITHOUT HEMATURIA, SITE UNSPECIFIED: ICD-10-CM

## 2025-06-10 PROCEDURE — G8400 PT W/DXA NO RESULTS DOC: HCPCS | Performed by: FAMILY MEDICINE

## 2025-06-10 PROCEDURE — G2211 COMPLEX E/M VISIT ADD ON: HCPCS | Performed by: FAMILY MEDICINE

## 2025-06-10 PROCEDURE — 3078F DIAST BP <80 MM HG: CPT | Performed by: FAMILY MEDICINE

## 2025-06-10 PROCEDURE — 99214 OFFICE O/P EST MOD 30 MIN: CPT | Performed by: FAMILY MEDICINE

## 2025-06-10 PROCEDURE — 1036F TOBACCO NON-USER: CPT | Performed by: FAMILY MEDICINE

## 2025-06-10 PROCEDURE — G8427 DOCREV CUR MEDS BY ELIG CLIN: HCPCS | Performed by: FAMILY MEDICINE

## 2025-06-10 PROCEDURE — 3052F HG A1C>EQUAL 8.0%<EQUAL 9.0%: CPT | Performed by: FAMILY MEDICINE

## 2025-06-10 PROCEDURE — G8417 CALC BMI ABV UP PARAM F/U: HCPCS | Performed by: FAMILY MEDICINE

## 2025-06-10 PROCEDURE — 3074F SYST BP LT 130 MM HG: CPT | Performed by: FAMILY MEDICINE

## 2025-06-10 PROCEDURE — 1123F ACP DISCUSS/DSCN MKR DOCD: CPT | Performed by: FAMILY MEDICINE

## 2025-06-10 PROCEDURE — 1090F PRES/ABSN URINE INCON ASSESS: CPT | Performed by: FAMILY MEDICINE

## 2025-06-10 PROCEDURE — 1159F MED LIST DOCD IN RCRD: CPT | Performed by: FAMILY MEDICINE

## 2025-06-10 SDOH — ECONOMIC STABILITY: FOOD INSECURITY: WITHIN THE PAST 12 MONTHS, THE FOOD YOU BOUGHT JUST DIDN'T LAST AND YOU DIDN'T HAVE MONEY TO GET MORE.: NEVER TRUE

## 2025-06-10 SDOH — ECONOMIC STABILITY: FOOD INSECURITY: WITHIN THE PAST 12 MONTHS, YOU WORRIED THAT YOUR FOOD WOULD RUN OUT BEFORE YOU GOT MONEY TO BUY MORE.: NEVER TRUE

## 2025-06-10 NOTE — PROGRESS NOTES
SUBJECTIVE:   Alayna Fall is a 79 y.o. female who has a complicated past medical history significant for renal failure status post transplant x2 followed by nephrology, diabetes followed by endocrinology, hypothyroidism, hypertension, high cholesterol, high triglycerides, degenerative arthritis, Alzheimer's dementia followed by neurology, history of stroke, acute GI bleed with blood loss anemia and obesity.      Patient presents today accompanied by her .  Patient was in the emergency department with fever and increased confusion on May 28.  She was diagnosed with a UTI and was sent home on Keflex.  Within 2 days of antibiotic treatment her symptoms resolved and she is back to baseline.  Of interest is that the culture was negative but the urinalysis was grossly abnormal.    In addition the patient has a welt type rash on her right lower back that her  has noticed over the course of the last several weeks.  He is not sure if it has been there longer.  Seems to \"come and go\".  No specific pattern to what makes it worse.  It seems to itch slightly but does not bother the patient for the most part.    HPI  See above    Past Medical History, Past Surgical History, Family history, Social History, and Medications were all reviewed with the patient today and updated as necessary.       Current Outpatient Medications   Medication Sig Dispense Refill    lidocaine-prilocaine (EMLA) 2.5-2.5 % cream APPLY TOPICALLY AS NEEDED FOR PAIN FOR INJECTIONS      risperiDONE (RISPERDAL) 0.5 MG tablet Take 1 tablet by mouth every evening 30 tablet 9    memantine (NAMENDA) 10 MG tablet Take half tablet twice a day for 1 month and then continue with 1 tablet twice a day 180 tablet 3    vitamin D (ERGOCALCIFEROL) 1.25 MG (37353 UT) CAPS capsule TAKE 1 CAPSULE BY MOUTH 1 TIME A WEEK 12 capsule 1    rosuvastatin (CRESTOR) 10 MG tablet TAKE 1 TABLET BY MOUTH EVERY NIGHT 90 tablet 1    TRULICITY 4.5 MG/0.5ML SOAJ Inject 4.5 mg

## 2025-07-01 ENCOUNTER — HOSPITAL ENCOUNTER (OUTPATIENT)
Dept: INFUSION THERAPY | Age: 80
Setting detail: INFUSION SERIES
End: 2025-07-01

## 2025-07-10 ENCOUNTER — TELEPHONE (OUTPATIENT)
Dept: ONCOLOGY | Age: 80
End: 2025-07-10

## 2025-07-10 ENCOUNTER — HOSPITAL ENCOUNTER (OUTPATIENT)
Dept: INFUSION THERAPY | Age: 80
Setting detail: INFUSION SERIES
End: 2025-07-10

## 2025-07-15 ENCOUNTER — HOSPITAL ENCOUNTER (OUTPATIENT)
Dept: INFUSION THERAPY | Age: 80
Setting detail: INFUSION SERIES
Discharge: HOME | End: 2025-07-15
Payer: MEDICARE

## 2025-07-15 VITALS
BODY MASS INDEX: 28.94 KG/M2 | OXYGEN SATURATION: 94 % | DIASTOLIC BLOOD PRESSURE: 79 MMHG | RESPIRATION RATE: 16 BRPM | SYSTOLIC BLOOD PRESSURE: 149 MMHG | HEART RATE: 78 BPM | TEMPERATURE: 98.9 F | WEIGHT: 168.6 LBS

## 2025-07-15 DIAGNOSIS — Z94.0 RENAL TRANSPLANT, STATUS POST: Primary | ICD-10-CM

## 2025-07-15 PROCEDURE — 96365 THER/PROPH/DIAG IV INF INIT: CPT

## 2025-07-15 PROCEDURE — 2500000003 HC RX 250 WO HCPCS: Performed by: INTERNAL MEDICINE

## 2025-07-15 PROCEDURE — 6360000002 HC RX W HCPCS: Performed by: INTERNAL MEDICINE

## 2025-07-15 PROCEDURE — 2580000003 HC RX 258: Performed by: INTERNAL MEDICINE

## 2025-07-15 RX ORDER — HYDROCORTISONE SODIUM SUCCINATE 100 MG/2ML
100 INJECTION INTRAMUSCULAR; INTRAVENOUS
OUTPATIENT
Start: 2025-08-12

## 2025-07-15 RX ORDER — ONDANSETRON 2 MG/ML
8 INJECTION INTRAMUSCULAR; INTRAVENOUS
OUTPATIENT
Start: 2025-08-12

## 2025-07-15 RX ORDER — HYDROCORTISONE SODIUM SUCCINATE 100 MG/2ML
100 INJECTION INTRAMUSCULAR; INTRAVENOUS
Status: DISCONTINUED | OUTPATIENT
Start: 2025-07-15 | End: 2025-07-16 | Stop reason: HOSPADM

## 2025-07-15 RX ORDER — ALBUTEROL SULFATE 0.83 MG/ML
2.5 SOLUTION RESPIRATORY (INHALATION)
Status: DISCONTINUED | OUTPATIENT
Start: 2025-07-15 | End: 2025-07-16 | Stop reason: HOSPADM

## 2025-07-15 RX ORDER — SODIUM CHLORIDE 9 MG/ML
5-250 INJECTION, SOLUTION INTRAVENOUS PRN
Status: DISCONTINUED | OUTPATIENT
Start: 2025-07-15 | End: 2025-07-16 | Stop reason: HOSPADM

## 2025-07-15 RX ORDER — SODIUM CHLORIDE 0.9 % (FLUSH) 0.9 %
5-40 SYRINGE (ML) INJECTION PRN
Status: DISCONTINUED | OUTPATIENT
Start: 2025-07-15 | End: 2025-07-16 | Stop reason: HOSPADM

## 2025-07-15 RX ORDER — EPINEPHRINE 1 MG/ML
0.3 INJECTION, SOLUTION, CONCENTRATE INTRAVENOUS PRN
OUTPATIENT
Start: 2025-08-12

## 2025-07-15 RX ORDER — ALBUTEROL SULFATE 90 UG/1
4 INHALANT RESPIRATORY (INHALATION) PRN
Status: DISCONTINUED | OUTPATIENT
Start: 2025-07-15 | End: 2025-07-16 | Stop reason: HOSPADM

## 2025-07-15 RX ORDER — ACETAMINOPHEN 325 MG/1
650 TABLET ORAL
OUTPATIENT
Start: 2025-08-12

## 2025-07-15 RX ORDER — ACETAMINOPHEN 325 MG/1
650 TABLET ORAL
Status: DISCONTINUED | OUTPATIENT
Start: 2025-07-15 | End: 2025-07-16 | Stop reason: HOSPADM

## 2025-07-15 RX ORDER — DIPHENHYDRAMINE HYDROCHLORIDE 50 MG/ML
50 INJECTION, SOLUTION INTRAMUSCULAR; INTRAVENOUS
OUTPATIENT
Start: 2025-08-12

## 2025-07-15 RX ORDER — SODIUM CHLORIDE 9 MG/ML
INJECTION, SOLUTION INTRAVENOUS CONTINUOUS
Status: DISCONTINUED | OUTPATIENT
Start: 2025-07-15 | End: 2025-07-16 | Stop reason: HOSPADM

## 2025-07-15 RX ORDER — SODIUM CHLORIDE 9 MG/ML
INJECTION, SOLUTION INTRAVENOUS CONTINUOUS
OUTPATIENT
Start: 2025-08-12

## 2025-07-15 RX ORDER — ALBUTEROL SULFATE 90 UG/1
4 INHALANT RESPIRATORY (INHALATION) PRN
OUTPATIENT
Start: 2025-08-12

## 2025-07-15 RX ORDER — ONDANSETRON 2 MG/ML
8 INJECTION INTRAMUSCULAR; INTRAVENOUS
Status: DISCONTINUED | OUTPATIENT
Start: 2025-07-15 | End: 2025-07-16 | Stop reason: HOSPADM

## 2025-07-15 RX ORDER — SODIUM CHLORIDE 0.9 % (FLUSH) 0.9 %
5-40 SYRINGE (ML) INJECTION PRN
OUTPATIENT
Start: 2025-08-12

## 2025-07-15 RX ORDER — EPINEPHRINE 1 MG/ML
0.3 INJECTION, SOLUTION, CONCENTRATE INTRAVENOUS PRN
Status: DISCONTINUED | OUTPATIENT
Start: 2025-07-15 | End: 2025-07-16 | Stop reason: HOSPADM

## 2025-07-15 RX ORDER — ALBUTEROL SULFATE 0.83 MG/ML
2.5 SOLUTION RESPIRATORY (INHALATION)
OUTPATIENT
Start: 2025-08-12

## 2025-07-15 RX ORDER — DIPHENHYDRAMINE HYDROCHLORIDE 50 MG/ML
50 INJECTION, SOLUTION INTRAMUSCULAR; INTRAVENOUS
Status: DISCONTINUED | OUTPATIENT
Start: 2025-07-15 | End: 2025-07-16 | Stop reason: HOSPADM

## 2025-07-15 RX ORDER — SODIUM CHLORIDE 9 MG/ML
5-250 INJECTION, SOLUTION INTRAVENOUS PRN
OUTPATIENT
Start: 2025-08-12

## 2025-07-15 RX ADMIN — SODIUM CHLORIDE 150 ML/HR: 0.9 INJECTION, SOLUTION INTRAVENOUS at 14:43

## 2025-07-15 RX ADMIN — SODIUM CHLORIDE, PRESERVATIVE FREE 10 ML: 5 INJECTION INTRAVENOUS at 14:42

## 2025-07-15 RX ADMIN — SODIUM CHLORIDE, PRESERVATIVE FREE 10 ML: 5 INJECTION INTRAVENOUS at 15:44

## 2025-07-15 RX ADMIN — SODIUM CHLORIDE 500 MG: 9 INJECTION, SOLUTION INTRAVENOUS at 15:06

## 2025-07-15 NOTE — PROGRESS NOTES
Arrived to the Infusion Center.  Belatacept completed. Patient tolerated well.   Any issues or concerns during appointment: none.  Patient aware of next infusion appointment on 8/5/25 (date) at 1415 (time).  Patient instructed to call provider with temperature of 100.4 or greater or nausea/vomiting/ diarrhea or pain not controlled by medications  Discharged in wc.

## 2025-07-22 ENCOUNTER — TELEPHONE (OUTPATIENT)
Dept: FAMILY MEDICINE CLINIC | Facility: CLINIC | Age: 80
End: 2025-07-22

## 2025-07-22 DIAGNOSIS — R21 RASH: Primary | ICD-10-CM

## 2025-08-05 ENCOUNTER — HOSPITAL ENCOUNTER (OUTPATIENT)
Dept: INFUSION THERAPY | Age: 80
Setting detail: INFUSION SERIES
End: 2025-08-05

## 2025-08-08 ENCOUNTER — HOSPITAL ENCOUNTER (OUTPATIENT)
Dept: LAB | Age: 80
Discharge: HOME | End: 2025-08-08

## 2025-08-08 ENCOUNTER — HOSPITAL ENCOUNTER (OUTPATIENT)
Dept: INFUSION THERAPY | Age: 80
Setting detail: INFUSION SERIES
Discharge: HOME | End: 2025-08-08
Payer: MEDICARE

## 2025-08-08 VITALS
BODY MASS INDEX: 28.8 KG/M2 | OXYGEN SATURATION: 96 % | WEIGHT: 167.8 LBS | DIASTOLIC BLOOD PRESSURE: 82 MMHG | RESPIRATION RATE: 16 BRPM | HEART RATE: 70 BPM | TEMPERATURE: 98.1 F | SYSTOLIC BLOOD PRESSURE: 137 MMHG

## 2025-08-08 DIAGNOSIS — Z94.0 RENAL TRANSPLANT, STATUS POST: Primary | ICD-10-CM

## 2025-08-08 PROCEDURE — 2580000003 HC RX 258: Performed by: INTERNAL MEDICINE

## 2025-08-08 PROCEDURE — 96365 THER/PROPH/DIAG IV INF INIT: CPT

## 2025-08-08 PROCEDURE — 6360000002 HC RX W HCPCS: Performed by: INTERNAL MEDICINE

## 2025-08-08 RX ORDER — ACETAMINOPHEN 325 MG/1
650 TABLET ORAL
OUTPATIENT
Start: 2025-08-12

## 2025-08-08 RX ORDER — EPINEPHRINE 1 MG/ML
0.3 INJECTION, SOLUTION, CONCENTRATE INTRAVENOUS PRN
OUTPATIENT
Start: 2025-08-12

## 2025-08-08 RX ORDER — ALBUTEROL SULFATE 0.83 MG/ML
2.5 SOLUTION RESPIRATORY (INHALATION)
OUTPATIENT
Start: 2025-08-12

## 2025-08-08 RX ORDER — ALBUTEROL SULFATE 90 UG/1
4 INHALANT RESPIRATORY (INHALATION) PRN
OUTPATIENT
Start: 2025-08-12

## 2025-08-08 RX ORDER — SODIUM CHLORIDE 9 MG/ML
INJECTION, SOLUTION INTRAVENOUS CONTINUOUS
OUTPATIENT
Start: 2025-08-12

## 2025-08-08 RX ORDER — SODIUM CHLORIDE 0.9 % (FLUSH) 0.9 %
5-40 SYRINGE (ML) INJECTION PRN
OUTPATIENT
Start: 2025-08-12

## 2025-08-08 RX ORDER — SODIUM CHLORIDE 9 MG/ML
5-250 INJECTION, SOLUTION INTRAVENOUS PRN
Status: DISCONTINUED | OUTPATIENT
Start: 2025-08-08 | End: 2025-08-09 | Stop reason: HOSPADM

## 2025-08-08 RX ORDER — ONDANSETRON 2 MG/ML
8 INJECTION INTRAMUSCULAR; INTRAVENOUS
OUTPATIENT
Start: 2025-08-12

## 2025-08-08 RX ORDER — HYDROCORTISONE SODIUM SUCCINATE 100 MG/2ML
100 INJECTION INTRAMUSCULAR; INTRAVENOUS
OUTPATIENT
Start: 2025-08-12

## 2025-08-08 RX ORDER — DIPHENHYDRAMINE HYDROCHLORIDE 50 MG/ML
50 INJECTION, SOLUTION INTRAMUSCULAR; INTRAVENOUS
OUTPATIENT
Start: 2025-08-12

## 2025-08-08 RX ORDER — SODIUM CHLORIDE 9 MG/ML
5-250 INJECTION, SOLUTION INTRAVENOUS PRN
OUTPATIENT
Start: 2025-08-12

## 2025-08-08 RX ADMIN — SODIUM CHLORIDE 500 MG: 9 INJECTION, SOLUTION INTRAVENOUS at 16:07

## 2025-08-08 RX ADMIN — SODIUM CHLORIDE 50 ML/HR: 9 INJECTION, SOLUTION INTRAVENOUS at 16:00

## 2025-08-27 ENCOUNTER — OFFICE VISIT (OUTPATIENT)
Dept: ENDOCRINOLOGY | Age: 80
End: 2025-08-27
Payer: MEDICARE

## 2025-08-27 VITALS
DIASTOLIC BLOOD PRESSURE: 70 MMHG | SYSTOLIC BLOOD PRESSURE: 138 MMHG | HEIGHT: 64 IN | HEART RATE: 63 BPM | OXYGEN SATURATION: 98 % | BODY MASS INDEX: 28.8 KG/M2

## 2025-08-27 DIAGNOSIS — Z94.0 RENAL TRANSPLANT, STATUS POST: ICD-10-CM

## 2025-08-27 DIAGNOSIS — E78.00 HYPERCHOLESTEROLEMIA: ICD-10-CM

## 2025-08-27 DIAGNOSIS — E11.65 TYPE 2 DIABETES MELLITUS WITH HYPERGLYCEMIA, WITH LONG-TERM CURRENT USE OF INSULIN (HCC): Primary | ICD-10-CM

## 2025-08-27 DIAGNOSIS — Z79.4 TYPE 2 DIABETES MELLITUS WITH BOTH EYES AFFECTED BY MILD NONPROLIFERATIVE RETINOPATHY AND MACULAR EDEMA, WITH LONG-TERM CURRENT USE OF INSULIN (HCC): ICD-10-CM

## 2025-08-27 DIAGNOSIS — Z79.4 TYPE 2 DIABETES MELLITUS WITH HYPERGLYCEMIA, WITH LONG-TERM CURRENT USE OF INSULIN (HCC): Primary | ICD-10-CM

## 2025-08-27 DIAGNOSIS — I10 PRIMARY HYPERTENSION: ICD-10-CM

## 2025-08-27 DIAGNOSIS — E11.3213 TYPE 2 DIABETES MELLITUS WITH BOTH EYES AFFECTED BY MILD NONPROLIFERATIVE RETINOPATHY AND MACULAR EDEMA, WITH LONG-TERM CURRENT USE OF INSULIN (HCC): ICD-10-CM

## 2025-08-27 DIAGNOSIS — E04.2 MULTINODULAR GOITER: ICD-10-CM

## 2025-08-27 LAB — HBA1C MFR BLD: 7.8 %

## 2025-08-27 PROCEDURE — G8400 PT W/DXA NO RESULTS DOC: HCPCS | Performed by: PHYSICIAN ASSISTANT

## 2025-08-27 PROCEDURE — 3075F SYST BP GE 130 - 139MM HG: CPT | Performed by: PHYSICIAN ASSISTANT

## 2025-08-27 PROCEDURE — 3078F DIAST BP <80 MM HG: CPT | Performed by: PHYSICIAN ASSISTANT

## 2025-08-27 PROCEDURE — 95251 CONT GLUC MNTR ANALYSIS I&R: CPT | Performed by: PHYSICIAN ASSISTANT

## 2025-08-27 PROCEDURE — 99214 OFFICE O/P EST MOD 30 MIN: CPT | Performed by: PHYSICIAN ASSISTANT

## 2025-08-27 PROCEDURE — 1159F MED LIST DOCD IN RCRD: CPT | Performed by: PHYSICIAN ASSISTANT

## 2025-08-27 PROCEDURE — G8427 DOCREV CUR MEDS BY ELIG CLIN: HCPCS | Performed by: PHYSICIAN ASSISTANT

## 2025-08-27 PROCEDURE — 1090F PRES/ABSN URINE INCON ASSESS: CPT | Performed by: PHYSICIAN ASSISTANT

## 2025-08-27 PROCEDURE — 1160F RVW MEDS BY RX/DR IN RCRD: CPT | Performed by: PHYSICIAN ASSISTANT

## 2025-08-27 PROCEDURE — 1036F TOBACCO NON-USER: CPT | Performed by: PHYSICIAN ASSISTANT

## 2025-08-27 PROCEDURE — 83036 HEMOGLOBIN GLYCOSYLATED A1C: CPT | Performed by: PHYSICIAN ASSISTANT

## 2025-08-27 PROCEDURE — 3051F HG A1C>EQUAL 7.0%<8.0%: CPT | Performed by: PHYSICIAN ASSISTANT

## 2025-08-27 PROCEDURE — 1123F ACP DISCUSS/DSCN MKR DOCD: CPT | Performed by: PHYSICIAN ASSISTANT

## 2025-08-27 PROCEDURE — G8417 CALC BMI ABV UP PARAM F/U: HCPCS | Performed by: PHYSICIAN ASSISTANT

## 2025-08-27 RX ORDER — INSULIN LISPRO 100 [IU]/ML
INJECTION, SOLUTION INTRAVENOUS; SUBCUTANEOUS
Qty: 30 ML | Refills: 3 | Status: SHIPPED | OUTPATIENT
Start: 2025-08-27

## 2025-09-02 ENCOUNTER — HOSPITAL ENCOUNTER (EMERGENCY)
Age: 80
Discharge: HOME | End: 2025-09-02
Attending: EMERGENCY MEDICINE
Payer: MEDICARE

## 2025-09-02 VITALS
WEIGHT: 161 LBS | HEART RATE: 76 BPM | RESPIRATION RATE: 16 BRPM | TEMPERATURE: 98.6 F | DIASTOLIC BLOOD PRESSURE: 80 MMHG | OXYGEN SATURATION: 91 % | SYSTOLIC BLOOD PRESSURE: 177 MMHG | BODY MASS INDEX: 27.49 KG/M2 | HEIGHT: 64 IN

## 2025-09-02 DIAGNOSIS — N39.0 URINARY TRACT INFECTION IN FEMALE: Primary | ICD-10-CM

## 2025-09-02 LAB
ALBUMIN SERPL-MCNC: 3.4 G/DL (ref 3.2–4.6)
ALBUMIN/GLOB SERPL: 0.8 (ref 1–1.9)
ALP SERPL-CCNC: 61 U/L (ref 35–104)
ALT SERPL-CCNC: 8 U/L (ref 8–45)
ANION GAP SERPL CALC-SCNC: 11 MMOL/L (ref 7–16)
APPEARANCE UR: CLEAR
AST SERPL-CCNC: 21 U/L (ref 15–37)
BACTERIA URNS QL MICRO: ABNORMAL /HPF
BASOPHILS # BLD: 0.04 K/UL (ref 0–0.2)
BASOPHILS NFR BLD: 0.7 % (ref 0–2)
BILIRUB SERPL-MCNC: 0.3 MG/DL (ref 0–1.2)
BILIRUB UR QL: NEGATIVE
BUN SERPL-MCNC: 11 MG/DL (ref 8–23)
CALCIUM SERPL-MCNC: 10.1 MG/DL (ref 8.8–10.2)
CASTS URNS QL MICRO: 0 /LPF
CHLORIDE SERPL-SCNC: 102 MMOL/L (ref 98–107)
CO2 SERPL-SCNC: 25 MMOL/L (ref 20–29)
COLOR UR: ABNORMAL
CREAT SERPL-MCNC: 0.69 MG/DL (ref 0.6–1.1)
CRYSTALS URNS QL MICRO: 0 /LPF
DIFFERENTIAL METHOD BLD: ABNORMAL
EOSINOPHIL # BLD: 0.15 K/UL (ref 0–0.8)
EOSINOPHIL NFR BLD: 2.6 % (ref 0.5–7.8)
EPI CELLS #/AREA URNS HPF: ABNORMAL /HPF
ERYTHROCYTE [DISTWIDTH] IN BLOOD BY AUTOMATED COUNT: 12.9 % (ref 11.9–14.6)
FLUAV RNA SPEC QL NAA+PROBE: NOT DETECTED
FLUBV RNA SPEC QL NAA+PROBE: NOT DETECTED
GLOBULIN SER CALC-MCNC: 4.3 G/DL (ref 2.3–3.5)
GLUCOSE SERPL-MCNC: 135 MG/DL (ref 70–99)
GLUCOSE UR STRIP.AUTO-MCNC: NEGATIVE MG/DL
HCT VFR BLD AUTO: 36.9 % (ref 35.8–46.3)
HGB BLD-MCNC: 11 G/DL (ref 11.7–15.4)
HGB UR QL STRIP: NEGATIVE
HYALINE CASTS URNS QL MICRO: ABNORMAL /LPF
IMM GRANULOCYTES # BLD AUTO: 0.03 K/UL (ref 0–0.5)
IMM GRANULOCYTES NFR BLD AUTO: 0.5 % (ref 0–5)
KETONES UR QL STRIP.AUTO: NEGATIVE MG/DL
LEUKOCYTE ESTERASE UR QL STRIP.AUTO: ABNORMAL
LYMPHOCYTES # BLD: 1.4 K/UL (ref 0.5–4.6)
LYMPHOCYTES NFR BLD: 24.6 % (ref 13–44)
MCH RBC QN AUTO: 28.7 PG (ref 26.1–32.9)
MCHC RBC AUTO-ENTMCNC: 29.8 G/DL (ref 31.4–35)
MCV RBC AUTO: 96.3 FL (ref 82–102)
MONOCYTES # BLD: 0.46 K/UL (ref 0.1–1.3)
MONOCYTES NFR BLD: 8.1 % (ref 4–12)
MUCOUS THREADS URNS QL MICRO: 0 /LPF
NEUTS SEG # BLD: 3.62 K/UL (ref 1.7–8.2)
NEUTS SEG NFR BLD: 63.5 % (ref 43–78)
NITRITE UR QL STRIP.AUTO: NEGATIVE
NRBC # BLD: 0 K/UL (ref 0–0.2)
PH UR STRIP: 7.5 (ref 5–9)
PLATELET # BLD AUTO: 204 K/UL (ref 150–450)
PMV BLD AUTO: 9.1 FL (ref 9.4–12.3)
POTASSIUM SERPL-SCNC: 4.3 MMOL/L (ref 3.5–5.1)
PROT SERPL-MCNC: 7.7 G/DL (ref 6.3–8.2)
PROT UR STRIP-MCNC: NEGATIVE MG/DL
RBC # BLD AUTO: 3.83 M/UL (ref 4.05–5.2)
RBC #/AREA URNS HPF: ABNORMAL /HPF
SARS-COV-2 RDRP RESP QL NAA+PROBE: NOT DETECTED
SODIUM SERPL-SCNC: 138 MMOL/L (ref 136–145)
SOURCE: NORMAL
SP GR UR REFRACTOMETRY: 1.01 (ref 1–1.02)
URINE CULTURE IF INDICATED: ABNORMAL
UROBILINOGEN UR QL STRIP.AUTO: 1 EU/DL (ref 0.2–1)
WBC # BLD AUTO: 5.7 K/UL (ref 4.3–11.1)
WBC URNS QL MICRO: ABNORMAL /HPF

## 2025-09-02 PROCEDURE — 99284 EMERGENCY DEPT VISIT MOD MDM: CPT

## 2025-09-02 PROCEDURE — 80053 COMPREHEN METABOLIC PANEL: CPT

## 2025-09-02 PROCEDURE — 85025 COMPLETE CBC W/AUTO DIFF WBC: CPT

## 2025-09-02 PROCEDURE — 6360000002 HC RX W HCPCS: Performed by: EMERGENCY MEDICINE

## 2025-09-02 PROCEDURE — 87636 SARSCOV2 & INF A&B AMP PRB: CPT

## 2025-09-02 PROCEDURE — 81001 URINALYSIS AUTO W/SCOPE: CPT

## 2025-09-02 PROCEDURE — 87086 URINE CULTURE/COLONY COUNT: CPT

## 2025-09-02 PROCEDURE — 96374 THER/PROPH/DIAG INJ IV PUSH: CPT

## 2025-09-02 PROCEDURE — 2500000003 HC RX 250 WO HCPCS: Performed by: EMERGENCY MEDICINE

## 2025-09-02 PROCEDURE — 2580000003 HC RX 258: Performed by: EMERGENCY MEDICINE

## 2025-09-02 RX ORDER — CEPHALEXIN 500 MG/1
500 CAPSULE ORAL 2 TIMES DAILY
Qty: 14 CAPSULE | Refills: 0 | Status: SHIPPED | OUTPATIENT
Start: 2025-09-02 | End: 2025-09-09

## 2025-09-02 RX ORDER — 0.9 % SODIUM CHLORIDE 0.9 %
1000 INTRAVENOUS SOLUTION INTRAVENOUS ONCE
Status: COMPLETED | OUTPATIENT
Start: 2025-09-02 | End: 2025-09-02

## 2025-09-02 RX ADMIN — WATER 1000 MG: 1 INJECTION INTRAMUSCULAR; INTRAVENOUS; SUBCUTANEOUS at 18:15

## 2025-09-02 RX ADMIN — SODIUM CHLORIDE 1000 ML: 9 INJECTION, SOLUTION INTRAVENOUS at 15:54

## 2025-09-02 ASSESSMENT — PAIN SCALES - GENERAL: PAINLEVEL_OUTOF10: 0

## 2025-09-02 ASSESSMENT — PAIN - FUNCTIONAL ASSESSMENT: PAIN_FUNCTIONAL_ASSESSMENT: 0-10

## 2025-09-03 RX ORDER — CEPHALEXIN 250 MG/5ML
500 POWDER, FOR SUSPENSION ORAL 3 TIMES DAILY
Qty: 300 ML | Refills: 0 | Status: SHIPPED | OUTPATIENT
Start: 2025-09-03 | End: 2025-09-13

## 2025-09-04 LAB
BACTERIA SPEC CULT: NORMAL
SERVICE CMNT-IMP: NORMAL

## (undated) DEVICE — CONNECTOR TBNG OD5-7MM O2 END DISP

## (undated) DEVICE — CANNULA NSL ORAL AD FOR CAPNOFLEX CO2 O2 AIRLFE

## (undated) DEVICE — KENDALL RADIOLUCENT FOAM MONITORING ELECTRODE RECTANGULAR SHAPE: Brand: KENDALL

## (undated) DEVICE — SYR 5ML 1/5 GRAD LL NSAF LF --

## (undated) DEVICE — SYRINGE, LUER SLIP, STERILE, 60ML: Brand: MEDLINE

## (undated) DEVICE — NDL PRT INJ NSAF BLNT 18GX1.5 --

## (undated) DEVICE — SYR 3ML LL TIP 1/10ML GRAD --